# Patient Record
Sex: MALE | Race: OTHER | URBAN - METROPOLITAN AREA
[De-identification: names, ages, dates, MRNs, and addresses within clinical notes are randomized per-mention and may not be internally consistent; named-entity substitution may affect disease eponyms.]

---
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
## 2021-02-24 ENCOUNTER — EMERGENCY (EMERGENCY)
Facility: HOSPITAL | Age: 21
LOS: 0 days | Discharge: HOME | End: 2021-02-24
Attending: EMERGENCY MEDICINE
Payer: MEDICAID

## 2021-02-24 VITALS — HEIGHT: 64 IN | WEIGHT: 145.95 LBS

## 2021-02-24 VITALS
TEMPERATURE: 99 F | RESPIRATION RATE: 16 BRPM | SYSTOLIC BLOOD PRESSURE: 119 MMHG | DIASTOLIC BLOOD PRESSURE: 56 MMHG | HEART RATE: 56 BPM | OXYGEN SATURATION: 100 %

## 2021-02-24 DIAGNOSIS — S60.948A: ICD-10-CM

## 2021-02-24 DIAGNOSIS — S60.921A UNSPECIFIED SUPERFICIAL INJURY OF RIGHT HAND, INITIAL ENCOUNTER: ICD-10-CM
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
## 2021-02-24 PROCEDURE — 29125 APPL SHORT ARM SPLINT STATIC: CPT | Mod: F5

## 2021-02-24 PROCEDURE — 73140 X-RAY EXAM OF FINGER(S): CPT | Mod: RT

## 2021-02-24 PROCEDURE — 73130 X-RAY EXAM OF HAND: CPT | Mod: 26,RT

## 2021-02-24 PROCEDURE — 99283 EMERGENCY DEPT VISIT LOW MDM: CPT

## 2021-02-24 PROCEDURE — 99284 EMERGENCY DEPT VISIT MOD MDM: CPT | Mod: 25

## 2021-02-24 PROCEDURE — 73140 X-RAY EXAM OF FINGER(S): CPT | Mod: 26,59,RT

## 2021-02-24 PROCEDURE — 29125 APPL SHORT ARM SPLINT STATIC: CPT

## 2021-02-24 PROCEDURE — 73130 X-RAY EXAM OF HAND: CPT | Mod: RT

## 2021-02-24 NOTE — ED STATDOCS - NSFOLLOWUPINSTRUCTIONS_ED_ALL_ED_FT
Contusión    Contusion      Lulu contusión es un hematoma profundo. Las contusiones son el resultado de un traumatismo cerrado en los tejidos y las fibras musculares que están debajo de la piel. La lesión causa lulu hemorragia debajo de la piel. La piel sobre la contusión puede ponerse de color james, shelia o amarillo. Las lesiones menores le causarán lulu contusión indolora; sin embargo, las lesiones más graves causan contusiones en las que el dolor y la hinchazón pueden prolongarse enoc algunas semanas.      Siga estas indicaciones en bernard casa:    Esté atento a cualquier cambio en los síntomas. Informe a bernard médico acerca de candis síntomas. [place] estas medidas para aliviar el dolor.      Control del dolor, el entumecimiento y la hinchazón    •Use reposo, aplicación de hielo y aplicación de presión (compresión), y poner en alto (elevar) la yuliana lesionada. Generalmente, esto se conoce sharif la estrategia de RHCE.  •Mantenga la yuliana de la lesión en reposo. Retome candis actividades normales según lo indicado por el médico. Pregúntele al médico qué actividades son seguras para usted.    •Si se lo indican, aplique hielo sobre la yuliana de la lesión:  •Ponga el hielo en lulu bolsa plástica.      •Coloque lulu toalla entre la piel y la bolsa.      •Coloque el hielo enoc 20 minutos, 2 a 3 veces al día.        •Si se lo indican, ejerza lulu compresión suave en la yuliana de la lesión con lulu venda elástica. Asegúrese de que la venda no esté muy ajustada. Quítese y vuelva a colocarse la venda sharif se lo haya indicado el médico.      •Si es posible, cuando esté sentado o acostado, levante (eleve) la yuliana lesionada por encima del nivel del corazón.        Indicaciones generales     •[place] los medicamentos de venta williams y los recetados solamente sharif se lo haya indicado el médico.      •Concurra a todas las visitas de control sharif se lo haya indicado el médico. [place] es importante.        Comuníquese con un médico si:    •Los síntomas no mejoran después de varios días de tratamiento.      •Candis síntomas empeoran.      •Tiene dificultad para  la yuliana lesionada.        Solicite ayuda inmediatamente si:    •Siente dolor intenso.      •Siente adormecimiento en lulu mano o un pie.      •La mano o el pie están pálidos o fríos.        Resumen    •Lulu contusión es un hematoma profundo.      •Las contusiones son el resultado de un traumatismo cerrado en los tejidos y las fibras musculares que están debajo de la piel.      •El tratamiento es hacer reposo, aplicarse hielo, compresión y elevar la yuliana afectada. Es posible que le den analgésicos de venta williams.      •Comuníquese con un médico si los síntomas no mejoran o si empeoran.       •Solicite ayuda de inmediato si tiene dolor intenso, entumecimiento o si la yuliana se torna pálida o fría.      Esta información no tiene sharif fin reemplazar el consejo del médico. Asegúrese de hacerle al médico cualquier pregunta que tenga.

## 2021-02-24 NOTE — ED STATDOCS - PATIENT PORTAL LINK FT
You can access the FollowMyHealth Patient Portal offered by [facility] by registering at the following website: [email or web address]. By joining [employer]’s FollowMyHealth portal, you will also be able to view your health information using other applications (apps) compatible with our system.
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
## 2021-02-24 NOTE — ED STATDOCS - CARE PROVIDER_API CALL
Ghislaine Wilson)  [facility]  [address] [place] [address]  Phone: [phone]  Fax: [phone]  Follow Up Time:
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
## 2021-02-24 NOTE — ED STATDOCS - CLINICAL SUMMARY MEDICAL DECISION MAKING FREE TEXT BOX
Will evaluate for fracture, dispo pending XR, likely will need thumb spica Will evaluate for fracture to base of thumb.  No ligamentous laxity.  Unclear exact mechanism of injury to thumb.  Will require thumb spica and outpatient f/u, dispo pending XR.

## 2021-06-24 PROBLEM — Z00.00 ENCOUNTER FOR PREVENTIVE HEALTH EXAMINATION: Status: ACTIVE | Noted: 2021-06-24

## 2021-07-09 ENCOUNTER — APPOINTMENT (OUTPATIENT)
Age: 21
End: 2021-07-09

## 2021-07-20 ENCOUNTER — INPATIENT (INPATIENT)
Facility: HOSPITAL | Age: 21
LOS: 21 days | Discharge: HOME | DRG: 751 | End: 2021-08-11
Attending: PSYCHIATRY & NEUROLOGY | Admitting: PSYCHIATRY & NEUROLOGY
Payer: MEDICAID

## 2021-07-20 VITALS
RESPIRATION RATE: 16 BRPM | DIASTOLIC BLOOD PRESSURE: 61 MMHG | HEIGHT: 64 IN | TEMPERATURE: 98 F | HEART RATE: 75 BPM | WEIGHT: 139.99 LBS | SYSTOLIC BLOOD PRESSURE: 113 MMHG | OXYGEN SATURATION: 98 %

## 2021-07-20 NOTE — ED ADULT TRIAGE NOTE - CHIEF COMPLAINT QUOTE
Pt. BIBEMS from home c/o abnormal behavior. As per EMS, family called because they thought pt. "took something".  used [id number], pt. A&Ox4 in triage. Denies drug/alcohol use. Acting appropriately.

## 2021-07-21 DIAGNOSIS — F23 BRIEF PSYCHOTIC DISORDER: ICD-10-CM

## 2021-07-21 DIAGNOSIS — F29 UNSPECIFIED PSYCHOSIS NOT DUE TO A SUBSTANCE OR KNOWN PHYSIOLOGICAL CONDITION: ICD-10-CM

## 2021-07-21 LAB
ALBUMIN SERPL ELPH-MCNC: 4.4 G/DL — SIGNIFICANT CHANGE UP (ref 3.3–5)
ALP SERPL-CCNC: 54 U/L — SIGNIFICANT CHANGE UP (ref 40–120)
ALT FLD-CCNC: 27 U/L — SIGNIFICANT CHANGE UP (ref 12–78)
ANION GAP SERPL CALC-SCNC: 3 MMOL/L — LOW (ref 5–17)
APAP SERPL-MCNC: < 2 UG/ML (ref 10–30)
APPEARANCE UR: CLEAR — SIGNIFICANT CHANGE UP
AST SERPL-CCNC: 32 U/L — SIGNIFICANT CHANGE UP (ref 15–37)
BASOPHILS # BLD AUTO: 0.02 K/UL — SIGNIFICANT CHANGE UP (ref 0–0.2)
BASOPHILS NFR BLD AUTO: 0.2 % — SIGNIFICANT CHANGE UP (ref 0–2)
BILIRUB SERPL-MCNC: 0.4 MG/DL — SIGNIFICANT CHANGE UP (ref 0.2–1.2)
BILIRUB UR-MCNC: NEGATIVE — SIGNIFICANT CHANGE UP
BUN SERPL-MCNC: 13 MG/DL — SIGNIFICANT CHANGE UP (ref 7–23)
CALCIUM SERPL-MCNC: 9 MG/DL — SIGNIFICANT CHANGE UP (ref 8.5–10.1)
CHLORIDE SERPL-SCNC: 105 MMOL/L — SIGNIFICANT CHANGE UP (ref 96–108)
CO2 SERPL-SCNC: 27 MMOL/L — SIGNIFICANT CHANGE UP (ref 22–31)
COLOR SPEC: YELLOW — SIGNIFICANT CHANGE UP
CREAT SERPL-MCNC: 1.05 MG/DL — SIGNIFICANT CHANGE UP (ref 0.5–1.3)
DIFF PNL FLD: ABNORMAL
EOSINOPHIL # BLD AUTO: 0.04 K/UL — SIGNIFICANT CHANGE UP (ref 0–0.5)
EOSINOPHIL NFR BLD AUTO: 0.5 % — SIGNIFICANT CHANGE UP (ref 0–6)
ETHANOL SERPL-MCNC: <10 MG/DL — SIGNIFICANT CHANGE UP (ref 0–10)
GLUCOSE SERPL-MCNC: 101 MG/DL — HIGH (ref 70–99)
GLUCOSE UR QL: NEGATIVE MG/DL — SIGNIFICANT CHANGE UP
HCT VFR BLD CALC: 42.7 % — SIGNIFICANT CHANGE UP (ref 39–50)
HGB BLD-MCNC: 14.6 G/DL — SIGNIFICANT CHANGE UP (ref 13–17)
IMM GRANULOCYTES NFR BLD AUTO: 0.2 % — SIGNIFICANT CHANGE UP (ref 0–1.5)
KETONES UR-MCNC: NEGATIVE — SIGNIFICANT CHANGE UP
LEUKOCYTE ESTERASE UR-ACNC: NEGATIVE — SIGNIFICANT CHANGE UP
LYMPHOCYTES # BLD AUTO: 1.7 K/UL — SIGNIFICANT CHANGE UP (ref 1–3.3)
LYMPHOCYTES # BLD AUTO: 20.6 % — SIGNIFICANT CHANGE UP (ref 13–44)
MCHC RBC-ENTMCNC: 29.9 PG — SIGNIFICANT CHANGE UP (ref 27–34)
MCHC RBC-ENTMCNC: 34.2 GM/DL — SIGNIFICANT CHANGE UP (ref 32–36)
MCV RBC AUTO: 87.3 FL — SIGNIFICANT CHANGE UP (ref 80–100)
MONOCYTES # BLD AUTO: 0.48 K/UL — SIGNIFICANT CHANGE UP (ref 0–0.9)
MONOCYTES NFR BLD AUTO: 5.8 % — SIGNIFICANT CHANGE UP (ref 2–14)
NEUTROPHILS # BLD AUTO: 6.01 K/UL — SIGNIFICANT CHANGE UP (ref 1.8–7.4)
NEUTROPHILS NFR BLD AUTO: 72.7 % — SIGNIFICANT CHANGE UP (ref 43–77)
NITRITE UR-MCNC: NEGATIVE — SIGNIFICANT CHANGE UP
PCP SPEC-MCNC: SIGNIFICANT CHANGE UP
PH UR: 6 — SIGNIFICANT CHANGE UP (ref 5–8)
PLATELET # BLD AUTO: 250 K/UL — SIGNIFICANT CHANGE UP (ref 150–400)
POTASSIUM SERPL-MCNC: 3.7 MMOL/L — SIGNIFICANT CHANGE UP (ref 3.5–5.3)
POTASSIUM SERPL-SCNC: 3.7 MMOL/L — SIGNIFICANT CHANGE UP (ref 3.5–5.3)
PROT SERPL-MCNC: 7.6 GM/DL — SIGNIFICANT CHANGE UP (ref 6–8.3)
PROT UR-MCNC: NEGATIVE MG/DL — SIGNIFICANT CHANGE UP
RBC # BLD: 4.89 M/UL — SIGNIFICANT CHANGE UP (ref 4.2–5.8)
RBC # FLD: 11.8 % — SIGNIFICANT CHANGE UP (ref 10.3–14.5)
SALICYLATES SERPL-MCNC: <1.7 MG/DL (ref 2.8–20)
SARS-COV-2 RNA SPEC QL NAA+PROBE: SIGNIFICANT CHANGE UP
SODIUM SERPL-SCNC: 135 MMOL/L — SIGNIFICANT CHANGE UP (ref 135–145)
SP GR SPEC: 1.01 — SIGNIFICANT CHANGE UP (ref 1.01–1.02)
UROBILINOGEN FLD QL: NEGATIVE MG/DL — SIGNIFICANT CHANGE UP
WBC # BLD: 8.27 K/UL — SIGNIFICANT CHANGE UP (ref 3.8–10.5)
WBC # FLD AUTO: 8.27 K/UL — SIGNIFICANT CHANGE UP (ref 3.8–10.5)

## 2021-07-21 PROCEDURE — 99222 1ST HOSP IP/OBS MODERATE 55: CPT

## 2021-07-21 PROCEDURE — 93010 ELECTROCARDIOGRAM REPORT: CPT

## 2021-07-21 PROCEDURE — 80061 LIPID PANEL: CPT

## 2021-07-21 PROCEDURE — 86769 SARS-COV-2 COVID-19 ANTIBODY: CPT

## 2021-07-21 PROCEDURE — 84443 ASSAY THYROID STIM HORMONE: CPT

## 2021-07-21 PROCEDURE — 70450 CT HEAD/BRAIN W/O DYE: CPT | Mod: 26,MG

## 2021-07-21 PROCEDURE — 80053 COMPREHEN METABOLIC PANEL: CPT

## 2021-07-21 PROCEDURE — 93005 ELECTROCARDIOGRAM TRACING: CPT

## 2021-07-21 PROCEDURE — 83036 HEMOGLOBIN GLYCOSYLATED A1C: CPT

## 2021-07-21 PROCEDURE — 36415 COLL VENOUS BLD VENIPUNCTURE: CPT

## 2021-07-21 PROCEDURE — 85025 COMPLETE CBC W/AUTO DIFF WBC: CPT

## 2021-07-21 PROCEDURE — G1004: CPT

## 2021-07-21 RX ORDER — OLANZAPINE 15 MG/1
2.5 TABLET, FILM COATED ORAL AT BEDTIME
Refills: 0 | Status: DISCONTINUED | OUTPATIENT
Start: 2021-07-21 | End: 2021-07-24

## 2021-07-21 RX ORDER — DIPHENHYDRAMINE HCL 50 MG
50 CAPSULE ORAL EVERY 6 HOURS
Refills: 0 | Status: DISCONTINUED | OUTPATIENT
Start: 2021-07-21 | End: 2021-07-21

## 2021-07-21 RX ORDER — OLANZAPINE 15 MG/1
2.5 TABLET, FILM COATED ORAL EVERY 6 HOURS
Refills: 0 | Status: DISCONTINUED | OUTPATIENT
Start: 2021-07-21 | End: 2021-07-22
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
## 2021-07-21 NOTE — PROGRESS NOTE BEHAVIORAL HEALTH - NSBHADDHXPSYSOCFT_PSY_A_CORE
Pt is living with his uncle and cousins in [place]. The pt emigrated from [facility] 5 years ago.   The pt is working as a  and has been attending college in [place].
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
## 2021-07-21 NOTE — ED ADULT NURSE NOTE - NSIMPLEMENTINTERV_GEN_ALL_ED
Implemented All Universal Safety Interventions:  [place] to call system. Call bell, personal items and telephone within reach. Instruct patient to call for assistance. Room bathroom lighting operational. Non-slip footwear when patient is off stretcher. Physically safe environment: no spills, clutter or unnecessary equipment. Stretcher in lowest position, wheels locked, appropriate side rails in place.

## 2021-07-21 NOTE — ED BEHAVIORAL HEALTH NOTE - BEHAVIORAL HEALTH NOTE
Consult requested by EM Attending Dr. Araujo at 2:42. Telepsychiatry attempted to consult at 3:20 but unable to initiate consult due to patient not being set up with Telepsych cart. ED staff aware.
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
## 2021-07-21 NOTE — PROGRESS NOTE BEHAVIORAL HEALTH - NSBHFUPADDHPIFT_PSY_A_CORE
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 He is brought in by his uncle for bizarre behavior and hearing voices.     Patient is a poor historian and superficially cooperative; it is unclear if this is due to drowsiness, psychotic or thought disorder, or language barriers. He denies any current mood sx or hallucinations. Unable to complete full evaluation as patient was unable to participate fully.

## 2021-07-21 NOTE — ED BEHAVIORAL HEALTH ASSESSMENT NOTE - RISK ASSESSMENT
COVID Exposure Screen- Patient  1.	*Have you had a COVID-19 test in the last 90 days?  (  ) Yes   (  ) No   ( x ) Unknown- Reason: _____  IF YES PROCEED TO QUESTION #2. IF NO OR UNKNOWN, PLEASE SKIP TO QUESTION #3.  2.	Date of test(s) and result(s): ________  3.	*Have you tested positive for COVID-19 antibodies? (  ) Yes   (  ) No   (x  ) Unknown- Reason: _____  IF YES PROCEED TO QUESTION #4. IF NO or UNKNOWN, PLEASE SKIP TO QUESTION #5.  4.	Date of positive antibody test: ________  5.	*Have you received 2 doses of the COVID-19 vaccine? (  ) Yes   ( ) No   ( x ) Unknown- Reason: _____   IF YES PROCEED TO QUESTION #6. IF NO or UNKNOWN, PLEASE SKIP TO QUESTION #7.  6.	Date of second dose: ________  7.	*In the past 10 days, have you been around anyone with a positive COVID-19 test?* (  ) Yes   (  ) No   ( x ) Unknown- Reason: ____  IF YES PROCEED TO QUESTION #8. IF NO or UNKNOWN, PLEASE SKIP TO QUESTION #13.  8.	Were you within 6 feet of them for at least 15 minutes? (  ) Yes   (  ) No   (  ) Unknown- Reason: _____  9.	Have you provided care for them? (  ) Yes   (  ) No   (  ) Unknown- Reason: ______  10.	Have you had direct physical contact with them (touched, hugged, or kissed them)? (  ) Yes   (  ) No    (  ) Unknown- Reason: _____  11.	Have you shared eating or drinking utensils with them? (  ) Yes   (  ) No    (  ) Unknown- Reason: ____  12.	Have they sneezed, coughed, or somehow gotten respiratory droplets on you? (  ) Yes   (  ) No    (  ) Unknown- Reason: ______  13.	*Have you been out of [place] within the past 10 days?* (  ) Yes   (  ) No   (  x) Unknown- Reason: _____  IF YES PLEASE ANSWER THE FOLLOWING QUESTIONS:  14.	Which state/country have you been to? ______  15.	Were you there over 24 hours? (  ) Yes   (  ) No    (  ) Unknown- Reason: ______  16.	Date of return [facility]: ______ Unable to determine Suicide Risk unable to assess     COVID Exposure Screen- Patient  1.	*Have you had a COVID-19 test in the last 90 days?  (  ) Yes   (  ) No   ( x ) Unknown- Reason: _____  IF YES PROCEED TO QUESTION #2. IF NO OR UNKNOWN, PLEASE SKIP TO QUESTION #3.  2.	Date of test(s) and result(s): ________  3.	*Have you tested positive for COVID-19 antibodies? (  ) Yes   (  ) No   (x  ) Unknown- Reason: _____  IF YES PROCEED TO QUESTION #4. IF NO or UNKNOWN, PLEASE SKIP TO QUESTION #5.  4.	Date of positive antibody test: ________  5.	*Have you received 2 doses of the COVID-19 vaccine? (  ) Yes   ( ) No   ( x ) Unknown- Reason: _____   IF YES PROCEED TO QUESTION #6. IF NO or UNKNOWN, PLEASE SKIP TO QUESTION #7.  6.	Date of second dose: ________  7.	*In the past 10 days, have you been around anyone with a positive COVID-19 test?* (  ) Yes   (  ) No   ( x ) Unknown- Reason: ____  IF YES PROCEED TO QUESTION #8. IF NO or UNKNOWN, PLEASE SKIP TO QUESTION #13.  8.	Were you within 6 feet of them for at least 15 minutes? (  ) Yes   (  ) No   (  ) Unknown- Reason: _____  9.	Have you provided care for them? (  ) Yes   (  ) No   (  ) Unknown- Reason: ______  10.	Have you had direct physical contact with them (touched, hugged, or kissed them)? (  ) Yes   (  ) No    (  ) Unknown- Reason: _____  11.	Have you shared eating or drinking utensils with them? (  ) Yes   (  ) No    (  ) Unknown- Reason: ____  12.	Have they sneezed, coughed, or somehow gotten respiratory droplets on you? (  ) Yes   (  ) No    (  ) Unknown- Reason: ______  13.	*Have you been out of [place] within the past 10 days?* (  ) Yes   (  ) No   (  x) Unknown- Reason: _____  IF YES PLEASE ANSWER THE FOLLOWING QUESTIONS:  14.	Which state/country have you been to? ______  15.	Were you there over 24 hours? (  ) Yes   (  ) No    (  ) Unknown- Reason: ______  16.	Date of return to [facility]: ______

## 2021-07-21 NOTE — ED BEHAVIORAL HEALTH ASSESSMENT NOTE - HPI (INCLUDE ILLNESS QUALITY, SEVERITY, DURATION, TIMING, CONTEXT, MODIFYING FACTORS, ASSOCIATED SIGNS AND SYMPTOMS)
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 He is brought in by his uncle for bizarre behavior and hearing voices.     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 He is brought in by his uncle for bizarre behavior and hearing voices.     On exam, patient is drowsy and superficially cooperative; evaluation is limited as he is a poor historian. Patient reports he doesn't know why his uncle brought him to the ER, other than he was "looking weird". Patient reports he feels fine and denies any mood sx or changes in sleep, appetite, or energy. When asked about joining a gang or hearing voices, patient denies AVH, paranoia, or delusions. Patient required questions to be repeated to him several times, unclear if this is due to psychotic sx, thought blocking, drowsiness, or comprehension. Unable to complete evaluation.     No collateral available as patient cannot recall uncle's number.

## 2021-07-21 NOTE — PROGRESS NOTE BEHAVIORAL HEALTH - ACTIVATING EVENTS/STRESSORS
Triggering events leading to humiliation, shame, and/or despair (e.g. Loss of relationship, financial or health status) (real or anticipated)/Current or pending social isolation/Substance intoxication or withdrawal

## 2021-07-21 NOTE — ED ADULT NURSE NOTE - CHIEF COMPLAINT QUOTE
Pt. BIBEMS from home c/o abnormal behavior. As per EMS, family called because they thought pt. "took something".  used [id number], pt. A&Ox4 in triage. Denies drug/alcohol use. Acting appropriately.

## 2021-07-21 NOTE — PROGRESS NOTE BEHAVIORAL HEALTH - OTHER
minimally cooperative due to current apparent pt internal preoccupation unable to assess due to pt limited ability to participate in evaluation due to thought disorganization as above paucity of speech overall

## 2021-07-21 NOTE — PROGRESS NOTE BEHAVIORAL HEALTH - SUMMARY
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 He is brought in by his uncle for bizarre behavior and hearing voices.     Patient is a poor historian and superficially cooperative; it is unclear if this is due to drowsiness, psychotic and  thought disorder, or possible unknown substances abuse.   No collaterals available on admission.     PLan:   admit to 5N 9.39 for observation and psychosis   Start Zyprexa 2.5 mg at HS,   No need for CO on the unit,.

## 2021-07-21 NOTE — ED PROVIDER NOTE - PROGRESS NOTE DETAILS
case d/w Dr Lobo telepsych and will evaluate pt pt evaluated by telepsych and recommend hold for collateral

## 2021-07-21 NOTE — PROGRESS NOTE BEHAVIORAL HEALTH - SUMMARY
Patient is a 21yo Citizen of [place] male, Bruneian speaking, domiciled with uncle and cousins, reports goes to college in [place], employed at a factory, with no formal pph, no prior psych admissions, no current outpatient, denies past SAs, denies substance use, and no known pmh. He is brought in by his uncle for bizarre behavior and hearing voices.     Patient is a poor historian and superficially cooperative; it is unclear if this is due to drowsiness, psychotic or thought disorder, or language barriers. He denies any current mood sx or hallucinations. Unable to complete full evaluation as patient was unable to participate fully.     Hold for reassess for additional collateral from uncle and when patient may be able to better engage in interview. The pt. is a 20 year-old Ethiopian male  in the  x 5 years and living with his family in [place]. 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 ED on 7/21/2021  by his uncle for  pt reportedly new onset bizarre behavior and reported AH.      The pt was largely unable to participate in his evaluation due to possible evident pt internal preoccupation and difficulty with attention despite use of the  services. The pt was seen by Psychiatry and admitted to  inpatient psychiatry on 7/21/2021 on a 9.39 emergency legal status for acute pt. safety  and for further pt evaluation and treatment of his psychotic disorder.

## 2021-07-21 NOTE — ED BEHAVIORAL HEALTH ASSESSMENT NOTE - SUMMARY
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 He is brought in by his uncle for bizarre behavior and hearing voices.     Patient is a poor historian and superficially cooperative; it is unclear if this is due to drowsiness, psychotic or thought disorder, or language barriers. He denies any current mood sx or hallucinations. Unable to complete full evaluation as patient was unable to participate fully.     Hold for reassess for additional collateral from uncle and when patient may be able to better engage in interview.

## 2021-07-21 NOTE — ED BEHAVIORAL HEALTH ASSESSMENT NOTE - DESCRIPTION
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

## 2021-07-21 NOTE — ED BEHAVIORAL HEALTH ASSESSMENT NOTE - PAST PSYCHOTROPIC MEDICATION
Left message to notify patient of results. Let her know if she had any questions she could call me back. Summary   Left ventricular function is normal, EF is estimated at 55-60%. Grade I diastolic dysfunction. Mild left ventricular hypertrophy. Bi atrial enlargement noted. The mitral valve appears to slightly prolapse. Moderate to severe mitral and tricuspid regurgitation is present. Aneurysmal interatrial septum. RVSP is 34 mmHg. No evidence of pericardial effusion.       Signature      ------------------------------------------------------------------   Electronically signed by Forest Russo MD (Interpreting   physician) on 03/17/2021 at 12:45 PM
denies

## 2021-07-21 NOTE — PROGRESS NOTE BEHAVIORAL HEALTH - NSBHFUPSTRENGTHS_PSY_A_CORE
In good physical health
Attempting to realize his/her potential/Has supportive interpersonal relationships with family, friends or peers/Steady employment

## 2021-07-21 NOTE — PATIENT PROFILE BEHAVIORAL HEALTH - NSBHHXAWOL_PSY_A_CORE
[de-identified] : This is a 89-year-old female with a minimally displaced left distal clavicle fracture.  The treatment is nonoperative with brief immobilization in sling and nonweightbearing for the next 3 weeks.  I'd like to see her again at that time for repeat x-rays and to possibly advance her range of motion and prescribed physical therapy.  Pain control on a as needed basis.
no

## 2021-07-21 NOTE — PROGRESS NOTE BEHAVIORAL HEALTH - NSBHFUPADDHPIFT_PSY_A_CORE
The pt. is a 20 year-old Slovak male  in the  x 5 years and living with his family in [place]. 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      The pt was largely unable to participate in his evaluation due to possible evident pt internal preoccupation and difficulty with attention despite use of the  services. The pt was seen by Psychiatry and admitted to  inpatient psychiatry on 7/21/2021 on a 9.39 emergency legal status for acute pt. safety  and for further pt evaluation and treatment of his psychotic disorder.     Patient is a poor historian and superficially cooperative; it is unclear if this is due to drowsiness, psychotic or thought disorder, or language barriers. He denies any current mood sx or hallucinations. Unable to complete full evaluation as patient was unable to participate fully. The pt. is a 20 year-old Sudanese male  in the  x 5 years and living with his family in [place]. 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      The pt was largely unable to participate in his evaluation due to possible evident pt internal preoccupation and difficulty with attention despite use of the  services. The pt was seen by Psychiatry and admitted to  inpatient psychiatry on 7/21/2021 on a 9.39 emergency legal status for acute pt. safety  and for further pt evaluation and treatment of his psychotic disorder.

## 2021-07-22 LAB
CHOLEST SERPL-MCNC: 142 MG/DL — SIGNIFICANT CHANGE UP
HDLC SERPL-MCNC: 39 MG/DL — LOW
LIPID PNL WITH DIRECT LDL SERPL: 88 MG/DL — SIGNIFICANT CHANGE UP
NON HDL CHOLESTEROL: 103 MG/DL — SIGNIFICANT CHANGE UP
TRIGL SERPL-MCNC: 75 MG/DL — SIGNIFICANT CHANGE UP

## 2021-07-22 PROCEDURE — 99222 1ST HOSP IP/OBS MODERATE 55: CPT

## 2021-07-22 PROCEDURE — 99232 SBSQ HOSP IP/OBS MODERATE 35: CPT

## 2021-07-22 PROCEDURE — 93010 ELECTROCARDIOGRAM REPORT: CPT

## 2021-07-22 RX ORDER — HALOPERIDOL DECANOATE 100 MG/ML
5 INJECTION INTRAMUSCULAR EVERY 6 HOURS
Refills: 0 | Status: DISCONTINUED | OUTPATIENT
Start: 2021-07-22 | End: 2021-08-11

## 2021-07-22 RX ORDER — DIPHENHYDRAMINE HCL 50 MG
50 CAPSULE ORAL EVERY 6 HOURS
Refills: 0 | Status: DISCONTINUED | OUTPATIENT
Start: 2021-07-22 | End: 2021-08-11

## 2021-07-22 RX ORDER — MAGNESIUM HYDROXIDE 400 MG/1
30 TABLET, CHEWABLE ORAL DAILY
Refills: 0 | Status: DISCONTINUED | OUTPATIENT
Start: 2021-07-22 | End: 2021-08-11

## 2021-07-22 RX ORDER — MULTIVIT-MIN/FERROUS GLUCONATE 9 MG/15 ML
1 LIQUID (ML) ORAL DAILY
Refills: 0 | Status: DISCONTINUED | OUTPATIENT
Start: 2021-07-22 | End: 2021-08-11

## 2021-07-22 RX ORDER — ACETAMINOPHEN 500 MG
650 TABLET ORAL EVERY 6 HOURS
Refills: 0 | Status: DISCONTINUED | OUTPATIENT
Start: 2021-07-22 | End: 2021-08-11

## 2021-07-22 RX ADMIN — OLANZAPINE 2.5 MILLIGRAM(S): 15 TABLET, FILM COATED ORAL at 20:34

## 2021-07-22 NOTE — PROGRESS NOTE BEHAVIORAL HEALTH - NSBHLOCFT_PSY_A_CORE
pt is alert and oriented to person but unable to participate further in assessment due to apparent  thought disorder
pt is alert and oriented to person  and somewhat to place but unable to participate fully  in assessment due to apparent  thought disorder

## 2021-07-22 NOTE — H&P ADULT - NSHPSOCIALHISTORY_GEN_ALL_CORE
Pt lives with uncle and cousins. His parents are still in [facility]. He has lived in the [place] for the last 5 years and works in [employer]. Denies tobacco, alcohol, illicit drug use.

## 2021-07-22 NOTE — H&P ADULT - NSHPLABSRESULTS_GEN_ALL_CORE
Vital Signs Last 24 Hrs  T(C): 36.9 (2021 07:42), Max: 36.9 (2021 07:42)  T(F): 98.5 (2021 07:42), Max: 98.5 (2021 07:42)  HR: --  BP: --  BP(mean): --  RR: 14 (2021 07:42) (14 - 14)  SpO2: 100% (2021 07:42) (100% - 100%)\      Labs:                        14.6   8.27  )-----------( 250      ( 2021 00:56 )             42.7     07-21    135  |  105  |  13  ----------------------------<  101<H>  3.7   |  27  |  1.05    Ca    9.0      2021 00:56    TPro  7.6  /  Alb  4.4  /  TBili  0.4  /  DBili  x   /  AST  32  /  ALT  27  /  AlkPhos  54  07-21    LIVER FUNCTIONS - ( 2021 00:56 )  Alb: 4.4 g/dL / Pro: 7.6 gm/dL / ALK PHOS: 54 U/L / ALT: 27 U/L / AST: 32 U/L / GGT: x           Urinalysis Basic - ( 2021 02:08 )    Color: Yellow / Appearance: Clear / S.010 / pH: x  Gluc: x / Ketone: Negative  / Bili: Negative / Urobili: Negative mg/dL   Blood: x / Protein: Negative mg/dL / Nitrite: Negative   Leuk Esterase: Negative / RBC: 0-2 /HPF / WBC Negative   Sq Epi: x / Non Sq Epi: x / Bacteria: Occasional    EKG: NSR      < from: CT Head No Cont (21 @ 00:39) >    IMPRESSION:    No acute findings on noncontrast CT of the brain.    < end of copied text > Labs:                        14.6   8.27  )-----------( 250      ( 2021 00:56 )             42.7         135  |  105  |  13  ----------------------------<  101<H>  3.7   |  27  |  1.05    Ca    9.0      2021 00:56    TPro  7.6  /  Alb  4.4  /  TBili  0.4  /  DBili  x   /  AST  32  /  ALT  27  /  AlkPhos  54      LIVER FUNCTIONS - ( 2021 00:56 )  Alb: 4.4 g/dL / Pro: 7.6 gm/dL / ALK PHOS: 54 U/L / ALT: 27 U/L / AST: 32 U/L / GGT: x           Urinalysis Basic - ( 2021 02:08 )    Color: Yellow / Appearance: Clear / S.010 / pH: x  Gluc: x / Ketone: Negative  / Bili: Negative / Urobili: Negative mg/dL   Blood: x / Protein: Negative mg/dL / Nitrite: Negative   Leuk Esterase: Negative / RBC: 0-2 /HPF / WBC Negative   Sq Epi: x / Non Sq Epi: x / Bacteria: Occasional    EKG: NSR at 62      < from: CT Head No Cont (21 @ 00:39) >    IMPRESSION:    No acute findings on noncontrast CT of the brain.    < end of copied text >    MEDICATIONS  (STANDING):  multivitamin/minerals 1 Tablet(s) Oral daily  OLANZapine 2.5 milliGRAM(s) Oral at bedtime    MEDICATIONS  (PRN):  acetaminophen   Tablet .. 650 milliGRAM(s) Oral every 6 hours PRN Temp greater or equal to 38C (100.4F), Mild Pain (1 - 3), Moderate Pain (4 - 6)  aluminum hydroxide/magnesium hydroxide/simethicone Suspension 30 milliLiter(s) Oral every 6 hours PRN Dyspepsia  diphenhydrAMINE 50 milliGRAM(s) Oral every 6 hours PRN Extrapyramidal prophylaxis/psychosis related agitation  diphenhydrAMINE   Injectable 50 milliGRAM(s) IntraMuscular every 6 hours PRN Extrapyramidal prophylaxis/psychosis related agitation  haloperidol     Tablet 5 milliGRAM(s) Oral every 6 hours PRN moderate  psychosis related agitation  haloperidol    Injectable 5 milliGRAM(s) IntraMuscular every 6 hours PRN severe psychosis related agitation  LORazepam     Tablet 0.5 milliGRAM(s) Oral every 6 hours PRN moderate anxiety  LORazepam   Injectable 1 milliGRAM(s) IntraMuscular every 6 hours PRN severe psychosis related anxiety/agitation  magnesium hydroxide Suspension 30 milliLiter(s) Oral daily PRN Constipation

## 2021-07-22 NOTE — H&P ADULT - NSHPPHYSICALEXAM_GEN_ALL_CORE
GEN: lying in bed, NAD, cooperative  HEENT:  NC/AT, pupils equal and reactive, EOMI  CV:  +S1, +S2, RRR  RESP:   lungs clear to auscultation bilaterally, no wheeze, rales, rhonchi   BREAST:  not examined  GI:  abdomen soft, non-tender, non-distended, normoactive BS  RECTAL:  not examined  :  not examined  MSK:   normal muscle tone  EXT:  no edema, no calf tenderness  NEURO:  AAOX3, no focal neurological deficits  SKIN:  no rashes Vital Signs Last 24 Hrs  T(C): 36.9 (22 Jul 2021 07:42), Max: 36.9 (22 Jul 2021 07:42)  T(F): 98.5 (22 Jul 2021 07:42), Max: 98.5 (22 Jul 2021 07:42)  HR: --  BP: --  BP(mean): --  RR: 14 (22 Jul 2021 07:42) (14 - 14)  SpO2: 100% (22 Jul 2021 07:42) (100% - 100%)    GEN: lying in bed, NAD, cooperative  HEENT:  NC/AT, pupils equal and reactive, EOMI  CV:  +S1, +S2, RRR  RESP:   lungs clear to auscultation bilaterally, no wheeze, rales, rhonchi   BREAST:  not examined  GI:  abdomen soft, non-tender, non-distended, normoactive BS  RECTAL:  not examined  :  not examined  MSK:   normal muscle tone  EXT:  no edema, no calf tenderness  NEURO:  AAOX3, no focal neurological deficits  SKIN:  no rashes

## 2021-07-22 NOTE — H&P ADULT - ASSESSMENT
#AMS, auditory hallucinations  - medications as per psychology  - CTH neg for acute pathology  - UA neg  - Utox neg  - Lipid panel, TSH wnl  - a1c 5.0    #DVT ppx  - IMPROVE Score: 0   - encourage ambulation 20/M, with no PMHx, admitted to Psych service for    #AMS, auditory hallucinations  - medications and Mx as per Psychiatry  - CTH neg for acute pathology  - UA neg  - Utox neg  - TSH wnl  - a1c 5.0    #: Low HDL: encourage healthy diet and exercise once pt is in that state of comprehension  check in 1 yr with PCP    #DVT ppx  - IMPROVE Score: 0   - encourage ambulation    poc discussed with pt, team.  20/M, with no PMHx, admitted to Psych service for    #AMS, auditory hallucinations  - medications and Mx as per Psychiatry  - CTH neg for acute pathology  - UA neg  - Utox neg  - TSH wnl  - a1c 5.0    #: Low HDL: encourage healthy diet and exercise once pt is in that state of comprehension  recheck lipid profile in 1 yr with PCP    #DVT ppx  - IMPROVE Score: 0   - encourage ambulation    poc discussed with pt, team.

## 2021-07-22 NOTE — PROGRESS NOTE BEHAVIORAL HEALTH - OTHER
paucity of speech overall as above minimally cooperative due to current apparent pt internal preoccupation unable to assess due to pt limited ability to participate in evaluation due to thought disorganization

## 2021-07-22 NOTE — PROGRESS NOTE BEHAVIORAL HEALTH - SUMMARY
The pt. is a 20 year-old Anguillan male  in the  x 5 years and living with his family in [place]. 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      The pt was largely unable to participate in his evaluation due to possible evident pt internal preoccupation and difficulty with attention despite use of the  services. The pt was seen by Psychiatry and admitted to  inpatient psychiatry on 7/21/2021 on a 9.39 emergency legal status for acute pt. safety  and for further pt evaluation and treatment of his psychotic disorder.
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
## 2021-07-22 NOTE — H&P ADULT - ATTENDING COMMENTS
20/M with no PNHX, admitted to Psych service for  AMS/auditory Hallucinations: CT head neg, no uti, PNA, infection, neg u tox  Low HDL 39:  encourage healthy diet and exercise once pt is in that state of comprehension; check in 1 yr with PCP  DVt PPX; ambulation 20/M with no PNHX, admitted to Psych service for  AMS/auditory Hallucinations: CT head neg, no uti, PNA, infection, neg u tox  Low HDL 39:  encourage healthy diet and exercise once pt is in that state of comprehension; recheck lipid profile in 1 yr with PCP  DVT PPX; ambulation

## 2021-07-23 PROCEDURE — 99232 SBSQ HOSP IP/OBS MODERATE 35: CPT

## 2021-07-23 RX ADMIN — OLANZAPINE 2.5 MILLIGRAM(S): 15 TABLET, FILM COATED ORAL at 20:48

## 2021-07-23 RX ADMIN — Medication 1 TABLET(S): at 09:14

## 2021-07-23 NOTE — PROGRESS NOTE BEHAVIORAL HEALTH - OTHER
unable to assess due to pt limited ability to participate in evaluation due to thought disorganization as above paucity of speech overall slowly becoming more cooperative as the pt's thought disorder begins to slowly improve.

## 2021-07-23 NOTE — PROGRESS NOTE BEHAVIORAL HEALTH - SUMMARY
The pt. is a 20 year-old Russian male  in the  x 5 years and living with his family in [place]. 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      The pt was largely unable to participate in his evaluation due to possible evident pt internal preoccupation and difficulty with attention despite use of the  services. The pt was seen by Psychiatry and admitted to  inpatient psychiatry on 7/21/2021 on a 9.39 emergency legal status for acute pt. safety  and for further pt evaluation and treatment of his psychotic disorder.

## 2021-07-24 PROCEDURE — 99231 SBSQ HOSP IP/OBS SF/LOW 25: CPT

## 2021-07-24 RX ORDER — OLANZAPINE 15 MG/1
5 TABLET, FILM COATED ORAL AT BEDTIME
Refills: 0 | Status: DISCONTINUED | OUTPATIENT
Start: 2021-07-24 | End: 2021-08-02

## 2021-07-24 RX ADMIN — OLANZAPINE 5 MILLIGRAM(S): 15 TABLET, FILM COATED ORAL at 20:37

## 2021-07-24 RX ADMIN — Medication 1 TABLET(S): at 09:29

## 2021-07-24 NOTE — PROGRESS NOTE BEHAVIORAL HEALTH - OTHER
paucity of speech overall slowly becoming more cooperative as the pt's thought disorder begins to slowly improve. unable to assess due to pt limited ability to participate in evaluation due to thought disorganization as above

## 2021-07-24 NOTE — PROGRESS NOTE BEHAVIORAL HEALTH - SUMMARY
The pt. is a 20 year-old Filipino male  in the  x 5 years and living with his family in [place]. 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      The pt was largely unable to participate in his evaluation due to possible evident pt internal preoccupation and difficulty with attention despite use of the  services. The pt was seen by Psychiatry and admitted to  inpatient psychiatry on 7/21/2021 on a 9.39 emergency legal status for acute pt. safety  and for further pt evaluation and treatment of his psychotic disorder.

## 2021-07-25 PROCEDURE — 99231 SBSQ HOSP IP/OBS SF/LOW 25: CPT

## 2021-07-25 RX ADMIN — Medication 1 TABLET(S): at 09:27

## 2021-07-25 RX ADMIN — OLANZAPINE 5 MILLIGRAM(S): 15 TABLET, FILM COATED ORAL at 21:08

## 2021-07-25 NOTE — PROGRESS NOTE BEHAVIORAL HEALTH - SUMMARY
The pt. is a 20 year-old Mozambican male  in the  x 5 years and living with his family in [place]. 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      The pt was largely unable to participate in his evaluation due to possible evident pt internal preoccupation and difficulty with attention despite use of the  services. The pt was seen by Psychiatry and admitted to  inpatient psychiatry on 7/21/2021 on a 9.39 emergency legal status for acute pt. safety  and for further pt evaluation and treatment of his psychotic disorder.

## 2021-07-25 NOTE — PROGRESS NOTE BEHAVIORAL HEALTH - OTHER
slowly becoming more cooperative as the pt's thought disorder begins to slowly improve. paucity of speech overall unable to assess due to pt limited ability to participate in evaluation due to thought disorganization as above

## 2021-07-26 PROCEDURE — 99232 SBSQ HOSP IP/OBS MODERATE 35: CPT

## 2021-07-26 RX ADMIN — OLANZAPINE 5 MILLIGRAM(S): 15 TABLET, FILM COATED ORAL at 20:45

## 2021-07-26 RX ADMIN — Medication 1 TABLET(S): at 09:23

## 2021-07-26 NOTE — PROGRESS NOTE BEHAVIORAL HEALTH - OTHER
paucity of speech overall as above unable to assess due to pt limited ability to participate in evaluation due to thought disorganization slowly becoming more cooperative as the pt's thought disorder begins to slowly improve.

## 2021-07-26 NOTE — PROGRESS NOTE BEHAVIORAL HEALTH - SUMMARY
The pt. is a 20 year-old Ugandan male  in the  x 5 years and living with his family in [place]. 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      The pt was largely unable to participate in his evaluation due to possible evident pt internal preoccupation and difficulty with attention despite use of the  services. The pt was seen by Psychiatry and admitted to  inpatient psychiatry on 7/21/2021 on a 9.39 emergency legal status for acute pt. safety  and for further pt evaluation and treatment of his psychotic disorder.

## 2021-07-27 PROCEDURE — 99231 SBSQ HOSP IP/OBS SF/LOW 25: CPT

## 2021-07-27 RX ADMIN — Medication 1 TABLET(S): at 09:09

## 2021-07-27 RX ADMIN — OLANZAPINE 5 MILLIGRAM(S): 15 TABLET, FILM COATED ORAL at 20:54

## 2021-07-27 NOTE — PROGRESS NOTE BEHAVIORAL HEALTH - OTHER
slowly becoming more cooperative as the pt's thought disorder begins to slowly improve. unable to assess due to pt limited ability to participate in evaluation due to thought disorganization as above paucity of speech overall

## 2021-07-27 NOTE — PROGRESS NOTE BEHAVIORAL HEALTH - SUMMARY
The pt. is a 20 year-old Citizen of the [place] male  in the  x 5 years and living with his family in [place]. 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      The pt was largely unable to participate in his evaluation due to possible evident pt internal preoccupation and difficulty with attention despite use of the  services. The pt was seen by Psychiatry and admitted to  inpatient psychiatry on 7/21/2021 on a 9.39 emergency legal status for acute pt. safety  and for further pt evaluation and treatment of his psychotic disorder.

## 2021-07-28 PROCEDURE — 99231 SBSQ HOSP IP/OBS SF/LOW 25: CPT

## 2021-07-28 RX ADMIN — Medication 1 TABLET(S): at 09:35

## 2021-07-28 RX ADMIN — OLANZAPINE 5 MILLIGRAM(S): 15 TABLET, FILM COATED ORAL at 21:07

## 2021-07-28 NOTE — PROGRESS NOTE BEHAVIORAL HEALTH - SUMMARY
The pt. is a 20 year-old Belgian male  in the  x 5 years and living with his family in [place]. 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      The pt was largely unable to participate in his evaluation due to possible evident pt internal preoccupation and difficulty with attention despite use of the  services. The pt was seen by Psychiatry and admitted to  inpatient psychiatry on 7/21/2021 on a 9.39 emergency legal status for acute pt. safety  and for further pt evaluation and treatment of his psychotic disorder.

## 2021-07-29 PROCEDURE — 99231 SBSQ HOSP IP/OBS SF/LOW 25: CPT

## 2021-07-29 RX ADMIN — Medication 1 TABLET(S): at 09:24

## 2021-07-29 RX ADMIN — OLANZAPINE 5 MILLIGRAM(S): 15 TABLET, FILM COATED ORAL at 20:39

## 2021-07-29 NOTE — PROGRESS NOTE BEHAVIORAL HEALTH - SUMMARY
The pt. is a 20 year-old Syrian male  in the  x 5 years and living with his family in [place]. 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      The pt was largely unable to participate in his evaluation due to possible evident pt internal preoccupation and difficulty with attention despite use of the  services. The pt was seen by Psychiatry and admitted to  inpatient psychiatry on 7/21/2021 on a 9.39 emergency legal status for acute pt. safety  and for further pt evaluation and treatment of his psychotic disorder.

## 2021-07-29 NOTE — PROGRESS NOTE BEHAVIORAL HEALTH - OTHER
unable to assess due to pt limited ability to participate in evaluation due to thought disorganization as above slowly becoming more cooperative as the pt's thought disorder begins to slowly improve. paucity of speech overall

## 2021-07-30 PROCEDURE — 99231 SBSQ HOSP IP/OBS SF/LOW 25: CPT

## 2021-07-30 RX ADMIN — OLANZAPINE 5 MILLIGRAM(S): 15 TABLET, FILM COATED ORAL at 21:00

## 2021-07-30 RX ADMIN — Medication 1 TABLET(S): at 09:31

## 2021-07-30 NOTE — PROGRESS NOTE BEHAVIORAL HEALTH - NSBHADMITNEWMEDDISCUSSNO_PSY_A_CORE FT
Pt currently psychotic and unable to participate meaningfully in his treatment

## 2021-07-30 NOTE — PROGRESS NOTE BEHAVIORAL HEALTH - SUMMARY
The pt. is a 20 year-old [place] citizen male  in the  x 5 years and living with his family in [place]. 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 ED on 7/21/2021  by his uncle for  pt reportedly new onset bizarre behavior and reported AH.      The pt was largely unable to participate in his evaluation due to possible evident pt internal preoccupation and difficulty with attention despite use of the  services. The pt was seen by Psychiatry and admitted to  inpatient psychiatry on 7/21/2021 on a 9.39 emergency legal status for acute pt. safety  and for further pt evaluation and treatment of his psychotic disorder.

## 2021-07-31 RX ADMIN — Medication 1 TABLET(S): at 09:42

## 2021-07-31 RX ADMIN — OLANZAPINE 5 MILLIGRAM(S): 15 TABLET, FILM COATED ORAL at 20:25

## 2021-08-01 RX ADMIN — OLANZAPINE 5 MILLIGRAM(S): 15 TABLET, FILM COATED ORAL at 20:48

## 2021-08-01 RX ADMIN — Medication 1 TABLET(S): at 09:22

## 2021-08-02 DIAGNOSIS — F19.959 OTHER PSYCHOACTIVE SUBSTANCE USE, UNSPECIFIED WITH PSYCHOACTIVE SUBSTANCE-INDUCED PSYCHOTIC DISORDER, UNSPECIFIED: ICD-10-CM

## 2021-08-02 PROCEDURE — 99232 SBSQ HOSP IP/OBS MODERATE 35: CPT

## 2021-08-02 RX ORDER — OLANZAPINE 15 MG/1
7.5 TABLET, FILM COATED ORAL AT BEDTIME
Refills: 0 | Status: DISCONTINUED | OUTPATIENT
Start: 2021-08-02 | End: 2021-08-04

## 2021-08-02 RX ADMIN — OLANZAPINE 7.5 MILLIGRAM(S): 15 TABLET, FILM COATED ORAL at 21:20

## 2021-08-02 RX ADMIN — Medication 1 TABLET(S): at 09:21

## 2021-08-02 NOTE — PROGRESS NOTE BEHAVIORAL HEALTH - NSBHLEGALSTATUS_PSY_A_CORE
9.39 (Emergency) legal conversion on 8/2/2021 due to ongoing pt psychosis and inability to function safely in a less restrictive setting at this time/9.27 (2PC)

## 2021-08-02 NOTE — PROGRESS NOTE BEHAVIORAL HEALTH - SUMMARY
The pt. is a 20 year-old Spanish male  in the  x 5 years and living with his family in [place]. 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      The pt was largely unable to participate in his evaluation due to possible evident pt internal preoccupation and difficulty with attention despite use of the  services. The pt was seen by Psychiatry and admitted to  inpatient psychiatry on 7/21/2021 on a 9.39 emergency legal status for acute pt. safety  and for further pt evaluation and treatment of his psychotic disorder.

## 2021-08-02 NOTE — PROGRESS NOTE BEHAVIORAL HEALTH - OTHER
slowly becoming more cooperative as the pt's thought disorder begins to slowly improve. as above " I am fine." paucity of speech overall pt appears reluctant to discuss events prior to his admission

## 2021-08-03 PROCEDURE — 99232 SBSQ HOSP IP/OBS MODERATE 35: CPT

## 2021-08-03 RX ADMIN — Medication 1 TABLET(S): at 09:37

## 2021-08-03 RX ADMIN — OLANZAPINE 7.5 MILLIGRAM(S): 15 TABLET, FILM COATED ORAL at 22:13

## 2021-08-03 RX ADMIN — Medication 0.5 MILLIGRAM(S): at 22:13

## 2021-08-03 NOTE — PROGRESS NOTE BEHAVIORAL HEALTH - OTHER
" I am still hearing voices. I feel anxious" pt still appears reluctant to discuss events prior to his admission generally limited but slowly becoming a bit more interactive with select staff improved, anxious and pleading as above speech paucity but with more frequent if monosyllabic responses to questions slowly becoming more cooperative as the pt's paranoia begins to slowly decrease in intensity ongoing but slowly less intense paranoid delusional thinking

## 2021-08-03 NOTE — PROGRESS NOTE BEHAVIORAL HEALTH - NSBHLEGALSTATUS_PSY_A_CORE
legal conversion on 8/2/2021 due to ongoing pt psychosis and inability to function safely in a less restrictive setting at this time/9.27 (2PC)

## 2021-08-03 NOTE — PROGRESS NOTE BEHAVIORAL HEALTH - SUMMARY
The pt. is a 20 year-old Mold[place] male  in the  x 5 years and living with his family in [place]. 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      The pt was largely unable to participate in his evaluation due to possible evident pt internal preoccupation and difficulty with attention despite use of the  services. The pt was seen by Psychiatry and admitted to  inpatient psychiatry on 7/21/2021 on a 9.39 emergency legal status for acute pt. safety  and for further pt evaluation and treatment of his psychotic disorder.

## 2021-08-04 PROCEDURE — 99232 SBSQ HOSP IP/OBS MODERATE 35: CPT

## 2021-08-04 RX ORDER — OLANZAPINE 15 MG/1
10 TABLET, FILM COATED ORAL AT BEDTIME
Refills: 0 | Status: DISCONTINUED | OUTPATIENT
Start: 2021-08-04 | End: 2021-08-11

## 2021-08-04 RX ADMIN — Medication 0.5 MILLIGRAM(S): at 21:19

## 2021-08-04 RX ADMIN — OLANZAPINE 10 MILLIGRAM(S): 15 TABLET, FILM COATED ORAL at 21:19

## 2021-08-04 RX ADMIN — Medication 0.5 MILLIGRAM(S): at 09:39

## 2021-08-04 RX ADMIN — Medication 1 TABLET(S): at 09:38

## 2021-08-04 NOTE — PROGRESS NOTE BEHAVIORAL HEALTH - OTHER
as above speech paucity but with more frequent , relevant though still monosyllabic responses to questions ongoing but slowly less intense paranoid delusional thinking apprehensive slowly becoming more cooperative as the pt's paranoia begins to very slowly decrease in intensity, pt remains reclusive and withdrawn to his room pt still appears reluctant to discuss events prior to his admission improved, becoming less overtly  anxious " I am still hearing voices. The anxiety is better." generally limited but slowly becoming a bit more interactive with  a few select staff

## 2021-08-04 NOTE — PROGRESS NOTE BEHAVIORAL HEALTH - SUMMARY
The pt. is a 20 year-old Niuean male  in the  x 5 years and living with his family in [place]. 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      The pt was largely unable to participate in his evaluation due to possible evident pt internal preoccupation and difficulty with attention despite use of the  services. The pt was seen by Psychiatry and admitted to  inpatient psychiatry on 7/21/2021 on a 9.39 emergency legal status for acute pt. safety  and for further pt evaluation and treatment of his psychotic disorder.

## 2021-08-05 DIAGNOSIS — F39 UNSPECIFIED MOOD [AFFECTIVE] DISORDER: ICD-10-CM

## 2021-08-05 LAB
ALBUMIN SERPL ELPH-MCNC: 3.9 G/DL — SIGNIFICANT CHANGE UP (ref 3.3–5)
ALP SERPL-CCNC: 44 U/L — SIGNIFICANT CHANGE UP (ref 40–120)
ALT FLD-CCNC: 36 U/L — SIGNIFICANT CHANGE UP (ref 12–78)
ANION GAP SERPL CALC-SCNC: 3 MMOL/L — LOW (ref 5–17)
AST SERPL-CCNC: 14 U/L — LOW (ref 15–37)
BASOPHILS # BLD AUTO: 0.02 K/UL — SIGNIFICANT CHANGE UP (ref 0–0.2)
BASOPHILS NFR BLD AUTO: 0.4 % — SIGNIFICANT CHANGE UP (ref 0–2)
BILIRUB SERPL-MCNC: 0.4 MG/DL — SIGNIFICANT CHANGE UP (ref 0.2–1.2)
BUN SERPL-MCNC: 17 MG/DL — SIGNIFICANT CHANGE UP (ref 7–23)
CALCIUM SERPL-MCNC: 9 MG/DL — SIGNIFICANT CHANGE UP (ref 8.5–10.1)
CHLORIDE SERPL-SCNC: 107 MMOL/L — SIGNIFICANT CHANGE UP (ref 96–108)
CO2 SERPL-SCNC: 30 MMOL/L — SIGNIFICANT CHANGE UP (ref 22–31)
CREAT SERPL-MCNC: 1.04 MG/DL — SIGNIFICANT CHANGE UP (ref 0.5–1.3)
EOSINOPHIL # BLD AUTO: 0.09 K/UL — SIGNIFICANT CHANGE UP (ref 0–0.5)
EOSINOPHIL NFR BLD AUTO: 2 % — SIGNIFICANT CHANGE UP (ref 0–6)
GLUCOSE SERPL-MCNC: 85 MG/DL — SIGNIFICANT CHANGE UP (ref 70–99)
HCT VFR BLD CALC: 45.1 % — SIGNIFICANT CHANGE UP (ref 39–50)
HGB BLD-MCNC: 15.3 G/DL — SIGNIFICANT CHANGE UP (ref 13–17)
IMM GRANULOCYTES NFR BLD AUTO: 0.7 % — SIGNIFICANT CHANGE UP (ref 0–1.5)
LYMPHOCYTES # BLD AUTO: 1.64 K/UL — SIGNIFICANT CHANGE UP (ref 1–3.3)
LYMPHOCYTES # BLD AUTO: 36 % — SIGNIFICANT CHANGE UP (ref 13–44)
MCHC RBC-ENTMCNC: 29.9 PG — SIGNIFICANT CHANGE UP (ref 27–34)
MCHC RBC-ENTMCNC: 33.9 GM/DL — SIGNIFICANT CHANGE UP (ref 32–36)
MCV RBC AUTO: 88.1 FL — SIGNIFICANT CHANGE UP (ref 80–100)
MONOCYTES # BLD AUTO: 0.29 K/UL — SIGNIFICANT CHANGE UP (ref 0–0.9)
MONOCYTES NFR BLD AUTO: 6.4 % — SIGNIFICANT CHANGE UP (ref 2–14)
NEUTROPHILS # BLD AUTO: 2.48 K/UL — SIGNIFICANT CHANGE UP (ref 1.8–7.4)
NEUTROPHILS NFR BLD AUTO: 54.5 % — SIGNIFICANT CHANGE UP (ref 43–77)
PLATELET # BLD AUTO: 225 K/UL — SIGNIFICANT CHANGE UP (ref 150–400)
POTASSIUM SERPL-MCNC: 4.4 MMOL/L — SIGNIFICANT CHANGE UP (ref 3.5–5.3)
POTASSIUM SERPL-SCNC: 4.4 MMOL/L — SIGNIFICANT CHANGE UP (ref 3.5–5.3)
PROT SERPL-MCNC: 7 GM/DL — SIGNIFICANT CHANGE UP (ref 6–8.3)
RBC # BLD: 5.12 M/UL — SIGNIFICANT CHANGE UP (ref 4.2–5.8)
RBC # FLD: 11.9 % — SIGNIFICANT CHANGE UP (ref 10.3–14.5)
SODIUM SERPL-SCNC: 140 MMOL/L — SIGNIFICANT CHANGE UP (ref 135–145)
WBC # BLD: 4.55 K/UL — SIGNIFICANT CHANGE UP (ref 3.8–10.5)
WBC # FLD AUTO: 4.55 K/UL — SIGNIFICANT CHANGE UP (ref 3.8–10.5)

## 2021-08-05 PROCEDURE — 99232 SBSQ HOSP IP/OBS MODERATE 35: CPT

## 2021-08-05 RX ADMIN — Medication 0.5 MILLIGRAM(S): at 22:45

## 2021-08-05 RX ADMIN — OLANZAPINE 10 MILLIGRAM(S): 15 TABLET, FILM COATED ORAL at 22:46

## 2021-08-05 RX ADMIN — Medication 1 TABLET(S): at 09:27

## 2021-08-05 RX ADMIN — Medication 0.5 MILLIGRAM(S): at 09:27

## 2021-08-05 NOTE — PROGRESS NOTE BEHAVIORAL HEALTH - SUMMARY
The pt. is a 20 year-old Palestinian male  in the  x 5 years and living with his family in [place]. 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      The pt was largely unable to participate in his evaluation due to possible evident pt internal preoccupation and difficulty with attention despite use of the  services. The pt was seen by Psychiatry and admitted to  inpatient psychiatry on 7/21/2021 on a 9.39 emergency legal status for acute pt. safety  and for further pt evaluation and treatment of his psychotic disorder.

## 2021-08-05 NOTE — PROGRESS NOTE BEHAVIORAL HEALTH - OTHER
pt still appears reluctant to discuss events prior to his admission as above speech paucity but with more frequent , relevant though still monosyllabic responses to questions slightly more animated and spontaneous still impoverished concrete apprehensive affect slowly less blunted and becoming more mood congruent still reclusive but slowly more cooperative with overall treatment plan " I am still hearing voices. The anxiety is better. I feel afraid.' decreasing in overall frequency and intensity

## 2021-08-06 DIAGNOSIS — F23 BRIEF PSYCHOTIC DISORDER: ICD-10-CM

## 2021-08-06 PROCEDURE — 99232 SBSQ HOSP IP/OBS MODERATE 35: CPT

## 2021-08-06 RX ADMIN — Medication 0.5 MILLIGRAM(S): at 20:54

## 2021-08-06 RX ADMIN — OLANZAPINE 10 MILLIGRAM(S): 15 TABLET, FILM COATED ORAL at 20:54

## 2021-08-06 RX ADMIN — Medication 1 TABLET(S): at 09:23

## 2021-08-06 NOTE — PROGRESS NOTE BEHAVIORAL HEALTH - SUMMARY
The pt. is a 20 year-old Citizen of [place] male  in the  x 5 years and living with his family in [place]. 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 ED on 7/21/2021  by his uncle for  pt reportedly new onset bizarre behavior and reported AH.      The pt was largely unable to participate in his evaluation due to possible evident pt internal preoccupation and difficulty with attention despite use of the  services. The pt was seen by Psychiatry and admitted to  inpatient psychiatry on 7/21/2021 on a 9.39 emergency legal status for acute pt. safety  and for further pt evaluation and treatment of his psychotic disorder.

## 2021-08-06 NOTE — PROGRESS NOTE BEHAVIORAL HEALTH - OTHER
pt still appears reluctant to discuss events prior to his admission at times more animated but with overall paucity of speech affect slowly less blunted and becoming more mood congruent speech paucity but with more frequent , relevant though still monosyllabic responses to questions still impoverished concrete still reclusive but slowly more cooperative with overall treatment plan improving decreasing in overall frequency and intensity apprehensive and frightened appearing as above " I am not afraid."

## 2021-08-07 RX ADMIN — Medication 0.5 MILLIGRAM(S): at 21:13

## 2021-08-07 RX ADMIN — Medication 1 TABLET(S): at 10:43

## 2021-08-07 RX ADMIN — OLANZAPINE 10 MILLIGRAM(S): 15 TABLET, FILM COATED ORAL at 20:53

## 2021-08-07 RX ADMIN — Medication 0.5 MILLIGRAM(S): at 20:54

## 2021-08-08 RX ADMIN — Medication 1 TABLET(S): at 09:34

## 2021-08-08 RX ADMIN — Medication 0.5 MILLIGRAM(S): at 21:07

## 2021-08-08 RX ADMIN — OLANZAPINE 10 MILLIGRAM(S): 15 TABLET, FILM COATED ORAL at 21:05

## 2021-08-09 DIAGNOSIS — F43.10 POST-TRAUMATIC STRESS DISORDER, UNSPECIFIED: ICD-10-CM

## 2021-08-09 PROCEDURE — 99232 SBSQ HOSP IP/OBS MODERATE 35: CPT

## 2021-08-09 RX ADMIN — OLANZAPINE 10 MILLIGRAM(S): 15 TABLET, FILM COATED ORAL at 22:03

## 2021-08-09 RX ADMIN — Medication 0.5 MILLIGRAM(S): at 22:03

## 2021-08-09 RX ADMIN — Medication 1 TABLET(S): at 09:43

## 2021-08-09 NOTE — PROGRESS NOTE BEHAVIORAL HEALTH - NS ED BHA MSE SPEECH SPONTANEITY
Increased latency
Other
Other
Increased latency
Other
Increased latency
Increased latency

## 2021-08-09 NOTE — PROGRESS NOTE BEHAVIORAL HEALTH - NSBHFUPVIOL_PSY_A_CORE
unable to assess
none known
unable to assess
none known
unable to assess
none known
unable to assess
none known
unable to assess
none known
unable to assess

## 2021-08-09 NOTE — PROGRESS NOTE BEHAVIORAL HEALTH - NSBHFUPTYPE_PSY_A_CORE
Inpatient
Inpatient
Inpatient-On Service Note
Inpatient
Inpatient-On Service Note
Inpatient

## 2021-08-09 NOTE — PROGRESS NOTE BEHAVIORAL HEALTH - RISK ASSESSMENT
Low current suicide risk  Acute risk factors- pt currently, suddenly psychotic with no clear etiology at this time, pt unable to care safely for himself due to psychosis    Chronic risk factors- none known, pt with no reported h/o psychosis, depression. 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
Low current suicide risk  Acute risk factors- pt currently, suddenly psychotic with no clear etiology at this time, pt unable to care safely for himself due to psychosis    Chronic risk factors- none known, pt with no reported h/o psychosis, depression. 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
Moderate risk: pt inially said he has SI, then denied, presents psychotic.   Increased long term risk: based on current presentation pt has pachyotic illness vs mood do with psychosis vs substance induced.   Protective factors: supportive uncle.   Mitigation> admission dn safety plan
Low current suicide risk  Acute risk factors- pt currently, suddenly psychotic with no clear etiology at this time, pt unable to care safely for himself due to psychosis    Chronic risk factors- none known, pt with no reported h/o psychosis, depression. 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
Low current suicide risk  Acute risk factors- pt currently, suddenly psychotic with no clear etiology at this time, pt unable to care safely for himself due to psychosis    Chronic risk factors- none known, pt with no reported h/o psychosis, depression. 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
Low current suicide risk  Acute risk factors- pt currently, suddenly psychotic with no clear etiology at this time, pt unable to care safely for himself due to psychosis    Chronic risk factors- none known, pt with no reported h/o psychosis, depression. 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
Low current suicide risk  Acute risk factors- pt currently, suddenly psychotic with no clear etiology at this time, pt unable to care safely for himself due to psychosis  , psychosis is slowly beginning to resolve with addition of low dose SGA Zyprexa  Chronic risk factors- none known, pt with no reported h/o psychosis, depression. 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
Low current suicide risk  Acute risk factors- pt currently, suddenly psychotic with no clear etiology at this time, pt unable to care safely for himself due to psychosis  , psychosis is slowly beginning to resolve with addition of low dose SGA Zyprexa, the pt's sleep and appetite continue to slowly improve  Chronic risk factors- none known, pt with no reported h/o psychosis, depression. 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
Low current suicide risk  Acute risk factors- pt currently, suddenly psychotic with no clear etiology at this time, pt unable to care safely for himself due to psychosis  , psychosis is slowly beginning to resolve with addition of low dose SGA Zyprexa, the pt's sleep and appetite continue to slowly improve  Chronic risk factors- none known, pt with no reported h/o psychosis, depression. 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
Low current suicide risk  Acute risk factors- pt currently, suddenly psychotic with no clear etiology at this time, pt unable to care safely for himself due to psychosis  , psychosis is slowly beginning to resolve with addition of low dose SGA Zyprexa, the pt's sleep and appetite continue to slowly improve  Chronic risk factors- none known, pt with no reported h/o psychosis, depression. 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
Low current suicide risk  Acute risk factors- pt currently, with decreasing  intensity of psychosis    Chronic risk factors- none known, pt with no reported h/o psychosis, depression. 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
Low current suicide risk  Acute risk factors- pt currently, suddenly psychotic with no clear etiology at this time, pt unable to care safely for himself due to psychosis    Chronic risk factors- none known, pt with no reported h/o psychosis, depression. 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
Low current suicide risk  Acute risk factors- pt currently, suddenly psychotic with no clear etiology at this time, pt unable to care safely for himself due to psychosis  , psychosis is continuing  to resolve with addition of  SGA Zyprexa, the pt's sleep and appetite continue to slowly improve  Chronic risk factors- none known, pt with no reported h/o psychosis, depression. 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
Low current suicide risk  Acute risk factors- pt currently, suddenly psychotic with no clear etiology at this time, pt unable to care safely for himself due to psychosis    Chronic risk factors- none known, pt with no reported h/o psychosis, depression. 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
Low current suicide risk  Acute risk factors- pt currently, suddenly psychotic with no clear etiology at this time, pt unable to care safely for himself due to psychosis    Chronic risk factors- none known, pt with no reported h/o psychosis, depression. 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
Low current suicide risk  Acute risk factors- pt currently, suddenly psychotic with likely PTSD acute stress reaction  , pt now better able to care safely for himself due to resolving psychosis  , psychosis is continuing  to resolve with addition of  SGA Zyprexa, the pt's sleep and appetite continue to slowly improve  Chronic risk factors- none known, pt with no reported h/o psychosis, depression. 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
Low current suicide risk  Acute risk factors- pt currently, suddenly psychotic with no clear etiology at this time, pt unable to care safely for himself due to psychosis    Chronic risk factors- none known, pt with no reported h/o psychosis, depression. 

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
## 2021-08-09 NOTE — PROGRESS NOTE BEHAVIORAL HEALTH - NSBHCHARTREVIEWVS_PSY_A_CORE FT
Vital Signs Last 24 Hrs  T(C): 36.9 (22 Jul 2021 07:42), Max: 36.9 (22 Jul 2021 07:42)  T(F): 98.5 (22 Jul 2021 07:42), Max: 98.5 (22 Jul 2021 07:42)  HR: --  BP: --  BP(mean): --  RR: 14 (22 Jul 2021 07:42) (14 - 14)  SpO2: 100% (22 Jul 2021 07:42) (100% - 100%)
Vital Signs Last 24 Hrs  T(C): 36.5 (26 Jul 2021 08:07), Max: 36.5 (26 Jul 2021 08:07)  T(F): 97.7 (26 Jul 2021 08:07), Max: 97.7 (26 Jul 2021 08:07)  HR: --  BP: --  BP(mean): --  RR: 14 (26 Jul 2021 08:07) (14 - 14)  SpO2: 100% (26 Jul 2021 08:07) (100% - 100%)
Vital Signs Last 24 Hrs  T(C): 36.3 (25 Jul 2021 08:01), Max: 36.3 (25 Jul 2021 08:01)  T(F): 97.4 (25 Jul 2021 08:01), Max: 97.4 (25 Jul 2021 08:01)  HR: --  BP: --  BP(mean): --  RR: 18 (25 Jul 2021 08:01) (18 - 18)  SpO2: 100% (25 Jul 2021 08:01) (100% - 100%)
Vital Signs Last 24 Hrs  T(C): 36.5 (04 Aug 2021 08:23), Max: 36.5 (04 Aug 2021 08:23)  T(F): 97.7 (04 Aug 2021 08:23), Max: 97.7 (04 Aug 2021 08:23)  HR: --  BP: --  BP(mean): --  RR: 14 (04 Aug 2021 08:23) (14 - 14)  SpO2: 100% (04 Aug 2021 08:23) (100% - 100%)
Vital Signs Last 24 Hrs  T(C): 36.9 (21 Jul 2021 11:33), Max: 37.2 (21 Jul 2021 06:02)  T(F): 98.4 (21 Jul 2021 11:33), Max: 98.9 (21 Jul 2021 06:02)  HR: 73 (21 Jul 2021 06:02) (73 - 75)  BP: 120/63 (21 Jul 2021 06:02) (113/61 - 120/63)  BP(mean): 79 (21 Jul 2021 06:02) (79 - 79)  RR: 14 (21 Jul 2021 11:33) (14 - 17)  SpO2: 100% (21 Jul 2021 11:33) (98% - 100%)
Vital Signs Last 24 Hrs  T(C): 36.5 (24 Jul 2021 08:28), Max: 36.5 (24 Jul 2021 08:28)  T(F): 97.7 (24 Jul 2021 08:28), Max: 97.7 (24 Jul 2021 08:28)  HR: --  BP: --  BP(mean): --  RR: 18 (24 Jul 2021 08:28) (18 - 18)  SpO2: 99% (24 Jul 2021 08:28) (99% - 99%)
Vital Signs Last 24 Hrs  T(C): 36.8 (06 Aug 2021 08:36), Max: 36.8 (06 Aug 2021 08:36)  T(F): 98.2 (06 Aug 2021 08:36), Max: 98.2 (06 Aug 2021 08:36)  HR: --  BP: --  BP(mean): --  RR: 14 (06 Aug 2021 08:36) (14 - 14)  SpO2: 100% (06 Aug 2021 08:36) (100% - 100%)
Vital Signs Last 24 Hrs  T(C): 36.6 (05 Aug 2021 08:24), Max: 36.6 (05 Aug 2021 08:24)  T(F): 97.9 (05 Aug 2021 08:24), Max: 97.9 (05 Aug 2021 08:24)  HR: --  BP: --  BP(mean): --  RR: 14 (05 Aug 2021 08:24) (14 - 14)  SpO2: 100% (05 Aug 2021 08:24) (100% - 100%)
Vital Signs Last 24 Hrs  T(C): 36.5 (26 Jul 2021 08:07), Max: 36.5 (26 Jul 2021 08:07)  T(F): 97.7 (26 Jul 2021 08:07), Max: 97.7 (26 Jul 2021 08:07)  HR: --  BP: --  BP(mean): --  RR: 14 (26 Jul 2021 08:07) (14 - 14)  SpO2: 100% (26 Jul 2021 08:07) (100% - 100%)
Vital Signs Last 24 Hrs  T(C): 36.8 (29 Jul 2021 07:52), Max: 36.8 (29 Jul 2021 07:52)  T(F): 98.2 (29 Jul 2021 07:52), Max: 98.2 (29 Jul 2021 07:52)  HR: --  BP: --  BP(mean): --  RR: 18 (29 Jul 2021 07:52) (18 - 18)  SpO2: 99% (29 Jul 2021 07:52) (99% - 99%)
Vital Signs Last 24 Hrs  T(C): 36.7 (09 Aug 2021 08:43), Max: 36.7 (09 Aug 2021 08:43)  T(F): 98 (09 Aug 2021 08:43), Max: 98 (09 Aug 2021 08:43)  HR: --  BP: --  BP(mean): --  RR: 14 (09 Aug 2021 08:43) (14 - 14)  SpO2: 100% (09 Aug 2021 08:43) (100% - 100%)
Vital Signs Last 24 Hrs  T(C): 36.7 (28 Jul 2021 08:19), Max: 36.7 (28 Jul 2021 08:19)  T(F): 98.1 (28 Jul 2021 08:19), Max: 98.1 (28 Jul 2021 08:19)  HR: --  BP: --  BP(mean): --  RR: 18 (28 Jul 2021 08:19) (18 - 18)  SpO2: 100% (28 Jul 2021 08:19) (100% - 100%)
Vital Signs Last 24 Hrs  T(C): 36.8 (03 Aug 2021 07:53), Max: 36.8 (03 Aug 2021 07:53)  T(F): 98.3 (03 Aug 2021 07:53), Max: 98.3 (03 Aug 2021 07:53)  HR: --  BP: --  BP(mean): --  RR: 18 (03 Aug 2021 07:53) (18 - 18)  SpO2: 100% (03 Aug 2021 07:53) (100% - 100%)
Vital Signs Last 24 Hrs  T(C): 37.2 (21 Jul 2021 06:02), Max: 37.2 (21 Jul 2021 06:02)  T(F): 98.9 (21 Jul 2021 06:02), Max: 98.9 (21 Jul 2021 06:02)  HR: 73 (21 Jul 2021 06:02) (73 - 75)  BP: 120/63 (21 Jul 2021 06:02) (113/61 - 120/63)  BP(mean): 79 (21 Jul 2021 06:02) (79 - 79)  RR: 17 (21 Jul 2021 06:02) (16 - 17)  SpO2: 99% (21 Jul 2021 06:02) (98% - 99%)
Vital Signs Last 24 Hrs  T(C): 36.5 (23 Jul 2021 07:59), Max: 36.5 (23 Jul 2021 07:59)  T(F): 97.7 (23 Jul 2021 07:59), Max: 97.7 (23 Jul 2021 07:59)  HR: --  BP: --  BP(mean): --  RR: 14 (23 Jul 2021 07:59) (14 - 14)  SpO2: 100% (23 Jul 2021 07:59) (100% - 100%)
Vital Signs Last 24 Hrs  T(C): 36.3 (27 Jul 2021 07:59), Max: 36.3 (27 Jul 2021 07:59)  T(F): 97.4 (27 Jul 2021 07:59), Max: 97.4 (27 Jul 2021 07:59)  HR: --  BP: --  BP(mean): --  RR: 14 (27 Jul 2021 07:59) (14 - 14)  SpO2: 100% (27 Jul 2021 07:59) (100% - 100%)
Vital Signs Last 24 Hrs  T(C): 36.6 (30 Jul 2021 08:12), Max: 36.6 (30 Jul 2021 08:12)  T(F): 97.9 (30 Jul 2021 08:12), Max: 97.9 (30 Jul 2021 08:12)  HR: --  BP: --  BP(mean): --  RR: 18 (30 Jul 2021 08:12) (18 - 18)  SpO2: 99% (30 Jul 2021 08:12) (99% - 99%)

## 2021-08-09 NOTE — PROGRESS NOTE BEHAVIORAL HEALTH - THOUGHT PROCESS
Thought blocking/Disorganized
Thought blocking/Impaired reasoning/Disorganized
Thought blocking/Impaired reasoning/Disorganized
Thought blocking/Disorganized
Thought blocking/Impaired reasoning/Disorganized
Thought blocking/Disorganized
Thought blocking/Disorganized
Thought blocking/Impaired reasoning/Other
Thought blocking/Impaired reasoning/Disorganized
Thought blocking/Disorganized
Thought blocking/Disorganized
Linear/Normal reasoning
Thought blocking/Impaired reasoning/Disorganized
Thought blocking/Impaired reasoning/Disorganized
Thought blocking/Impaired reasoning/Other/Disorganized

## 2021-08-09 NOTE — PROGRESS NOTE BEHAVIORAL HEALTH - OTHER
affect slowly less blunted and becoming more mood congruent slowly becoming  more animated " I am good." cautiously improving normalizing less reclusive  and slowly more animated and hopeful appearing with overall treatment plan improving better modulated less anxious , more euthymic less ruminative

## 2021-08-09 NOTE — PROGRESS NOTE BEHAVIORAL HEALTH - ESTIMATED DISCHARGE DATE
13-Aug-2021
05-Aug-2021
05-Aug-2021
11-Aug-2021
05-Aug-2021
13-Aug-2021
05-Aug-2021
04-Aug-2021
11-Aug-2021
09-Aug-2021
04-Aug-2021
05-Aug-2021
13-Aug-2021
28-Jul-2021

## 2021-08-09 NOTE — PROGRESS NOTE BEHAVIORAL HEALTH - NSBHADMITDANGERSELF_PSY_A_CORE
unable to care for self
suicidal ideation with plan and means/unable to care for self
unable to care for self

## 2021-08-09 NOTE — PROGRESS NOTE BEHAVIORAL HEALTH - PROBLEM SELECTOR PROBLEM 1
Brief psychotic disorder

## 2021-08-09 NOTE — PROGRESS NOTE BEHAVIORAL HEALTH - THOUGHT CONTENT
Preoccupations
Delusions/Preoccupations/Ruminations/Other
Preoccupations
Delusions/Preoccupations/Ruminations/Other
Preoccupations
Preoccupations/Ruminations/Other
Preoccupations/Ruminations
Preoccupations
Delusions/Preoccupations/Ruminations/Other
Delusions/Preoccupations/Ruminations/Other

## 2021-08-09 NOTE — PROGRESS NOTE BEHAVIORAL HEALTH - MOOD
Depressed
Anxious/Other
Depressed
Depressed/Other
Other
Depressed
Anxious/Other
Depressed

## 2021-08-09 NOTE — PROGRESS NOTE BEHAVIORAL HEALTH - NSBHCHARTREVIEWIMAGING_PSY_A_CORE FT
MEDICATIONS  (STANDING):  LORazepam     Tablet 0.5 milliGRAM(s) Oral two times a day  multivitamin/minerals 1 Tablet(s) Oral daily  OLANZapine 7.5 milliGRAM(s) Oral at bedtime    MEDICATIONS  (PRN):  acetaminophen   Tablet .. 650 milliGRAM(s) Oral every 6 hours PRN Temp greater or equal to 38C (100.4F), Mild Pain (1 - 3), Moderate Pain (4 - 6)  aluminum hydroxide/magnesium hydroxide/simethicone Suspension 30 milliLiter(s) Oral every 6 hours PRN Dyspepsia  diphenhydrAMINE 50 milliGRAM(s) Oral every 6 hours PRN Extrapyramidal prophylaxis/psychosis related agitation  diphenhydrAMINE   Injectable 50 milliGRAM(s) IntraMuscular every 6 hours PRN Extrapyramidal prophylaxis/psychosis related agitation  haloperidol     Tablet 5 milliGRAM(s) Oral every 6 hours PRN moderate  psychosis related agitation  haloperidol    Injectable 5 milliGRAM(s) IntraMuscular every 6 hours PRN severe psychosis related agitation  LORazepam     Tablet 0.5 milliGRAM(s) Oral every 8 hours PRN moderate anxiety related to psychosis  magnesium hydroxide Suspension 30 milliLiter(s) Oral daily PRN Constipation
MEDICATIONS  (STANDING):  LORazepam     Tablet 0.5 milliGRAM(s) Oral at bedtime  multivitamin/minerals 1 Tablet(s) Oral daily  OLANZapine 10 milliGRAM(s) Oral at bedtime    MEDICATIONS  (PRN):  acetaminophen   Tablet .. 650 milliGRAM(s) Oral every 6 hours PRN Temp greater or equal to 38C (100.4F), Mild Pain (1 - 3), Moderate Pain (4 - 6)  aluminum hydroxide/magnesium hydroxide/simethicone Suspension 30 milliLiter(s) Oral every 6 hours PRN Dyspepsia  diphenhydrAMINE 50 milliGRAM(s) Oral every 6 hours PRN Extrapyramidal prophylaxis/psychosis related agitation  diphenhydrAMINE   Injectable 50 milliGRAM(s) IntraMuscular every 6 hours PRN Extrapyramidal prophylaxis/psychosis related agitation  haloperidol     Tablet 5 milliGRAM(s) Oral every 6 hours PRN moderate  psychosis related agitation  haloperidol    Injectable 5 milliGRAM(s) IntraMuscular every 6 hours PRN severe psychosis related agitation  LORazepam     Tablet 0.5 milliGRAM(s) Oral every 8 hours PRN moderate anxiety related to psychosis  magnesium hydroxide Suspension 30 milliLiter(s) Oral daily PRN Constipation
MEDICATIONS  (STANDING):  OLANZapine 2.5 milliGRAM(s) Oral at bedtime    MEDICATIONS  (PRN):  LORazepam   Injectable 2 milliGRAM(s) IntraMuscular every 4 hours PRN severe agitation  OLANZapine Injectable 2.5 milliGRAM(s) IntraMuscular every 6 hours PRN severe psychotic  agitation
MEDICATIONS  (STANDING):  OLANZapine 2.5 milliGRAM(s) Oral at bedtime    MEDICATIONS  (PRN):  LORazepam   Injectable 2 milliGRAM(s) IntraMuscular every 4 hours PRN severe agitation  OLANZapine Injectable 2.5 milliGRAM(s) IntraMuscular every 6 hours PRN severe psychotic  agitation
MEDICATIONS  (STANDING):  multivitamin/minerals 1 Tablet(s) Oral daily  OLANZapine 5 milliGRAM(s) Oral at bedtime    MEDICATIONS  (PRN):  acetaminophen   Tablet .. 650 milliGRAM(s) Oral every 6 hours PRN Temp greater or equal to 38C (100.4F), Mild Pain (1 - 3), Moderate Pain (4 - 6)  aluminum hydroxide/magnesium hydroxide/simethicone Suspension 30 milliLiter(s) Oral every 6 hours PRN Dyspepsia  diphenhydrAMINE 50 milliGRAM(s) Oral every 6 hours PRN Extrapyramidal prophylaxis/psychosis related agitation  diphenhydrAMINE   Injectable 50 milliGRAM(s) IntraMuscular every 6 hours PRN Extrapyramidal prophylaxis/psychosis related agitation  haloperidol     Tablet 5 milliGRAM(s) Oral every 6 hours PRN moderate  psychosis related agitation  haloperidol    Injectable 5 milliGRAM(s) IntraMuscular every 6 hours PRN severe psychosis related agitation  LORazepam     Tablet 0.5 milliGRAM(s) Oral every 6 hours PRN moderate anxiety  LORazepam   Injectable 1 milliGRAM(s) IntraMuscular every 6 hours PRN severe psychosis related anxiety/agitation  magnesium hydroxide Suspension 30 milliLiter(s) Oral daily PRN Constipation
MEDICATIONS  (STANDING):  LORazepam     Tablet 0.5 milliGRAM(s) Oral at bedtime  multivitamin/minerals 1 Tablet(s) Oral daily  OLANZapine 10 milliGRAM(s) Oral at bedtime    MEDICATIONS  (PRN):  acetaminophen   Tablet .. 650 milliGRAM(s) Oral every 6 hours PRN Temp greater or equal to 38C (100.4F), Mild Pain (1 - 3), Moderate Pain (4 - 6)  aluminum hydroxide/magnesium hydroxide/simethicone Suspension 30 milliLiter(s) Oral every 6 hours PRN Dyspepsia  diphenhydrAMINE 50 milliGRAM(s) Oral every 6 hours PRN Extrapyramidal prophylaxis/psychosis related agitation  diphenhydrAMINE   Injectable 50 milliGRAM(s) IntraMuscular every 6 hours PRN Extrapyramidal prophylaxis/psychosis related agitation  haloperidol     Tablet 5 milliGRAM(s) Oral every 6 hours PRN moderate  psychosis related agitation  haloperidol    Injectable 5 milliGRAM(s) IntraMuscular every 6 hours PRN severe psychosis related agitation  LORazepam     Tablet 0.5 milliGRAM(s) Oral every 8 hours PRN moderate anxiety related to psychosis  magnesium hydroxide Suspension 30 milliLiter(s) Oral daily PRN Constipation
MEDICATIONS  (STANDING):  LORazepam     Tablet 0.5 milliGRAM(s) Oral two times a day  multivitamin/minerals 1 Tablet(s) Oral daily  OLANZapine 10 milliGRAM(s) Oral at bedtime    MEDICATIONS  (PRN):  acetaminophen   Tablet .. 650 milliGRAM(s) Oral every 6 hours PRN Temp greater or equal to 38C (100.4F), Mild Pain (1 - 3), Moderate Pain (4 - 6)  aluminum hydroxide/magnesium hydroxide/simethicone Suspension 30 milliLiter(s) Oral every 6 hours PRN Dyspepsia  diphenhydrAMINE 50 milliGRAM(s) Oral every 6 hours PRN Extrapyramidal prophylaxis/psychosis related agitation  diphenhydrAMINE   Injectable 50 milliGRAM(s) IntraMuscular every 6 hours PRN Extrapyramidal prophylaxis/psychosis related agitation  haloperidol     Tablet 5 milliGRAM(s) Oral every 6 hours PRN moderate  psychosis related agitation  haloperidol    Injectable 5 milliGRAM(s) IntraMuscular every 6 hours PRN severe psychosis related agitation  LORazepam     Tablet 0.5 milliGRAM(s) Oral every 8 hours PRN moderate anxiety related to psychosis  magnesium hydroxide Suspension 30 milliLiter(s) Oral daily PRN Constipation
MEDICATIONS  (STANDING):  OLANZapine 2.5 milliGRAM(s) Oral at bedtime    MEDICATIONS  (PRN):  LORazepam   Injectable 2 milliGRAM(s) IntraMuscular every 4 hours PRN severe agitation  OLANZapine Injectable 2.5 milliGRAM(s) IntraMuscular every 6 hours PRN severe psychotic  agitation
MEDICATIONS  (STANDING):  LORazepam     Tablet 0.5 milliGRAM(s) Oral at bedtime  multivitamin/minerals 1 Tablet(s) Oral daily  OLANZapine 10 milliGRAM(s) Oral at bedtime    MEDICATIONS  (PRN):  acetaminophen   Tablet .. 650 milliGRAM(s) Oral every 6 hours PRN Temp greater or equal to 38C (100.4F), Mild Pain (1 - 3), Moderate Pain (4 - 6)  aluminum hydroxide/magnesium hydroxide/simethicone Suspension 30 milliLiter(s) Oral every 6 hours PRN Dyspepsia  diphenhydrAMINE 50 milliGRAM(s) Oral every 6 hours PRN Extrapyramidal prophylaxis/psychosis related agitation  diphenhydrAMINE   Injectable 50 milliGRAM(s) IntraMuscular every 6 hours PRN Extrapyramidal prophylaxis/psychosis related agitation  haloperidol     Tablet 5 milliGRAM(s) Oral every 6 hours PRN moderate  psychosis related agitation  haloperidol    Injectable 5 milliGRAM(s) IntraMuscular every 6 hours PRN severe psychosis related agitation  LORazepam     Tablet 0.5 milliGRAM(s) Oral every 8 hours PRN moderate anxiety related to psychosis  magnesium hydroxide Suspension 30 milliLiter(s) Oral daily PRN Constipation
EXAM:  CT BRAIN                            PROCEDURE DATE:  07/21/2021          INTERPRETATION:  NONCONTRAST CT OF THE BRAIN DATED 7/21/2021.    CLINICAL INFORMATION:  Change in mental status.    TECHNIQUE: Axial CT images are obtained from the cranial vertex to the skullbase without the administration of IV contrast. Images are reformatted in sagittal and coronal planes.    No prior studies are available for comparison.    FINDINGS:    Repeat images were performed due to motion artifact. There is no acute intra-axial or extra-axial hemorrhage. There is no mass effect or shift of the midline. The basal cisterns are not effaced. The ventricles are not dilated. There is no CT evidence of an acute vascular territorial infarct.    The regional soft tissues and osseous structures are unremarkable. The visualized paranasal sinuses and tympanic/mastoid cavities are clear apart from minimal ethmoid mucosal thickening    IMPRESSION:    No acute findings on noncontrast CT of the brain.
MEDICATIONS  (STANDING):  OLANZapine 2.5 milliGRAM(s) Oral at bedtime    MEDICATIONS  (PRN):  LORazepam   Injectable 2 milliGRAM(s) IntraMuscular every 4 hours PRN severe agitation  OLANZapine Injectable 2.5 milliGRAM(s) IntraMuscular every 6 hours PRN severe psychotic  agitation
MEDICATIONS  (STANDING):  OLANZapine 2.5 milliGRAM(s) Oral at bedtime    MEDICATIONS  (PRN):  LORazepam   Injectable 2 milliGRAM(s) IntraMuscular every 4 hours PRN severe agitation  OLANZapine Injectable 2.5 milliGRAM(s) IntraMuscular every 6 hours PRN severe psychotic  agitation
MEDICATIONS  (STANDING):  multivitamin/minerals 1 Tablet(s) Oral daily  OLANZapine 5 milliGRAM(s) Oral at bedtime    MEDICATIONS  (PRN):  acetaminophen   Tablet .. 650 milliGRAM(s) Oral every 6 hours PRN Temp greater or equal to 38C (100.4F), Mild Pain (1 - 3), Moderate Pain (4 - 6)  aluminum hydroxide/magnesium hydroxide/simethicone Suspension 30 milliLiter(s) Oral every 6 hours PRN Dyspepsia  diphenhydrAMINE 50 milliGRAM(s) Oral every 6 hours PRN Extrapyramidal prophylaxis/psychosis related agitation  diphenhydrAMINE   Injectable 50 milliGRAM(s) IntraMuscular every 6 hours PRN Extrapyramidal prophylaxis/psychosis related agitation  haloperidol     Tablet 5 milliGRAM(s) Oral every 6 hours PRN moderate  psychosis related agitation  haloperidol    Injectable 5 milliGRAM(s) IntraMuscular every 6 hours PRN severe psychosis related agitation  LORazepam     Tablet 0.5 milliGRAM(s) Oral every 6 hours PRN moderate anxiety  LORazepam   Injectable 1 milliGRAM(s) IntraMuscular every 6 hours PRN severe psychosis related anxiety/agitation  magnesium hydroxide Suspension 30 milliLiter(s) Oral daily PRN Constipation

## 2021-08-09 NOTE — PROGRESS NOTE BEHAVIORAL HEALTH - AFFECT CONGRUENCE
Congruent
Not congruent
Congruent

## 2021-08-09 NOTE — PROGRESS NOTE BEHAVIORAL HEALTH - NSBHADMITMEDEDUDETAILS_A_CORE FT
Brief initial informed consent discussion with the pt as to a trial of low dose SGA Zyprexa to alleviate pt current psychotic symptoms

## 2021-08-09 NOTE — PROGRESS NOTE BEHAVIORAL HEALTH - AFFECT RANGE
Blunted
Other
Blunted
Other
Blunted
Other

## 2021-08-09 NOTE — PROGRESS NOTE BEHAVIORAL HEALTH - SECONDARY DX1
Substance-induced psychotic disorder
PTSD (post-traumatic stress disorder)
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
## 2021-08-09 NOTE — PROGRESS NOTE BEHAVIORAL HEALTH - NSBHPTASSESSDT_PSY_A_CORE
21-Jul-2021 10:28
06-Aug-2021
09-Aug-2021
29-Jul-2021 12:54
03-Aug-2021
28-Jul-2021 13:13
25-Jul-2021 11:34
24-Jul-2021 10:37
26-Jul-2021
05-Aug-2021
02-Aug-2021
04-Aug-2021
23-Jul-2021
27-Jul-2021 11:41
30-Jul-2021 13:17
22-Jul-2021
21-Jul-2021

## 2021-08-09 NOTE — PROGRESS NOTE BEHAVIORAL HEALTH - NSBHCHARTREVIEWLAB_PSY_A_CORE FT
14.6   8.27  )-----------( 250      ( 21 Jul 2021 00:56 )             42.7   07-21    135  |  105  |  13  ----------------------------<  101<H>  3.7   |  27  |  1.05    Ca    9.0      21 Jul 2021 00:56    TPro  7.6  /  Alb  4.4  /  TBili  0.4  /  DBili  x   /  AST  32  /  ALT  27  /  AlkPhos  54  07-21
CBC Full  -  ( 2021 00:56 )  WBC Count : 8.27 K/uL  RBC Count : 4.89 M/uL  Hemoglobin : 14.6 g/dL  Hematocrit : 42.7 %  Platelet Count - Automated : 250 K/uL  Mean Cell Volume : 87.3 fl  Mean Cell Hemoglobin : 29.9 pg  Mean Cell Hemoglobin Concentration : 34.2 gm/dL  Auto Neutrophil # : 6.01 K/uL  Auto Lymphocyte # : 1.70 K/uL  Auto Monocyte # : 0.48 K/uL  Auto Eosinophil # : 0.04 K/uL  Auto Basophil # : 0.02 K/uL  Auto Neutrophil % : 72.7 %  Auto Lymphocyte % : 20.6 %  Auto Monocyte % : 5.8 %  Auto Eosinophil % : 0.5 %  Auto Basophil % : 0.2 %        135  |  105  |  13  ----------------------------<  101<H>  3.7   |  27  |  1.05    Ca    9.0      2021 00:56    TPro  7.6  /  Alb  4.4  /  TBili  0.4  /  DBili  x   /  AST  32  /  ALT  27  /  AlkPhos  54  07-21      Urinalysis Basic - ( 2021 02:08 )    Color: Yellow / Appearance: Clear / S.010 / pH: x  Gluc: x / Ketone: Negative  / Bili: Negative / Urobili: Negative mg/dL   Blood: x / Protein: Negative mg/dL / Nitrite: Negative   Leuk Esterase: Negative / RBC: 0-2 /HPF / WBC Negative   Sq Epi: x / Non Sq Epi: x / Bacteria: Occasional
Pt had refused repeat lab work scheduled for 8/2/2021. Plan to renew lab orders.
CBC Full  -  ( 05 Aug 2021 08:22 )  WBC Count : 4.55 K/uL  RBC Count : 5.12 M/uL  Hemoglobin : 15.3 g/dL  Hematocrit : 45.1 %  Platelet Count - Automated : 225 K/uL  Mean Cell Volume : 88.1 fl  Mean Cell Hemoglobin : 29.9 pg  Mean Cell Hemoglobin Concentration : 33.9 gm/dL  Auto Neutrophil # : 2.48 K/uL  Auto Lymphocyte # : 1.64 K/uL  Auto Monocyte # : 0.29 K/uL  Auto Eosinophil # : 0.09 K/uL  Auto Basophil # : 0.02 K/uL  Auto Neutrophil % : 54.5 %  Auto Lymphocyte % : 36.0 %  Auto Monocyte % : 6.4 %  Auto Eosinophil % : 2.0 %  Auto Basophil % : 0.4 %    08-05    140  |  107  |  17  ----------------------------<  85  4.4   |  30  |  1.04    Ca    9.0      05 Aug 2021 08:22    TPro  7.0  /  Alb  3.9  /  TBili  0.4  /  DBili  x   /  AST  14<L>  /  ALT  36  /  AlkPhos  44  08-05
CBC Full  -  ( 2021 00:56 )  WBC Count : 8.27 K/uL  RBC Count : 4.89 M/uL  Hemoglobin : 14.6 g/dL  Hematocrit : 42.7 %  Platelet Count - Automated : 250 K/uL  Mean Cell Volume : 87.3 fl  Mean Cell Hemoglobin : 29.9 pg  Mean Cell Hemoglobin Concentration : 34.2 gm/dL  Auto Neutrophil # : 6.01 K/uL  Auto Lymphocyte # : 1.70 K/uL  Auto Monocyte # : 0.48 K/uL  Auto Eosinophil # : 0.04 K/uL  Auto Basophil # : 0.02 K/uL  Auto Neutrophil % : 72.7 %  Auto Lymphocyte % : 20.6 %  Auto Monocyte % : 5.8 %  Auto Eosinophil % : 0.5 %  Auto Basophil % : 0.2 %        135  |  105  |  13  ----------------------------<  101<H>  3.7   |  27  |  1.05    Ca    9.0      2021 00:56    TPro  7.6  /  Alb  4.4  /  TBili  0.4  /  DBili  x   /  AST  32  /  ALT  27  /  AlkPhos  54  07-21      Urinalysis Basic - ( 2021 02:08 )    Color: Yellow / Appearance: Clear / S.010 / pH: x  Gluc: x / Ketone: Negative  / Bili: Negative / Urobili: Negative mg/dL   Blood: x / Protein: Negative mg/dL / Nitrite: Negative   Leuk Esterase: Negative / RBC: 0-2 /HPF / WBC Negative   Sq Epi: x / Non Sq Epi: x / Bacteria: Occasional
CBC Full  -  ( 05 Aug 2021 08:22 )  WBC Count : 4.55 K/uL  RBC Count : 5.12 M/uL  Hemoglobin : 15.3 g/dL  Hematocrit : 45.1 %  Platelet Count - Automated : 225 K/uL  Mean Cell Volume : 88.1 fl  Mean Cell Hemoglobin : 29.9 pg  Mean Cell Hemoglobin Concentration : 33.9 gm/dL  Auto Neutrophil # : 2.48 K/uL  Auto Lymphocyte # : 1.64 K/uL  Auto Monocyte # : 0.29 K/uL  Auto Eosinophil # : 0.09 K/uL  Auto Basophil # : 0.02 K/uL  Auto Neutrophil % : 54.5 %  Auto Lymphocyte % : 36.0 %  Auto Monocyte % : 6.4 %  Auto Eosinophil % : 2.0 %  Auto Basophil % : 0.4 %    08-05    140  |  107  |  17  ----------------------------<  85  4.4   |  30  |  1.04    Ca    9.0      05 Aug 2021 08:22    TPro  7.0  /  Alb  3.9  /  TBili  0.4  /  DBili  x   /  AST  14<L>  /  ALT  36  /  AlkPhos  44  08-05
Pt had refused repeat lab work scheduled for 8/2/2021. Plan to renew lab orders.   Labs reordered for 8/5/2021.
CBC Full  -  ( 2021 00:56 )  WBC Count : 8.27 K/uL  RBC Count : 4.89 M/uL  Hemoglobin : 14.6 g/dL  Hematocrit : 42.7 %  Platelet Count - Automated : 250 K/uL  Mean Cell Volume : 87.3 fl  Mean Cell Hemoglobin : 29.9 pg  Mean Cell Hemoglobin Concentration : 34.2 gm/dL  Auto Neutrophil # : 6.01 K/uL  Auto Lymphocyte # : 1.70 K/uL  Auto Monocyte # : 0.48 K/uL  Auto Eosinophil # : 0.04 K/uL  Auto Basophil # : 0.02 K/uL  Auto Neutrophil % : 72.7 %  Auto Lymphocyte % : 20.6 %  Auto Monocyte % : 5.8 %  Auto Eosinophil % : 0.5 %  Auto Basophil % : 0.2 %        135  |  105  |  13  ----------------------------<  101<H>  3.7   |  27  |  1.05    Ca    9.0      2021 00:56    TPro  7.6  /  Alb  4.4  /  TBili  0.4  /  DBili  x   /  AST  32  /  ALT  27  /  AlkPhos  54  07-21      Urinalysis Basic - ( 2021 02:08 )    Color: Yellow / Appearance: Clear / S.010 / pH: x  Gluc: x / Ketone: Negative  / Bili: Negative / Urobili: Negative mg/dL   Blood: x / Protein: Negative mg/dL / Nitrite: Negative   Leuk Esterase: Negative / RBC: 0-2 /HPF / WBC Negative   Sq Epi: x / Non Sq Epi: x / Bacteria: Occasional
CBC Full  -  ( 2021 00:56 )  WBC Count : 8.27 K/uL  RBC Count : 4.89 M/uL  Hemoglobin : 14.6 g/dL  Hematocrit : 42.7 %  Platelet Count - Automated : 250 K/uL  Mean Cell Volume : 87.3 fl  Mean Cell Hemoglobin : 29.9 pg  Mean Cell Hemoglobin Concentration : 34.2 gm/dL  Auto Neutrophil # : 6.01 K/uL  Auto Lymphocyte # : 1.70 K/uL  Auto Monocyte # : 0.48 K/uL  Auto Eosinophil # : 0.04 K/uL  Auto Basophil # : 0.02 K/uL  Auto Neutrophil % : 72.7 %  Auto Lymphocyte % : 20.6 %  Auto Monocyte % : 5.8 %  Auto Eosinophil % : 0.5 %  Auto Basophil % : 0.2 %        135  |  105  |  13  ----------------------------<  101<H>  3.7   |  27  |  1.05    Ca    9.0      2021 00:56    TPro  7.6  /  Alb  4.4  /  TBili  0.4  /  DBili  x   /  AST  32  /  ALT  27  /  AlkPhos  54  07-21      Urinalysis Basic - ( 2021 02:08 )    Color: Yellow / Appearance: Clear / S.010 / pH: x  Gluc: x / Ketone: Negative  / Bili: Negative / Urobili: Negative mg/dL   Blood: x / Protein: Negative mg/dL / Nitrite: Negative   Leuk Esterase: Negative / RBC: 0-2 /HPF / WBC Negative   Sq Epi: x / Non Sq Epi: x / Bacteria: Occasional
CBC Full  -  ( 05 Aug 2021 08:22 )  WBC Count : 4.55 K/uL  RBC Count : 5.12 M/uL  Hemoglobin : 15.3 g/dL  Hematocrit : 45.1 %  Platelet Count - Automated : 225 K/uL  Mean Cell Volume : 88.1 fl  Mean Cell Hemoglobin : 29.9 pg  Mean Cell Hemoglobin Concentration : 33.9 gm/dL  Auto Neutrophil # : 2.48 K/uL  Auto Lymphocyte # : 1.64 K/uL  Auto Monocyte # : 0.29 K/uL  Auto Eosinophil # : 0.09 K/uL  Auto Basophil # : 0.02 K/uL  Auto Neutrophil % : 54.5 %  Auto Lymphocyte % : 36.0 %  Auto Monocyte % : 6.4 %  Auto Eosinophil % : 2.0 %  Auto Basophil % : 0.4 %    08-05    140  |  107  |  17  ----------------------------<  85  4.4   |  30  |  1.04    Ca    9.0      05 Aug 2021 08:22    TPro  7.0  /  Alb  3.9  /  TBili  0.4  /  DBili  x   /  AST  14<L>  /  ALT  36  /  AlkPhos  44  08-05
CBC Full  -  ( 2021 00:56 )  WBC Count : 8.27 K/uL  RBC Count : 4.89 M/uL  Hemoglobin : 14.6 g/dL  Hematocrit : 42.7 %  Platelet Count - Automated : 250 K/uL  Mean Cell Volume : 87.3 fl  Mean Cell Hemoglobin : 29.9 pg  Mean Cell Hemoglobin Concentration : 34.2 gm/dL  Auto Neutrophil # : 6.01 K/uL  Auto Lymphocyte # : 1.70 K/uL  Auto Monocyte # : 0.48 K/uL  Auto Eosinophil # : 0.04 K/uL  Auto Basophil # : 0.02 K/uL  Auto Neutrophil % : 72.7 %  Auto Lymphocyte % : 20.6 %  Auto Monocyte % : 5.8 %  Auto Eosinophil % : 0.5 %  Auto Basophil % : 0.2 %        135  |  105  |  13  ----------------------------<  101<H>  3.7   |  27  |  1.05    Ca    9.0      2021 00:56    TPro  7.6  /  Alb  4.4  /  TBili  0.4  /  DBili  x   /  AST  32  /  ALT  27  /  AlkPhos  54  07-21      Urinalysis Basic - ( 2021 02:08 )    Color: Yellow / Appearance: Clear / S.010 / pH: x  Gluc: x / Ketone: Negative  / Bili: Negative / Urobili: Negative mg/dL   Blood: x / Protein: Negative mg/dL / Nitrite: Negative   Leuk Esterase: Negative / RBC: 0-2 /HPF / WBC Negative   Sq Epi: x / Non Sq Epi: x / Bacteria: Occasional
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
## 2021-08-09 NOTE — PROGRESS NOTE BEHAVIORAL HEALTH - NSBHLOC_PSY_A_CORE
Other
Alert
Other
Alert

## 2021-08-09 NOTE — PROGRESS NOTE BEHAVIORAL HEALTH - NSBHCONSORIP_PSY_A_CORE
Inpatient Admission...
Inpatient Admission...
done
Inpatient Admission...

## 2021-08-09 NOTE — PROGRESS NOTE BEHAVIORAL HEALTH - ATTENTION / CONCENTRATION
Impaired
Other

## 2021-08-09 NOTE — PROGRESS NOTE BEHAVIORAL HEALTH - NSBHFUPSUICINTERVAL_PSY_A_CORE
unable to assess
none known
unable to assess
unable to assess
none known
none known
unable to assess
none known
unable to assess
none known
unable to assess
unable to assess
none known
none known

## 2021-08-09 NOTE — PROGRESS NOTE BEHAVIORAL HEALTH - BEHAVIOR
Other
Cooperative/Other
Other
Cooperative/Other
Other
Other
Cooperative/Other

## 2021-08-09 NOTE — PROGRESS NOTE BEHAVIORAL HEALTH - PERCEPTIONS
Auditory hallucinations
No abnormalities
Auditory hallucinations
Auditory hallucinations/Other
Auditory hallucinations/Other

## 2021-08-09 NOTE — PROGRESS NOTE BEHAVIORAL HEALTH - THOUGHT ASSOCIATIONS
Loose
Normal
Loose

## 2021-08-09 NOTE — PROGRESS NOTE BEHAVIORAL HEALTH - SUMMARY
The pt. is a 20 year-old [place] male  in the  x 5 years and living with his family in [place]. 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      The pt was largely unable to participate in his evaluation due to possible evident pt internal preoccupation and difficulty with attention despite use of the  services. The pt was seen by Psychiatry and admitted to  inpatient psychiatry on 7/21/2021 on a 9.39 emergency legal status for acute pt. safety  and for further pt evaluation and treatment of his psychotic disorder.

## 2021-08-09 NOTE — PROGRESS NOTE BEHAVIORAL HEALTH - NSBHADMITIPOBSFT_PSY_A_CORE
Pt not currently denying active  SI or HI though he remains distracted and preoccupied
Pt not currently endorsing SI or HI though he remains distracted and preoccupied
Pt not currently denying active  SI or HI  Pt denies current AH /VH
Pt not currently endorsing SI or HI though he remains distracted and preoccupied
Pt not currently denying active  SI or HI
Pt not currently endorsing SI or HI though he remains distracted and preoccupied
no imminent plan or intent

## 2021-08-09 NOTE — PROGRESS NOTE BEHAVIORAL HEALTH - GAIT / STATION
Normal gait / station
Other

## 2021-08-10 PROCEDURE — 99232 SBSQ HOSP IP/OBS MODERATE 35: CPT

## 2021-08-10 RX ORDER — MULTIVIT-MIN/FERROUS GLUCONATE 9 MG/15 ML
1 LIQUID (ML) ORAL
Qty: 0 | Refills: 0 | DISCHARGE
Start: 2021-08-10

## 2021-08-10 RX ORDER — OLANZAPINE 15 MG/1
1 TABLET, FILM COATED ORAL
Qty: 14 | Refills: 1
Start: 2021-08-10 | End: 2021-09-06

## 2021-08-10 RX ORDER — OLANZAPINE 15 MG/1
1 TABLET, FILM COATED ORAL
Qty: 14 | Refills: 1
Start: 2021-08-10 | End: 2021-09-07

## 2021-08-10 RX ADMIN — Medication 0.5 MILLIGRAM(S): at 21:59

## 2021-08-10 RX ADMIN — Medication 1 TABLET(S): at 09:19

## 2021-08-10 RX ADMIN — OLANZAPINE 10 MILLIGRAM(S): 15 TABLET, FILM COATED ORAL at 21:58

## 2021-08-10 NOTE — DISCHARGE NOTE BEHAVIORAL HEALTH - NSBHDCCRISISPLAN1FT_PSY_A_CORE
Tell a trusted friend/family member, tell housing staff, tell clinic staff, call a crisis line ( [facility] ph. [phone], [facility] DASH [phone], [facility] (peer support) ph. [phone]), return to the nearest emergency room. You may call 9-1-1 for assistance.

## 2021-08-10 NOTE — DISCHARGE NOTE BEHAVIORAL HEALTH - PAST PSYCHIATRIC HISTORY
Per the pt's uncle's report : no reported pt formal h/o psychiatric illness.  No pt h/o suicide attempts or gestures  No prior inpatient hospital admissions or outpatient treatment  No prior h/o psychotropic medications

## 2021-08-10 NOTE — DISCHARGE NOTE BEHAVIORAL HEALTH - MEDICATION SUMMARY - MEDICATIONS TO STOP TAKING
I will STOP taking the medications listed below when I get home from the hospital:    amoxicillin 500 mg oral tablet  -- 2 tab(s) by mouth once a day   -- Finish all this medication unless otherwise directed by prescriber.
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
## 2021-08-10 NOTE — DISCHARGE NOTE BEHAVIORAL HEALTH - NSBHDCRESOURCESOTHERFT_PSY_A_CORE
KAITLIN DASH- for psychiatric crises (available AFTER HOURS)  24/7 hotline: 632.956.1573  Address: [address] [place] [address] [place] [address]    Mood Disorder Support Group [facility]:   Free Zoom Support Groups:   [email or web address]://[email or web address]/zoommeetings    9-751-280-MONTSERRAT ([phone]) or [email or web address]

## 2021-08-10 NOTE — DISCHARGE NOTE BEHAVIORAL HEALTH - NSBHDCMEDSFT_PSY_A_CORE
MEDICATIONS  (STANDING):  LORazepam     Tablet 0.5 milliGRAM(s) Oral at bedtime  multivitamin/minerals 1 Tablet(s) Oral daily  OLANZapine 10 milliGRAM(s) Oral at bedtime    MEDICATIONS  (PRN):  acetaminophen   Tablet .. 650 milliGRAM(s) Oral every 6 hours PRN Temp greater or equal to 38C (100.4F), Mild Pain (1 - 3), Moderate Pain (4 - 6)  aluminum hydroxide/magnesium hydroxide/simethicone Suspension 30 milliLiter(s) Oral every 6 hours PRN Dyspepsia  diphenhydrAMINE 50 milliGRAM(s) Oral every 6 hours PRN Extrapyramidal prophylaxis/psychosis related agitation  diphenhydrAMINE   Injectable 50 milliGRAM(s) IntraMuscular every 6 hours PRN Extrapyramidal prophylaxis/psychosis related agitation  haloperidol     Tablet 5 milliGRAM(s) Oral every 6 hours PRN moderate  psychosis related agitation  haloperidol    Injectable 5 milliGRAM(s) IntraMuscular every 6 hours PRN severe psychosis related agitation  LORazepam     Tablet 0.5 milliGRAM(s) Oral every 8 hours PRN moderate anxiety related to psychosis  magnesium hydroxide Suspension 30 milliLiter(s) Oral daily PRN Constipation Discharge Medications  multivitamin/minerals 1 Tablet(s) Oral daily  OLANZapine 10 milliGRAM(s) Oral at bedtime   The pt has been educated to continue the medication unless told by his outpatient provider to stop

## 2021-08-10 NOTE — DISCHARGE NOTE BEHAVIORAL HEALTH - NSBHDCPURPOSE2FT_PSY_A_CORE
Outpatient mental health treatment, medication management. You have an appointment with FRANKLYN Samayoa on 8/17/21 at 1:30 PM.

## 2021-08-10 NOTE — DISCHARGE NOTE BEHAVIORAL HEALTH - MEDICATION SUMMARY - MEDICATIONS TO TAKE
I will START or STAY ON the medications listed below when I get home from the hospital:    OLANZapine 10 mg oral tablet  -- 1 tab(s) by mouth once a day (at bedtime)  -- Indication: For Brief reactive psychosis with marked stressor    Multiple Vitamins with Minerals oral tablet  -- 1 tab(s) by mouth once a day  -- Indication: For Supplement ( over the counter)

## 2021-08-10 NOTE — DISCHARGE NOTE BEHAVIORAL HEALTH - NSBHDCTESTSFT_PSY_A_CORE
TSH= 2.61  Fasting lipids  Cholesterol = 142  TG=75  HgA1C= 5.0  COVID-19 PCR NEGATIVE  COVID-19 AB + > 250   Urine tox screen negative for substances of abuse  ( 7/21/2021)  EKG  VR= 64  QT/QTc = 376/387   NSR  No studies are pending

## 2021-08-10 NOTE — DISCHARGE NOTE BEHAVIORAL HEALTH - NSBHDCLABSFT_PSY_A_CORE
HgA1C, Fasting lipids q 6 monthly with SGA Zyprexa  CBC with diff, CMP q 6 monthly with SGA Zyprexa  Monthly monitoring of vital signs including weight and waist circumference with second generation antipsychotic Zyprexa

## 2021-08-10 NOTE — DISCHARGE NOTE BEHAVIORAL HEALTH - CONDITIONS AT DISCHARGE
less depressed and anxious, denies suicidal ideation or voices. discharged with all belongings and verbalized understanding of all d/c plans

## 2021-08-10 NOTE — DISCHARGE NOTE BEHAVIORAL HEALTH - NSBHDCCRISISPLAN2FT_PSY_A_CORE
Call [facility] 5N: [phone]  : Clementine Gross LMSW  Psychiatrists- Dr. Dina Gray

## 2021-08-10 NOTE — DISCHARGE NOTE BEHAVIORAL HEALTH - NSBHDCPURPOSE1FT_PSY_A_CORE
Outpatient mental health treatment. You have an intake appointment with Shreya at the [facility] on 8/13/21 at 12:30 PM.

## 2021-08-11 VITALS — RESPIRATION RATE: 12 BRPM | OXYGEN SATURATION: 97 % | TEMPERATURE: 98 F

## 2021-08-11 PROCEDURE — 99239 HOSP IP/OBS DSCHRG MGMT >30: CPT

## 2021-08-11 RX ADMIN — Medication 1 TABLET(S): at 09:38

## 2021-08-17 DIAGNOSIS — R44.0 AUDITORY HALLUCINATIONS: ICD-10-CM

## 2021-08-17 DIAGNOSIS — F23 BRIEF PSYCHOTIC DISORDER: ICD-10-CM

## 2021-08-17 DIAGNOSIS — F43.10 POST-TRAUMATIC STRESS DISORDER, UNSPECIFIED: ICD-10-CM

## 2021-08-21 ENCOUNTER — INPATIENT (INPATIENT)
Facility: HOSPITAL | Age: 21
LOS: 19 days | Discharge: HOME | DRG: 753 | End: 2021-09-10
Attending: PSYCHIATRY & NEUROLOGY | Admitting: PSYCHIATRY & NEUROLOGY
Payer: MEDICAID

## 2021-08-21 VITALS
TEMPERATURE: 98 F | HEART RATE: 72 BPM | HEIGHT: 62 IN | WEIGHT: 125 LBS | RESPIRATION RATE: 18 BRPM | OXYGEN SATURATION: 98 % | SYSTOLIC BLOOD PRESSURE: 121 MMHG | DIASTOLIC BLOOD PRESSURE: 65 MMHG

## 2021-08-21 DIAGNOSIS — F23 BRIEF PSYCHOTIC DISORDER: ICD-10-CM

## 2021-08-21 DIAGNOSIS — F20.9 SCHIZOPHRENIA, UNSPECIFIED: ICD-10-CM

## 2021-08-21 DIAGNOSIS — F29 UNSPECIFIED PSYCHOSIS NOT DUE TO A SUBSTANCE OR KNOWN PHYSIOLOGICAL CONDITION: ICD-10-CM

## 2021-08-21 LAB
ALBUMIN SERPL ELPH-MCNC: 4.4 G/DL — SIGNIFICANT CHANGE UP (ref 3.3–5)
ALP SERPL-CCNC: 53 U/L — SIGNIFICANT CHANGE UP (ref 40–120)
ALT FLD-CCNC: 48 U/L — SIGNIFICANT CHANGE UP (ref 12–78)
ANION GAP SERPL CALC-SCNC: 2 MMOL/L — LOW (ref 5–17)
APAP SERPL-MCNC: < 2 UG/ML (ref 10–30)
AST SERPL-CCNC: 18 U/L — SIGNIFICANT CHANGE UP (ref 15–37)
BASOPHILS # BLD AUTO: 0.03 K/UL — SIGNIFICANT CHANGE UP (ref 0–0.2)
BASOPHILS NFR BLD AUTO: 0.4 % — SIGNIFICANT CHANGE UP (ref 0–2)
BILIRUB SERPL-MCNC: 0.4 MG/DL — SIGNIFICANT CHANGE UP (ref 0.2–1.2)
BUN SERPL-MCNC: 19 MG/DL — SIGNIFICANT CHANGE UP (ref 7–23)
CALCIUM SERPL-MCNC: 9.3 MG/DL — SIGNIFICANT CHANGE UP (ref 8.5–10.1)
CHLORIDE SERPL-SCNC: 105 MMOL/L — SIGNIFICANT CHANGE UP (ref 96–108)
CO2 SERPL-SCNC: 29 MMOL/L — SIGNIFICANT CHANGE UP (ref 22–31)
CREAT SERPL-MCNC: 0.98 MG/DL — SIGNIFICANT CHANGE UP (ref 0.5–1.3)
EOSINOPHIL # BLD AUTO: 0.01 K/UL — SIGNIFICANT CHANGE UP (ref 0–0.5)
EOSINOPHIL NFR BLD AUTO: 0.1 % — SIGNIFICANT CHANGE UP (ref 0–6)
ETHANOL SERPL-MCNC: <10 MG/DL — SIGNIFICANT CHANGE UP (ref 0–10)
GLUCOSE SERPL-MCNC: 92 MG/DL — SIGNIFICANT CHANGE UP (ref 70–99)
HCT VFR BLD CALC: 40.1 % — SIGNIFICANT CHANGE UP (ref 39–50)
HGB BLD-MCNC: 13.8 G/DL — SIGNIFICANT CHANGE UP (ref 13–17)
IMM GRANULOCYTES NFR BLD AUTO: 0.5 % — SIGNIFICANT CHANGE UP (ref 0–1.5)
LYMPHOCYTES # BLD AUTO: 1.26 K/UL — SIGNIFICANT CHANGE UP (ref 1–3.3)
LYMPHOCYTES # BLD AUTO: 16.3 % — SIGNIFICANT CHANGE UP (ref 13–44)
MCHC RBC-ENTMCNC: 29.9 PG — SIGNIFICANT CHANGE UP (ref 27–34)
MCHC RBC-ENTMCNC: 34.4 GM/DL — SIGNIFICANT CHANGE UP (ref 32–36)
MCV RBC AUTO: 86.8 FL — SIGNIFICANT CHANGE UP (ref 80–100)
MONOCYTES # BLD AUTO: 0.43 K/UL — SIGNIFICANT CHANGE UP (ref 0–0.9)
MONOCYTES NFR BLD AUTO: 5.6 % — SIGNIFICANT CHANGE UP (ref 2–14)
NEUTROPHILS # BLD AUTO: 5.94 K/UL — SIGNIFICANT CHANGE UP (ref 1.8–7.4)
NEUTROPHILS NFR BLD AUTO: 77.1 % — HIGH (ref 43–77)
PLATELET # BLD AUTO: 225 K/UL — SIGNIFICANT CHANGE UP (ref 150–400)
POTASSIUM SERPL-MCNC: 4.1 MMOL/L — SIGNIFICANT CHANGE UP (ref 3.5–5.3)
POTASSIUM SERPL-SCNC: 4.1 MMOL/L — SIGNIFICANT CHANGE UP (ref 3.5–5.3)
PROT SERPL-MCNC: 7.7 GM/DL — SIGNIFICANT CHANGE UP (ref 6–8.3)
RBC # BLD: 4.62 M/UL — SIGNIFICANT CHANGE UP (ref 4.2–5.8)
RBC # FLD: 12.1 % — SIGNIFICANT CHANGE UP (ref 10.3–14.5)
SALICYLATES SERPL-MCNC: <1.7 MG/DL — LOW (ref 2.8–20)
SARS-COV-2 RNA SPEC QL NAA+PROBE: SIGNIFICANT CHANGE UP
SODIUM SERPL-SCNC: 136 MMOL/L — SIGNIFICANT CHANGE UP (ref 135–145)
WBC # BLD: 7.71 K/UL — SIGNIFICANT CHANGE UP (ref 3.8–10.5)
WBC # FLD AUTO: 7.71 K/UL — SIGNIFICANT CHANGE UP (ref 3.8–10.5)

## 2021-08-21 PROCEDURE — 99285 EMERGENCY DEPT VISIT HI MDM: CPT

## 2021-08-21 PROCEDURE — 80053 COMPREHEN METABOLIC PANEL: CPT

## 2021-08-21 PROCEDURE — 93010 ELECTROCARDIOGRAM REPORT: CPT

## 2021-08-21 PROCEDURE — 85025 COMPLETE CBC W/AUTO DIFF WBC: CPT

## 2021-08-21 PROCEDURE — 86769 SARS-COV-2 COVID-19 ANTIBODY: CPT

## 2021-08-21 PROCEDURE — 36415 COLL VENOUS BLD VENIPUNCTURE: CPT

## 2021-08-21 RX ORDER — DIPHENHYDRAMINE HCL 50 MG
50 CAPSULE ORAL EVERY 6 HOURS
Refills: 0 | Status: DISCONTINUED | OUTPATIENT
Start: 2021-08-21 | End: 2021-09-10

## 2021-08-21 RX ORDER — ACETAMINOPHEN 500 MG
650 TABLET ORAL EVERY 6 HOURS
Refills: 0 | Status: DISCONTINUED | OUTPATIENT
Start: 2021-08-21 | End: 2021-09-10

## 2021-08-21 RX ORDER — OLANZAPINE 15 MG/1
10 TABLET, FILM COATED ORAL AT BEDTIME
Refills: 0 | Status: DISCONTINUED | OUTPATIENT
Start: 2021-08-21 | End: 2021-08-25

## 2021-08-21 RX ORDER — LANOLIN ALCOHOL/MO/W.PET/CERES
3 CREAM (GRAM) TOPICAL AT BEDTIME
Refills: 0 | Status: DISCONTINUED | OUTPATIENT
Start: 2021-08-21 | End: 2021-09-10

## 2021-08-21 RX ORDER — HALOPERIDOL DECANOATE 100 MG/ML
5 INJECTION INTRAMUSCULAR EVERY 6 HOURS
Refills: 0 | Status: DISCONTINUED | OUTPATIENT
Start: 2021-08-21 | End: 2021-09-10

## 2021-08-21 RX ORDER — MAGNESIUM HYDROXIDE 400 MG/1
30 TABLET, CHEWABLE ORAL DAILY
Refills: 0 | Status: DISCONTINUED | OUTPATIENT
Start: 2021-08-21 | End: 2021-09-10

## 2021-08-21 RX ADMIN — Medication 3 MILLIGRAM(S): at 21:20

## 2021-08-21 RX ADMIN — OLANZAPINE 10 MILLIGRAM(S): 15 TABLET, FILM COATED ORAL at 21:20

## 2021-08-21 NOTE — ED ADULT NURSE NOTE - OBJECTIVE STATEMENT
[facility]  used ID #[id number], pt is 21 yo bib [employer] for making a SI statement, pt denies SI/HI or depression, or psych history, pt states "my uncle called the police because he wants me out of the house." pt denies taking any ETOH, or recreational drug use. pt lived with uncle for 5 years.
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
## 2021-08-21 NOTE — ED BEHAVIORAL HEALTH ASSESSMENT NOTE - SUICIDAL BEHAVIOR DETAILS
Patient denying but pt was agitated and begging for uncle to "kill" him this morning for no apparent reason

## 2021-08-21 NOTE — ED BEHAVIORAL HEALTH ASSESSMENT NOTE - PSYCHIATRIC ISSUES AND PLAN (INCLUDE STANDING AND PRN MEDICATION)
Zyprexa 10mg po Qhs, Haldol 5mg po prn Q6h for agitation, Cogentin 1mg po prn prophylactically for EPS
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
## 2021-08-21 NOTE — ED BEHAVIORAL HEALTH ASSESSMENT NOTE - DETAILS
Patient discharged from  Uncle Miguel Mcdermott contacted via [employer] #[id number] Dr. Gray informed as above Above details discussed with Dr. Gray Patient denies but uncle states pt was screaming this morning "just kill me uncle. Kill me now!!"

## 2021-08-21 NOTE — ED PROVIDER NOTE - OBJECTIVE STATEMENT
19 y/o male with a psychiatric PMHx presents to the ED c/o suicidal thoughts.  States he was d/c from 5N  in [place] early 08/2021. Reports he was brought in by his uncle at his last visit.  Has SI but denies it at this time. No trauma or injury. No other complaints. 19 y/o male with a psychiatric PMHx presents to the ED c/o suicidal thoughts.  States he was d/c from 5N early 08/2021. Reports he was brought in by his uncle at his last visit.  Has SI but denies it at this time. No trauma or injury. No other complaints.  Service: [id number]

## 2021-08-21 NOTE — ED PROVIDER NOTE - PROGRESS NOTE DETAILS
Saul OTTO: patient is medically clear pending ua result at this time; to be admitted to [address] [place] [address] per psychiatry team.

## 2021-08-21 NOTE — ED PROVIDER NOTE - CLINICAL SUMMARY MEDICAL DECISION MAKING FREE TEXT BOX
21 y/o male with a psychiatric  PMHx  presents to the ED c/o suicidal thoughts.  Need for psych consult and medical evaluation. 21 y/o male with hx of schizophrenia presents to the ED c/o suicidal thoughts.  Need for psych consult and medical evaluation.

## 2021-08-21 NOTE — ED BEHAVIORAL HEALTH ASSESSMENT NOTE - RISK ASSESSMENT
Low Acute Suicide Risk Risk factors - 20 year-old H/M with prior recent psychiatric history, questionable compliance with medication  Protective Factors - 20 year-old, H/M, no known substance use, denying si/p/i, supportive family and cultural belief, housing stability

## 2021-08-21 NOTE — ED BEHAVIORAL HEALTH ASSESSMENT NOTE - DESCRIPTION
20 year-old single, unemployed H/M who migrated from [facility] x5 years ago. Lives with uncle and cousins in private house. Patient was reportedly involved in a work training program prior to recent admission to  the end of July. No children and no reports of substance use. Medical assessment and CO 1:1 observation None known

## 2021-08-22 PROCEDURE — 99231 SBSQ HOSP IP/OBS SF/LOW 25: CPT

## 2021-08-22 PROCEDURE — 99253 IP/OBS CNSLTJ NEW/EST LOW 45: CPT

## 2021-08-22 RX ADMIN — Medication 1 TABLET(S): at 09:33

## 2021-08-22 RX ADMIN — OLANZAPINE 10 MILLIGRAM(S): 15 TABLET, FILM COATED ORAL at 20:43

## 2021-08-22 NOTE — BEHAVIORAL HEALTH ASSESSMENT NOTE - NSBHREFERDETAILS_PSY_A_CORE_FT
The pt was readmitted to  on 8/21/2021 on a 9.39 emergency legal status due to worsening affective psychosis with pt statements of + SI  and paranoid delusional thinking with a resurgence of pt AH  related to pt med noncompliance.

## 2021-08-22 NOTE — BEHAVIORAL HEALTH ASSESSMENT NOTE - NSBHADMITIPSTRENGTH_PSY_A_CORE
Has supportive interpersonal relationships with family, friends or peers/Has access to housing/residential stability

## 2021-08-22 NOTE — BEHAVIORAL HEALTH ASSESSMENT NOTE - NSBHREFEROTHER_PSY_A_CORE_FT
Pt readmitted to  from [facility] ED on 8/21/2021 due to worsening affective psychosis presumably in the context of pt noncompliance with recently begun and clinically effective Zyprexa 10 mg po qhs to alleviate the pt's paranoid psychosis.
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
## 2021-08-22 NOTE — CONSULT NOTE ADULT - SUBJECTIVE AND OBJECTIVE BOX
21 y/o male with PMHx of recently diagnosis psychosis just started on zyprexa and discharged from [address] [place] [address] in August who returns to  with persistent psychosis likely secondary to noncompliance with medications.        PCP:    CHIEF COMPLAINT:     HISTORY OF THE PRESENT ILLNESS:    PAST MEDICAL HISTORY:    PAST SURGICAL HISTORY:    FAMILY HISTORY:   non-contributory to the patient's current presentation    SOCIAL HISTORY:  no smoking, no alcohol, no drugs    REVIEW OF SYSTEMS:   All 10 systems reviewed in detailed and found to be negative with the exception of what has already been described above    MEDICATIONS  (STANDING):  multivitamin 1 Tablet(s) Oral daily  OLANZapine 10 milliGRAM(s) Oral at bedtime    MEDICATIONS  (PRN):  acetaminophen   Tablet .. 650 milliGRAM(s) Oral every 6 hours PRN Temp greater or equal to 38C (100.4F), Mild Pain (1 - 3), Moderate Pain (4 - 6)  aluminum hydroxide/magnesium hydroxide/simethicone Suspension 30 milliLiter(s) Oral every 6 hours PRN Dyspepsia  diphenhydrAMINE 50 milliGRAM(s) Oral every 6 hours PRN Extrapyramidal prophylaxis/ psychosis related anxiety/agitation  diphenhydrAMINE   Injectable 50 milliGRAM(s) IntraMuscular every 6 hours PRN Extrapyramidal prophylaxis/ psychosis related agitation  haloperidol     Tablet 5 milliGRAM(s) Oral every 6 hours PRN moderate psychosis related agitation  haloperidol    Injectable 5 milliGRAM(s) IntraMuscular every 6 hours PRN severe psychosis related agitation  LORazepam     Tablet 1 milliGRAM(s) Oral every 6 hours PRN moderate anxiety related to psychosis  LORazepam   Injectable 2 milliGRAM(s) IntraMuscular every 6 hours PRN severe agitation/ anxiety related to psychosis  magnesium hydroxide Suspension 30 milliLiter(s) Oral daily PRN Constipation  melatonin 3 milliGRAM(s) Oral at bedtime PRN Insomnia      VITALS:  T(F): 98.1 (08-22-21 @ 08:26), Max: 98.3 (08-21-21 @ 17:35)  HR: --  BP: --  RR: 16 (08-22-21 @ 08:26) (16 - 18)  SpO2: 99% (08-22-21 @ 08:26) (98% - 99%)  Wt(kg): --    I&O's Summary      CAPILLARY BLOOD GLUCOSE          PHYSICAL EXAM:    HEENT:  pupils equal and reactive, EOMI, no oropharyngeal lesions, erythema, exudates, oral thrush  NECK:   supple, no carotid bruits, no palpable lymph nodes, no thyromegaly  CV:  +S1, +S2, regular, no murmurs or rubs  RESP:   lungs clear to auscultation bilaterally, no wheezing, rales, rhonchi, good air entry bilaterally  BREAST:  not examined  GI:  abdomen soft, non-tender, non-distended, normal BS, no bruits, no abdominal masses, no palpable masses  RECTAL:  not examined  :  not examined  MSK:   normal muscle tone, no atrophy, no rigidity, no contractions  EXT:  no clubbing, no cyanosis, no edema, no calf pain, swelling or erythema  VASCULAR:  pulses equal and symmetric in the upper and lower extremities  NEURO:  AAOX3, no focal neurological deficits, follows all commands, able to move extremities spontaneously  SKIN:  no ulcers, lesions or rashes    LABS:                            13.8   7.71  )-----------( 225      ( 21 Aug 2021 11:10 )             40.1     08-21    136  |  105  |  19  ----------------------------<  92  4.1   |  29  |  0.98    Ca    9.3      21 Aug 2021 11:10    TPro  7.7  /  Alb  4.4  /  TBili  0.4  /  DBili  x   /  AST  18  /  ALT  48  /  AlkPhos  53  08-21        LIVER FUNCTIONS - ( 21 Aug 2021 11:10 )  Alb: 4.4 g/dL / Pro: 7.7 gm/dL / ALK PHOS: 53 U/L / ALT: 48 U/L / AST: 18 U/L / GGT: x                                             EKG:     RADIOLOGY STUDIES:      IMPRESSION:        RECOMMENDATIONS:        d/w patient, ED RN and ED physician    Time Spent:   PCP:  none    CHIEF COMPLAINT: psychosis    HISTORY OF THE PRESENT ILLNESS:  21 y/o male with PMHx of recently diagnosed psychosis just started on zyprexa and discharged from [address] [place] [address] earlier in August who returns to  with persistent psychosis likely secondary to noncompliance with medications.  We are asked to evaluate medically for admission/ clearance to [address] [place] [address].      PAST MEDICAL HISTORY:  Psychosis    PAST SURGICAL HISTORY:  None    FAMILY HISTORY:    Unable to ask patient at this time due to psychosis    SOCIAL HISTORY:  Patient denies tob/ etoh/ drug use.      REVIEW OF SYSTEMS:   All 10 systems reviewed in detailed and found to be negative with the exception of what has already been described above    MEDICATIONS  (STANDING):  multivitamin 1 Tablet(s) Oral daily  OLANZapine 10 milliGRAM(s) Oral at bedtime    MEDICATIONS  (PRN):  acetaminophen   Tablet .. 650 milliGRAM(s) Oral every 6 hours PRN Temp greater or equal to 38C (100.4F), Mild Pain (1 - 3), Moderate Pain (4 - 6)  aluminum hydroxide/magnesium hydroxide/simethicone Suspension 30 milliLiter(s) Oral every 6 hours PRN Dyspepsia  diphenhydrAMINE 50 milliGRAM(s) Oral every 6 hours PRN Extrapyramidal prophylaxis/ psychosis related anxiety/agitation  diphenhydrAMINE   Injectable 50 milliGRAM(s) IntraMuscular every 6 hours PRN Extrapyramidal prophylaxis/ psychosis related agitation  haloperidol     Tablet 5 milliGRAM(s) Oral every 6 hours PRN moderate psychosis related agitation  haloperidol    Injectable 5 milliGRAM(s) IntraMuscular every 6 hours PRN severe psychosis related agitation  LORazepam     Tablet 1 milliGRAM(s) Oral every 6 hours PRN moderate anxiety related to psychosis  LORazepam   Injectable 2 milliGRAM(s) IntraMuscular every 6 hours PRN severe agitation/ anxiety related to psychosis  magnesium hydroxide Suspension 30 milliLiter(s) Oral daily PRN Constipation  melatonin 3 milliGRAM(s) Oral at bedtime PRN Insomnia      VITALS:  T(F): 98.1 (08-22-21 @ 08:26), Max: 98.3 (08-21-21 @ 17:35)  HR: --  BP: --  RR: 16 (08-22-21 @ 08:26) (16 - 18)  SpO2: 99% (08-22-21 @ 08:26) (98% - 99%)  Wt(kg): --    PHYSICAL EXAM:  HEENT:  pupils equal and reactive, EOMI, no oropharyngeal lesions, erythema, exudates, oral thrush  NECK:   supple, no carotid bruits, no palpable lymph nodes, no thyromegaly  CV:  +S1, +S2, regular, no murmurs or rubs  RESP:   lungs clear to auscultation bilaterally, no wheezing, rales, rhonchi, good air entry bilaterally  BREAST:  not examined  GI:  abdomen soft, non-tender, non-distended, normal BS, no bruits, no abdominal masses, no palpable masses  RECTAL:  not examined  :  not examined  MSK:   normal muscle tone, no atrophy, no rigidity, no contractions  EXT:  no clubbing, no cyanosis, no edema, no calf pain, swelling or erythema  VASCULAR:  pulses equal and symmetric in the upper and lower extremities  NEURO:  AAOX3, no focal neurological deficits, follows all commands, able to move extremities spontaneously  SKIN:  no ulcers, lesions or rashes    LABS:                        13.8   7.71  )-----------( 225      ( 21 Aug 2021 11:10 )             40.1     08-21    136  |  105  |  19  ----------------------------<  92  4.1   |  29  |  0.98    Ca    9.3      21 Aug 2021 11:10    TPro  7.7  /  Alb  4.4  /  TBili  0.4  /  DBili  x   /  AST  18  /  ALT  48  /  AlkPhos  53  08-21        LIVER FUNCTIONS - ( 21 Aug 2021 11:10 )  Alb: 4.4 g/dL / Pro: 7.7 gm/dL / ALK PHOS: 53 U/L / ALT: 48 U/L / AST: 18 U/L / GGT: x             EKG:  NSR.  Rate in 60's.  No acute changes.  QTc 406.

## 2021-08-22 NOTE — BEHAVIORAL HEALTH ASSESSMENT NOTE - DETAILS
Patient denies but uncle states pt was screaming this morning "just kill me uncle. Kill me now!!" Dr. Gray informed as above Patient discharged from

## 2021-08-22 NOTE — BEHAVIORAL HEALTH ASSESSMENT NOTE - RISK ASSESSMENT
Risk factors - 20 year-old H/M with prior recent psychiatric history, questionable compliance with medication  Protective Factors - 20 year-old, H/M, no known substance use, denying si/p/i, supportive family and cultural belief, housing stability Low Acute Suicide Risk

## 2021-08-22 NOTE — BEHAVIORAL HEALTH ASSESSMENT NOTE - NSBHCHARTREVIEWVS_PSY_A_CORE FT
Vital Signs Last 24 Hrs  T(C): 36.7 (22 Aug 2021 08:26), Max: 36.8 (21 Aug 2021 17:35)  T(F): 98.1 (22 Aug 2021 08:26), Max: 98.3 (21 Aug 2021 17:35)  HR: 77 (21 Aug 2021 15:20) (77 - 77)  BP: 118/55 (21 Aug 2021 15:20) (118/55 - 118/55)  BP(mean): --  RR: 16 (22 Aug 2021 08:26) (16 - 18)  SpO2: 99% (22 Aug 2021 08:26) (98% - 100%)

## 2021-08-22 NOTE — CONSULT NOTE ADULT - ASSESSMENT
21 y/o male with PMHx of recently diagnosed psychosis just started on zyprexa and discharged from [address] [place] [address] earlier in August who returns to  with persistent psychosis likely secondary to noncompliance with medications.  We are asked to evaluate medically for admission/ clearance to [address] [place] [address].    #Psychosis:    Further management as per psych.    Cont zyprexa qhs.    PRN meds as ordered.      #+COVID antibodies present in July.  Unclear if had covid or vaccinated but has immunity.      #DVT Proph:  encourage ambulation    Medically stable for admission to [address] [place] [address].  Please recall with any further medical issues.  Will not follow.
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
## 2021-08-22 NOTE — BEHAVIORAL HEALTH ASSESSMENT NOTE - NSBHCHARTREVIEWLAB_PSY_A_CORE FT
CBC Full  -  ( 21 Aug 2021 11:10 )  WBC Count : 7.71 K/uL  RBC Count : 4.62 M/uL  Hemoglobin : 13.8 g/dL  Hematocrit : 40.1 %  Platelet Count - Automated : 225 K/uL  Mean Cell Volume : 86.8 fl  Mean Cell Hemoglobin : 29.9 pg  Mean Cell Hemoglobin Concentration : 34.4 gm/dL  Auto Neutrophil # : 5.94 K/uL  Auto Lymphocyte # : 1.26 K/uL  Auto Monocyte # : 0.43 K/uL  Auto Eosinophil # : 0.01 K/uL  Auto Basophil # : 0.03 K/uL  Auto Neutrophil % : 77.1 %  Auto Lymphocyte % : 16.3 %  Auto Monocyte % : 5.6 %  Auto Eosinophil % : 0.1 %  Auto Basophil % : 0.4 %    08-21    136  |  105  |  19  ----------------------------<  92  4.1   |  29  |  0.98    Ca    9.3      21 Aug 2021 11:10    TPro  7.7  /  Alb  4.4  /  TBili  0.4  /  DBili  x   /  AST  18  /  ALT  48  /  AlkPhos  53  08-21

## 2021-08-23 PROCEDURE — 99231 SBSQ HOSP IP/OBS SF/LOW 25: CPT

## 2021-08-23 RX ADMIN — Medication 1 TABLET(S): at 10:08

## 2021-08-23 RX ADMIN — OLANZAPINE 10 MILLIGRAM(S): 15 TABLET, FILM COATED ORAL at 21:07

## 2021-08-23 NOTE — PROGRESS NOTE BEHAVIORAL HEALTH - NSBHADDHXPSYSOCFT_PSY_A_CORE
The pt had been reportedly doing well with no significant physical or psychiatric history  over the past 5 years since the he came to the [place] alone from [facility] at age 15. Since then  the pt  has  been living with his uncle and his  paternal aunt and a cousin in [place].   

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
## 2021-08-23 NOTE — PROGRESS NOTE BEHAVIORAL HEALTH - NSBHCHARTREVIEWVS_PSY_A_CORE FT
Vital Signs Last 24 Hrs  T(C): 36.8 (23 Aug 2021 08:02), Max: 36.8 (23 Aug 2021 08:02)  T(F): 98.2 (23 Aug 2021 08:02), Max: 98.2 (23 Aug 2021 08:02)  HR: --  BP: --  BP(mean): --  RR: 14 (23 Aug 2021 08:02) (14 - 14)  SpO2: 100% (23 Aug 2021 08:02) (100% - 100%)

## 2021-08-23 NOTE — PROGRESS NOTE BEHAVIORAL HEALTH - RISK ASSESSMENT
Moderate risk for suicide in the context of current paranoid psychosis Moderate risk for suicide in the context of current paranoid psychosis  Acute risk factors-pt med noncompliant with worsening paranoid psychosis, pt with SI /AH  , agitated, limited insight  Chronic risk factors- pt recently discharged from hospital for apparent first psychotic break, med noncompliant, h/o likely PTSD related solitary travel to the [place] alone at age 15 from [facility]  Protective factors- pt has supportive , loving family, pt had been working and attending school before becoming ill, pt has access to housing and to financial support from extended family   Mitigating factors- pt now readmitted to safe setting of hospital, pt now med compliant but with little insight into his illness and the need for treatment, pt unable currently to use coping skills due to severity of his psychosis

## 2021-08-23 NOTE — PROGRESS NOTE BEHAVIORAL HEALTH - NSBHADDHXFAMFT_PSY_A_CORE
As above, the pt has no reported family history of mood or thought disorders.    Lengthy discussion  as to pt's good premorbid functioning and the pt's generally good physical and mental health over the past 5 years since the pt came to the [place] alone from [facility] and since the pt. has been living with his paternal aunt and uncle and a cousin in [place]. 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

## 2021-08-23 NOTE — PROGRESS NOTE BEHAVIORAL HEALTH - NSBHADDHXPSYCHFT_PSY_A_CORE
Pt with no prior h/o psychiatric illness and with first inpatient admission ever  to hospital on 7/21/2021 to [facility] due to bizarre behavior and apparent brief reactive psychosis .   Pt begun on Zyprexa titrated to 10 mg po qhs with modest effect and pt discharged home on 8/11/2021.   Pt reportedly med noncompliant after DC home with resultant resurgence of affective psychosis  and readmission to hospital on 8/22/2021 for acute safety and for ongoing pt evaluation and treatment.   No pt h/o suicide attempts or gestures  No h/o violence or SIB

## 2021-08-23 NOTE — PROGRESS NOTE BEHAVIORAL HEALTH - OTHER
variable, pt in tenuous control slowed, terse, clipped at times " I am fine!" Pt denies AH /VH but he appears internally preoccupied variable paranoid, persecutory, nihilistic  delusional thinking

## 2021-08-24 PROCEDURE — 99231 SBSQ HOSP IP/OBS SF/LOW 25: CPT

## 2021-08-24 RX ADMIN — Medication 1 TABLET(S): at 09:23

## 2021-08-24 RX ADMIN — OLANZAPINE 10 MILLIGRAM(S): 15 TABLET, FILM COATED ORAL at 20:29

## 2021-08-24 NOTE — PROGRESS NOTE BEHAVIORAL HEALTH - NSBHCHARTREVIEWVS_PSY_A_CORE FT
Vital Signs Last 24 Hrs  T(C): 36.8 (23 Aug 2021 08:02), Max: 36.8 (23 Aug 2021 08:02)  T(F): 98.2 (23 Aug 2021 08:02), Max: 98.2 (23 Aug 2021 08:02)  HR: --  BP: --  BP(mean): --  RR: 14 (23 Aug 2021 08:02) (14 - 14)  SpO2: 100% (23 Aug 2021 08:02) (100% - 100%) Vital Signs Last 24 Hrs  T(C): --  T(F): --  HR: --  BP: --  BP(mean): --  RR: --  SpO2: --

## 2021-08-24 NOTE — PROGRESS NOTE BEHAVIORAL HEALTH - NSBHFUPINTERVALHXFT_PSY_A_CORE
Pt seen in his room  with bilingual female staff .  Earlier in the day the pt was noted  by hospital staff to be walking alone in the hallway and talking and whispering to himself seemingly in response to AH which the pt continues to deny. Dressed with fair attention to his ADLs and to his grooming. The pt appeared very tense, distraught and he was noted to be gripping the bed sheets with clenched fists. Pt with poor eye contact  and when asked to sit up he remained with head bowed and gaze averted  downward. Pt appeared with to be roiling with  inner turmoil as he held his clenched fist to his mouth in an apparent effort to not cry despite near tearfulness. Pt was alert and oriented to person and place and appeared angry and depressed with largely blunted affect.   When asked in both [place] and English about pt's uncle's report that the pt had initially worried that his uncle wanted to kill him and later reportedly had begged his uncle to kill the pt, pt with disorganized contradictory replies " I don't know. Yes. Maybe. " In reply to pt questions  related to the seriousness of recent events, the pt stated, " At the moment, I was wanting him to kill me. I don't know why. If my uncle wanted to shoot me, I am not afraid. If he asked me to shoot him, I would do that. I am not afraid." Pt with intact memory and cognition. Pt currently denies active SI or HI and denies AH /VH though he appears quite distracted and internally preoccupied with ? thought blocking and illogical non goal-directed statements. The pt's judgment appears markedly impaired beyond apparent pt efforts at machismo , with very limited insight into his illness and the need for treatment. Pt seen in his room  with bilingual female staff .  Earlier in the day the pt was noted  by hospital staff to be walking alone in the hallway and talking and whispering to himself seemingly in response to AH which the pt continues to deny. The pt  remains illogical and thought disordered with an irritable and angry, anxious demeanor . Pt with glaring eye contact at times when asked direct simple questions with the pt's appearing to struggle to focus on the question and becoming annoyed with writer due to the pt's inattention and ongoing distraction seemingly in the context of the pt's current active psychosis   When the pt was asked about his uncle's report to hospital staff that the pt had appeared clinically stable and well before leaving the house for a while after which the pt had returned in his present psychotic state.  The pt's uncle, who had previously not suspected pt possible new onset substance use was now expressing concern that this could in fact be a possibility considering the time frame and narrow window during which just prior to pt readmission to hospital, the pt had appeared well , then left for awhile only to return with AMS consistent with a possible drug ingestion. The pt continues to deny all such possibilities  despite more convincing collateral information obtained from the pt's uncle with whom the pt has been living x the past 5 years without incident until the past 1- 2 months.  The pt appeared thought blocked and unable to meaningfully participate in this or any discussion or therapy activity due to the severity of his current  thought disorganization possibly  though not yet definitively substance induced as synthetic THC and other such drugs often are not detected by current urine toxicology screening.  The pt is tolerating the newly restarted Zyprexa 10 mg po qhs without side effects. The pt's judgment remains markedly impaired with little current insight into his illness and the need for ongoing acute treatment with the need for substance use d/o after care treatment to decrease the risk of future pt psychotic episodes if drugs are in fact the primary etiology.

## 2021-08-24 NOTE — PROGRESS NOTE BEHAVIORAL HEALTH - OTHER
Pt denies AH /VH but he appears internally preoccupied paranoid, persecutory, nihilistic  delusional thinking slowed, terse, clipped at times variable variable, pt in tenuous control " I am fine!" " I am fine! No! Yes !" pt remains withdrawn and reclusive, distrustful and suspicious variable, responses often brief and diametrically opposed within the same sentence variable, brief angry monosyllabic responses to questions Pt denies AH /VH but he appears internally preoccupied and was noted by staff to be actively hallucinating paranoid, persecutory,  delusional thinking pt noted by staff to be walking alone in the hallway and talking and gesturing to himself  staff seemingly in response to AH

## 2021-08-24 NOTE — PROGRESS NOTE BEHAVIORAL HEALTH - RISK ASSESSMENT
Moderate risk for suicide in the context of current paranoid psychosis  Acute risk factors-pt med noncompliant with worsening paranoid psychosis, pt with SI /AH  , agitated, limited insight  Chronic risk factors- pt recently discharged from hospital for apparent first psychotic break, med noncompliant, h/o likely PTSD related solitary travel to the [place] alone at age 15 from [facility]  Protective factors- pt has supportive , loving family, pt had been working and attending school before becoming ill, pt has access to housing and to financial support from extended family   Mitigating factors- pt now readmitted to safe setting of hospital, pt now med compliant but with little insight into his illness and the need for treatment, pt unable currently to use coping skills due to severity of his psychosis

## 2021-08-25 DIAGNOSIS — F19.959 OTHER PSYCHOACTIVE SUBSTANCE USE, UNSPECIFIED WITH PSYCHOACTIVE SUBSTANCE-INDUCED PSYCHOTIC DISORDER, UNSPECIFIED: ICD-10-CM

## 2021-08-25 PROCEDURE — 99231 SBSQ HOSP IP/OBS SF/LOW 25: CPT

## 2021-08-25 RX ORDER — OLANZAPINE 15 MG/1
10 TABLET, FILM COATED ORAL AT BEDTIME
Refills: 0 | Status: DISCONTINUED | OUTPATIENT
Start: 2021-08-25 | End: 2021-09-08

## 2021-08-25 RX ADMIN — Medication 1 TABLET(S): at 09:20

## 2021-08-25 RX ADMIN — OLANZAPINE 10 MILLIGRAM(S): 15 TABLET, FILM COATED ORAL at 21:20

## 2021-08-25 NOTE — PROGRESS NOTE BEHAVIORAL HEALTH - OTHER
dysphoric briefly visible in the hallway. Standing alone  , hypervigilant  and irritable, walking back to his room , reclusive " I am fine! " Pt denies AH /VH but he appears internally preoccupied and was noted by staff to be actively hallucinating pt remains withdrawn and reclusive, distrustful and suspicious selectively mute at times variable, brief angry monosyllabic responses to questions paranoid, persecutory,  delusional thinking pt in tenuous control

## 2021-08-25 NOTE — PROGRESS NOTE BEHAVIORAL HEALTH - RISK ASSESSMENT
Moderate risk for suicide in the context of current paranoid psychosis  Acute risk factors-pt med noncompliant with worsening paranoid psychosis, pt with SI /AH  , agitated, limited insight  Chronic risk factors- pt recently discharged from hospital for apparent first psychotic break, med noncompliant, h/o likely PTSD related solitary travel to the [place] alone at age 15 from [facility]  Protective factors- pt has supportive , loving family, pt had been working and attending school before becoming ill, pt has access to housing and to financial support from extended family   Mitigating factors- pt now readmitted to safe setting of hospital, pt now med compliant but with little insight into his illness and the need for treatment, pt unable currently to use coping skills due to severity of his psychosis

## 2021-08-25 NOTE — PROGRESS NOTE BEHAVIORAL HEALTH - NSBHFUPINTERVALHXFT_PSY_A_CORE
Pt seen . In the hallway. Dressed neatly with attention to grooming and to his ADLs. Pt appeared irritable, angry, distracted and ? internally preoccupied. Pt with head bowed, socially withdrawn and isolative. Pt declining staff efforts to encourage pt participation in therapy groups .    When the pt does speak briefly with select staff, he has  remained  illogical and thought disordered with an irritable and angry, anxious demeanor . Pt with glaring eye contact at times when staff attempt to engage the pt in even the briefest of conversations.     The pt appeared genuinely surprised and disbelieving when told by this writer that the pt's uncle had called 911 prior to pt readmission to hospital due to the uncle's concerns for the pt's safety . The pt presented as paranoid a guarded and mistakenly appeared to believe that the police had come to the house on their own for some unknown reason. The pt is tolerating the newly restarted Zyprexa 10 mg po qhs without side effects and with plan to change to disintegrating  Zyprexa Zydis in light of pt's ongoing mistaken belief that nothing is wrong with him and that he does not need medication. . The pt's judgment remains markedly impaired with little current insight into his illness and the need for ongoing acute treatment with the need for substance use d/o after care treatment to decrease the risk of future pt psychotic episodes if drugs are in fact the primary etiology.

## 2021-08-26 PROCEDURE — 99231 SBSQ HOSP IP/OBS SF/LOW 25: CPT

## 2021-08-26 RX ADMIN — Medication 1 TABLET(S): at 09:15

## 2021-08-26 RX ADMIN — OLANZAPINE 10 MILLIGRAM(S): 15 TABLET, FILM COATED ORAL at 21:17

## 2021-08-26 NOTE — PROGRESS NOTE BEHAVIORAL HEALTH - OTHER
becoming more mood congruent less overtly irritable but still somewhat dysphoric and annoyed furtive glances at writer, otherwise pt with head bowed the pt appears less internally preoccupied and less distracted overall slowly normalizing slowly becoming more spontaneous more audible, less irritated decreasing evidence of paranoid, persecutory,  delusional thinking affect becoming johnson in range slowly improving slowly becoming more cooperative as his thought d/o appears to clear pt appears less tense and is maintaining emotional control " I'm good." pt remains withdrawn and reclusive, distrustful but slightly less suspicious appearing becoming more linear and logical

## 2021-08-26 NOTE — PROGRESS NOTE BEHAVIORAL HEALTH - NSBHCHARTREVIEWVS_PSY_A_CORE FT
Vital Signs Last 24 Hrs  T(C): 36.7 (26 Aug 2021 08:08), Max: 36.7 (26 Aug 2021 08:08)  T(F): 98.1 (26 Aug 2021 08:08), Max: 98.1 (26 Aug 2021 08:08)  HR: --  BP: --  BP(mean): --  RR: 18 (26 Aug 2021 08:08) (18 - 18)  SpO2: 100% (26 Aug 2021 08:08) (100% - 100%)

## 2021-08-26 NOTE — PROGRESS NOTE BEHAVIORAL HEALTH - NSBHFUPINTERVALHXFT_PSY_A_CORE
Pt seen . Now dressed neatly and appropriately with attention to grooming and to his ADLs. The pt is now more visible on the unit though he is still largely solitary and not seeking to interact with staff or peers on the unit. The pt does, however appear more alert and more aware of his surroundings and he no longer appears distracted and internally preoccupied to the degree he had earlier upon his readmission to hospital. The pt was seen eating heartily at a table alone  . Pt approached writer later to ask for something to drink with his lunch. Pt now able to engage in logical , relevant conversations with writer and with others when the pt needs to make a specific need known.  The pt initially endorsed then quickly denied having used any substances of abuse prior to his readmission to hospital. This writer with help from bilingual staff have continued to attempt to educate the pt as to the clinical similarities between schizophrenia and substance -induced psychosis  and the associated dangers of substance abuse/ misuse .   . The pt continues to tolerate  tolerating the newly restarted and now ODT  disintegrating tab  Zyprexa Zydis in light of pt's ongoing mistaken belief that nothing is wrong with him and that he does not need medication. . The pt's judgment remains markedly impaired with little current insight into his illness and the need for ongoing acute treatment with the need for substance use d/o after care treatment to decrease the risk of future pt psychotic episodes if drugs are in fact the primary etiology.

## 2021-08-26 NOTE — PROGRESS NOTE BEHAVIORAL HEALTH - RISK ASSESSMENT
Low risk for suicide in the context of rapidly improving and more likely substance -induced psychosis  Acute risk factors-pt med noncompliant with worsening paranoid psychosis, pt with resolving SI /AH  , agitated in the context of  likely pt drug induced psychosis  , limited insight  Chronic risk factors- pt recently discharged from hospital for apparent first psychotic break but now possibly related to better prognosis  substance induced psychosis, med noncompliant, h/o likely PTSD related solitary travel to the [place] alone at age 15 from [facility]  Protective factors- pt has supportive , loving family, pt had been working and attending school before becoming ill, pt has access to housing and to financial support from extended family   Mitigating factors- pt now readmitted to safe setting of hospital, pt now med compliant but with little insight into his illness and the need for treatment, pt unable currently to use coping skills due to severity of his psychosis

## 2021-08-27 PROCEDURE — 99231 SBSQ HOSP IP/OBS SF/LOW 25: CPT

## 2021-08-27 RX ADMIN — OLANZAPINE 10 MILLIGRAM(S): 15 TABLET, FILM COATED ORAL at 20:49

## 2021-08-27 RX ADMIN — Medication 1 TABLET(S): at 09:08

## 2021-08-27 NOTE — PROGRESS NOTE BEHAVIORAL HEALTH - ATTENTION / CONCENTRATION
Other Consent (Temporal Branch)/Introductory Paragraph: The rationale for Mohs was explained to the patient and consent was obtained. The risks, benefits and alternatives to therapy were discussed in detail. Specifically, the risks of damage to the temporal branch of the facial nerve, infection, scarring, bleeding, prolonged wound healing, incomplete removal, allergy to anesthesia, and recurrence were addressed. Prior to the procedure, the treatment site was clearly identified and confirmed by the patient. All components of Universal Protocol/PAUSE Rule completed.

## 2021-08-27 NOTE — PROGRESS NOTE BEHAVIORAL HEALTH - NSBHCHARTREVIEWVS_PSY_A_CORE FT
Vital Signs Last 24 Hrs  T(C): 36.7 (26 Aug 2021 08:08), Max: 36.7 (26 Aug 2021 08:08)  T(F): 98.1 (26 Aug 2021 08:08), Max: 98.1 (26 Aug 2021 08:08)  HR: --  BP: --  BP(mean): --  RR: 18 (26 Aug 2021 08:08) (18 - 18)  SpO2: 100% (26 Aug 2021 08:08) (100% - 100%) Vital Signs Last 24 Hrs  T(C): 36.7 (27 Aug 2021 08:07), Max: 36.7 (27 Aug 2021 08:07)  T(F): 98.1 (27 Aug 2021 08:07), Max: 98.1 (27 Aug 2021 08:07)  HR: --  BP: --  BP(mean): --  RR: 18 (27 Aug 2021 08:07) (18 - 18)  SpO2: 99% (27 Aug 2021 08:07) (99% - 99%)

## 2021-08-27 NOTE — PROGRESS NOTE BEHAVIORAL HEALTH - RISK ASSESSMENT
Low risk for suicide in the context of rapidly improving and more likely substance -induced psychosis  Acute risk factors-pt med noncompliant with worsening paranoid psychosis, pt with resolving SI /AH  , agitated in the context of  likely pt drug induced psychosis  , limited insight  Chronic risk factors- pt recently discharged from hospital for apparent first psychotic break but now possibly related to better prognosis  substance induced psychosis, med noncompliant, h/o likely PTSD related solitary travel to the [place] alone at age 15 from [facility]  Protective factors- pt has supportive , loving family, pt had been working and attending school before becoming ill, pt has access to housing and to financial support from extended family   Mitigating factors- pt now readmitted to safe setting of hospital, pt now med compliant but with little insight into his illness and the need for treatment, pt unable currently to use coping skills due to severity of his psychosis

## 2021-08-27 NOTE — PROGRESS NOTE BEHAVIORAL HEALTH - NSBHFUPINTERVALHXFT_PSY_A_CORE
Pt seen . The pt continues to clinically improve and he has become more visible on the unit though he still declines therapy group participation in spite of ongoing staff support and encouragement. The pt appears neatly and appropriately with attention to grooming and to his ADLs. The pt is now more visible on the unit though he is still largely solitary and not seeking to interact with staff or peers on the unit. The pt does, however appear more alert and more aware of his surroundings and he no longer appears distracted and internally preoccupied to the degree he had earlier upon his readmission to hospital. The pt was seen eating heartily at a table alone  . Pt approached writer later to ask for something to drink with his lunch. Pt now able to engage in logical , relevant conversations with writer and with others when the pt needs to make a specific need known.  The pt initially endorsed then quickly denied having used any substances of abuse prior to his readmission to hospital. This writer with help from bilingual staff have continued to attempt to educate the pt as to the clinical similarities between schizophrenia and substance -induced psychosis  and the associated dangers of substance abuse/ misuse .   . The pt continues to tolerate  tolerating the newly restarted and now ODT  disintegrating tab  Zyprexa Zydis in light of pt's ongoing mistaken belief that nothing is wrong with him and that he does not need medication. . The pt's judgment remains markedly impaired with little current insight into his illness and the need for ongoing acute treatment with the need for substance use d/o after care treatment to decrease the risk of future pt psychotic episodes if drugs are in fact the primary etiology.   Ongoing staff efforts at pt psychoeducation related to substance use d/o ( especially K2, synthetic THC and other halland overlap with psychotic illnesses Pt seen . The pt continues to clinically improve and he has become more visible on the unit though he still declines therapy group participation in spite of ongoing staff support and encouragement. The pt appears neatly and appropriately with attention to grooming and to his ADLs. The pt is now more visible on the unit though he is still largely solitary and not seeking to interact with staff or peers on the unit. The pt does, however appear more alert and more aware of his surroundings and he no longer appears distracted and internally preoccupied to the degree he had earlier upon his readmission to hospital.  Pt approached writer later to ask for something to drink with his lunch. Pt now able to engage in logical , relevant conversations with writer and with others when the pt needs to make a specific need known.   This writer with help from bilingual staff have continued to attempt to educate the pt as to the clinical similarities between schizophrenia and substance -induced psychosis  and the associated dangers of substance abuse/ misuse .   . The pt continues to tolerate  tolerating the newly restarted and now ODT  disintegrating tab  Zyprexa Zydis in light of pt's ongoing mistaken belief that nothing is wrong with him and that he does not need medication. . The pt's judgment remains markedly impaired with little current insight into his illness and the need for ongoing acute treatment with the need for substance use d/o after care treatment to decrease the risk of future pt psychotic episodes if drugs are in fact the primary etiology.   Ongoing staff efforts at pt psychoeducation related to substance use d/o ( especially K2, synthetic THC and other hallucinogens ) and evidence for clinical  overlap with psychotic illnesses

## 2021-08-27 NOTE — PROGRESS NOTE BEHAVIORAL HEALTH - OTHER
less overtly irritable but still somewhat dysphoric and annoyed slowly improving pt remains withdrawn and reclusive, distrustful but slightly less suspicious appearing slowly becoming more cooperative as his thought d/o appears to clear pt appears less tense and is maintaining emotional control slowly normalizing becoming more mood congruent affect becoming johnson in range furtive glances at writer, otherwise pt with head bowed " I'm good." the pt appears less internally preoccupied and less distracted overall slowly becoming more spontaneous more audible, less irritated decreasing evidence of paranoid, persecutory,  delusional thinking becoming more linear and logical slowly becoming more cooperative as his thought d/o appears to clear, jack at times

## 2021-08-28 RX ADMIN — Medication 1 TABLET(S): at 09:37

## 2021-08-28 RX ADMIN — OLANZAPINE 10 MILLIGRAM(S): 15 TABLET, FILM COATED ORAL at 21:13

## 2021-08-29 RX ADMIN — OLANZAPINE 10 MILLIGRAM(S): 15 TABLET, FILM COATED ORAL at 20:29

## 2021-08-29 RX ADMIN — Medication 1 TABLET(S): at 09:27

## 2021-08-30 LAB
ALBUMIN SERPL ELPH-MCNC: 4.3 G/DL — SIGNIFICANT CHANGE UP (ref 3.3–5)
ALP SERPL-CCNC: 55 U/L — SIGNIFICANT CHANGE UP (ref 40–120)
ALT FLD-CCNC: 65 U/L — SIGNIFICANT CHANGE UP (ref 12–78)
ANION GAP SERPL CALC-SCNC: 2 MMOL/L — LOW (ref 5–17)
AST SERPL-CCNC: 32 U/L — SIGNIFICANT CHANGE UP (ref 15–37)
BASOPHILS # BLD AUTO: 0.03 K/UL — SIGNIFICANT CHANGE UP (ref 0–0.2)
BASOPHILS NFR BLD AUTO: 0.5 % — SIGNIFICANT CHANGE UP (ref 0–2)
BILIRUB SERPL-MCNC: 0.3 MG/DL — SIGNIFICANT CHANGE UP (ref 0.2–1.2)
BUN SERPL-MCNC: 20 MG/DL — SIGNIFICANT CHANGE UP (ref 7–23)
CALCIUM SERPL-MCNC: 9.3 MG/DL — SIGNIFICANT CHANGE UP (ref 8.5–10.1)
CHLORIDE SERPL-SCNC: 105 MMOL/L — SIGNIFICANT CHANGE UP (ref 96–108)
CO2 SERPL-SCNC: 31 MMOL/L — SIGNIFICANT CHANGE UP (ref 22–31)
CREAT SERPL-MCNC: 1.13 MG/DL — SIGNIFICANT CHANGE UP (ref 0.5–1.3)
EOSINOPHIL # BLD AUTO: 0.08 K/UL — SIGNIFICANT CHANGE UP (ref 0–0.5)
EOSINOPHIL NFR BLD AUTO: 1.3 % — SIGNIFICANT CHANGE UP (ref 0–6)
GLUCOSE SERPL-MCNC: 105 MG/DL — HIGH (ref 70–99)
HCT VFR BLD CALC: 42.7 % — SIGNIFICANT CHANGE UP (ref 39–50)
HGB BLD-MCNC: 14.7 G/DL — SIGNIFICANT CHANGE UP (ref 13–17)
IMM GRANULOCYTES NFR BLD AUTO: 1.8 % — HIGH (ref 0–1.5)
LYMPHOCYTES # BLD AUTO: 2.12 K/UL — SIGNIFICANT CHANGE UP (ref 1–3.3)
LYMPHOCYTES # BLD AUTO: 35.2 % — SIGNIFICANT CHANGE UP (ref 13–44)
MCHC RBC-ENTMCNC: 29.9 PG — SIGNIFICANT CHANGE UP (ref 27–34)
MCHC RBC-ENTMCNC: 34.4 GM/DL — SIGNIFICANT CHANGE UP (ref 32–36)
MCV RBC AUTO: 86.8 FL — SIGNIFICANT CHANGE UP (ref 80–100)
MONOCYTES # BLD AUTO: 0.42 K/UL — SIGNIFICANT CHANGE UP (ref 0–0.9)
MONOCYTES NFR BLD AUTO: 7 % — SIGNIFICANT CHANGE UP (ref 2–14)
NEUTROPHILS # BLD AUTO: 3.26 K/UL — SIGNIFICANT CHANGE UP (ref 1.8–7.4)
NEUTROPHILS NFR BLD AUTO: 54.2 % — SIGNIFICANT CHANGE UP (ref 43–77)
PLATELET # BLD AUTO: 237 K/UL — SIGNIFICANT CHANGE UP (ref 150–400)
POTASSIUM SERPL-MCNC: 4.2 MMOL/L — SIGNIFICANT CHANGE UP (ref 3.5–5.3)
POTASSIUM SERPL-SCNC: 4.2 MMOL/L — SIGNIFICANT CHANGE UP (ref 3.5–5.3)
PROT SERPL-MCNC: 7.9 GM/DL — SIGNIFICANT CHANGE UP (ref 6–8.3)
RBC # BLD: 4.92 M/UL — SIGNIFICANT CHANGE UP (ref 4.2–5.8)
RBC # FLD: 12.1 % — SIGNIFICANT CHANGE UP (ref 10.3–14.5)
SODIUM SERPL-SCNC: 138 MMOL/L — SIGNIFICANT CHANGE UP (ref 135–145)
WBC # BLD: 6.02 K/UL — SIGNIFICANT CHANGE UP (ref 3.8–10.5)
WBC # FLD AUTO: 6.02 K/UL — SIGNIFICANT CHANGE UP (ref 3.8–10.5)

## 2021-08-30 PROCEDURE — 99231 SBSQ HOSP IP/OBS SF/LOW 25: CPT

## 2021-08-30 RX ADMIN — Medication 1 TABLET(S): at 09:17

## 2021-08-30 RX ADMIN — OLANZAPINE 10 MILLIGRAM(S): 15 TABLET, FILM COATED ORAL at 20:11

## 2021-08-30 NOTE — PROGRESS NOTE BEHAVIORAL HEALTH - PROBLEM SELECTOR PLAN 2
1. Ongoing staff support  2.Pt encouraged  to attend therapy groups as tolerated with a focus on substance use d/o treatment options  3.Dual diagnosis after care treatment planning to be recommended

## 2021-08-30 NOTE — PROGRESS NOTE BEHAVIORAL HEALTH - RISK ASSESSMENT
Low risk for suicide in the context of rapidly improving and more likely substance -induced psychosis  Acute risk factors-pt med noncompliant with worsening paranoid psychosis, pt with resolving SI /AH  , agitated in the context of  likely pt drug induced psychosis  , limited insight  Chronic risk factors- pt recently discharged from hospital for apparent first psychotic break but now possibly related to better prognosis  substance induced psychosis, med noncompliant, h/o likely PTSD related solitary travel to the [place] alone at age 15 from [facility]  Protective factors- pt has supportive , loving family, pt had been working and attending school before becoming ill, pt has access to housing and to financial support from extended family   Mitigating factors- pt now readmitted to safe setting of hospital, pt now med compliant but with little insight into his illness and the need for treatment, pt unable currently to use coping skills due to severity of his psychosis

## 2021-08-30 NOTE — PROGRESS NOTE BEHAVIORAL HEALTH - ADDITIONAL DETAILS / COMMENTS
recent memory impairment may be secondary to suspected drug ingestion

## 2021-08-30 NOTE — PROGRESS NOTE BEHAVIORAL HEALTH - NSBHFUPINTERVALHXFT_PSY_A_CORE
Pt seen  with bilingual staff in the pt's room. Pt more visible on the unit but remains largely solitary and isolative to his room despite numerous bilingual staff and peers.. The pt continues to clinically improve but he still declines therapy group participation in spite of ongoing staff support and encouragement. The pt appears neatly and appropriately with attention to grooming and to his ADLs. The pt is now more visible on the unit though he is still largely solitary and not seeking to interact with staff or peers on the unit. The pt does, however appear more alert and more aware of his surroundings and he no longer appears distracted and internally preoccupied to the degree he had earlier upon his readmission to hospital.  Pt approached writer later to ask for something to drink with his lunch. Pt now able to engage in logical , relevant conversations with writer and with others when the pt needs to make a specific need known.   This writer with help from bilingual staff have continued to attempt to educate the pt as to the clinical similarities between schizophrenia and substance -induced psychosis  and the associated dangers of substance abuse/ misuse .   . The pt continues to tolerate  tolerating the newly restarted and now ODT  disintegrating tab  Zyprexa Zydis to better ensure pt treatment compliance. The pt continues to deny any substance use despite the temporal sequence of events surrounding this readmission to hospital with pt reported AMS immediately upon returning to his uncle's home after the pt had appeared well  prior to leaving that evening.    The pt's thought disorganization appears to have largely resolved since his readmission to hospital with no access to any substances of potential abuse and with restart of SGA Zyprexa once again to alleviate the pt's resurgent thought disorder.  The pt continues to tolerate his restarted Zyprexa without reported side effects and with ongoing evidence of clinical stabilization.  The pt's judgment remains impaired with little current insight into his illness and the need for ongoing acute treatment with the need for substance use d/o after care treatment to decrease the risk of future pt psychotic episodes if drugs are in fact the primary etiology.   Ongoing staff efforts at pt psychoeducation related to substance use d/o ( especially K2, synthetic THC and other hallucinogens ) and evidence for clinical  overlap with psychotic illnesses

## 2021-08-30 NOTE — PROGRESS NOTE BEHAVIORAL HEALTH - NSBHCHARTREVIEWVS_PSY_A_CORE FT
Vital Signs Last 24 Hrs  T(C): 36.6 (30 Aug 2021 07:29), Max: 36.6 (30 Aug 2021 07:29)  T(F): 97.8 (30 Aug 2021 07:29), Max: 97.8 (30 Aug 2021 07:29)  HR: --  BP: --  BP(mean): --  RR: 18 (30 Aug 2021 07:29) (18 - 18)  SpO2: 100% (30 Aug 2021 07:29) (100% - 100%)

## 2021-08-30 NOTE — PROGRESS NOTE BEHAVIORAL HEALTH - OTHER
affect becoming johnson in range superficially cooperative but he remains isolative and non disclosing slowly normalizing slowly becoming more spontaneous, often flat, unemotional 1 word responses to questions becoming more mood congruent less overtly irritable but still somewhat dysphoric and annoyed " I'm good." pt remains withdrawn and reclusive, distrustful but  less suspicious appearing more audible, less irritated decreasing evidence of paranoid, persecutory,  delusional thinking becoming more linear and logical slowly improving the pt appears less internally preoccupied and less distracted overall

## 2021-08-30 NOTE — PROGRESS NOTE BEHAVIORAL HEALTH - PROBLEM SELECTOR PROBLEM 2
Substance-induced psychotic disorder

## 2021-08-31 PROCEDURE — 99231 SBSQ HOSP IP/OBS SF/LOW 25: CPT

## 2021-08-31 RX ADMIN — Medication 1 TABLET(S): at 09:28

## 2021-08-31 RX ADMIN — OLANZAPINE 10 MILLIGRAM(S): 15 TABLET, FILM COATED ORAL at 20:53

## 2021-08-31 NOTE — PROGRESS NOTE BEHAVIORAL HEALTH - RISK ASSESSMENT
Low  current risk for suicide but the pt remains paranoid and suspicious  Acute risk factors-pt med noncompliant with worsening paranoid psychosis,  , limited insight  Chronic risk factors- pt recently discharged from hospital for presumptive first psychotic break  , med noncompliant, h/o likely PTSD related solitary travel to the [place] alone at age 15 from [facility]  Protective factors- pt has supportive , loving family, pt had been working and attending school before becoming ill, pt has access to housing and to financial support from extended family   Mitigating factors- pt now readmitted to safe setting of hospital, pt now med compliant but with little insight into his illness and the need for treatment, pt unable currently to use coping skills due to severity of his psychosis

## 2021-08-31 NOTE — PROGRESS NOTE BEHAVIORAL HEALTH - OTHER
the pt denies AH / VH but continues to appear internally preoccupied ongoing evidence of paranoid, persecutory,  delusional thinking more suspicious and wary appearing superficially cooperative but he remains isolative, guarded and non disclosing wary and suspicious pt remains withdrawn and reclusive, distrustful and suspicious appearing " OK" cautious

## 2021-08-31 NOTE — PROGRESS NOTE BEHAVIORAL HEALTH - NSBHFUPINTERVALHXFT_PSY_A_CORE
Pt seen in his room. The pt remains isolative, reclusive and paranoid though he has been a bit more visible on the unit ( ie out for meals but then back to his room and not participating in therapy groups despite ongoing staff support and encouragement. The pt has been less animated and less visible overall on the unit. Pt has been dressed with fair attention to grooming and to his ADLs but he has been more overtly guarded and paranoid with decreased overall speech and a reluctance to elaborate or to participate more fully in his treatment.    . The pt continues to tolerate  tolerating the newly restarted and now ODT  disintegrating tab  Zyprexa Zydis to better ensure pt treatment compliance. The pt continues to deny any substance use despite the temporal sequence of events surrounding this readmission to hospital with pt reported AMS immediately upon returning to his uncle's home after the pt had appeared well  prior to leaving that evening. s to tolerate his restarted Zyprexa without reported side effects and with ongoing evidence of clinical stabilization.  The pt's judgment remains impaired with limited insight into the nature and severity of his apparent primary psychosis.

## 2021-08-31 NOTE — PROGRESS NOTE BEHAVIORAL HEALTH - NSBHCHARTREVIEWVS_PSY_A_CORE FT
Vital Signs Last 24 Hrs  T(C): 36.7 (31 Aug 2021 07:55), Max: 36.7 (31 Aug 2021 07:55)  T(F): 98 (31 Aug 2021 07:55), Max: 98 (31 Aug 2021 07:55)  HR: --  BP: --  BP(mean): --  RR: 18 (31 Aug 2021 07:55) (18 - 18)  SpO2: 100% (31 Aug 2021 07:55) (100% - 100%)

## 2021-09-01 PROCEDURE — 99231 SBSQ HOSP IP/OBS SF/LOW 25: CPT

## 2021-09-01 RX ADMIN — OLANZAPINE 10 MILLIGRAM(S): 15 TABLET, FILM COATED ORAL at 21:32

## 2021-09-01 NOTE — PROGRESS NOTE BEHAVIORAL HEALTH - RISK ASSESSMENT
Low  current risk for suicide but the pt remains paranoid and suspicious  Acute risk factors-pt med noncompliant with worsening paranoid psychosis,  , limited insight  Chronic risk factors- pt recently discharged from hospital for presumptive first psychotic break  , med noncompliant, h/o likely PTSD related solitary travel to the [place] alone at age 15 from [facility]  Protective factors- pt has supportive , loving family, pt had been working and attending school before becoming ill, pt has access to housing and to financial support from extended family   Mitigating factors- pt now readmitted to safe setting of hospital, pt now med compliant but with little insight into his illness and the need for treatment, pt unable currently to use coping skills due to severity of his psychosis

## 2021-09-01 NOTE — PROGRESS NOTE BEHAVIORAL HEALTH - OTHER
wary and suspicious cautious the pt denies AH / VH but continues to appear internally preoccupied " OK" pt remains withdrawn and reclusive, distrustful and suspicious appearing superficially cooperative but he remains isolative, guarded and non disclosing, more reclusive more suspicious and wary appearing ongoing evidence of paranoid, persecutory,  delusional thinking

## 2021-09-02 PROCEDURE — 99231 SBSQ HOSP IP/OBS SF/LOW 25: CPT

## 2021-09-02 RX ADMIN — Medication 1 TABLET(S): at 09:28

## 2021-09-02 RX ADMIN — OLANZAPINE 10 MILLIGRAM(S): 15 TABLET, FILM COATED ORAL at 20:45

## 2021-09-02 NOTE — PROGRESS NOTE BEHAVIORAL HEALTH - OTHER
more suspicious and wary appearing " OK" pt remains withdrawn and reclusive, distrustful and suspicious appearing the pt denies AH / VH but continues to appear internally preoccupied more spontaneous but still largely uninformative cautious superficially cooperative but he remains isolative, guarded and non disclosing, more reclusive ongoing evidence of paranoid, persecutory,  delusional thinking wary and suspicious, intense at times improving frequent one or 2 word responses to questions

## 2021-09-02 NOTE — PROGRESS NOTE BEHAVIORAL HEALTH - NSBHFUPINTERVALHXFT_PSY_A_CORE
Pt seen in the day room with bilingual staff with whom the pt has been more comfortable  in conversation, though still brief and largely uninformative. The pt was neatly attired and out for meals but he continues to refuse all therapy groups despite ongoing staff support and encouragement.  . The pt has been less animated and less visible overall on the unit. Pt has been dressed with fair attention to grooming and to his ADLs but he has been more overtly guarded and paranoid with decreased overall speech and a reluctance to elaborate or to participate more fully in his treatment.     Discussion with the pt as to pt's uncle's plan to adopt the pt with plan for family court hearing on 10/1/2021.  The pt appeared briefly  near tearful though he reported having been told of this by his uncle. The pt continues somewhat guarded and wary and appears doubtful as to other people's motives, including pt loved ones.    . The pt continues to tolerate  tolerating the newly restarted and now ODT  disintegrating tab  Zyprexa Zydis to better ensure pt treatment compliance. The pt continues to deny any substance use despite the temporal sequence of events surrounding this readmission to hospital with pt reported AMS immediately upon returning to his uncle's home after the pt had appeared well  prior to leaving that evening. s to tolerate his restarted Zyprexa without reported side effects and with ongoing evidence of clinical stabilization.  The pt's judgment remains impaired with limited insight into the nature and severity of his apparent primary psychosis.

## 2021-09-02 NOTE — PROGRESS NOTE BEHAVIORAL HEALTH - RISK ASSESSMENT
Low  current risk for suicide but the pt remains paranoid and suspicious  Acute risk factors-pt med noncompliant with worsening paranoid psychosis,  , limited insight  Chronic risk factors- pt recently discharged from hospital for presumptive first psychotic break  , med noncompliant, h/o likely PTSD related solitary travel to the [place] alone at age 15 from [facility]  Protective factors- pt has supportive , loving family, pt had been working and attending school before becoming ill, pt has access to housing and to financial support from extended family   Mitigating factors- pt now readmitted to safe setting of hospital, pt now med compliant but with little insight into his illness and the need for treatment, pt unable currently to use coping skills due to severity of his psychosis

## 2021-09-02 NOTE — PROGRESS NOTE BEHAVIORAL HEALTH - NSBHCHARTREVIEWVS_PSY_A_CORE FT
Vital Signs Last 24 Hrs  T(C): 36.6 (02 Sep 2021 08:33), Max: 36.6 (02 Sep 2021 08:33)  T(F): 97.9 (02 Sep 2021 08:33), Max: 97.9 (02 Sep 2021 08:33)  HR: --  BP: --  BP(mean): --  RR: 18 (02 Sep 2021 08:33) (18 - 18)  SpO2: 100% (02 Sep 2021 08:33) (100% - 100%)

## 2021-09-03 PROCEDURE — 99231 SBSQ HOSP IP/OBS SF/LOW 25: CPT

## 2021-09-03 RX ADMIN — OLANZAPINE 10 MILLIGRAM(S): 15 TABLET, FILM COATED ORAL at 20:52

## 2021-09-03 RX ADMIN — Medication 1 TABLET(S): at 09:23

## 2021-09-03 NOTE — PROGRESS NOTE BEHAVIORAL HEALTH - RISK ASSESSMENT
Low  current risk for suicide but the pt remains paranoid and suspicious  Acute risk factors-pt med noncompliant with worsening paranoid psychosis,  , limited insight  Chronic risk factors- pt recently discharged from hospital for presumptive first psychotic break  , med noncompliant, h/o likely PTSD related solitary travel to the [place] alone at age 15 from [facility]  Protective factors- pt has supportive , loving family, pt had been working and attending school before becoming ill, pt has access to housing and to financial support from extended family   Mitigating factors- pt now readmitted to safe setting of hospital, pt now med compliant but with little insight into his illness and the need for treatment, pt unable currently to use coping skills due to severity of his psychosis

## 2021-09-03 NOTE — PROGRESS NOTE BEHAVIORAL HEALTH - NSBHFUPINTERVALHXFT_PSY_A_CORE
Pt seen in the day room as he sat outside a therapy group which was in progress. " I do not like groups."  . The pt has been less animated and less visible overall on the unit. Pt has been dressed with fair attention to grooming and to his ADLs but he has been more overtly guarded and paranoid with decreased overall speech and a reluctance to elaborate or to participate more fully in his treatment. The pt appeared dysphoric but continues to deny SI /HI /AH /VH .     Discussion with the pt as to pt's uncle's plan to adopt the pt with plan for family court hearing on 10/1/2021.  The pt appeared briefly  near tearful though he reported having been told of this by his uncle. The pt continues somewhat guarded and wary and appears doubtful as to other people's motives, including pt loved ones.    . The pt continues to tolerate  tolerating the newly restarted and now ODT  disintegrating tab  Zyprexa Zydis to better ensure pt treatment compliance. The pt continues to deny any substance use despite the temporal sequence of events surrounding this readmission to hospital with pt reported AMS immediately upon returning to his uncle's home after the pt had appeared well  prior to leaving that evening. s to tolerate his restarted Zyprexa without reported side effects and with ongoing evidence of clinical stabilization.  The pt's judgment remains impaired with limited insight into the nature and severity of his apparent primary psychosis.

## 2021-09-03 NOTE — PROGRESS NOTE BEHAVIORAL HEALTH - OTHER
more dysphoric appearing superficially cooperative but he remains isolative, guarded and non disclosing, more reclusive wary and suspicious, intense at times improving " OK" the pt denies AH / VH but continues to appear internally preoccupied pt remains withdrawn and reclusive, distrustful and suspicious appearing frequent one or 2 word responses to questions cautious ongoing evidence of paranoid, persecutory,  delusional thinking contradictory statements related to mood and thinking

## 2021-09-04 RX ADMIN — OLANZAPINE 10 MILLIGRAM(S): 15 TABLET, FILM COATED ORAL at 21:14

## 2021-09-05 RX ADMIN — OLANZAPINE 10 MILLIGRAM(S): 15 TABLET, FILM COATED ORAL at 21:29

## 2021-09-06 RX ADMIN — OLANZAPINE 10 MILLIGRAM(S): 15 TABLET, FILM COATED ORAL at 21:12

## 2021-09-07 DIAGNOSIS — F39 UNSPECIFIED MOOD [AFFECTIVE] DISORDER: ICD-10-CM

## 2021-09-07 LAB
ALBUMIN SERPL ELPH-MCNC: 4 G/DL — SIGNIFICANT CHANGE UP (ref 3.3–5)
ALP SERPL-CCNC: 53 U/L — SIGNIFICANT CHANGE UP (ref 40–120)
ALT FLD-CCNC: 68 U/L — SIGNIFICANT CHANGE UP (ref 12–78)
ANION GAP SERPL CALC-SCNC: 1 MMOL/L — LOW (ref 5–17)
AST SERPL-CCNC: 17 U/L — SIGNIFICANT CHANGE UP (ref 15–37)
BASOPHILS # BLD AUTO: 0.02 K/UL — SIGNIFICANT CHANGE UP (ref 0–0.2)
BASOPHILS NFR BLD AUTO: 0.5 % — SIGNIFICANT CHANGE UP (ref 0–2)
BILIRUB SERPL-MCNC: 0.5 MG/DL — SIGNIFICANT CHANGE UP (ref 0.2–1.2)
BUN SERPL-MCNC: 16 MG/DL — SIGNIFICANT CHANGE UP (ref 7–23)
CALCIUM SERPL-MCNC: 9.1 MG/DL — SIGNIFICANT CHANGE UP (ref 8.5–10.1)
CHLORIDE SERPL-SCNC: 108 MMOL/L — SIGNIFICANT CHANGE UP (ref 96–108)
CO2 SERPL-SCNC: 30 MMOL/L — SIGNIFICANT CHANGE UP (ref 22–31)
CREAT SERPL-MCNC: 0.97 MG/DL — SIGNIFICANT CHANGE UP (ref 0.5–1.3)
EOSINOPHIL # BLD AUTO: 0.07 K/UL — SIGNIFICANT CHANGE UP (ref 0–0.5)
EOSINOPHIL NFR BLD AUTO: 1.7 % — SIGNIFICANT CHANGE UP (ref 0–6)
GLUCOSE SERPL-MCNC: 86 MG/DL — SIGNIFICANT CHANGE UP (ref 70–99)
HCT VFR BLD CALC: 40.8 % — SIGNIFICANT CHANGE UP (ref 39–50)
HGB BLD-MCNC: 13.7 G/DL — SIGNIFICANT CHANGE UP (ref 13–17)
IMM GRANULOCYTES NFR BLD AUTO: 0.9 % — SIGNIFICANT CHANGE UP (ref 0–1.5)
LYMPHOCYTES # BLD AUTO: 1.63 K/UL — SIGNIFICANT CHANGE UP (ref 1–3.3)
LYMPHOCYTES # BLD AUTO: 38.4 % — SIGNIFICANT CHANGE UP (ref 13–44)
MCHC RBC-ENTMCNC: 29.4 PG — SIGNIFICANT CHANGE UP (ref 27–34)
MCHC RBC-ENTMCNC: 33.6 GM/DL — SIGNIFICANT CHANGE UP (ref 32–36)
MCV RBC AUTO: 87.6 FL — SIGNIFICANT CHANGE UP (ref 80–100)
MONOCYTES # BLD AUTO: 0.34 K/UL — SIGNIFICANT CHANGE UP (ref 0–0.9)
MONOCYTES NFR BLD AUTO: 8 % — SIGNIFICANT CHANGE UP (ref 2–14)
NEUTROPHILS # BLD AUTO: 2.14 K/UL — SIGNIFICANT CHANGE UP (ref 1.8–7.4)
NEUTROPHILS NFR BLD AUTO: 50.5 % — SIGNIFICANT CHANGE UP (ref 43–77)
PLATELET # BLD AUTO: 230 K/UL — SIGNIFICANT CHANGE UP (ref 150–400)
POTASSIUM SERPL-MCNC: 4.4 MMOL/L — SIGNIFICANT CHANGE UP (ref 3.5–5.3)
POTASSIUM SERPL-SCNC: 4.4 MMOL/L — SIGNIFICANT CHANGE UP (ref 3.5–5.3)
PROT SERPL-MCNC: 7.3 GM/DL — SIGNIFICANT CHANGE UP (ref 6–8.3)
RBC # BLD: 4.66 M/UL — SIGNIFICANT CHANGE UP (ref 4.2–5.8)
RBC # FLD: 12.2 % — SIGNIFICANT CHANGE UP (ref 10.3–14.5)
SODIUM SERPL-SCNC: 139 MMOL/L — SIGNIFICANT CHANGE UP (ref 135–145)
WBC # BLD: 4.24 K/UL — SIGNIFICANT CHANGE UP (ref 3.8–10.5)
WBC # FLD AUTO: 4.24 K/UL — SIGNIFICANT CHANGE UP (ref 3.8–10.5)

## 2021-09-07 PROCEDURE — 99231 SBSQ HOSP IP/OBS SF/LOW 25: CPT

## 2021-09-07 RX ADMIN — Medication 1 TABLET(S): at 09:13

## 2021-09-07 RX ADMIN — OLANZAPINE 10 MILLIGRAM(S): 15 TABLET, FILM COATED ORAL at 21:07

## 2021-09-07 NOTE — PROGRESS NOTE BEHAVIORAL HEALTH - NSBHCHARTREVIEWVS_PSY_A_CORE FT
Vital Signs Last 24 Hrs  T(C): 36.4 (07 Sep 2021 07:59), Max: 36.4 (07 Sep 2021 07:59)  T(F): 97.6 (07 Sep 2021 07:59), Max: 97.6 (07 Sep 2021 07:59)  HR: --  BP: --  BP(mean): --  RR: 14 (07 Sep 2021 07:59) (14 - 14)  SpO2: 100% (07 Sep 2021 07:59) (100% - 100%)

## 2021-09-07 NOTE — PROGRESS NOTE BEHAVIORAL HEALTH - OTHER
" Good " improving with select Faroese speaking peers frequent one or 2 word responses to questions no current evidence of overt paranoid or persecutory delusional thinking still minimizing recent events prior to this readmission

## 2021-09-07 NOTE — PROGRESS NOTE BEHAVIORAL HEALTH - RISK ASSESSMENT
Low  current risk for suicide but the pt remains paranoid and suspicious  Acute risk factors-pt med noncompliant with worsening paranoid psychosis,  , limited insight  Chronic risk factors- pt recently discharged from hospital for presumptive first psychotic break  , med noncompliant, h/o likely PTSD related solitary travel to the [place] alone at age 15 from [facility]  Protective factors- pt has supportive , loving family, pt had been working and attending school before becoming ill, pt has access to housing and to financial support from extended family   Mitigating factors- pt now readmitted to safe setting of hospital, pt now med compliant but with little insight into his illness and the need for treatment, pt unable currently to use coping skills due to severity of his psychosis

## 2021-09-07 NOTE — PROGRESS NOTE BEHAVIORAL HEALTH - NSBHFUPINTERVALHXFT_PSY_A_CORE
Pt seen in the day room as he sat outside a therapy group which was in progress. " I do not like groups."  . The pt has been more  animated and visible recently though he continues to decline therapy group participation  on the unit. Pt has been dressed with fair attention to grooming and to his ADLs but he has been more overtly guarded and paranoid with decreased overall speech and a reluctance to elaborate or to participate more fully in his treatment. The pt appeared dysphoric but continues to deny SI /HI /AH /VH .     Discussion with the pt as to pt's uncle's plan to adopt the pt with plan for family court hearing on 10/1/2021.  The pt appeared briefly  near tearful though he reported having been told of this by his uncle. The pt continues somewhat guarded and wary and appears doubtful as to other people's motives, including pt loved ones.    . The pt continues to tolerate  tolerating the newly restarted and now ODT  disintegrating tab  Zyprexa Zydis to better ensure pt treatment compliance. The pt continues to deny any substance use despite the temporal sequence of events surrounding this readmission to hospital with pt reported AMS immediately upon returning to his uncle's home after the pt had appeared well  prior to leaving that evening. s to tolerate his restarted Zyprexa without reported side effects and with ongoing evidence of clinical stabilization.  The pt's judgment remains impaired with limited insight into the nature and severity of his apparent primary psychosis.

## 2021-09-08 PROCEDURE — 99231 SBSQ HOSP IP/OBS SF/LOW 25: CPT

## 2021-09-08 RX ORDER — OLANZAPINE 15 MG/1
10 TABLET, FILM COATED ORAL AT BEDTIME
Refills: 0 | Status: DISCONTINUED | OUTPATIENT
Start: 2021-09-08 | End: 2021-09-10

## 2021-09-08 RX ADMIN — OLANZAPINE 10 MILLIGRAM(S): 15 TABLET, FILM COATED ORAL at 21:08

## 2021-09-08 RX ADMIN — Medication 1 TABLET(S): at 09:28

## 2021-09-08 NOTE — PROGRESS NOTE BEHAVIORAL HEALTH - NSBHADMITIPOBSFT_PSY_A_CORE
The pt denies current SI /HI though he continues to appear thought disordered

## 2021-09-08 NOTE — PROGRESS NOTE BEHAVIORAL HEALTH - NSBHFUPTYPE_PSY_A_CORE
Inpatient
Inpatient-On Service Note
Inpatient

## 2021-09-08 NOTE — PROGRESS NOTE BEHAVIORAL HEALTH - NSBHCHARTREVIEWLAB_PSY_A_CORE FT
CBC Full  -  ( 30 Aug 2021 11:50 )  WBC Count : 6.02 K/uL  RBC Count : 4.92 M/uL  Hemoglobin : 14.7 g/dL  Hematocrit : 42.7 %  Platelet Count - Automated : 237 K/uL  Mean Cell Volume : 86.8 fl  Mean Cell Hemoglobin : 29.9 pg  Mean Cell Hemoglobin Concentration : 34.4 gm/dL  Auto Neutrophil # : 3.26 K/uL  Auto Lymphocyte # : 2.12 K/uL  Auto Monocyte # : 0.42 K/uL  Auto Eosinophil # : 0.08 K/uL  Auto Basophil # : 0.03 K/uL  Auto Neutrophil % : 54.2 %  Auto Lymphocyte % : 35.2 %  Auto Monocyte % : 7.0 %  Auto Eosinophil % : 1.3 %  Auto Basophil % : 0.5 %    08-30    138  |  105  |  20  ----------------------------<  105<H>  4.2   |  31  |  1.13    Ca    9.3      30 Aug 2021 11:50    TPro  7.9  /  Alb  4.3  /  TBili  0.3  /  DBili  x   /  AST  32  /  ALT  65  /  AlkPhos  55  08-30
CBC Full  -  ( 30 Aug 2021 11:50 )  WBC Count : 6.02 K/uL  RBC Count : 4.92 M/uL  Hemoglobin : 14.7 g/dL  Hematocrit : 42.7 %  Platelet Count - Automated : 237 K/uL  Mean Cell Volume : 86.8 fl  Mean Cell Hemoglobin : 29.9 pg  Mean Cell Hemoglobin Concentration : 34.4 gm/dL  Auto Neutrophil # : 3.26 K/uL  Auto Lymphocyte # : 2.12 K/uL  Auto Monocyte # : 0.42 K/uL  Auto Eosinophil # : 0.08 K/uL  Auto Basophil # : 0.03 K/uL  Auto Neutrophil % : 54.2 %  Auto Lymphocyte % : 35.2 %  Auto Monocyte % : 7.0 %  Auto Eosinophil % : 1.3 %  Auto Basophil % : 0.5 %    08-30    138  |  105  |  20  ----------------------------<  105<H>  4.2   |  31  |  1.13    Ca    9.3      30 Aug 2021 11:50    TPro  7.9  /  Alb  4.3  /  TBili  0.3  /  DBili  x   /  AST  32  /  ALT  65  /  AlkPhos  55  08-30
CBC Full  -  ( 07 Sep 2021 10:03 )  WBC Count : 4.24 K/uL  RBC Count : 4.66 M/uL  Hemoglobin : 13.7 g/dL  Hematocrit : 40.8 %  Platelet Count - Automated : 230 K/uL  Mean Cell Volume : 87.6 fl  Mean Cell Hemoglobin : 29.4 pg  Mean Cell Hemoglobin Concentration : 33.6 gm/dL  Auto Neutrophil # : 2.14 K/uL  Auto Lymphocyte # : 1.63 K/uL  Auto Monocyte # : 0.34 K/uL  Auto Eosinophil # : 0.07 K/uL  Auto Basophil # : 0.02 K/uL  Auto Neutrophil % : 50.5 %  Auto Lymphocyte % : 38.4 %  Auto Monocyte % : 8.0 %  Auto Eosinophil % : 1.7 %  Auto Basophil % : 0.5 %    09-07    139  |  108  |  16  ----------------------------<  86  4.4   |  30  |  0.97    Ca    9.1      07 Sep 2021 10:03    TPro  7.3  /  Alb  4.0  /  TBili  0.5  /  DBili  x   /  AST  17  /  ALT  68  /  AlkPhos  53  09-07
CBC Full  -  ( 30 Aug 2021 11:50 )  WBC Count : 6.02 K/uL  RBC Count : 4.92 M/uL  Hemoglobin : 14.7 g/dL  Hematocrit : 42.7 %  Platelet Count - Automated : 237 K/uL  Mean Cell Volume : 86.8 fl  Mean Cell Hemoglobin : 29.9 pg  Mean Cell Hemoglobin Concentration : 34.4 gm/dL  Auto Neutrophil # : 3.26 K/uL  Auto Lymphocyte # : 2.12 K/uL  Auto Monocyte # : 0.42 K/uL  Auto Eosinophil # : 0.08 K/uL  Auto Basophil # : 0.03 K/uL  Auto Neutrophil % : 54.2 %  Auto Lymphocyte % : 35.2 %  Auto Monocyte % : 7.0 %  Auto Eosinophil % : 1.3 %  Auto Basophil % : 0.5 %    08-30    138  |  105  |  20  ----------------------------<  105<H>  4.2   |  31  |  1.13    Ca    9.3      30 Aug 2021 11:50    TPro  7.9  /  Alb  4.3  /  TBili  0.3  /  DBili  x   /  AST  32  /  ALT  65  /  AlkPhos  55  08-30
MEDICATIONS  (STANDING):  multivitamin 1 Tablet(s) Oral daily  OLANZapine Disintegrating Tablet 10 milliGRAM(s) Oral at bedtime    MEDICATIONS  (PRN):  acetaminophen   Tablet .. 650 milliGRAM(s) Oral every 6 hours PRN Temp greater or equal to 38C (100.4F), Mild Pain (1 - 3), Moderate Pain (4 - 6)  aluminum hydroxide/magnesium hydroxide/simethicone Suspension 30 milliLiter(s) Oral every 6 hours PRN Dyspepsia  diphenhydrAMINE 50 milliGRAM(s) Oral every 6 hours PRN Extrapyramidal prophylaxis/ psychosis related anxiety/agitation  diphenhydrAMINE   Injectable 50 milliGRAM(s) IntraMuscular every 6 hours PRN Extrapyramidal prophylaxis/ psychosis related agitation  haloperidol     Tablet 5 milliGRAM(s) Oral every 6 hours PRN moderate psychosis related agitation  haloperidol    Injectable 5 milliGRAM(s) IntraMuscular every 6 hours PRN severe psychosis related agitation  LORazepam     Tablet 1 milliGRAM(s) Oral every 6 hours PRN moderate anxiety related to psychosis  LORazepam   Injectable 2 milliGRAM(s) IntraMuscular every 6 hours PRN severe agitation/ anxiety related to psychosis  magnesium hydroxide Suspension 30 milliLiter(s) Oral daily PRN Constipation  melatonin 3 milliGRAM(s) Oral at bedtime PRN Insomnia
CBC Full  -  ( 30 Aug 2021 11:50 )  WBC Count : 6.02 K/uL  RBC Count : 4.92 M/uL  Hemoglobin : 14.7 g/dL  Hematocrit : 42.7 %  Platelet Count - Automated : 237 K/uL  Mean Cell Volume : 86.8 fl  Mean Cell Hemoglobin : 29.9 pg  Mean Cell Hemoglobin Concentration : 34.4 gm/dL  Auto Neutrophil # : 3.26 K/uL  Auto Lymphocyte # : 2.12 K/uL  Auto Monocyte # : 0.42 K/uL  Auto Eosinophil # : 0.08 K/uL  Auto Basophil # : 0.03 K/uL  Auto Neutrophil % : 54.2 %  Auto Lymphocyte % : 35.2 %  Auto Monocyte % : 7.0 %  Auto Eosinophil % : 1.3 %  Auto Basophil % : 0.5 %    08-30    138  |  105  |  20  ----------------------------<  105<H>  4.2   |  31  |  1.13    Ca    9.3      30 Aug 2021 11:50    TPro  7.9  /  Alb  4.3  /  TBili  0.3  /  DBili  x   /  AST  32  /  ALT  65  /  AlkPhos  55  08-30
MEDICATIONS  (STANDING):  multivitamin 1 Tablet(s) Oral daily  OLANZapine 10 milliGRAM(s) Oral at bedtime    MEDICATIONS  (PRN):  acetaminophen   Tablet .. 650 milliGRAM(s) Oral every 6 hours PRN Temp greater or equal to 38C (100.4F), Mild Pain (1 - 3), Moderate Pain (4 - 6)  aluminum hydroxide/magnesium hydroxide/simethicone Suspension 30 milliLiter(s) Oral every 6 hours PRN Dyspepsia  diphenhydrAMINE 50 milliGRAM(s) Oral every 6 hours PRN Extrapyramidal prophylaxis/ psychosis related anxiety/agitation  diphenhydrAMINE   Injectable 50 milliGRAM(s) IntraMuscular every 6 hours PRN Extrapyramidal prophylaxis/ psychosis related agitation  haloperidol     Tablet 5 milliGRAM(s) Oral every 6 hours PRN moderate psychosis related agitation  haloperidol    Injectable 5 milliGRAM(s) IntraMuscular every 6 hours PRN severe psychosis related agitation  LORazepam     Tablet 1 milliGRAM(s) Oral every 6 hours PRN moderate anxiety related to psychosis  LORazepam   Injectable 2 milliGRAM(s) IntraMuscular every 6 hours PRN severe agitation/ anxiety related to psychosis  magnesium hydroxide Suspension 30 milliLiter(s) Oral daily PRN Constipation  melatonin 3 milliGRAM(s) Oral at bedtime PRN Insomnia
CBC Full  -  ( 07 Sep 2021 10:03 )  WBC Count : 4.24 K/uL  RBC Count : 4.66 M/uL  Hemoglobin : 13.7 g/dL  Hematocrit : 40.8 %  Platelet Count - Automated : 230 K/uL  Mean Cell Volume : 87.6 fl  Mean Cell Hemoglobin : 29.4 pg  Mean Cell Hemoglobin Concentration : 33.6 gm/dL  Auto Neutrophil # : 2.14 K/uL  Auto Lymphocyte # : 1.63 K/uL  Auto Monocyte # : 0.34 K/uL  Auto Eosinophil # : 0.07 K/uL  Auto Basophil # : 0.02 K/uL  Auto Neutrophil % : 50.5 %  Auto Lymphocyte % : 38.4 %  Auto Monocyte % : 8.0 %  Auto Eosinophil % : 1.7 %  Auto Basophil % : 0.5 %    09-07    139  |  108  |  16  ----------------------------<  86  4.4   |  30  |  0.97    Ca    9.1      07 Sep 2021 10:03    TPro  7.3  /  Alb  4.0  /  TBili  0.5  /  DBili  x   /  AST  17  /  ALT  68  /  AlkPhos  53  09-07
MEDICATIONS  (STANDING):  multivitamin 1 Tablet(s) Oral daily  OLANZapine Disintegrating Tablet 10 milliGRAM(s) Oral at bedtime    MEDICATIONS  (PRN):  acetaminophen   Tablet .. 650 milliGRAM(s) Oral every 6 hours PRN Temp greater or equal to 38C (100.4F), Mild Pain (1 - 3), Moderate Pain (4 - 6)  aluminum hydroxide/magnesium hydroxide/simethicone Suspension 30 milliLiter(s) Oral every 6 hours PRN Dyspepsia  diphenhydrAMINE 50 milliGRAM(s) Oral every 6 hours PRN Extrapyramidal prophylaxis/ psychosis related anxiety/agitation  diphenhydrAMINE   Injectable 50 milliGRAM(s) IntraMuscular every 6 hours PRN Extrapyramidal prophylaxis/ psychosis related agitation  haloperidol     Tablet 5 milliGRAM(s) Oral every 6 hours PRN moderate psychosis related agitation  haloperidol    Injectable 5 milliGRAM(s) IntraMuscular every 6 hours PRN severe psychosis related agitation  LORazepam     Tablet 1 milliGRAM(s) Oral every 6 hours PRN moderate anxiety related to psychosis  LORazepam   Injectable 2 milliGRAM(s) IntraMuscular every 6 hours PRN severe agitation/ anxiety related to psychosis  magnesium hydroxide Suspension 30 milliLiter(s) Oral daily PRN Constipation  melatonin 3 milliGRAM(s) Oral at bedtime PRN Insomnia
MEDICATIONS  (STANDING):  multivitamin 1 Tablet(s) Oral daily  OLANZapine Disintegrating Tablet 10 milliGRAM(s) Oral at bedtime    MEDICATIONS  (PRN):  acetaminophen   Tablet .. 650 milliGRAM(s) Oral every 6 hours PRN Temp greater or equal to 38C (100.4F), Mild Pain (1 - 3), Moderate Pain (4 - 6)  aluminum hydroxide/magnesium hydroxide/simethicone Suspension 30 milliLiter(s) Oral every 6 hours PRN Dyspepsia  diphenhydrAMINE 50 milliGRAM(s) Oral every 6 hours PRN Extrapyramidal prophylaxis/ psychosis related anxiety/agitation  diphenhydrAMINE   Injectable 50 milliGRAM(s) IntraMuscular every 6 hours PRN Extrapyramidal prophylaxis/ psychosis related agitation  haloperidol     Tablet 5 milliGRAM(s) Oral every 6 hours PRN moderate psychosis related agitation  haloperidol    Injectable 5 milliGRAM(s) IntraMuscular every 6 hours PRN severe psychosis related agitation  LORazepam     Tablet 1 milliGRAM(s) Oral every 6 hours PRN moderate anxiety related to psychosis  LORazepam   Injectable 2 milliGRAM(s) IntraMuscular every 6 hours PRN severe agitation/ anxiety related to psychosis  magnesium hydroxide Suspension 30 milliLiter(s) Oral daily PRN Constipation  melatonin 3 milliGRAM(s) Oral at bedtime PRN Insomnia
CBC Full  -  ( 30 Aug 2021 11:50 )  WBC Count : 6.02 K/uL  RBC Count : 4.92 M/uL  Hemoglobin : 14.7 g/dL  Hematocrit : 42.7 %  Platelet Count - Automated : 237 K/uL  Mean Cell Volume : 86.8 fl  Mean Cell Hemoglobin : 29.9 pg  Mean Cell Hemoglobin Concentration : 34.4 gm/dL  Auto Neutrophil # : 3.26 K/uL  Auto Lymphocyte # : 2.12 K/uL  Auto Monocyte # : 0.42 K/uL  Auto Eosinophil # : 0.08 K/uL  Auto Basophil # : 0.03 K/uL  Auto Neutrophil % : 54.2 %  Auto Lymphocyte % : 35.2 %  Auto Monocyte % : 7.0 %  Auto Eosinophil % : 1.3 %  Auto Basophil % : 0.5 %    08-30    138  |  105  |  20  ----------------------------<  105<H>  4.2   |  31  |  1.13    Ca    9.3      30 Aug 2021 11:50    TPro  7.9  /  Alb  4.3  /  TBili  0.3  /  DBili  x   /  AST  32  /  ALT  65  /  AlkPhos  55  08-30
MEDICATIONS  (STANDING):  multivitamin 1 Tablet(s) Oral daily  OLANZapine 10 milliGRAM(s) Oral at bedtime    MEDICATIONS  (PRN):  acetaminophen   Tablet .. 650 milliGRAM(s) Oral every 6 hours PRN Temp greater or equal to 38C (100.4F), Mild Pain (1 - 3), Moderate Pain (4 - 6)  aluminum hydroxide/magnesium hydroxide/simethicone Suspension 30 milliLiter(s) Oral every 6 hours PRN Dyspepsia  diphenhydrAMINE 50 milliGRAM(s) Oral every 6 hours PRN Extrapyramidal prophylaxis/ psychosis related anxiety/agitation  diphenhydrAMINE   Injectable 50 milliGRAM(s) IntraMuscular every 6 hours PRN Extrapyramidal prophylaxis/ psychosis related agitation  haloperidol     Tablet 5 milliGRAM(s) Oral every 6 hours PRN moderate psychosis related agitation  haloperidol    Injectable 5 milliGRAM(s) IntraMuscular every 6 hours PRN severe psychosis related agitation  LORazepam     Tablet 1 milliGRAM(s) Oral every 6 hours PRN moderate anxiety related to psychosis  LORazepam   Injectable 2 milliGRAM(s) IntraMuscular every 6 hours PRN severe agitation/ anxiety related to psychosis  magnesium hydroxide Suspension 30 milliLiter(s) Oral daily PRN Constipation  melatonin 3 milliGRAM(s) Oral at bedtime PRN Insomnia

## 2021-09-08 NOTE — PROGRESS NOTE BEHAVIORAL HEALTH - ESTIMATED DISCHARGE DATE
08-Sep-2021
08-Sep-2021
03-Sep-2021
03-Sep-2021
09-Sep-2021
31-Aug-2021
31-Aug-2021
09-Sep-2021
31-Aug-2021
08-Sep-2021
03-Sep-2021
08-Sep-2021

## 2021-09-08 NOTE — PROGRESS NOTE BEHAVIORAL HEALTH - SECONDARY DX1
Schizophrenia spectrum disorder with psychotic disorder type not yet determined
Schizophrenia spectrum disorder with psychotic disorder type not yet determined
Schizophreniform psychosis, confusional type
Schizophrenia spectrum disorder with psychotic disorder type not yet determined
Schizophrenia spectrum disorder with psychotic disorder type not yet determined
Schizophreniform psychosis, confusional type
Schizophreniform psychosis, confusional type
Schizophrenia spectrum disorder with psychotic disorder type not yet determined
Schizophreniform psychosis, confusional type
Schizophrenia spectrum disorder with psychotic disorder type not yet determined

## 2021-09-08 NOTE — PROGRESS NOTE BEHAVIORAL HEALTH - PRIMARY DX
Affective psychosis
Schizophreniform psychosis, confusional type
Substance-induced psychotic disorder
Schizophreniform psychosis, confusional type
Affective psychosis
Substance-induced psychotic disorder

## 2021-09-08 NOTE — PROGRESS NOTE BEHAVIORAL HEALTH - NSBHADMITIPBHPROVIDER_PSY_A_CORE
The patient has been examined and the H&P has been reviewed:    I concur with the findings and no changes have occurred since H&P was written.    Anesthesia/Surgery risks, benefits and alternative options discussed and understood by patient/family.          There are no hospital problems to display for this patient.    
N/A

## 2021-09-08 NOTE — PROGRESS NOTE BEHAVIORAL HEALTH - NSBHCHARTREVIEWVS_PSY_A_CORE FT
Vital Signs Last 24 Hrs  T(C): 36.5 (08 Sep 2021 08:02), Max: 36.5 (08 Sep 2021 08:02)  T(F): 97.7 (08 Sep 2021 08:02), Max: 97.7 (08 Sep 2021 08:02)  HR: --  BP: --  BP(mean): --  RR: 18 (08 Sep 2021 08:02) (18 - 18)  SpO2: 100% (08 Sep 2021 08:02) (100% - 100%)

## 2021-09-08 NOTE — PROGRESS NOTE BEHAVIORAL HEALTH - RISK ASSESSMENT
Low  current risk for suicide with pt now calm and without prior paranoid delusional thinking  Acute risk factors-pt now med noncompliant with much improved  paranoid psychosis,  , limited insight  Chronic risk factors- pt recently discharged from hospital for presumptive first psychotic break  , med noncompliant, h/o likely PTSD related solitary travel to the [place] alone at age 15 from [facility]  Protective factors- pt has supportive , loving family, pt had been working and attending school before becoming ill, pt has access to housing and to financial support from extended family   Mitigating factors- pt now readmitted to safe setting of hospital, pt now med compliant but with little insight into his illness and the need for treatment, pt unable currently to use coping skills due to severity of his psychosis

## 2021-09-08 NOTE — PROGRESS NOTE BEHAVIORAL HEALTH - NSBHPTASSESSDT_PSY_A_CORE
02-Sep-2021
23-Aug-2021
30-Aug-2021
31-Aug-2021
08-Sep-2021
26-Aug-2021
27-Aug-2021
01-Sep-2021
07-Sep-2021
25-Aug-2021
24-Aug-2021
03-Sep-2021

## 2021-09-08 NOTE — PROGRESS NOTE BEHAVIORAL HEALTH - NS ED BHA MSE SPEECH SPONTANEITY
Increased latency
Other
Other
Increased latency/Other
Increased latency
Normal
Increased latency/Other
Normal
Increased latency
Increased latency
Increased latency/Other
Increased latency/Other

## 2021-09-08 NOTE — PROGRESS NOTE BEHAVIORAL HEALTH - OTHER
" Good " more readily flowing still minimizing recent events prior to this readmission improving with select Syriac speaking peers no current evidence of overt paranoid or persecutory delusional thinking

## 2021-09-08 NOTE — PROGRESS NOTE BEHAVIORAL HEALTH - PROBLEM SELECTOR PROBLEM 1
Schizophrenia spectrum disorder with psychotic disorder type not yet determined

## 2021-09-08 NOTE — PROGRESS NOTE BEHAVIORAL HEALTH - PROBLEM SELECTOR PLAN 1
1. Restart previously effective SGA Zyprexa  changed to Zydis ODT 10 mg po qhs ( psychosis)  2.Pt encouraged to attend therapy groups as tolerated when thinking is more organized  3.Pt has allowed writer to contact pt collateral family in order to obtain further clinical pt history to aid in pt treatment and disposition planning
1. Restart previously effective SGA Zyprexa  changed to Zydis ODT 10 mg po qhs ( psychosis)  2.Pt encouraged to attend therapy groups as tolerated when thinking is more organized  3.Pt has allowed writer to contact pt collateral family in order to obtain further clinical pt history to aid in pt treatment and disposition planning  4.To recheck CBC with diff and CMP on 9/7/2021
1. Restart previously effective SGA Zyprexa  changed to Zydis ODT 10 mg po qhs ( psychosis)  2.Pt encouraged to attend therapy groups as tolerated when thinking is more organized  3.Pt has allowed writer to contact pt collateral family in order to obtain further clinical pt history to aid in pt treatment and disposition planning
1. Restart previously effective SGA Zyprexa  changed to Zydis ODT 10 mg po qhs ( psychosis)  2.Pt encouraged to attend therapy groups as tolerated when thinking is more organized  3.Pt has allowed writer to contact pt collateral family in order to obtain further clinical pt history to aid in pt treatment and disposition planning
1. Restart previously effective SGA Zyprexa 10 mg po qhs ( psychosis)  2.Pt encouraged to attend therapy groups as tolerated when thinking is more organized  3.Pt has allowed writer to contact pt collateral family in order to obtain further clinical pt history to aid in pt treatment and disposition planning
1. Restart previously effective SGA Zyprexa  changed to Zydis ODT 10 mg po qhs ( psychosis)  2.Pt encouraged to attend therapy groups as tolerated when thinking is more organized  3.Pt has allowed writer to contact pt collateral family in order to obtain further clinical pt history to aid in pt treatment and disposition planning
1. Restart previously effective SGA Zyprexa  changed to Zydis ODT 10 mg po qhs ( psychosis)  2.Pt encouraged to attend therapy groups as tolerated when thinking is more organized  3.Pt has allowed writer to contact pt collateral family in order to obtain further clinical pt history to aid in pt treatment and disposition planning
1. Restart previously effective SGA Zyprexa  changed back to 10 mg po tablet form prior to pt discharge home   ( psychosis)  2.Pt encouraged to attend therapy groups as tolerated when thinking is more organized  3.Pt has allowed writer to contact pt collateral family in order to obtain further clinical pt history to aid in pt treatment and disposition planning to include reschedule of [facility] clinic for med management and for individual therapy  4.Rechecked  CBC with diff and CMP on 9/7/2021
1. Restart previously effective SGA Zyprexa  changed to Zydis ODT 10 mg po qhs ( psychosis)  2.Pt encouraged to attend therapy groups as tolerated when thinking is more organized  3.Pt has allowed writer to contact pt collateral family in order to obtain further clinical pt history to aid in pt treatment and disposition planning  4.To recheck CBC with diff and CMP on 9/7/2021
1. Restart previously effective SGA Zyprexa  changed to Zydis ODT 10 mg po qhs ( psychosis)  2.Pt encouraged to attend therapy groups as tolerated when thinking is more organized  3.Pt has allowed writer to contact pt collateral family in order to obtain further clinical pt history to aid in pt treatment and disposition planning  4.To recheck CBC with diff and CMP on 9/7/2021
1. Restart previously effective SGA Zyprexa  changed to Zydis ODT 10 mg po qhs ( psychosis)  2.Pt encouraged to attend therapy groups as tolerated when thinking is more organized  3.Pt has allowed writer to contact pt collateral family in order to obtain further clinical pt history to aid in pt treatment and disposition planning
1. Restart previously effective SGA Zyprexa 10 mg po qhs ( psychosis)  2.Pt encouraged to attend therapy groups as tolerated when thinking is more organized  3.Pt has allowed writer to contact pt collateral family in order to obtain further clinical pt history to aid in pt treatment and disposition planning

## 2021-09-08 NOTE — PROGRESS NOTE BEHAVIORAL HEALTH - NSBHFUPINTERVALCCFT_PSY_A_CORE
" I am doing good."    On 8/31/2021 this writer had the pt's permission to speak with the pt's uncle Miguel Mcdermott ( tel [phone]) via [employer] ID # [id number] to gather further pt collateral data. The pt's uncle reported that he was now unsure that the pt had even left the home on the day  prior to the pt's readmission to hospital with worsening evidence of pt psychosis in the context of the pt's reported noncompliance with his outpatient antipsychotic medication Zyprexa . The pt has continued to maintain that he has never used any substances of potential abuse or misuse and the pt's uncle reported that the pt has had no known h/o any substance use / misuse over the past 5 years since the pt has come to live with his uncle. Urine tox screens have also continued to be negative for any substances of possible abuse though synthetic drugs are not always detectable in current tox screen procedures.
" When can I leave?"    On 8/31/2021 this writer had the pt's permission to speak with the pt's uncle Miguel Mcdermott ( tel [phone]) via [employer] ID # [id number] to gather further pt collateral data. The pt's uncle reported that he was now unsure that the pt had even left the home on the day  prior to the pt's readmission to hospital with worsening evidence of pt psychosis in the context of the pt's reported noncompliance with his outpatient antipsychotic medication Zyprexa . The pt has continued to maintain that he has never used any substances of potential abuse or misuse and the pt's uncle reported that the pt has had no known h/o any substance use / misuse over the past 5 years since the pt has come to live with his uncle. Urine tox screens have also continued to be negative for any substances of possible abuse though synthetic drugs are not always detectable in current tox screen procedures.
" When do I leave?"    On 8/31/2021 this writer had the pt's permission to speak with the pt's uncle Miguel Mcdermott ( tel [phone]) via [employer] ID # [id number] to gather further pt collateral data. The pt's uncle reported that he was now unsure that the pt had even left the home on the day  prior to the pt's readmission to hospital with worsening evidence of pt psychosis in the context of the pt's reported noncompliance with his outpatient antipsychotic medication Zyprexa . The pt has continued to maintain that he has never used any substances of potential abuse or misuse and the pt's uncle reported that the pt has had no known h/o any substance use / misuse over the past 5 years since the pt has come to live with his uncle. Urine tox screens have also continued to be negative for any substances of possible abuse though synthetic drugs are not always detectable in current tox screen procedures.
" I'm doing ok. I don't do drugs"
" OK. "
" I don't know why the police brought me back here. There is nothing wrong with me."
" I am doing ok. I'm hungry."
Pt largely quiet and withdrawn, not wishing to speak with staff or peers.
" Yes! No! I don't know!"
" No drugs.'    On 8/31/2021 this writer had the pt's permission to speak with the pt's uncle Miguel Mcdermott ( tel [phone]) via [employer] ID # [id number] to gather further pt collateral data. The pt's uncle reported that he was now unsure that the pt had even left the home on the day  prior to the pt's readmission to hospital with worsening evidence of pt psychosis in the context of the pt's reported noncompliance with his outpatient antipsychotic medication Zyprexa . The pt has continued to maintain that he has never used any substances of potential abuse or misuse and the pt's uncle reported that the pt has had no known h/o any substance use / misuse over the past 5 years since the pt has come to live with his uncle. Urine tox screens have also continued to be negative for any substances of possible abuse though synthetic drugs are not always detectable in current tox screen procedures.
" Hi. I feel good. Ready to go home."    On 8/31/2021 this writer had the pt's permission to speak with the pt's uncle Miguel Mcdermott ( tel [phone]) via [employer] ID # [id number] to gather further pt collateral data. The pt's uncle reported that he was now unsure that the pt had even left the home on the day  prior to the pt's readmission to hospital with worsening evidence of pt psychosis in the context of the pt's reported noncompliance with his outpatient antipsychotic medication Zyprexa . The pt has continued to maintain that he has never used any substances of potential abuse or misuse and the pt's uncle reported that the pt has had no known h/o any substance use / misuse over the past 5 years since the pt has come to live with his uncle. Urine tox screens have also continued to be negative for any substances of possible abuse though synthetic drugs are not always detectable in current tox screen procedures.
" I am ok. Not sad."    On 8/31/2021 this writer had the pt's permission to speak with the pt's uncle Miguel Mcdermott ( tel [phone]) via [employer] ID # [id number] to gather further pt collateral data. The pt's uncle reported that he was now unsure that the pt had even left the home on the day  prior to the pt's readmission to hospital with worsening evidence of pt psychosis in the context of the pt's reported noncompliance with his outpatient antipsychotic medication Zyprexa . The pt has continued to maintain that he has never used any substances of potential abuse or misuse and the pt's uncle reported that the pt has had no known h/o any substance use / misuse over the past 5 years since the pt has come to live with his uncle. Urine tox screens have also continued to be negative for any substances of possible abuse though synthetic drugs are not always detectable in current tox screen procedures.

## 2021-09-08 NOTE — PROGRESS NOTE BEHAVIORAL HEALTH - SUMMARY
The pt. is a 20 year-old [facility] male  in the  x 5 years and living with his family in [place]. 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 ED on 7/21/2021  by his uncle for  pt reportedly new onset bizarre behavior and reported AH.      The pt was largely unable to participate in his evaluation due to possible evident pt internal preoccupation and difficulty with attention despite use of the  services. The pt was seen by Psychiatry and admitted to  inpatient psychiatry on 7/21/2021 on a 9.39 emergency legal status for acute pt. safety  and for further pt evaluation and treatment of his psychotic disorder.  no reported pt h/o prior depression, ragini, psychosis or a h/o inpatient or outpatient treatment  No reported pt h/o substance use/ abuse  Pt is living with his uncle and cousins in [place]. The pt emigrated from [facility] 5 years ago.   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 appeared to benefit from the safety and structure of the inpatient hospital unit with multimodal treatments offered though not always accepted by the pt despite ongoing staff support and encouragement.   	On 8/6/2021, this writer had pt's permission to speak with pt's uncle Miguel Mcdermott with whom the pt lives in [place] ( Tel [phone]) to review pt clinical status and to update pt treatment and disposition planning and after care treatment. Pt with first inpatient admission to hospital on 7/21/2021 to [facility] due to bizarre behavior and apparent brief reactive psychosis . Pt begun on Zyprexa titrated to 10 mg po qhs with modest effect and pt discharged home on 8/11/2021.  Plan discussed and agreed to for gang task force who met on 8/9/2021 with the pt on the inpatient hospital unit  to take report and to discuss plan of action to enhance the pt's safety after pt DC home. Pt with first inpatient admission to hospital on 7/21/2021 to [facility] due to bizarre behavior and apparent brief reactive psychosis .   Pt begun on Zyprexa titrated to 10 mg po qhs with modest effect and pt discharged home on 8/11/2021.   Pt reportedly med noncompliant after DC home with resultant resurgence of affective psychosis  and readmission to hospital on 8/22/2021 for acute safety and for ongoing pt evaluation and treatment.   Meeting with gang task force for 8/9/2021 at 3;30 pm to gather information related to pt's reported receipt of threats to his safety.   Per ED evaluation by NP on 8/22/2021:   The pt.  had been  recently discharged from  x 1 week ago af 1st psychiatric admission for psychotic behavior. Patient initially stated that Aunt called police who came and took him from his room for no reason. . Later stated uncle called police but he didn't know the reason. Then stated uncle came into his room with a gun and wanted to kill him. Patient had earlier denied access to guns or other lethal weapons. Patient repeatedly stated he "feels good" and his mood is "good" and adamantly denies SI/P/Intent. Affect is sad and constricted and incongruent to mood. Denies A/V hallucinations or paranoia. Admits to taking discharge medication "sometimes". Denies h/o drugs/alcohol/cigarette use. Denies h/o physical/sexual abuse. Denies family h/o mental illness or suicide. Speech of average rate and volume but thoughts disorganized.  Insight and judgment poor currently.   Collateral from uncle Miguel Mcdermott obtained with assistance of [place]  # [id number] on 8/22/2021:  The pt's uncle reported  that pt's behavior was 'okay" since discharge but when he went to check on him on 8/22/2021 , the pt   was in bed and had begun  screaming for his uncle to " just kill me uncle, kill me now". Uncle states he was screaming something about gangs and doesn't know if he is in a gang or whether or not he may have used any substance. Urine tox  of 8/22/2021 was again negative.     The pt's uncle then called 911 and the pt was BIB police to [facility] ED on 8/22/2021 where he was then readmitted to  inpatient psychiatry on a 9.39.emergency legal status for acute safety concerns and for ongoing pt evaluation and treatment of his presumptive affective psychosis  Plan  Restart Zyprexa 10mg po Hs  Haldol 5mg po prn Q6h, agitation; Cogentin 1mg po Q6h, Prophylactic EPS
The pt. is a 20 year-old [facility] male  in the  x 5 years and living with his family in [place]. 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      The pt was largely unable to participate in his evaluation due to possible evident pt internal preoccupation and difficulty with attention despite use of the  services. The pt was seen by Psychiatry and admitted to  inpatient psychiatry on 7/21/2021 on a 9.39 emergency legal status for acute pt. safety  and for further pt evaluation and treatment of his psychotic disorder.  no reported pt h/o prior depression, ragini, psychosis or a h/o inpatient or outpatient treatment  No reported pt h/o substance use/ abuse  Pt is living with his uncle and cousins in [place]. The pt emigrated from [facility] 5 years ago.   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 appeared to benefit from the safety and structure of the inpatient hospital unit with multimodal treatments offered though not always accepted by the pt despite ongoing staff support and encouragement.   	On 8/6/2021, this writer had pt's permission to speak with pt's uncle Miguel Mcdermott with whom the pt lives in [place] ( Tel [phone]) to review pt clinical status and to update pt treatment and disposition planning and after care treatment. Pt with first inpatient admission to hospital on 7/21/2021 to [facility] due to bizarre behavior and apparent brief reactive psychosis . Pt begun on Zyprexa titrated to 10 mg po qhs with modest effect and pt discharged home on 8/11/2021.  Plan discussed and agreed to for gang task force who met on 8/9/2021 with the pt on the inpatient hospital unit  to take report and to discuss plan of action to enhance the pt's safety after pt DC home. Pt with first inpatient admission to hospital on 7/21/2021 to [facility] due to bizarre behavior and apparent brief reactive psychosis .   Pt begun on Zyprexa titrated to 10 mg po qhs with modest effect and pt discharged home on 8/11/2021.   Pt reportedly med noncompliant after DC home with resultant resurgence of affective psychosis  and readmission to hospital on 8/22/2021 for acute safety and for ongoing pt evaluation and treatment.   Meeting with gang task force for 8/9/2021 at 3;30 pm to gather information related to pt's reported receipt of threats to his safety.   Per ED evaluation by NP on 8/22/2021:   The pt.  had been  recently discharged from  x 1 week ago af 1st psychiatric admission for psychotic behavior. Patient initially stated that Aunt called police who came and took him from his room for no reason. . Later stated uncle called police but he didn't know the reason. Then stated uncle came into his room with a gun and wanted to kill him. Patient had earlier denied access to guns or other lethal weapons. Patient repeatedly stated he "feels good" and his mood is "good" and adamantly denies SI/P/Intent. Affect is sad and constricted and incongruent to mood. Denies A/V hallucinations or paranoia. Admits to taking discharge medication "sometimes". Denies h/o drugs/alcohol/cigarette use. Denies h/o physical/sexual abuse. Denies family h/o mental illness or suicide. Speech of average rate and volume but thoughts disorganized.  Insight and judgment poor currently.   Collateral from uncle Miguel Mcdermott obtained with assistance of [place]  # [id number] on 8/22/2021:  The pt's uncle reported  that pt's behavior was 'okay" since discharge but when he went to check on him on 8/22/2021 , the pt   was in bed and had begun  screaming for his uncle to " just kill me uncle, kill me now". Uncle states he was screaming something about gangs and doesn't know if he is in a gang or whether or not he may have used any substance. Urine tox  of 8/22/2021 was again negative.     The pt's uncle then called 911 and the pt was BIB police to [facility] ED on 8/22/2021 where he was then readmitted to  inpatient psychiatry on a 9.39.emergency legal status for acute safety concerns and for ongoing pt evaluation and treatment of his presumptive affective psychosis  Plan  Restart Zyprexa 10mg po Hs  Haldol 5mg po prn Q6h, agitation; Cogentin 1mg po Q6h, Prophylactic EPS
The pt. is a 20 year-old [facility] male  in the  x 5 years and living with his family in [place]. 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      The pt was largely unable to participate in his evaluation due to possible evident pt internal preoccupation and difficulty with attention despite use of the  services. The pt was seen by Psychiatry and admitted to  inpatient psychiatry on 7/21/2021 on a 9.39 emergency legal status for acute pt. safety  and for further pt evaluation and treatment of his psychotic disorder.  no reported pt h/o prior depression, ragini, psychosis or a h/o inpatient or outpatient treatment  No reported pt h/o substance use/ abuse  Pt is living with his uncle and cousins in [place]. The pt emigrated from [facility] 5 years ago.   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 appeared to benefit from the safety and structure of the inpatient hospital unit with multimodal treatments offered though not always accepted by the pt despite ongoing staff support and encouragement.   	On 8/6/2021, this writer had pt's permission to speak with pt's uncle Miguel Mcdermott with whom the pt lives in [place] ( Tel [phone]) to review pt clinical status and to update pt treatment and disposition planning and after care treatment. Pt with first inpatient admission to hospital on 7/21/2021 to [facility] due to bizarre behavior and apparent brief reactive psychosis . Pt begun on Zyprexa titrated to 10 mg po qhs with modest effect and pt discharged home on 8/11/2021.  Plan discussed and agreed to for gang task force who met on 8/9/2021 with the pt on the inpatient hospital unit  to take report and to discuss plan of action to enhance the pt's safety after pt DC home. Pt with first inpatient admission to hospital on 7/21/2021 to [facility] due to bizarre behavior and apparent brief reactive psychosis .   Pt begun on Zyprexa titrated to 10 mg po qhs with modest effect and pt discharged home on 8/11/2021.   Pt reportedly med noncompliant after DC home with resultant resurgence of affective psychosis  and readmission to hospital on 8/22/2021 for acute safety and for ongoing pt evaluation and treatment.   Meeting with gang task force for 8/9/2021 at 3;30 pm to gather information related to pt's reported receipt of threats to his safety.   Per ED evaluation by NP on 8/22/2021:   The pt.  had been  recently discharged from  x 1 week ago af 1st psychiatric admission for psychotic behavior. Patient initially stated that Aunt called police who came and took him from his room for no reason. . Later stated uncle called police but he didn't know the reason. Then stated uncle came into his room with a gun and wanted to kill him. Patient had earlier denied access to guns or other lethal weapons. Patient repeatedly stated he "feels good" and his mood is "good" and adamantly denies SI/P/Intent. Affect is sad and constricted and incongruent to mood. Denies A/V hallucinations or paranoia. Admits to taking discharge medication "sometimes". Denies h/o drugs/alcohol/cigarette use. Denies h/o physical/sexual abuse. Denies family h/o mental illness or suicide. Speech of average rate and volume but thoughts disorganized.  Insight and judgment poor currently.   Collateral from uncle Miguel Mcdermott obtained with assistance of [place]  # [id number] on 8/22/2021:  The pt's uncle reported  that pt's behavior was 'okay" since discharge but when he went to check on him on 8/22/2021 , the pt   was in bed and had begun  screaming for his uncle to " just kill me uncle, kill me now". Uncle states he was screaming something about gangs and doesn't know if he is in a gang or whether or not he may have used any substance. Urine tox  of 8/22/2021 was again negative.     The pt's uncle then called 911 and the pt was BIB police to [facility] ED on 8/22/2021 where he was then readmitted to  inpatient psychiatry on a 9.39.emergency legal status for acute safety concerns and for ongoing pt evaluation and treatment of his presumptive affective psychosis  Plan  Restart Zyprexa 10mg po Hs  Haldol 5mg po prn Q6h, agitation; Cogentin 1mg po Q6h, Prophylactic EPS
The pt. is a 20 year-old [facility] male  in the  x 5 years and living with his family in [place]. 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      The pt was largely unable to participate in his evaluation due to possible evident pt internal preoccupation and difficulty with attention despite use of the  services. The pt was seen by Psychiatry and admitted to  inpatient psychiatry on 7/21/2021 on a 9.39 emergency legal status for acute pt. safety  and for further pt evaluation and treatment of his psychotic disorder.  no reported pt h/o prior depression, ragini, psychosis or a h/o inpatient or outpatient treatment  No reported pt h/o substance use/ abuse  Pt is living with his uncle and cousins in [place]. The pt emigrated from [facility] 5 years ago.   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 appeared to benefit from the safety and structure of the inpatient hospital unit with multimodal treatments offered though not always accepted by the pt despite ongoing staff support and encouragement.   	On 8/6/2021, this writer had pt's permission to speak with pt's uncle Miguel Mcdermott with whom the pt lives in [place] ( Tel [phone]) to review pt clinical status and to update pt treatment and disposition planning and after care treatment. Pt with first inpatient admission to hospital on 7/21/2021 to [facility] due to bizarre behavior and apparent brief reactive psychosis . Pt begun on Zyprexa titrated to 10 mg po qhs with modest effect and pt discharged home on 8/11/2021.  Plan discussed and agreed to for gang task force who met on 8/9/2021 with the pt on the inpatient hospital unit  to take report and to discuss plan of action to enhance the pt's safety after pt DC home. Pt with first inpatient admission to hospital on 7/21/2021 to [facility] due to bizarre behavior and apparent brief reactive psychosis .   Pt begun on Zyprexa titrated to 10 mg po qhs with modest effect and pt discharged home on 8/11/2021.   Pt reportedly med noncompliant after DC home with resultant resurgence of affective psychosis  and readmission to hospital on 8/22/2021 for acute safety and for ongoing pt evaluation and treatment.   Meeting with gang task force for 8/9/2021 at 3;30 pm to gather information related to pt's reported receipt of threats to his safety.   Per ED evaluation by NP on 8/22/2021:   The pt.  had been  recently discharged from  x 1 week ago af 1st psychiatric admission for psychotic behavior. Patient initially stated that Aunt called police who came and took him from his room for no reason. . Later stated uncle called police but he didn't know the reason. Then stated uncle came into his room with a gun and wanted to kill him. Patient had earlier denied access to guns or other lethal weapons. Patient repeatedly stated he "feels good" and his mood is "good" and adamantly denies SI/P/Intent. Affect is sad and constricted and incongruent to mood. Denies A/V hallucinations or paranoia. Admits to taking discharge medication "sometimes". Denies h/o drugs/alcohol/cigarette use. Denies h/o physical/sexual abuse. Denies family h/o mental illness or suicide. Speech of average rate and volume but thoughts disorganized.  Insight and judgment poor currently.   Collateral from uncle Miguel Mcdermott obtained with assistance of [place]  # [id number] on 8/22/2021:  The pt's uncle reported  that pt's behavior was 'okay" since discharge but when he went to check on him on 8/22/2021 , the pt   was in bed and had begun  screaming for his uncle to " just kill me uncle, kill me now". Uncle states he was screaming something about gangs and doesn't know if he is in a gang or whether or not he may have used any substance. Urine tox  of 8/22/2021 was again negative.     The pt's uncle then called 911 and the pt was BIB police to [facility] ED on 8/22/2021 where he was then readmitted to  inpatient psychiatry on a 9.39.emergency legal status for acute safety concerns and for ongoing pt evaluation and treatment of his presumptive affective psychosis  Plan  Restart Zyprexa 10mg po Hs  Haldol 5mg po prn Q6h, agitation; Cogentin 1mg po Q6h, Prophylactic EPS
The pt. is a 20 year-old [facility] male  in the  x 5 years and living with his family in [place]. 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      The pt was largely unable to participate in his evaluation due to possible evident pt internal preoccupation and difficulty with attention despite use of the  services. The pt was seen by Psychiatry and admitted to  inpatient psychiatry on 7/21/2021 on a 9.39 emergency legal status for acute pt. safety  and for further pt evaluation and treatment of his psychotic disorder.  no reported pt h/o prior depression, ragini, psychosis or a h/o inpatient or outpatient treatment  No reported pt h/o substance use/ abuse  Pt is living with his uncle and cousins in [place]. The pt emigrated from [facility] 5 years ago.   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 appeared to benefit from the safety and structure of the inpatient hospital unit with multimodal treatments offered though not always accepted by the pt despite ongoing staff support and encouragement.   	On 8/6/2021, this writer had pt's permission to speak with pt's uncle Miguel Mcdermott with whom the pt lives in [place] ( Tel [phone]) to review pt clinical status and to update pt treatment and disposition planning and after care treatment. Pt with first inpatient admission to hospital on 7/21/2021 to [facility] due to bizarre behavior and apparent brief reactive psychosis . Pt begun on Zyprexa titrated to 10 mg po qhs with modest effect and pt discharged home on 8/11/2021.  Plan discussed and agreed to for gang task force who met on 8/9/2021 with the pt on the inpatient hospital unit  to take report and to discuss plan of action to enhance the pt's safety after pt DC home. Pt with first inpatient admission to hospital on 7/21/2021 to [facility] due to bizarre behavior and apparent brief reactive psychosis .   Pt begun on Zyprexa titrated to 10 mg po qhs with modest effect and pt discharged home on 8/11/2021.   Pt reportedly med noncompliant after DC home with resultant resurgence of affective psychosis  and readmission to hospital on 8/22/2021 for acute safety and for ongoing pt evaluation and treatment.   Meeting with gang task force for 8/9/2021 at 3;30 pm to gather information related to pt's reported receipt of threats to his safety.   Per ED evaluation by NP on 8/22/2021:   The pt.  had been  recently discharged from  x 1 week ago af 1st psychiatric admission for psychotic behavior. Patient initially stated that Aunt called police who came and took him from his room for no reason. . Later stated uncle called police but he didn't know the reason. Then stated uncle came into his room with a gun and wanted to kill him. Patient had earlier denied access to guns or other lethal weapons. Patient repeatedly stated he "feels good" and his mood is "good" and adamantly denies SI/P/Intent. Affect is sad and constricted and incongruent to mood. Denies A/V hallucinations or paranoia. Admits to taking discharge medication "sometimes". Denies h/o drugs/alcohol/cigarette use. Denies h/o physical/sexual abuse. Denies family h/o mental illness or suicide. Speech of average rate and volume but thoughts disorganized.  Insight and judgment poor currently.   Collateral from uncle Miguel Mcdermott obtained with assistance of [place]  # [id number] on 8/22/2021:  The pt's uncle reported  that pt's behavior was 'okay" since discharge but when he went to check on him on 8/22/2021 , the pt   was in bed and had begun  screaming for his uncle to " just kill me uncle, kill me now". Uncle states he was screaming something about gangs and doesn't know if he is in a gang or whether or not he may have used any substance. Urine tox  of 8/22/2021 was again negative.     The pt's uncle then called 911 and the pt was BIB police to [facility] ED on 8/22/2021 where he was then readmitted to  inpatient psychiatry on a 9.39.emergency legal status for acute safety concerns and for ongoing pt evaluation and treatment of his presumptive affective psychosis  Plan  Restart Zyprexa 10mg po Hs  Haldol 5mg po prn Q6h, agitation; Cogentin 1mg po Q6h, Prophylactic EPS
The pt. is a 20 year-old [facility] male  in the  x 5 years and living with his family in [place]. 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      The pt was largely unable to participate in his evaluation due to possible evident pt internal preoccupation and difficulty with attention despite use of the  services. The pt was seen by Psychiatry and admitted to  inpatient psychiatry on 7/21/2021 on a 9.39 emergency legal status for acute pt. safety  and for further pt evaluation and treatment of his psychotic disorder.  no reported pt h/o prior depression, ragini, psychosis or a h/o inpatient or outpatient treatment  No reported pt h/o substance use/ abuse  Pt is living with his uncle and cousins in [place]. The pt emigrated from [facility] 5 years ago.   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 appeared to benefit from the safety and structure of the inpatient hospital unit with multimodal treatments offered though not always accepted by the pt despite ongoing staff support and encouragement.   	On 8/6/2021, this writer had pt's permission to speak with pt's uncle Miguel Mcdermott with whom the pt lives in [place] ( Tel [phone]) to review pt clinical status and to update pt treatment and disposition planning and after care treatment. Pt with first inpatient admission to hospital on 7/21/2021 to [facility] due to bizarre behavior and apparent brief reactive psychosis . Pt begun on Zyprexa titrated to 10 mg po qhs with modest effect and pt discharged home on 8/11/2021.  Plan discussed and agreed to for gang task force who met on 8/9/2021 with the pt on the inpatient hospital unit  to take report and to discuss plan of action to enhance the pt's safety after pt DC home. Pt with first inpatient admission to hospital on 7/21/2021 to [facility] due to bizarre behavior and apparent brief reactive psychosis .   Pt begun on Zyprexa titrated to 10 mg po qhs with modest effect and pt discharged home on 8/11/2021.   Pt reportedly med noncompliant after DC home with resultant resurgence of affective psychosis  and readmission to hospital on 8/22/2021 for acute safety and for ongoing pt evaluation and treatment.   Meeting with gang task force for 8/9/2021 at 3;30 pm to gather information related to pt's reported receipt of threats to his safety.   Per ED evaluation by NP on 8/22/2021:   The pt.  had been  recently discharged from  x 1 week ago af 1st psychiatric admission for psychotic behavior. Patient initially stated that Aunt called police who came and took him from his room for no reason. . Later stated uncle called police but he didn't know the reason. Then stated uncle came into his room with a gun and wanted to kill him. Patient had earlier denied access to guns or other lethal weapons. Patient repeatedly stated he "feels good" and his mood is "good" and adamantly denies SI/P/Intent. Affect is sad and constricted and incongruent to mood. Denies A/V hallucinations or paranoia. Admits to taking discharge medication "sometimes". Denies h/o drugs/alcohol/cigarette use. Denies h/o physical/sexual abuse. Denies family h/o mental illness or suicide. Speech of average rate and volume but thoughts disorganized.  Insight and judgment poor currently.   Collateral from uncle Miguel Mcdermott obtained with assistance of [place]  # [id number] on 8/22/2021:  The pt's uncle reported  that pt's behavior was 'okay" since discharge but when he went to check on him on 8/22/2021 , the pt   was in bed and had begun  screaming for his uncle to " just kill me uncle, kill me now". Uncle states he was screaming something about gangs and doesn't know if he is in a gang or whether or not he may have used any substance. Urine tox  of 8/22/2021 was again negative.     The pt's uncle then called 911 and the pt was BIB police to [facility] ED on 8/22/2021 where he was then readmitted to  inpatient psychiatry on a 9.39.emergency legal status for acute safety concerns and for ongoing pt evaluation and treatment of his presumptive affective psychosis  Plan  Restart Zyprexa 10mg po Hs  Haldol 5mg po prn Q6h, agitation; Cogentin 1mg po Q6h, Prophylactic EPS
The pt. is a 20 year-old [facility] male  in the  x 5 years and living with his family in [place]. 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      The pt was largely unable to participate in his evaluation due to possible evident pt internal preoccupation and difficulty with attention despite use of the  services. The pt was seen by Psychiatry and admitted to  inpatient psychiatry on 7/21/2021 on a 9.39 emergency legal status for acute pt. safety  and for further pt evaluation and treatment of his psychotic disorder.  no reported pt h/o prior depression, ragini, psychosis or a h/o inpatient or outpatient treatment  No reported pt h/o substance use/ abuse  Pt is living with his uncle and cousins in [place]. The pt emigrated from [facility] 5 years ago.   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 appeared to benefit from the safety and structure of the inpatient hospital unit with multimodal treatments offered though not always accepted by the pt despite ongoing staff support and encouragement.   	On 8/6/2021, this writer had pt's permission to speak with pt's uncle Miguel Mcdermott with whom the pt lives in [place] ( Tel [phone]) to review pt clinical status and to update pt treatment and disposition planning and after care treatment. Pt with first inpatient admission to hospital on 7/21/2021 to [facility] due to bizarre behavior and apparent brief reactive psychosis . Pt begun on Zyprexa titrated to 10 mg po qhs with modest effect and pt discharged home on 8/11/2021.  Plan discussed and agreed to for gang task force who met on 8/9/2021 with the pt on the inpatient hospital unit  to take report and to discuss plan of action to enhance the pt's safety after pt DC home. Pt with first inpatient admission to hospital on 7/21/2021 to [facility] due to bizarre behavior and apparent brief reactive psychosis .   Pt begun on Zyprexa titrated to 10 mg po qhs with modest effect and pt discharged home on 8/11/2021.   Pt reportedly med noncompliant after DC home with resultant resurgence of affective psychosis  and readmission to hospital on 8/22/2021 for acute safety and for ongoing pt evaluation and treatment.   Meeting with gang task force for 8/9/2021 at 3;30 pm to gather information related to pt's reported receipt of threats to his safety.   Per ED evaluation by NP on 8/22/2021:   The pt.  had been  recently discharged from  x 1 week ago af 1st psychiatric admission for psychotic behavior. Patient initially stated that Aunt called police who came and took him from his room for no reason. . Later stated uncle called police but he didn't know the reason. Then stated uncle came into his room with a gun and wanted to kill him. Patient had earlier denied access to guns or other lethal weapons. Patient repeatedly stated he "feels good" and his mood is "good" and adamantly denies SI/P/Intent. Affect is sad and constricted and incongruent to mood. Denies A/V hallucinations or paranoia. Admits to taking discharge medication "sometimes". Denies h/o drugs/alcohol/cigarette use. Denies h/o physical/sexual abuse. Denies family h/o mental illness or suicide. Speech of average rate and volume but thoughts disorganized.  Insight and judgment poor currently.   Collateral from uncle Miguel Mcdermott obtained with assistance of [place]  # [id number] on 8/22/2021:  The pt's uncle reported  that pt's behavior was 'okay" since discharge but when he went to check on him on 8/22/2021 , the pt   was in bed and had begun  screaming for his uncle to " just kill me uncle, kill me now". Uncle states he was screaming something about gangs and doesn't know if he is in a gang or whether or not he may have used any substance. Urine tox  of 8/22/2021 was again negative.     The pt's uncle then called 911 and the pt was BIB police to [facility] ED on 8/22/2021 where he was then readmitted to  inpatient psychiatry on a 9.39.emergency legal status for acute safety concerns and for ongoing pt evaluation and treatment of his presumptive affective psychosis  Plan  Restart Zyprexa 10mg po Hs  Haldol 5mg po prn Q6h, agitation; Cogentin 1mg po Q6h, Prophylactic EPS
The pt. is a 20 year-old [facility] male  in the  x 5 years and living with his family in [place]. 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      The pt was largely unable to participate in his evaluation due to possible evident pt internal preoccupation and difficulty with attention despite use of the  services. The pt was seen by Psychiatry and admitted to  inpatient psychiatry on 7/21/2021 on a 9.39 emergency legal status for acute pt. safety  and for further pt evaluation and treatment of his psychotic disorder.  no reported pt h/o prior depression, ragini, psychosis or a h/o inpatient or outpatient treatment  No reported pt h/o substance use/ abuse  Pt is living with his uncle and cousins in [place]. The pt emigrated from [facility] 5 years ago.   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 appeared to benefit from the safety and structure of the inpatient hospital unit with multimodal treatments offered though not always accepted by the pt despite ongoing staff support and encouragement.   	On 8/6/2021, this writer had pt's permission to speak with pt's uncle Miguel Mcdermott with whom the pt lives in [place] ( Tel [phone]) to review pt clinical status and to update pt treatment and disposition planning and after care treatment. Pt with first inpatient admission to hospital on 7/21/2021 to [facility] due to bizarre behavior and apparent brief reactive psychosis . Pt begun on Zyprexa titrated to 10 mg po qhs with modest effect and pt discharged home on 8/11/2021.  Plan discussed and agreed to for gang task force who met on 8/9/2021 with the pt on the inpatient hospital unit  to take report and to discuss plan of action to enhance the pt's safety after pt DC home. Pt with first inpatient admission to hospital on 7/21/2021 to [facility] due to bizarre behavior and apparent brief reactive psychosis .   Pt begun on Zyprexa titrated to 10 mg po qhs with modest effect and pt discharged home on 8/11/2021.   Pt reportedly med noncompliant after DC home with resultant resurgence of affective psychosis  and readmission to hospital on 8/22/2021 for acute safety and for ongoing pt evaluation and treatment.   Meeting with gang task force for 8/9/2021 at 3;30 pm to gather information related to pt's reported receipt of threats to his safety.   Per ED evaluation by NP on 8/22/2021:   The pt.  had been  recently discharged from  x 1 week ago af 1st psychiatric admission for psychotic behavior. Patient initially stated that Aunt called police who came and took him from his room for no reason. . Later stated uncle called police but he didn't know the reason. Then stated uncle came into his room with a gun and wanted to kill him. Patient had earlier denied access to guns or other lethal weapons. Patient repeatedly stated he "feels good" and his mood is "good" and adamantly denies SI/P/Intent. Affect is sad and constricted and incongruent to mood. Denies A/V hallucinations or paranoia. Admits to taking discharge medication "sometimes". Denies h/o drugs/alcohol/cigarette use. Denies h/o physical/sexual abuse. Denies family h/o mental illness or suicide. Speech of average rate and volume but thoughts disorganized.  Insight and judgment poor currently.   Collateral from uncle Mgiuel Mcdermott obtained with assistance of [place]  # [id number] on 8/22/2021:  The pt's uncle reported  that pt's behavior was 'okay" since discharge but when he went to check on him on 8/22/2021 , the pt   was in bed and had begun  screaming for his uncle to " just kill me uncle, kill me now". Uncle states he was screaming something about gangs and doesn't know if he is in a gang or whether or not he may have used any substance. Urine tox  of 8/22/2021 was again negative.     The pt's uncle then called 911 and the pt was BIB police to [facility] ED on 8/22/2021 where he was then readmitted to  inpatient psychiatry on a 9.39.emergency legal status for acute safety concerns and for ongoing pt evaluation and treatment of his presumptive affective psychosis  Plan  Restart Zyprexa 10mg po Hs  Haldol 5mg po prn Q6h, agitation; Cogentin 1mg po Q6h, Prophylactic EPS
The pt. is a 20 year-old [facility] male  in the  x 5 years and living with his family in [place]. 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      The pt was largely unable to participate in his evaluation due to possible evident pt internal preoccupation and difficulty with attention despite use of the  services. The pt was seen by Psychiatry and admitted to  inpatient psychiatry on 7/21/2021 on a 9.39 emergency legal status for acute pt. safety  and for further pt evaluation and treatment of his psychotic disorder.  no reported pt h/o prior depression, ragini, psychosis or a h/o inpatient or outpatient treatment  No reported pt h/o substance use/ abuse  Pt is living with his uncle and cousins in [place]. The pt emigrated from [facility] 5 years ago.   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 appeared to benefit from the safety and structure of the inpatient hospital unit with multimodal treatments offered though not always accepted by the pt despite ongoing staff support and encouragement.   	On 8/6/2021, this writer had pt's permission to speak with pt's uncle Miguel Mcdermott with whom the pt lives in [place] ( Tel [phone]) to review pt clinical status and to update pt treatment and disposition planning and after care treatment. Pt with first inpatient admission to hospital on 7/21/2021 to [facility] due to bizarre behavior and apparent brief reactive psychosis . Pt begun on Zyprexa titrated to 10 mg po qhs with modest effect and pt discharged home on 8/11/2021.  Plan discussed and agreed to for gang task force who met on 8/9/2021 with the pt on the inpatient hospital unit  to take report and to discuss plan of action to enhance the pt's safety after pt DC home. Pt with first inpatient admission to hospital on 7/21/2021 to [facility] due to bizarre behavior and apparent brief reactive psychosis .   Pt begun on Zyprexa titrated to 10 mg po qhs with modest effect and pt discharged home on 8/11/2021.   Pt reportedly med noncompliant after DC home with resultant resurgence of affective psychosis  and readmission to hospital on 8/22/2021 for acute safety and for ongoing pt evaluation and treatment.   Meeting with gang task force for 8/9/2021 at 3;30 pm to gather information related to pt's reported receipt of threats to his safety.   Per ED evaluation by NP on 8/22/2021:   The pt.  had been  recently discharged from  x 1 week ago af 1st psychiatric admission for psychotic behavior. Patient initially stated that Aunt called police who came and took him from his room for no reason. . Later stated uncle called police but he didn't know the reason. Then stated uncle came into his room with a gun and wanted to kill him. Patient had earlier denied access to guns or other lethal weapons. Patient repeatedly stated he "feels good" and his mood is "good" and adamantly denies SI/P/Intent. Affect is sad and constricted and incongruent to mood. Denies A/V hallucinations or paranoia. Admits to taking discharge medication "sometimes". Denies h/o drugs/alcohol/cigarette use. Denies h/o physical/sexual abuse. Denies family h/o mental illness or suicide. Speech of average rate and volume but thoughts disorganized.  Insight and judgment poor currently.   Collateral from uncle Miguel Mcdermott obtained with assistance of [place]  # [id number] on 8/22/2021:  The pt's uncle reported  that pt's behavior was 'okay" since discharge but when he went to check on him on 8/22/2021 , the pt   was in bed and had begun  screaming for his uncle to " just kill me uncle, kill me now". Uncle states he was screaming something about gangs and doesn't know if he is in a gang or whether or not he may have used any substance. Urine tox  of 8/22/2021 was again negative.     The pt's uncle then called 911 and the pt was BIB police to [facility] ED on 8/22/2021 where he was then readmitted to  inpatient psychiatry on a 9.39.emergency legal status for acute safety concerns and for ongoing pt evaluation and treatment of his presumptive affective psychosis  Plan  Restart Zyprexa 10mg po Hs  Haldol 5mg po prn Q6h, agitation; Cogentin 1mg po Q6h, Prophylactic EPS
The pt. is a 20 year-old [facility] male  in the  x 5 years and living with his family in [place]. 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      The pt was largely unable to participate in his evaluation due to possible evident pt internal preoccupation and difficulty with attention despite use of the  services. The pt was seen by Psychiatry and admitted to  inpatient psychiatry on 7/21/2021 on a 9.39 emergency legal status for acute pt. safety  and for further pt evaluation and treatment of his psychotic disorder.  no reported pt h/o prior depression, ragini, psychosis or a h/o inpatient or outpatient treatment  No reported pt h/o substance use/ abuse  Pt is living with his uncle and cousins in [place]. The pt emigrated from [facility] 5 years ago.   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 appeared to benefit from the safety and structure of the inpatient hospital unit with multimodal treatments offered though not always accepted by the pt despite ongoing staff support and encouragement.   	On 8/6/2021, this writer had pt's permission to speak with pt's uncle Miguel Mcdermott with whom the pt lives in [place] ( Tel [phone]) to review pt clinical status and to update pt treatment and disposition planning and after care treatment. Pt with first inpatient admission to hospital on 7/21/2021 to [facility] due to bizarre behavior and apparent brief reactive psychosis . Pt begun on Zyprexa titrated to 10 mg po qhs with modest effect and pt discharged home on 8/11/2021.  Plan discussed and agreed to for gang task force who met on 8/9/2021 with the pt on the inpatient hospital unit  to take report and to discuss plan of action to enhance the pt's safety after pt DC home. Pt with first inpatient admission to hospital on 7/21/2021 to [facility] due to bizarre behavior and apparent brief reactive psychosis .   Pt begun on Zyprexa titrated to 10 mg po qhs with modest effect and pt discharged home on 8/11/2021.   Pt reportedly med noncompliant after DC home with resultant resurgence of affective psychosis  and readmission to hospital on 8/22/2021 for acute safety and for ongoing pt evaluation and treatment.   Meeting with gang task force for 8/9/2021 at 3;30 pm to gather information related to pt's reported receipt of threats to his safety.   Per ED evaluation by NP on 8/22/2021:   The pt.  had been  recently discharged from  x 1 week ago af 1st psychiatric admission for psychotic behavior. Patient initially stated that Aunt called police who came and took him from his room for no reason. . Later stated uncle called police but he didn't know the reason. Then stated uncle came into his room with a gun and wanted to kill him. Patient had earlier denied access to guns or other lethal weapons. Patient repeatedly stated he "feels good" and his mood is "good" and adamantly denies SI/P/Intent. Affect is sad and constricted and incongruent to mood. Denies A/V hallucinations or paranoia. Admits to taking discharge medication "sometimes". Denies h/o drugs/alcohol/cigarette use. Denies h/o physical/sexual abuse. Denies family h/o mental illness or suicide. Speech of average rate and volume but thoughts disorganized.  Insight and judgment poor currently.   Collateral from uncle Miguel Mcdermott obtained with assistance of [place]  # [id number] on 8/22/2021:  The pt's uncle reported  that pt's behavior was 'okay" since discharge but when he went to check on him on 8/22/2021 , the pt   was in bed and had begun  screaming for his uncle to " just kill me uncle, kill me now". Uncle states he was screaming something about gangs and doesn't know if he is in a gang or whether or not he may have used any substance. Urine tox  of 8/22/2021 was again negative.     The pt's uncle then called 911 and the pt was BIB police to [facility] ED on 8/22/2021 where he was then readmitted to  inpatient psychiatry on a 9.39.emergency legal status for acute safety concerns and for ongoing pt evaluation and treatment of his presumptive affective psychosis  Plan  Restart Zyprexa 10mg po Hs  Haldol 5mg po prn Q6h, agitation; Cogentin 1mg po Q6h, Prophylactic EPS
The pt. is a 20 year-old [facility] male  in the  x 5 years and living with his family in [place]. 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      The pt was largely unable to participate in his evaluation due to possible evident pt internal preoccupation and difficulty with attention despite use of the  services. The pt was seen by Psychiatry and admitted to  inpatient psychiatry on 7/21/2021 on a 9.39 emergency legal status for acute pt. safety  and for further pt evaluation and treatment of his psychotic disorder.  no reported pt h/o prior depression, ragini, psychosis or a h/o inpatient or outpatient treatment  No reported pt h/o substance use/ abuse  Pt is living with his uncle and cousins in [place]. The pt emigrated from [facility] 5 years ago.   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 appeared to benefit from the safety and structure of the inpatient hospital unit with multimodal treatments offered though not always accepted by the pt despite ongoing staff support and encouragement.   	On 8/6/2021, this writer had pt's permission to speak with pt's uncle Miguel Mcdermott with whom the pt lives in [place] ( Tel [phone]) to review pt clinical status and to update pt treatment and disposition planning and after care treatment. Pt with first inpatient admission to hospital on 7/21/2021 to [facility] due to bizarre behavior and apparent brief reactive psychosis . Pt begun on Zyprexa titrated to 10 mg po qhs with modest effect and pt discharged home on 8/11/2021.  Plan discussed and agreed to for gang task force who met on 8/9/2021 with the pt on the inpatient hospital unit  to take report and to discuss plan of action to enhance the pt's safety after pt DC home. Pt with first inpatient admission to hospital on 7/21/2021 to [facility] due to bizarre behavior and apparent brief reactive psychosis .   Pt begun on Zyprexa titrated to 10 mg po qhs with modest effect and pt discharged home on 8/11/2021.   Pt reportedly med noncompliant after DC home with resultant resurgence of affective psychosis  and readmission to hospital on 8/22/2021 for acute safety and for ongoing pt evaluation and treatment.   Meeting with gang task force for 8/9/2021 at 3;30 pm to gather information related to pt's reported receipt of threats to his safety.   Per ED evaluation by NP on 8/22/2021:   The pt.  had been  recently discharged from  x 1 week ago af 1st psychiatric admission for psychotic behavior. Patient initially stated that Aunt called police who came and took him from his room for no reason. . Later stated uncle called police but he didn't know the reason. Then stated uncle came into his room with a gun and wanted to kill him. Patient had earlier denied access to guns or other lethal weapons. Patient repeatedly stated he "feels good" and his mood is "good" and adamantly denies SI/P/Intent. Affect is sad and constricted and incongruent to mood. Denies A/V hallucinations or paranoia. Admits to taking discharge medication "sometimes". Denies h/o drugs/alcohol/cigarette use. Denies h/o physical/sexual abuse. Denies family h/o mental illness or suicide. Speech of average rate and volume but thoughts disorganized.  Insight and judgment poor currently.   Collateral from uncle Miguel Mcdermott obtained with assistance of [place]  # [id number] on 8/22/2021:  The pt's uncle reported  that pt's behavior was 'okay" since discharge but when he went to check on him on 8/22/2021 , the pt   was in bed and had begun  screaming for his uncle to " just kill me uncle, kill me now". Uncle states he was screaming something about gangs and doesn't know if he is in a gang or whether or not he may have used any substance. Urine tox  of 8/22/2021 was again negative.     The pt's uncle then called 911 and the pt was BIB police to [facility] ED on 8/22/2021 where he was then readmitted to  inpatient psychiatry on a 9.39.emergency legal status for acute safety concerns and for ongoing pt evaluation and treatment of his presumptive affective psychosis  Plan  Restart Zyprexa 10mg po Hs  Haldol 5mg po prn Q6h, agitation; Cogentin 1mg po Q6h, Prophylactic EPS
The pt. is a 20 year-old [facility] male  in the  x 5 years and living with his family in [place]. 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      The pt was largely unable to participate in his evaluation due to possible evident pt internal preoccupation and difficulty with attention despite use of the  services. The pt was seen by Psychiatry and admitted to  inpatient psychiatry on 7/21/2021 on a 9.39 emergency legal status for acute pt. safety  and for further pt evaluation and treatment of his psychotic disorder.  no reported pt h/o prior depression, ragini, psychosis or a h/o inpatient or outpatient treatment  No reported pt h/o substance use/ abuse  Pt is living with his uncle and cousins in [place]. The pt emigrated from [facility] 5 years ago.   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 appeared to benefit from the safety and structure of the inpatient hospital unit with multimodal treatments offered though not always accepted by the pt despite ongoing staff support and encouragement.   	On 8/6/2021, this writer had pt's permission to speak with pt's uncle Miguel Mcdermott with whom the pt lives in [place] ( Tel [phone]) to review pt clinical status and to update pt treatment and disposition planning and after care treatment. Pt with first inpatient admission to hospital on 7/21/2021 to [facility] due to bizarre behavior and apparent brief reactive psychosis . Pt begun on Zyprexa titrated to 10 mg po qhs with modest effect and pt discharged home on 8/11/2021.  Plan discussed and agreed to for gang task force who met on 8/9/2021 with the pt on the inpatient hospital unit  to take report and to discuss plan of action to enhance the pt's safety after pt DC home. Pt with first inpatient admission to hospital on 7/21/2021 to [facility] due to bizarre behavior and apparent brief reactive psychosis .   Pt begun on Zyprexa titrated to 10 mg po qhs with modest effect and pt discharged home on 8/11/2021.   Pt reportedly med noncompliant after DC home with resultant resurgence of affective psychosis  and readmission to hospital on 8/22/2021 for acute safety and for ongoing pt evaluation and treatment.   Meeting with gang task force for 8/9/2021 at 3;30 pm to gather information related to pt's reported receipt of threats to his safety.   Per ED evaluation by NP on 8/22/2021:   The pt.  had been  recently discharged from  x 1 week ago af 1st psychiatric admission for psychotic behavior. Patient initially stated that Aunt called police who came and took him from his room for no reason. . Later stated uncle called police but he didn't know the reason. Then stated uncle came into his room with a gun and wanted to kill him. Patient had earlier denied access to guns or other lethal weapons. Patient repeatedly stated he "feels good" and his mood is "good" and adamantly denies SI/P/Intent. Affect is sad and constricted and incongruent to mood. Denies A/V hallucinations or paranoia. Admits to taking discharge medication "sometimes". Denies h/o drugs/alcohol/cigarette use. Denies h/o physical/sexual abuse. Denies family h/o mental illness or suicide. Speech of average rate and volume but thoughts disorganized.  Insight and judgment poor currently.   Collateral from uncle Miguel Mcdermott obtained with assistance of [employer]  # [id number] on 8/22/2021:  The pt's uncle reported  that pt's behavior was 'okay" since discharge but when he went to check on him on 8/22/2021 , the pt   was in bed and had begun  screaming for his uncle to " just kill me uncle, kill me now". Uncle states he was screaming something about gangs and doesn't know if he is in a gang or whether or not he may have used any substance. Urine tox  of 8/22/2021 was again negative.     The pt's uncle then called 911 and the pt was BIB police to [facility] ED on 8/22/2021 where he was then readmitted to  inpatient psychiatry on a 9.39.emergency legal status for acute safety concerns and for ongoing pt evaluation and treatment of his presumptive affective psychosis  Plan  Restart Zyprexa 10mg po Hs  Haldol 5mg po prn Q6h, agitation; Cogentin 1mg po Q6h, Prophylactic EPS

## 2021-09-08 NOTE — PROGRESS NOTE BEHAVIORAL HEALTH - AFFECT CONGRUENCE
Not congruent
Other
Congruent
Other
Not congruent
Congruent
Not congruent
Other
Not congruent

## 2021-09-08 NOTE — PROGRESS NOTE BEHAVIORAL HEALTH - NSBHFUPINTERVALHXFT_PSY_A_CORE
Pt seen in the day room . Pt more sociable with another Latvian speaking peer who is being discharged today. The pt has been more  animated and visible recently though he continues to decline therapy group participation  on the unit. Pt has been dressed with fair attention to grooming and to his ADLs but he has been more overtly guarded and paranoid with decreased overall speech and a reluctance to elaborate or to participate more fully in his treatment. The pt appeared dysphoric but continues to deny SI /HI /AH /VH .     Discussion with the pt as to pt's uncle's plan to adopt the pt with plan for family court hearing on 10/1/2021.  The pt appeared briefly  near tearful though he reported having been told of this by his uncle. The pt continues somewhat guarded and wary and appears doubtful as to other people's motives, including pt loved ones.    . The pt continues to tolerate  tolerating the newly restarted and now ODT  disintegrating tab  Zyprexa Zydis to better ensure pt treatment compliance. The pt continues to deny any substance use despite the temporal sequence of events surrounding this readmission to hospital with pt reported AMS immediately upon returning to his uncle's home after the pt had appeared well  prior to leaving that evening. s to tolerate his restarted Zyprexa without reported side effects and with ongoing evidence of clinical stabilization.  The pt's judgment remains impaired with limited insight into the nature and severity of his apparent primary psychosis.   The pt continues with good sleep and appetite and with good clinical effect with well tolerated Zyprexa 10 mg po q hs. . Plan to resume pt PO Zyprexa tablet form from Zyprexa Zydis prior to pt DC home scheduled for 9/9/2021.

## 2021-09-08 NOTE — PROGRESS NOTE BEHAVIORAL HEALTH - ORIENTATION OTHER
" I don't need to be here I'm fine!"
" I am ok."
" I am ok."
" I don't need to be here I'm fine!"
" I don't need to be here I'm fine!"
" I am ok."
" I don't need to be here I'm fine!"

## 2021-09-08 NOTE — PROGRESS NOTE BEHAVIORAL HEALTH - NSBHCHARTREVIEWIMAGING_PSY_A_CORE FT
MEDICATIONS  (STANDING):  multivitamin 1 Tablet(s) Oral daily  OLANZapine Disintegrating Tablet 10 milliGRAM(s) Oral at bedtime    MEDICATIONS  (PRN):  acetaminophen   Tablet .. 650 milliGRAM(s) Oral every 6 hours PRN Temp greater or equal to 38C (100.4F), Mild Pain (1 - 3), Moderate Pain (4 - 6)  aluminum hydroxide/magnesium hydroxide/simethicone Suspension 30 milliLiter(s) Oral every 6 hours PRN Dyspepsia  diphenhydrAMINE 50 milliGRAM(s) Oral every 6 hours PRN Extrapyramidal prophylaxis/ psychosis related anxiety/agitation  diphenhydrAMINE   Injectable 50 milliGRAM(s) IntraMuscular every 6 hours PRN Extrapyramidal prophylaxis/ psychosis related agitation  haloperidol     Tablet 5 milliGRAM(s) Oral every 6 hours PRN moderate psychosis related agitation  haloperidol    Injectable 5 milliGRAM(s) IntraMuscular every 6 hours PRN severe psychosis related agitation  magnesium hydroxide Suspension 30 milliLiter(s) Oral daily PRN Constipation  melatonin 3 milliGRAM(s) Oral at bedtime PRN Insomnia

## 2021-09-09 VITALS — TEMPERATURE: 96 F | OXYGEN SATURATION: 99 % | RESPIRATION RATE: 12 BRPM

## 2021-09-09 PROCEDURE — 99231 SBSQ HOSP IP/OBS SF/LOW 25: CPT

## 2021-09-09 RX ORDER — OLANZAPINE 15 MG/1
1 TABLET, FILM COATED ORAL
Qty: 14 | Refills: 0
Start: 2021-09-09 | End: 2021-09-22

## 2021-09-09 RX ORDER — LANOLIN ALCOHOL/MO/W.PET/CERES
1 CREAM (GRAM) TOPICAL
Qty: 14 | Refills: 0
Start: 2021-09-09 | End: 2021-09-22

## 2021-09-09 RX ADMIN — Medication 1 TABLET(S): at 09:34

## 2021-09-09 NOTE — DISCHARGE NOTE BEHAVIORAL HEALTH - PROVIDER TOKENS
FREE:[LAST:[Shreya],FIRST:[TBA],PHONE:[(   )    -],FAX:[(   )    -],ADDRESS:[Pt has intake at [facility] in [place] on 9/13/2021 at 10 am for therapy referral  Pt has med management appointment with FRANKLYN Alexandre on 9/23/2021 at 2pm]]

## 2021-09-09 NOTE — DISCHARGE NOTE BEHAVIORAL HEALTH - NSBHDCPURPOSE1FT_PSY_A_CORE
Outpatient mental health treatment. You have an intake appointment scheduled with Shreya on 9/13/21 at 10 AM in person at [facility].

## 2021-09-09 NOTE — DISCHARGE NOTE BEHAVIORAL HEALTH - NSBHDCSWCOMMENTSFT_PSY_A_CORE
Pt was educated on the importance of taking medications as prescribed and to not stop or miss any doses unless directed to do so by the prescribing provider. Pt was counseled on the importance of attending outpatient appointments. Pt was made aware of crisis resources to use after hours as needed. Pt was educated to return to the nearest emergency room in the event of worsening symptoms, including SI/HI, or to call 911 if pt feels unsafe.  Pt was educated regarding safety precautions for COVID-19 including hand hygiene, wearing a mask, and maintaining social distancing. Pt provided with information on symptoms of COVID 19 prior to discharge, and educated to seek testing in the event that any symptoms occur.

## 2021-09-09 NOTE — DISCHARGE NOTE BEHAVIORAL HEALTH - NSBHDCLABSFT_PSY_A_CORE
Fasting lipids, HgA1C q 6 months with SGA Zyprexa  Monthly monitoring of vital signs including weight and waist circumference

## 2021-09-09 NOTE — DISCHARGE NOTE BEHAVIORAL HEALTH - REASON FOR ADMISSION
Per pt's ED BH Assessment completed on 8/21/21 by Little Stauffer: "I don't know why I here...I'm fine" Per pt's ED BH Assessment completed on 8/21/21 by Little Vick : "I don't know why I here.. I'm fine"

## 2021-09-09 NOTE — DISCHARGE NOTE BEHAVIORAL HEALTH - NSBHDCTESTSFT_PSY_A_CORE
Lab work completed at [facility] on 7/22/2021 :  TSH =2.61  Fasting lipids  Cholesterol = 142  TG=75  HgA1C= 5.0  COVID-19 PCR  NON DETECTED  8/21/2021  COVID- AB +>250 Lab work completed at [facility] on 7/22/2021 :  TSH =2.61  Fasting lipids  Cholesterol = 142  TG=75  HgA1C= 5.0  COVID-19 PCR  NON DETECTED  8/21/2021  COVID- AB +>250  Head CT 7/21/2021 WNL   No studies are pending

## 2021-09-09 NOTE — DISCHARGE NOTE BEHAVIORAL HEALTH - CARE PROVIDER_API CALL
TRISTON Cash  Pt has intake at [facility] in [place] on 9/13/2021 at 10 am for therapy referral  Pt has med management appointment with FRANKLYN Alexandre on 9/23/2021 at 2pm  Phone: (   )    -  Fax: (   )    -  Follow Up Time:

## 2021-09-09 NOTE — DISCHARGE NOTE BEHAVIORAL HEALTH - NSBHDCCRISISPLAN2FT_PSY_A_CORE
Call [facility] 5N: [phone]  : Clementine Gross LMSW  Psychiatrists- Dr. Dina Gray

## 2021-09-09 NOTE — DISCHARGE NOTE BEHAVIORAL HEALTH - NSBHDCMEDSFT_PSY_A_CORE
Pt educated not to stop taking medications unless told to do so by his doctor. Discharge medications   multivitamin 1 Tablet(s) Oral daily  OLANZapine 10 milliGRAM(s) Oral at bedtime  melatonin 3 milliGRAM(s) Oral at bedtime PRN Insomnia      Pt educated not to stop taking medications unless told to do so by his doctor.

## 2021-09-09 NOTE — DISCHARGE NOTE BEHAVIORAL HEALTH - CONDITIONS AT DISCHARGE
Pt pleasant and cooperative. Pt denies SI, denies feeling depressed. Pt is happy and hopeful for his future. Pt is goal directed and future oriented. Pt left with all belongings in hand, properly dressed.

## 2021-09-09 NOTE — DISCHARGE NOTE BEHAVIORAL HEALTH - MEDICATION SUMMARY - MEDICATIONS TO TAKE
I will START or STAY ON the medications listed below when I get home from the hospital:    OLANZapine 10 mg oral tablet  -- 1 tab(s) by mouth once a day (at bedtime)  -- Indication: For Affective psychosis    melatonin 3 mg oral tablet  -- 1 tab(s) by mouth once a day (at bedtime), As needed, Insomnia  -- Indication: For prn insomnia    Multiple Vitamins with Minerals oral tablet  -- 1 tab(s) by mouth once a day  -- Indication: For Discontinued

## 2021-09-09 NOTE — DISCHARGE NOTE BEHAVIORAL HEALTH - NSBHDCPURPOSE2FT_PSY_A_CORE
Outpatient mental health treatment, medication management. You have an appointment with FRANKLYN HUGHES on 9/23/21 at 2PM in person at [facility].

## 2021-09-09 NOTE — DISCHARGE NOTE BEHAVIORAL HEALTH - NSBHDCCRISISPLAN1FT_PSY_A_CORE
Tell a trusted friend/family member, tell housing staff, tell your outpatient provider, call a crisis line ( Crisis Center ph. [phone], [facility] DASH [phone], [facility] (peer support) ph. [phone]), return to the nearest emergency room. You may call 9-1-1 for assistance.

## 2021-09-09 NOTE — DISCHARGE NOTE BEHAVIORAL HEALTH - NS MD DC FALL RISK RISK
For information on Fall & injury Prevention, visit [email or web address]

## 2021-09-09 NOTE — DISCHARGE NOTE BEHAVIORAL HEALTH - HPI (INCLUDE ILLNESS QUALITY, SEVERITY, DURATION, TIMING, CONTEXT, MODIFYING FACTORS, ASSOCIATED SIGNS AND SYMPTOMS)
Per pt's ED BH Assessment completed on 8/21/21 by Little Stauffer: "20 year-old h/m BIB police from home due to SI. Patient was recently discharged from  x 1 week ago af 1st psychiatric admission for psychotic behavior. Patient initially stated that Aunt called police who came and took him from his room for no reason. When bilingual nursing Hiral Alexandra was brought in to translate during the interview, pt stated he had no family and lived alone in a house. Stated he had 1 friend then said he was not close to anyone. Later stated uncle called police but he didn't know the reason. Then stated uncle came into his room with a gun and wanted to kill him. Patient had earlier denied access to guns or other lethal weapons. Patient repeatedly stated he "feels good" and his mood is "good" and adamantly denies SI/P/Intent. Affect is sad and constricted. Denies A/V hallucinations or paranoia. Admits to taking discharge medication "sometimes". Denies h/o drugs/alcohol/cigarette use. Denies h/o physical/sexual abuse. Denies family h/o mental illness or suicide. Speech of average rate and volume but thoughts disorganized.  Insight and judgment poor currently.   Collateral from uncle Miguel Mcdermott obtained with assistance of [employer]  # [id number] and with patient's consent who states pt had good premorbid functioning, good physical and mental health over the past 5 years since the he came to the  alone from [facility] and since he has been living with him and paternal aunt and a cousin in [place]. 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 The pt's uncle report that pt's behavior was 'okay" since discharge but when he went to check on him this morning he was in bed with 2 different foot of shoes on looking strange and started screaming for him to " just kill me uncle, kill me now". Uncle states he was screaming something about gangs and doesn't know if he is in a gang or whether or not he may have used any substance. Urine tox not yet back. So he called police due to safety concerns and bizarre behavior." Principal diagnosis at discharge : Affective psychosis              The pt is a  20 year-old  male  BIB police from home on 8/21/2021 to the ED at [facility]  due to bizarre behavior and SI . Patient was recently discharged from [facility] 1 week prior to this admission after his 1st psychiatric admission for psychotic behavior. Patient initially stated that Aunt called police who came and took him from his room for no reason. When bilingual nursing Hiral Martiny was brought in to translate during the interview, pt stated he had no family and lived alone in a house. Stated he had 1 friend then said he was not close to anyone. Later stated uncle called police but he didn't know the reason. Then stated uncle came into his room with a gun and wanted to kill him. Patient had earlier denied access to guns or other lethal weapons. Patient repeatedly stated he "feels good" and his mood is "good" and adamantly denies SI/P/Intent. Affect is sad and constricted. Denies A/V hallucinations or paranoia. Admits to taking discharge medication "sometimes". Denies h/o drugs/alcohol/cigarette use. Denies h/o physical/sexual abuse. Denies family h/o mental illness or suicide. Speech of average rate and volume but thoughts disorganized.  Insight and judgment poor currently.   Collateral  obtained on 8/21/2021 in the ED from uncle Miguel Mcdermott obtained with assistance of [place]  # [id number] and with patient's consent who states pt had good premorbid functioning, good physical and mental health over the past 5 years since the he came to the [place] alone from [facility] and since he has been living with him and paternal aunt and a cousin in [place]. 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 The pt's uncle report that pt's behavior was 'okay" since discharge but when he went to check on him this morning he was in bed with 2 different foot of shoes on looking strange and started screaming for him to " just kill me uncle, kill me now". Uncle states he was screaming something about gangs and doesn't know if he is in a gang or whether or not he may have used any substance. Urine tox not yet back. So he called police due to safety concerns and bizarre behavior." Principal diagnosis at discharge : Affective psychosis              The pt is a  20 year-old  male  BIB police from home on 8/21/2021 to the ED at [facility]  due to bizarre behavior and SI . Patient was recently discharged from [facility] 1 week prior to this admission after his 1st psychiatric admission for psychotic behavior. Patient initially stated that Aunt called police who came and took him from his room for no reason. When bilingual nursing Hiral Alexandra was brought in to translate during the interview, pt stated he had no family and lived alone in a house. Stated he had 1 friend then said he was not close to anyone. Later stated uncle called police but he didn't know the reason. Then stated uncle came into his room with a gun and wanted to kill him. Patient had earlier denied access to guns or other lethal weapons. Patient repeatedly stated he "feels good" and his mood is "good" and adamantly denies SI/P/Intent. Affect is sad and constricted. Denies A/V hallucinations or paranoia. Admits to taking discharge medication "sometimes". Denies h/o drugs/alcohol/cigarette use. Denies h/o physical/sexual abuse. Denies family h/o mental illness or suicide.                             Course of Hospitalization                   The pt. appears to have benefitted from the safety and structure of the inpatient hospital unit with multimodal treatment including a restart of his previously effective Zyprexa 10 mg po qhs .  	On 8/31/2021 this writer had the pt's permission to speak with the pt's uncle Miguel Mcdermott ( tel [phone]) via [employer] ID # [id number] to gather further pt collateral data. The pt's uncle reported that he was now unsure that the pt had even left the home on the day  prior to the pt's readmission to hospital with worsening evidence of pt psychosis in the context of the pt's reported noncompliance with his outpatient antipsychotic medication Zyprexa . The pt has continued to maintain that he has never used any substances of potential abuse or misuse and the pt's uncle reported that the pt has had no known h/o any substance use / misuse over the past 5 years since the pt has come to live with his uncle. Urine tox screens have also continued to be negative for any substances of possible abuse though synthetic drugs are not always detectable in current tox screen procedures.  · Interval History: Pt seen in the day room . Pt more sociable with another Irish speaking peer . The pt has been more  animated and visible on the unit .The pt now appears more euthymic and continues to deny SI /HI /AH /VH .  	   Discussion with the pt as to pt's uncle's plan to adopt the pt with plan for family court hearing on 10/1/2021.  The pt appeared briefly  near tearful though he reported having been told of this by his uncle. The pt continues somewhat guarded and wary and appears doubtful as to other people's motives, including pt loved ones.   	 . The pt continues to tolerate  tolerating the newly restarted and now ODT  disintegrating tab  Zyprexa Zydis to better ensure pt treatment compliance. The pt continues to deny any substance use despite the temporal sequence of events surrounding this readmission to hospital with pt reported AMS immediately upon returning to his uncle's home after the pt had appeared well  prior to leaving that evening. s to tolerate his restarted Zyprexa without reported side effects and with ongoing evidence of clinical stabilization.  The pt's judgment remains impaired with limited insight into the nature and severity of his apparent primary psychosis.   The pt continues with good sleep and appetite and with good clinical effect with well tolerated Zyprexa 10 mg po q hs. . Plan to resume pt PO Zyprexa tablet form from Zyprexa Zydis prior to pt DC home scheduled for 9/9/2021.

## 2021-09-10 PROCEDURE — 99239 HOSP IP/OBS DSCHRG MGMT >30: CPT

## 2021-09-13 DIAGNOSIS — Z20.822 CONTACT WITH AND (SUSPECTED) EXPOSURE TO COVID-19: ICD-10-CM

## 2021-09-13 DIAGNOSIS — F39 UNSPECIFIED MOOD [AFFECTIVE] DISORDER: ICD-10-CM

## 2021-09-13 DIAGNOSIS — R45.851 SUICIDAL IDEATIONS: ICD-10-CM

## 2021-09-13 DIAGNOSIS — F43.10 POST-TRAUMATIC STRESS DISORDER, UNSPECIFIED: ICD-10-CM

## 2021-09-13 DIAGNOSIS — R45.850 HOMICIDAL IDEATIONS: ICD-10-CM

## 2021-09-13 DIAGNOSIS — Z91.14 PATIENT'S OTHER NONCOMPLIANCE WITH MEDICATION REGIMEN: ICD-10-CM

## 2022-02-28 ENCOUNTER — EMERGENCY (EMERGENCY)
Facility: HOSPITAL | Age: 22
LOS: 0 days | Discharge: OTHER INSTITUTION | End: 2022-03-01
Attending: EMERGENCY MEDICINE
Payer: SELF-PAY

## 2022-02-28 VITALS
SYSTOLIC BLOOD PRESSURE: 137 MMHG | HEART RATE: 112 BPM | RESPIRATION RATE: 17 BRPM | TEMPERATURE: 98 F | HEIGHT: 64 IN | OXYGEN SATURATION: 98 % | DIASTOLIC BLOOD PRESSURE: 99 MMHG

## 2022-02-28 DIAGNOSIS — I45.10 UNSPECIFIED RIGHT BUNDLE-BRANCH BLOCK: ICD-10-CM

## 2022-02-28 DIAGNOSIS — F39 UNSPECIFIED MOOD [AFFECTIVE] DISORDER: ICD-10-CM

## 2022-02-28 DIAGNOSIS — Z20.822 CONTACT WITH AND (SUSPECTED) EXPOSURE TO COVID-19: ICD-10-CM

## 2022-02-28 LAB
ALBUMIN SERPL ELPH-MCNC: 4.4 G/DL — SIGNIFICANT CHANGE UP (ref 3.3–5)
ALP SERPL-CCNC: 70 U/L — SIGNIFICANT CHANGE UP (ref 40–120)
ALT FLD-CCNC: 26 U/L — SIGNIFICANT CHANGE UP (ref 12–78)
ANION GAP SERPL CALC-SCNC: 5 MMOL/L — SIGNIFICANT CHANGE UP (ref 5–17)
APAP SERPL-MCNC: < 2 UG/ML (ref 10–30)
AST SERPL-CCNC: 18 U/L — SIGNIFICANT CHANGE UP (ref 15–37)
BASOPHILS # BLD AUTO: 0.02 K/UL — SIGNIFICANT CHANGE UP (ref 0–0.2)
BASOPHILS NFR BLD AUTO: 0.3 % — SIGNIFICANT CHANGE UP (ref 0–2)
BILIRUB SERPL-MCNC: 0.9 MG/DL — SIGNIFICANT CHANGE UP (ref 0.2–1.2)
BUN SERPL-MCNC: 11 MG/DL — SIGNIFICANT CHANGE UP (ref 7–23)
CALCIUM SERPL-MCNC: 9.3 MG/DL — SIGNIFICANT CHANGE UP (ref 8.5–10.1)
CHLORIDE SERPL-SCNC: 104 MMOL/L — SIGNIFICANT CHANGE UP (ref 96–108)
CO2 SERPL-SCNC: 29 MMOL/L — SIGNIFICANT CHANGE UP (ref 22–31)
CREAT SERPL-MCNC: 1.18 MG/DL — SIGNIFICANT CHANGE UP (ref 0.5–1.3)
EGFR: 90 ML/MIN/1.73M2 — SIGNIFICANT CHANGE UP
EOSINOPHIL # BLD AUTO: 0.06 K/UL — SIGNIFICANT CHANGE UP (ref 0–0.5)
EOSINOPHIL NFR BLD AUTO: 0.8 % — SIGNIFICANT CHANGE UP (ref 0–6)
ETHANOL SERPL-MCNC: <10 MG/DL — SIGNIFICANT CHANGE UP (ref 0–10)
GLUCOSE SERPL-MCNC: 99 MG/DL — SIGNIFICANT CHANGE UP (ref 70–99)
HCT VFR BLD CALC: 46.3 % — SIGNIFICANT CHANGE UP (ref 39–50)
HGB BLD-MCNC: 15.8 G/DL — SIGNIFICANT CHANGE UP (ref 13–17)
IMM GRANULOCYTES NFR BLD AUTO: 0.4 % — SIGNIFICANT CHANGE UP (ref 0–1.5)
LYMPHOCYTES # BLD AUTO: 2.12 K/UL — SIGNIFICANT CHANGE UP (ref 1–3.3)
LYMPHOCYTES # BLD AUTO: 29.9 % — SIGNIFICANT CHANGE UP (ref 13–44)
MCHC RBC-ENTMCNC: 29 PG — SIGNIFICANT CHANGE UP (ref 27–34)
MCHC RBC-ENTMCNC: 34.1 GM/DL — SIGNIFICANT CHANGE UP (ref 32–36)
MCV RBC AUTO: 85 FL — SIGNIFICANT CHANGE UP (ref 80–100)
MONOCYTES # BLD AUTO: 0.67 K/UL — SIGNIFICANT CHANGE UP (ref 0–0.9)
MONOCYTES NFR BLD AUTO: 9.4 % — SIGNIFICANT CHANGE UP (ref 2–14)
NEUTROPHILS # BLD AUTO: 4.19 K/UL — SIGNIFICANT CHANGE UP (ref 1.8–7.4)
NEUTROPHILS NFR BLD AUTO: 59.2 % — SIGNIFICANT CHANGE UP (ref 43–77)
PLATELET # BLD AUTO: 233 K/UL — SIGNIFICANT CHANGE UP (ref 150–400)
POTASSIUM SERPL-MCNC: 3.5 MMOL/L — SIGNIFICANT CHANGE UP (ref 3.5–5.3)
POTASSIUM SERPL-SCNC: 3.5 MMOL/L — SIGNIFICANT CHANGE UP (ref 3.5–5.3)
PROT SERPL-MCNC: 8 GM/DL — SIGNIFICANT CHANGE UP (ref 6–8.3)
RBC # BLD: 5.45 M/UL — SIGNIFICANT CHANGE UP (ref 4.2–5.8)
RBC # FLD: 11.9 % — SIGNIFICANT CHANGE UP (ref 10.3–14.5)
SALICYLATES SERPL-MCNC: <1.7 MG/DL (ref 2.8–20)
SARS-COV-2 RNA SPEC QL NAA+PROBE: SIGNIFICANT CHANGE UP
SODIUM SERPL-SCNC: 138 MMOL/L — SIGNIFICANT CHANGE UP (ref 135–145)
TSH SERPL-MCNC: 1.54 UU/ML — SIGNIFICANT CHANGE UP (ref 0.34–4.82)
WBC # BLD: 7.09 K/UL — SIGNIFICANT CHANGE UP (ref 3.8–10.5)
WBC # FLD AUTO: 7.09 K/UL — SIGNIFICANT CHANGE UP (ref 3.8–10.5)

## 2022-02-28 PROCEDURE — U0005: CPT

## 2022-02-28 PROCEDURE — 80307 DRUG TEST PRSMV CHEM ANLYZR: CPT

## 2022-02-28 PROCEDURE — 93005 ELECTROCARDIOGRAM TRACING: CPT

## 2022-02-28 PROCEDURE — 93010 ELECTROCARDIOGRAM REPORT: CPT

## 2022-02-28 PROCEDURE — U0003: CPT

## 2022-02-28 PROCEDURE — 84443 ASSAY THYROID STIM HORMONE: CPT

## 2022-02-28 PROCEDURE — 85025 COMPLETE CBC W/AUTO DIFF WBC: CPT

## 2022-02-28 PROCEDURE — 81001 URINALYSIS AUTO W/SCOPE: CPT

## 2022-02-28 PROCEDURE — 80053 COMPREHEN METABOLIC PANEL: CPT

## 2022-02-28 PROCEDURE — 36415 COLL VENOUS BLD VENIPUNCTURE: CPT

## 2022-02-28 PROCEDURE — 99285 EMERGENCY DEPT VISIT HI MDM: CPT

## 2022-02-28 RX ORDER — OLANZAPINE 15 MG/1
10 TABLET, FILM COATED ORAL ONCE
Refills: 0 | Status: DISCONTINUED | OUTPATIENT
Start: 2022-02-28 | End: 2022-03-01

## 2022-02-28 RX ORDER — OLANZAPINE 15 MG/1
5 TABLET, FILM COATED ORAL ONCE
Refills: 0 | Status: COMPLETED | OUTPATIENT
Start: 2022-02-28 | End: 2022-02-28

## 2022-02-28 RX ADMIN — OLANZAPINE 5 MILLIGRAM(S): 15 TABLET, FILM COATED ORAL at 22:28

## 2022-02-28 NOTE — ED PROVIDER NOTE - UNABLE TO OBTAIN
Severe Illness/Injury Pt poorly cooperative with efforts at obtaining Hx Pt poorly cooperative with efforts at obtaining ROS

## 2022-02-28 NOTE — ED BEHAVIORAL HEALTH ASSESSMENT NOTE - OTHER PAST PSYCHIATRIC HISTORY (INCLUDE DETAILS REGARDING ONSET, COURSE OF ILLNESS, INPATIENT/OUTPATIENT TREATMENT)
1st psychiatric admission to  7/2021  Seen in ED for AH treated and released 7/2021 As per chart review; Seen in ED for AH treated and released 7/2021. 1st psychiatric admission to  7/21-8/112021. Readmitted subsequently 8/21-9/9/21. Discharged on Zyprexa 10 mg HS.

## 2022-02-28 NOTE — ED ADULT NURSE REASSESSMENT NOTE - NS ED NURSE REASSESS COMMENT FT1
Assumed care of Pt from RN Maggie. Pt received resting comfortably in stretcher. Pt presents with flat affect, non-communicative. Pending urine. Will continue to monitor. Assumed care of Pt from RN Maggie. Pt received resting comfortably in stretcher. Pt presents with flat affect, non-communicative. Pt on constant observation, safety maintained. Pending urine. Will continue to monitor.

## 2022-02-28 NOTE — ED BEHAVIORAL HEALTH ASSESSMENT NOTE - DESCRIPTION
as per chart review; migrated from [facility] ~6years ago. Lives with uncle, aunt and cousins in private house. Patient was reportedly involved in a work training program prior to recent admission to  the end of July. No children and no reports of substance use. ED course and collateral are as per BTCM (ED Behavioral health) note     Patient did not give permission to obtain collateral. Rationale for overriding objection is noted as such:    Lack of capacity - Patient unable to engage meaningfully in process of consenting to collateral and additional information critical to caring for patient remains lacking without collateral input.    Assessing risk of danger to self/others - Evaluation being conducted specifically due to expressed or evidenced psyhiatric symptoms that elevate safety risks and collateral source required to accurately determine severity of safety risk and ensure safe disposition plan.  Specific collateral source noted above was selected based on their potential to provide meaningful information that would impact risk assessment of danger to self/others and no alternative equivalent source was available. None known ED course and collateral are as per BTCM (ED Behavioral health) note     Patient did not give permission to obtain collateral. Rationale for overriding objection is noted as such:    Lack of capacity - Patient unable to engage meaningfully in process of consenting to collateral and additional information critical to caring for patient remains lacking without collateral input.    Assessing risk of danger to self/others - Evaluation being conducted specifically due to expressed or evidenced psychiatric symptoms that elevate safety risks and collateral source required to accurately determine severity of safety risk and ensure safe disposition plan.  Specific collateral source noted above was selected based on their potential to provide meaningful information that would impact risk assessment of danger to self/others and no alternative equivalent source was available.

## 2022-02-28 NOTE — ED ADULT TRIAGE NOTE - CHIEF COMPLAINT QUOTE
Patient presents in ED by [facility] for psych eval. Patient has a known Psych history and as per family has not been taking his meds. Patient has been having poor hygiene.

## 2022-02-28 NOTE — ED BEHAVIORAL HEALTH ASSESSMENT NOTE - RISK ASSESSMENT
Unable to determine Suicide Risk Pertinent known risk and protective factors are noted in documentation above; otherwise unable to assess.

## 2022-02-28 NOTE — ED ADULT NURSE REASSESSMENT NOTE - NS ED NURSE REASSESS COMMENT FT1
Pt resting comfortably in stretcher. [place], ID #[id number]. Vital signs stable at this time. Will continue to monitor.

## 2022-02-28 NOTE — ED PROVIDER NOTE - CLINICAL SUMMARY MEDICAL DECISION MAKING FREE TEXT BOX
22 yo HM, Bulgarian-speaking, PMH of affective psychosis, past Psych admissions incl.  [facility] 9/2021, BIBA [facility] (not under arrest) after family called that pt hasn't been compliant with his meds & has been neglecting basic daily hygeine/bathing/showering, etc.  Poorly cooperative w/ Hx & exam but not hostile nor physically violent, + poor insight, appears very inwardly preoccupied.  Plan: 1:1 observation, EKG, Psych labs incl. COVID swab, Tele-Psychiatry consult.

## 2022-02-28 NOTE — ED PROVIDER NOTE - PROGRESS NOTE DETAILS
DENNY Flores MD:  Tele-Psychiatry will call back to receive consult. DENNY Flores MD:  Informed by Tele-psychiatry that pt will require involuntary Psych admission, but bed situation currently unclear.  Urine still pending as well.  They advise Zyprexa 5 mg po tonight & 10 mg IM prn psychosis, agitation. so am doctor, no issues overnight, pt waiting for bed for involuntary admission SAUD Verdin DO

## 2022-02-28 NOTE — ED PROVIDER NOTE - SKIN, MLM
Skin normal color for race, warm, dry and intact. No evidence of rash.  No tactile warmth.  No obvious external evidence of trauma.  No track marks.

## 2022-02-28 NOTE — ED BEHAVIORAL HEALTH ASSESSMENT NOTE - OTHER
Family 9.39 or 9.27 depending on bed availability at  or elsewhere Deferred pending COVID result, urine studies and bed availability not observed Unable to assess pt denies AVH but appears internally preoccupied [facility], [facility], [facility] Vis[facility], [facility], [facility], [facility], [facility], [facility], [facility], [facility], [facility],  [facility], [facility], [facility], [facility], [facility], [facility], [facility] , [facility]arcus[facility]ookup, [facility], [employer] apathetic selectively non-verbal

## 2022-02-28 NOTE — ED BEHAVIORAL HEALTH ASSESSMENT NOTE - DETAILS
BTCM in contact w/ family pending COVID result, urine studies and bed availability Pt denies current SI but unable to assess suicidality in the last month or past attempts due to selective non-verbalism

## 2022-02-28 NOTE — ED BEHAVIORAL HEALTH NOTE - BEHAVIORAL HEALTH NOTE
============  ED COURSE   ============  SOURCE:  ED documentation  ARRIVAL:  PD-EMS, uncle called due to concerns for pt. pt not under arrest    BELONGINGS:  no belongings of note   BEHAVIOR: Pt. refusing to speak, not cooperative but not agitated.  TREATMENT:  no medications given, no security intervention required   VISITORS:  no friends/family at bedside    ========================  COLLATERAL  ========================  Uncle: Miguel [phone]    Collateral (Miguel, patient's uncle,) has requested that the information provided remain confidential: Yes [X ] No [  ]  Collateral (Miguel, patient's uncle) has provided information that patient is/may be unaware of: Yes [  ] No [X]”  “Patient gives permission to obtain collateral from _Nobody____:  (  ) Yes  (X)  No  Rationale for overriding objection: Please see Psychiatric  assessment for overriding details     HPI    BASELINE FUNCTIONING: Pt at baseline has good appetite and sleep and cares for his ADLs. Pt. has two past psych admissions in [facility], has prior passive SI, and has a psychiatrist he seems 1x per month and therapist he does not see often. Pt. has medications (unknown what they are) but he is currently not compliant but when he was taking them, collateral reports he was doing well, going to school and working part-time.     DATE HPI STARTED:  8 days ago  DECOMPENSATION: Per uncle, the pt. 8 days ago was no longer at baseline. Pt began to refuse food, stopped sleeping during the night, stopped caring for his hygiene-has not showered for the entire 8 days, and has isolated in his room. The pt stopped going to school and is no longer working. The pt. has also stopped speaking to them, might say one word answers but no longer communicates with the family. Has stopped his medications for 1 month. Collateral is very worried for him and would like to see him admitted to [facility] again.   SUICIDALITY: Currently none  VIOLENCE: pertinent no    SUBSTANCE: pertinent no       ========================    PAST PSYCHIATRIC HISTORY    ========================  PSYCHIATRIC HOSPITALIZATIONS:  twice in [facility]     SUICIDALITY:  he has had passive SI never SA    VIOLENCE: pertinent no    SUBSTANCE USE: pertinent no        COVID Exposure Screen- collateral (i.e. third-party, chart review, belongings, etc; include EMS and ED staff)        1. *Has the patient been tested for COVID-19 in the last 90 days? ( X) Yes ( ) No ( ) Unknown- Reason: _____    IF YES PROCEED TO QUESTION #2. IF NO OR UNKNOWN, PLEASE SKIP TO QUESTION #3.    2. Date of test(s), type of test(s), result(s) for ALL tests in last 90 days: pending results-2/28________    3. *Has the patient received a COVID-19 vaccine? ( X) Yes ( ) No ( ) Unknown- Reason: _____    IF YES PROCEED TO QUESTION #4. IF NO or UNKNOWN, PLEASE SKIP TO QUESTION #7.    4. Moderna ( ) Pfizer ( X) J&J ( )    5. Number of doses: _1_______    6. Date of last dose: ____unknown____    7. *In the past 10 days, has the patient been around anyone with a positive COVID-19 test?* ( ) Yes (X ) No ( ) Unknown- Reason: ____    IF YES PLEASE ANSWER THE FOLLOWING QUESTIONS:    8. Was the patient within 6 feet of them for at least 15 minutes? ( ) Yes ( ) No ( ) Unknown- Reason: _____    9. Did the patient provide care for them? ( ) Yes ( ) No ( ) Unknown- Reason: ______    10. Did the patient have direct physical contact with them (touched, hugged, or kissed them)? ( ) Yes ( ) No ( ) Unknown- Reason: __    11. Did the patient share eating or drinking utensils with them? ( ) Yes ( ) No ( ) Unknown- Reason: ____    12. Did they sneeze, cough, or somehow get respiratory droplets on the patient? ( ) Yes ( ) No ( ) Unknown- Reason: ______

## 2022-02-28 NOTE — ED BEHAVIORAL HEALTH ASSESSMENT NOTE - PSYCHIATRIC ISSUES AND PLAN (INCLUDE STANDING AND PRN MEDICATION)
resume Zyprexa 5 mg PO (offer dose now). For acute agitation/psychosis w/ safety threat; recommend Zyprexa 10 mg IM PRN

## 2022-02-28 NOTE — ED ADULT NURSE NOTE - NSIMPLEMENTINTERV_GEN_ALL_ED
Implemented All Universal Safety Interventions:  [place] to call system. Call bell, personal items and telephone within reach. Instruct patient to call for assistance. Room bathroom lighting operational. Non-slip footwear when patient is off stretcher. Physically safe environment: no spills, clutter or unnecessary equipment. Stretcher in lowest position, wheels locked, appropriate side rails in place.

## 2022-02-28 NOTE — ED PROVIDER NOTE - PSYCHIATRIC, MLM
Alert, poorly cooperative w/ formal exam, poor insight, appears very inwardly preoccupied.  Unable to determine if SI/HI.  Pt denies hallucs.

## 2022-02-28 NOTE — ED BEHAVIORAL HEALTH ASSESSMENT NOTE - SUMMARY
This is a 21 year old single, unemployed male, Iraqi-speaking, non-caregiver, domiciled with family (uncle, aunt and cousins), with past psychiatric history of affective psychosis vs schizophreniform disorder, prior psychiatric admissions (last known to  8/21-9/9/21), no known suicide attempts or non-suicidal self injury, no known history of violence, aggression, legal issues or substance use and no pertinent medical history otherwise who presents to the ED BIB [facility] (not under arrest) activated by family for concern of decompensated psychosis and poor self care. Family reported that patient hasn't been compliant with his meds & has been neglecting basic daily hygiene. Patient himself denied any complaints but is described as internally preoccupied an uncooperative with questions. He assesses similarly on psychiatric evaluation with some added concern for volitional misbehavior. He is unable to meaningfully engage in safety screening questions, treatment discussion or safety planning and meets criteria for involuntary psychiatric evaluation for further evaluation and stabilization of psychosis with poor self care.

## 2022-02-28 NOTE — ED PROVIDER NOTE - OBJECTIVE STATEMENT
20 yo HM, [place] Islander-speaking, PMH of affective psychosis, past Psych admissions incl.  [facility] 9/2021, BIBA [facility] (not under arrest) after family called that pt hasn't been compliant with his meds & has been neglecting basic daily hygeine/bathing/showering, etc.  ? if any drug use.  Pt doesn't understand why he's here, has no complaints.  When I asked about his meds use & whether he's recently bathed/showered, pt refused to answer.

## 2022-02-28 NOTE — ED BEHAVIORAL HEALTH ASSESSMENT NOTE - NSSUICPROTFACT_PSY_ALL_CORE
Responsibility to children, family, or others/Cultural, spiritual and/or moral attitudes against suicide Supportive social network of family or friends

## 2022-02-28 NOTE — ED BEHAVIORAL HEALTH ASSESSMENT NOTE - HPI (INCLUDE ILLNESS QUALITY, SEVERITY, DURATION, TIMING, CONTEXT, MODIFYING FACTORS, ASSOCIATED SIGNS AND SYMPTOMS)
This is a 21 year old single, unemployed male, non-caregiver, domiciled with family (uncle, aunt and cousins), with past psychiatric history of affective psychosis vs schizophreniform, prior psychiatric admissions    On assessment, patient is able to confirm his last name and tell me that the police brought him in. He is very slow to respond but denies having any thoughts on is mind and denies having SI, HI, AVH and affirms feeling safe. He denies taking his psychiatric medications. He denies any drug or alcohol use today but relays use "one time." He is otherwise mostly non-verbal and remains so even when asked why he is not responding. When he does answer, he switches between use of [place]er and English and often states "huh?" as if not understanding a question. There is concern for volitional misbehavior in the manner in which he chooses to answer and not answer questions.     COVID Exposure Screen- Patient  Unable to assess This is a 21 year old single, unemployed male, Greenlandic-speaking, non-caregiver, domiciled with family (uncle, aunt and cousins), with past psychiatric history of affective psychosis vs schizophreniform disorder, prior psychiatric admissions (last known to  8/21-9/9/21), no known suicide attempts or non-suicidal self injury, no known history of violence, aggression, legal issues or substance use and no pertinent medical history otherwise who presents to the ED BIB [facility] (not under arrest) activated by family for concern of decompensated psychosis and poor self care. Family reported that patient hasn't been compliant with his meds & has been neglecting basic daily hygiene. Patient himself denied any complaints but is described as internally preoccupied an uncooperative with questions. Psychiatry consulted for evaluation.     On assessment, patient is able to confirm his last name and tell me that the police brought him in. He is very slow to respond but denies having any thoughts on is mind and denies having SI, HI, AVH and affirms feeling safe. He denies taking his psychiatric medications. He denies any drug or alcohol use today but relays use "one time." He is otherwise mostly non-verbal and remains so even when asked why he is not responding. When he does answer, he switches between use of Greenlandic and English and often states "huh?" as if not understanding a question. There is concern for volitional misbehavior in the manner in which he chooses to answer and not answer questions.     COVID Exposure Screen- Patient  Unable to assess

## 2022-02-28 NOTE — ED PROVIDER NOTE - CONSTITUTIONAL, MLM
normal... Thin HM, awake, alert, no respiratory discomfort.  Poorly cooperative w/ Hx & exam but not hostile nor physically violent.

## 2022-03-01 ENCOUNTER — INPATIENT (INPATIENT)
Facility: HOSPITAL | Age: 22
LOS: 29 days | Discharge: HOME | End: 2022-03-31
Attending: PSYCHIATRY & NEUROLOGY | Admitting: PSYCHIATRY & NEUROLOGY
Payer: MEDICAID

## 2022-03-01 VITALS — WEIGHT: 141.1 LBS | TEMPERATURE: 99 F | HEIGHT: 61 IN | RESPIRATION RATE: 16 BRPM

## 2022-03-01 VITALS
OXYGEN SATURATION: 99 % | HEART RATE: 54 BPM | RESPIRATION RATE: 17 BRPM | TEMPERATURE: 98 F | DIASTOLIC BLOOD PRESSURE: 80 MMHG | SYSTOLIC BLOOD PRESSURE: 124 MMHG

## 2022-03-01 DIAGNOSIS — F29 UNSPECIFIED PSYCHOSIS NOT DUE TO A SUBSTANCE OR KNOWN PHYSIOLOGICAL CONDITION: ICD-10-CM

## 2022-03-01 LAB
APPEARANCE UR: CLEAR — SIGNIFICANT CHANGE UP
BILIRUB UR-MCNC: ABNORMAL
COLOR SPEC: YELLOW — SIGNIFICANT CHANGE UP
DIFF PNL FLD: ABNORMAL
GLUCOSE UR QL: NEGATIVE — SIGNIFICANT CHANGE UP
KETONES UR-MCNC: ABNORMAL
LEUKOCYTE ESTERASE UR-ACNC: ABNORMAL
NITRITE UR-MCNC: NEGATIVE — SIGNIFICANT CHANGE UP
PCP SPEC-MCNC: SIGNIFICANT CHANGE UP
PH UR: 6 — SIGNIFICANT CHANGE UP (ref 5–8)
PROT UR-MCNC: 30 MG/DL
SP GR SPEC: 1.02 — SIGNIFICANT CHANGE UP (ref 1.01–1.02)
UROBILINOGEN FLD QL: 1

## 2022-03-01 PROCEDURE — 99222 1ST HOSP IP/OBS MODERATE 55: CPT

## 2022-03-01 RX ORDER — RISPERIDONE 4 MG/1
1 TABLET ORAL AT BEDTIME
Refills: 0 | Status: DISCONTINUED | OUTPATIENT
Start: 2022-03-01 | End: 2022-03-03

## 2022-03-01 RX ORDER — HALOPERIDOL DECANOATE 100 MG/ML
5 INJECTION INTRAMUSCULAR EVERY 6 HOURS
Refills: 0 | Status: DISCONTINUED | OUTPATIENT
Start: 2022-03-01 | End: 2022-03-31

## 2022-03-01 RX ORDER — DIPHENHYDRAMINE HCL 50 MG
50 CAPSULE ORAL EVERY 6 HOURS
Refills: 0 | Status: DISCONTINUED | OUTPATIENT
Start: 2022-03-01 | End: 2022-03-31

## 2022-03-01 RX ORDER — DIPHENHYDRAMINE HCL 50 MG
50 CAPSULE ORAL ONCE
Refills: 0 | Status: DISCONTINUED | OUTPATIENT
Start: 2022-03-01 | End: 2022-03-31

## 2022-03-01 RX ORDER — HALOPERIDOL DECANOATE 100 MG/ML
5 INJECTION INTRAMUSCULAR ONCE
Refills: 0 | Status: DISCONTINUED | OUTPATIENT
Start: 2022-03-01 | End: 2022-03-31

## 2022-03-01 RX ADMIN — RISPERIDONE 1 MILLIGRAM(S): 4 TABLET ORAL at 20:38

## 2022-03-01 NOTE — BH PATIENT PROFILE - HOME MEDICATIONS
melatonin 3 mg oral tablet , 1 tab(s) orally once a day (at bedtime), As needed, Insomnia  OLANZapine 10 mg oral tablet , 1 tab(s) orally once a day (at bedtime)  Multiple Vitamins with Minerals oral tablet , 1 tab(s) orally once a day

## 2022-03-01 NOTE — BH INPATIENT PSYCHIATRY ASSESSMENT NOTE - NSBHCHARTREVIEWVS_PSY_A_CORE FT
Vital Signs Last 24 Hrs  T(C): 37 (03-01-22 @ 14:41), Max: 37 (03-01-22 @ 14:41)  T(F): 98.6 (03-01-22 @ 14:41), Max: 98.6 (03-01-22 @ 14:41)  HR: 54 (03-01-22 @ 06:30) (54 - 112)  BP: 124/80 (03-01-22 @ 06:30) (124/80 - 137/99)  BP(mean): 95 (03-01-22 @ 06:30) (86 - 95)  RR: 16 (03-01-22 @ 14:41) (16 - 18)  SpO2: 99% (03-01-22 @ 06:30) (98% - 99%)    Orthostatic VS  03-01-22 @ 14:41  Lying BP: 123/74 HR: 77  Sitting BP: 117/60 HR: 80  Standing BP: --/-- HR: --  Site: --  Mode: --   Vital Signs Last 24 Hrs  T(C): 36.7 (03-02-22 @ 07:53), Max: 37 (03-01-22 @ 14:41)  T(F): 98 (03-02-22 @ 07:53), Max: 98.6 (03-01-22 @ 14:41)  HR: 97 (03-02-22 @ 07:53) (97 - 97)  BP: 94/59 (03-02-22 @ 07:53) (94/59 - 94/59)  BP(mean): --  RR: 16 (03-01-22 @ 14:41) (16 - 16)  SpO2: --    Orthostatic VS  03-01-22 @ 19:35  Lying BP: --/-- HR: --  Sitting BP: 117/65 HR: 109  Standing BP: --/-- HR: --  Site: --  Mode: --  Orthostatic VS  03-01-22 @ 14:41  Lying BP: 123/74 HR: 77  Sitting BP: 117/60 HR: 80  Standing BP: --/-- HR: --  Site: --  Mode: --

## 2022-03-01 NOTE — BH PATIENT PROFILE - FALL HARM RISK - UNIVERSAL INTERVENTIONS
Bed in lowest position, wheels locked, appropriate side rails in place/Call bell, personal items and telephone in reach/Instruct patient to call for assistance before getting out of bed or chair/Non-slip footwear when patient is out of bed/[place] to call system/Physically safe environment - no spills, clutter or unnecessary equipment/Purposeful Proactive Rounding/Room/bathroom lighting operational, light cord in reach

## 2022-03-01 NOTE — BH INPATIENT PSYCHIATRY ASSESSMENT NOTE - RISK ASSESSMENT
Pertinent known risk and protective factors are noted in documentation above; otherwise unable to assess.

## 2022-03-01 NOTE — BH INPATIENT PSYCHIATRY ASSESSMENT NOTE - DESCRIPTION
as per chart review; migrated from [facility] ~6years ago. Lives with uncle, aunt and cousins in private house. Patient was reportedly involved in a work training program prior to recent admission to  the end of July. No children and no reports of substance use.

## 2022-03-01 NOTE — BH INPATIENT PSYCHIATRY ASSESSMENT NOTE - HPI (INCLUDE ILLNESS QUALITY, SEVERITY, DURATION, TIMING, CONTEXT, MODIFYING FACTORS, ASSOCIATED SIGNS AND SYMPTOMS)
Per [facility] ED assessment, "This is a 21 year old single, unemployed male, Greenlandic-speaking, non-caregiver, domiciled with family (uncle, aunt and cousins), with past psychiatric history of affective psychosis vs schizophreniform disorder, prior psychiatric admissions (last known to  8/21-9/9/21), no known suicide attempts or non-suicidal self injury, no known history of violence, aggression, legal issues or substance use and no pertinent medical history otherwise who presents to the ED BIB PD (not under arrest) activated by family for concern of decompensated psychosis and poor self care. Family reported that patient hasn't been compliant with his meds & has been neglecting basic daily hygiene. Patient himself denied any complaints but is described as internally preoccupied an uncooperative with questions. Psychiatry consulted for evaluation.     On assessment, patient is able to confirm his last name and tell me that the police brought him in. He is very slow to respond but denies having any thoughts on is mind and denies having SI, HI, AVH and affirms feeling safe. He denies taking his psychiatric medications. He denies any drug or alcohol use today but relays use "one time." He is otherwise mostly non-verbal and remains so even when asked why he is not responding. When he does answer, he switches between use of Green[place]ic and English and often states "huh?" as if not understanding a question. There is concern for volitional misbehavior in the manner in which he chooses to answer and not answer questions.     COVID Exposure Screen- Patient  Unable to assess"    On arrival to the unit, patient reports he speaks English, declines  services, but agrees for Greenlandic speaking staff to be present.  Pt guarded and internally preoccupied.  Pt denies SI/HI/I/P or AH/VH or paranoia.  Pt denies changes in mood, sleep or appetite.  Pt reports he does not know why he was hospitalized, but the police brought him here.  Pt denies being prescribed psychotropic medication, but per chart review, patient supposed to be on Zyprexa.  Pt initially denies previous psychiatric hospitalizations, then when asked about hospitalization at [facility], admits to being hospitalized "for little things," but does not elaborate.  Pt denies substance use.

## 2022-03-01 NOTE — BH INPATIENT PSYCHIATRY ASSESSMENT NOTE - NSBHMETABOLIC_PSY_ALL_CORE_FT
BMI: BMI (kg/m2): 26.7 (03-01-22 @ 14:41)  HbA1c: A1C with Estimated Average Glucose Result: 5.0 % (07-22-21 @ 08:02)    Glucose:   BP: --  Lipid Panel: Date/Time: 07-22-21 @ 08:02  Cholesterol, Serum: 142  Direct LDL: --  HDL Cholesterol, Serum: 39  Total Cholesterol/HDL Ration Measurement: --  Triglycerides, Serum: 75   BMI: BMI (kg/m2): 26.7 (03-01-22 @ 14:41)  HbA1c: A1C with Estimated Average Glucose Result: 5.0 % (07-22-21 @ 08:02)    Glucose:   BP: 94/59 (03-02-22 @ 07:53) (94/59 - 94/59)  Lipid Panel: Date/Time: 07-22-21 @ 08:02  Cholesterol, Serum: 142  Direct LDL: --  HDL Cholesterol, Serum: 39  Total Cholesterol/HDL Ration Measurement: --  Triglycerides, Serum: 75

## 2022-03-01 NOTE — BH INPATIENT PSYCHIATRY ASSESSMENT NOTE - CURRENT MEDICATION
MEDICATIONS  (STANDING):  risperiDONE  Disintegrating Tablet 1 milliGRAM(s) Oral at bedtime    MEDICATIONS  (PRN):  diphenhydrAMINE 50 milliGRAM(s) Oral every 6 hours PRN EPS ppx  diphenhydrAMINE Injectable 50 milliGRAM(s) IntraMuscular once PRN agitation  haloperidol     Tablet 5 milliGRAM(s) Oral every 6 hours PRN agitation  haloperidol    Injectable 5 milliGRAM(s) IntraMuscular once PRN agitation

## 2022-03-01 NOTE — ED BEHAVIORAL HEALTH NOTE - BEHAVIORAL HEALTH NOTE
Patient accepted to [place] [id number], by Dr. Sheriff. Nurse to nurse to be completed at [phone].  Informed [facility] to set up transport.  Informed Dr. Coronado, FRANKLYN Perez and Dr. Machado.  Legals and transfer forms completed. Patient accepted to [place] [address], by Dr. Sheriff. Nurse to nurse to be completed at [phone].  Informed [place] to set up transport.  Informed Dr. Coronado, FRANKLYN Perez and Dr. Machado.  Legals and transfer forms completed. No auth needed as patient has straight medicaid.

## 2022-03-01 NOTE — BH PATIENT PROFILE - DOMESTIC TRAVEL HIGH RISK QUESTION
Charu Portal  : 04/10/1935  ACCOUNT:  [id number]  [phone]  PCP: Dr. Roa Fresh     No

## 2022-03-01 NOTE — BH INPATIENT PSYCHIATRY ASSESSMENT NOTE - OTHER PAST PSYCHIATRIC HISTORY (INCLUDE DETAILS REGARDING ONSET, COURSE OF ILLNESS, INPATIENT/OUTPATIENT TREATMENT)
As per chart review; Seen in ED for AH treated and released 7/2021. 1st psychiatric admission to  7/21-8/112021. Readmitted subsequently 8/21-9/9/21. Discharged on Zyprexa 10 mg HS.

## 2022-03-01 NOTE — BH INPATIENT PSYCHIATRY ASSESSMENT NOTE - NSBHASSESSSUMMFT_PSY_ALL_CORE
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 Family reported that patient hasn't been compliant with his meds & has been neglecting basic daily hygiene. Patient himself denied any complaints but is described as internally preoccupied an uncooperative with questions. Psychiatry consulted for evaluation.     >Legal: 9.27 INVOL  >Obs: Routine, no current SI. no need for CO, patient not expected to pose risk to self or others in controlled inpatient setting  >Psychiatric Meds: Start Risperdal M-tab 1mg PO at bedtime  >Labs: Admission labs reviewed, will order urine culture.   >Medical:  No acute concerns. No consultations needed at this time.   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 Family reported that patient hasn't been compliant with his meds & has been neglecting basic daily hygiene. Patient himself denied any complaints but is described as internally preoccupied an uncooperative with questions. Psychiatry consulted for evaluation.     >Legal: 9.27 INVOL  >Obs: Routine, no current SI. no need for CO, patient not expected to pose risk to self or others in controlled inpatient setting; q15 adequate  >Psychiatric Meds: Start Risperdal M-tab 1mg PO at bedtime;  goal of Sustenna EPPS  >Labs: Admission labs reviewed, will order urine culture.   >Medical:  No acute concerns. No consultations needed at this time.   >Social: milieu/structured therapy  >Treatment Interventions: Groups and Individual Therapy/CBT  >Dispo: pending remission of sx

## 2022-03-02 LAB
A1C WITH ESTIMATED AVERAGE GLUCOSE RESULT: 5.2 % — SIGNIFICANT CHANGE UP (ref 4–5.6)
CHOLEST SERPL-MCNC: 144 MG/DL — SIGNIFICANT CHANGE UP
COVID-19 SPIKE DOMAIN AB INTERP: POSITIVE
COVID-19 SPIKE DOMAIN ANTIBODY RESULT: >250 U/ML — HIGH
CULTURE RESULTS: SIGNIFICANT CHANGE UP
ESTIMATED AVERAGE GLUCOSE: 103 — SIGNIFICANT CHANGE UP
HDLC SERPL-MCNC: 35 MG/DL — LOW
LIPID PNL WITH DIRECT LDL SERPL: 91 MG/DL — SIGNIFICANT CHANGE UP
NON HDL CHOLESTEROL: 109 MG/DL — SIGNIFICANT CHANGE UP
SARS-COV-2 IGG+IGM SERPL QL IA: >250 U/ML — HIGH
SARS-COV-2 IGG+IGM SERPL QL IA: POSITIVE
SPECIMEN SOURCE: SIGNIFICANT CHANGE UP
TRIGL SERPL-MCNC: 88 MG/DL — SIGNIFICANT CHANGE UP

## 2022-03-02 PROCEDURE — 99232 SBSQ HOSP IP/OBS MODERATE 35: CPT

## 2022-03-02 RX ADMIN — RISPERIDONE 1 MILLIGRAM(S): 4 TABLET ORAL at 20:58

## 2022-03-02 NOTE — BH INPATIENT PSYCHIATRY PROGRESS NOTE - NSBHMETABOLIC_PSY_ALL_CORE_FT
BMI: BMI (kg/m2): 26.7 (03-01-22 @ 14:41)  HbA1c: A1C with Estimated Average Glucose Result: 5.2 % (03-02-22 @ 09:43)    Glucose:   BP: 94/59 (03-02-22 @ 07:53) (94/59 - 94/59)  Lipid Panel: Date/Time: 03-02-22 @ 09:43  Cholesterol, Serum: 144  Direct LDL: --  HDL Cholesterol, Serum: 35  Total Cholesterol/HDL Ration Measurement: --  Triglycerides, Serum: 88

## 2022-03-02 NOTE — BH INPATIENT PSYCHIATRY PROGRESS NOTE - NSBHCHARTREVIEWVS_PSY_A_CORE FT
Vital Signs Last 24 Hrs  T(C): 36.7 (03-02-22 @ 07:53), Max: 37 (03-01-22 @ 14:41)  T(F): 98 (03-02-22 @ 07:53), Max: 98.6 (03-01-22 @ 14:41)  HR: 97 (03-02-22 @ 07:53) (97 - 97)  BP: 94/59 (03-02-22 @ 07:53) (94/59 - 94/59)  BP(mean): --  RR: 16 (03-01-22 @ 14:41) (16 - 16)  SpO2: --    Orthostatic VS  03-01-22 @ 19:35  Lying BP: --/-- HR: --  Sitting BP: 117/65 HR: 109  Standing BP: --/-- HR: --  Site: --  Mode: --  Orthostatic VS  03-01-22 @ 14:41  Lying BP: 123/74 HR: 77  Sitting BP: 117/60 HR: 80  Standing BP: --/-- HR: --  Site: --  Mode: --

## 2022-03-02 NOTE — BH INPATIENT PSYCHIATRY PROGRESS NOTE - OTHER
selectively non-verbal, guarded, vague pt denies AVH but appears internally preoccupied impoverished

## 2022-03-02 NOTE — PSYCHIATRIC REHAB INITIAL EVALUATION - NSBHPRRECOMMEND_PSY_ALL_CORE
Writer met with patient in order to orient patient to unit, introduce patient to psychiatric rehabilitation staff and department functions. Patient was receptive.   Writer contacted [facility]  ([phone]),  (ID# [id number]). Patient was guarded, not engaged and not forthcoming with his personal history with the writer. Patient made no eye contact with the writer and kept his head down throughout the session. Patient reported that he is feeling good and was brought in by the police. Patient was unable to identify his primary reason of hospitalization. Patient denied SI/HI/AH/VH. Patient reported good sleep and appetite. Patient reported that he is compliant with his meds since admission but was not taking any meds at home. Patient reported that this is his second hospitalization. Patient would answer only in one to two words. Patient was observed to be internally preoccupied and uncooperative with questions at times during the session. Patient reported that he lives with his uncle and no immediate family except one sister in [place].    Per the chart Patient is a 21 year old who identifies as a single, unemployed and non-caregiver male. Patient is a Mohawk speaker and migrated from [facility] [facility] 6years ago. Patient lives with his uncle, aunt and cousins in private house.  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 Patient has prior history of psych inpatient admissions last known to  8/21-9/9/21. Patient has no known attempts or SIB. Patient has no history of violence, aggression, legal issues or substance use. Patient is hospitalization at [facility] for concern of decompensated psychosis and poor self care.     Writer and patient established a collaborative treatment goal for the patient to work on over the next 7 days. Psychiatric rehabilitation staff will provide encouragement, support, psychotherapy, and psychoeducation to assist the patient in the progression of his treatment goal.

## 2022-03-02 NOTE — CHART NOTE - NSCHARTNOTEFT_GEN_A_CORE
Screening Medical Evaluation  Patient Admitted from: [facility]    [facility] admitting diagnosis: Non-organic psychosis    PAST MEDICAL & SURGICAL HISTORY:  No pertinent past medical history    Affective psychosis    No significant past surgical history          Allergies    No Known Allergies    Intolerances        Social History:     FAMILY HISTORY:  No known problems        MEDICATIONS  (STANDING):  risperiDONE  Disintegrating Tablet 1 milliGRAM(s) Oral at bedtime    MEDICATIONS  (PRN):  diphenhydrAMINE 50 milliGRAM(s) Oral every 6 hours PRN EPS ppx  diphenhydrAMINE Injectable 50 milliGRAM(s) IntraMuscular once PRN agitation  haloperidol     Tablet 5 milliGRAM(s) Oral every 6 hours PRN agitation  haloperidol    Injectable 5 milliGRAM(s) IntraMuscular once PRN agitation      Vital Signs Last 24 Hrs  T(C): 36.4 (01 Mar 2022 17:56), Max: 37 (01 Mar 2022 14:41)  T(F): 97.5 (01 Mar 2022 17:56), Max: 98.6 (01 Mar 2022 14:41)  HR: 54 (01 Mar 2022 06:30) (54 - 54)  BP: 124/80 (01 Mar 2022 06:30) (124/80 - 124/80)  BP(mean): 95 (01 Mar 2022 06:30) (95 - 95)  RR: 16 (01 Mar 2022 14:41) (16 - 17)  SpO2: 99% (01 Mar 2022 06:30) (99% - 99%)  CAPILLARY BLOOD GLUCOSE            PHYSICAL EXAM:  GENERAL: NAD, well-developed  HEAD:  Atraumatic, Normocephalic  EYES: EOMI, PERRLA, conjunctiva and sclera clear  NECK: Supple, No JVD  CHEST/LUNG: Clear to auscultation bilaterally; No wheeze  HEART: Regular rate and rhythm; No murmurs, rubs, or gallops  ABDOMEN: Soft, Nontender, Nondistended; Bowel sounds present  EXTREMITIES:  2+ Peripheral Pulses, No clubbing, cyanosis, or edema  PSYCH: AAOx3  NEUROLOGY: non-focal  SKIN: No rashes or lesions    LABS:                        15.8   7.09  )-----------( 233      ( 2022 17:53 )             46.3         138  |  104  |  11  ----------------------------<  99  3.5   |  29  |  1.18    Ca    9.3      2022 17:53    TPro  8.0  /  Alb  4.4  /  TBili  0.9  /  DBili  x   /  AST  18  /  ALT  26  /  AlkPhos  70            Urinalysis Basic - ( 01 Mar 2022 05:08 )    Color: Yellow / Appearance: Clear / S.020 / pH: x  Gluc: x / Ketone: Moderate  / Bili: Small / Urobili: 1   Blood: x / Protein: 30 mg/dL / Nitrite: Negative   Leuk Esterase: Trace / RBC: 0-2 /HPF / WBC 0-2   Sq Epi: x / Non Sq Epi: Negative / Bacteria: Occasional        RADIOLOGY & ADDITIONAL TESTS:    Assessment and Plan:  21 year old male presenting today from [facility] to [facility] with admitting diagnosis of Non-organic psychosis with no pertinent PMH. Denies any fever, chills, headache, chest pain, SOB, abdominal pain, N/V/D/C, dysuria. Physical exam unremarkable.   1) Non-organic psychosis: Follow care plan as per team.

## 2022-03-02 NOTE — BH INPATIENT PSYCHIATRY PROGRESS NOTE - NSBHASSESSSUMMFT_PSY_ALL_CORE
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 Family reported that patient hasn't been compliant with his meds & has been neglecting basic daily hygiene. Patient himself denied any complaints but is described as internally preoccupied an uncooperative with questions. Psychiatry consulted for evaluation.     >Legal: 9.27 INVOL  >Obs: Routine, no current SI. no need for CO, patient not expected to pose risk to self or others in controlled inpatient setting; q15 adequate  >Psychiatric Meds: Start Risperdal M-tab 1mg PO at bedtime;  goal of Sustenna EPPS  >Labs: Admission labs reviewed, will order urine culture.   >Medical:  No acute concerns. No consultations needed at this time.   >Social: milieu/structured therapy  >Treatment Interventions: Groups and Individual Therapy/CBT  >Dispo: pending remission of sx

## 2022-03-02 NOTE — BH INPATIENT PSYCHIATRY PROGRESS NOTE - NSBHFUPINTERVALHXFT_PSY_A_CORE
Pt compliant with medication and tolerating it well.  Chart reviewed and case discussed with treatment team.  Pt speaks some English, but Bahraini speaking staff present during interview for translation.  Pt continues to be guarded and vague.  Pt reports he does not know why he is in the hospital.  Pt gives only one word answers and denies SI/HI/I/P or AH/VH or paranoia.  Pt denies changes in mood, sleep or appetite.  Pt reports he has been tending to his ADLs.  Pt reports at home he doesn't do anything, has not worked since last summer.

## 2022-03-03 PROCEDURE — 99232 SBSQ HOSP IP/OBS MODERATE 35: CPT

## 2022-03-03 RX ORDER — RISPERIDONE 4 MG/1
2 TABLET ORAL AT BEDTIME
Refills: 0 | Status: DISCONTINUED | OUTPATIENT
Start: 2022-03-03 | End: 2022-03-05

## 2022-03-03 RX ADMIN — RISPERIDONE 2 MILLIGRAM(S): 4 TABLET ORAL at 20:32

## 2022-03-03 NOTE — BH INPATIENT PSYCHIATRY PROGRESS NOTE - OTHER
delusions suspected selectively non-verbal, guarded, vague pt denies AVH but appears internally preoccupied impoverished

## 2022-03-03 NOTE — BH INPATIENT PSYCHIATRY PROGRESS NOTE - NSBHFUPINTERVALHXFT_PSY_A_CORE
Pt compliant with medication and tolerating it well.  Chart reviewed and case discussed with treatment team.  No events reported overnight.  Pt reports he would like to use  today.  Pt continues to be guarded denies all sx, speaks minimally.  Pt denies SI/HI/I/P or AH/VH or paranoia.  Pt reports no issues with eating and sleeping.  Pt agrees for treatment team to call his uncle using  services.  Called patient's uncle, Miguel Mcdermott ([phone]), who reported he called 911 because patient stopped taking his medication, was not eating or showering for 6-7 days, was acting out and talking about the devil.  Patient denies all that.  Updated patient's uncle on patient current presentation, provided psychoeducation and contact numbers to the unit as well as visiting hours.  He reported he will try to visit patient in the hospital.  Pt reported he did not want to speak to his uncle while he was on the phone.   Pt compliant with medication and tolerating it well.  Chart reviewed and case discussed with treatment team.  No events reported overnight.  Pt reports he would like to use  today.  Pt continues to be guarded denies all sx, speaks minimally.  Pt denies SI/HI/I/P or AH/VH or paranoia.  Pt reports no issues with eating and sleeping.  Pt agrees for treatment team to call his uncle using  services.  Called patient's uncle, Miguel Mcdermott ([phone]), who reported he called 911 because patient stopped taking his medication, was not eating or showering for 6-7 days, was acting out and talking about the devil then refused to speak to family at all.  Patient denies all that.  Updated patient's uncle on patient current presentation, provided psychoeducation and contact numbers to the unit as well as visiting hours.  He reported he will try to visit patient in the hospital.  Pt reported he did not want to speak to his uncle while he was on the phone.

## 2022-03-03 NOTE — BH INPATIENT PSYCHIATRY PROGRESS NOTE - NSBHMETABOLIC_PSY_ALL_CORE_FT
BMI: BMI (kg/m2): 26.7 (03-01-22 @ 14:41)  HbA1c: A1C with Estimated Average Glucose Result: 5.2 % (03-02-22 @ 09:43)    Glucose:   BP: 111/58 (03-02-22 @ 20:16) (94/59 - 111/58)  Lipid Panel: Date/Time: 03-02-22 @ 09:43  Cholesterol, Serum: 144  Direct LDL: --  HDL Cholesterol, Serum: 35  Total Cholesterol/HDL Ration Measurement: --  Triglycerides, Serum: 88

## 2022-03-03 NOTE — BH INPATIENT PSYCHIATRY PROGRESS NOTE - NSBHASSESSSUMMFT_PSY_ALL_CORE
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 Family reported that patient hasn't been compliant with his meds & has been neglecting basic daily hygiene. Patient himself denied any complaints but is described as internally preoccupied an uncooperative with questions. Psychiatry consulted for evaluation.     >Legal: 9.27 INVOL  >Obs: Routine, no current SI. no need for CO, patient not expected to pose risk to self or others in controlled inpatient setting; q15 adequate  >Psychiatric Meds: Titrate Risperdal M-tab to 2mg PO at bedtime;  goal of Sustenna EPPS  >Labs: Admission labs reviewed, will order urine culture.   >Medical:  No acute concerns. No consultations needed at this time.   >Social: milieu/structured therapy  >Treatment Interventions: Groups and Individual Therapy/CBT  >Dispo: pending remission of sx

## 2022-03-03 NOTE — BH INPATIENT PSYCHIATRY PROGRESS NOTE - NSBHCHARTREVIEWVS_PSY_A_CORE FT
Vital Signs Last 24 Hrs  T(C): 36.5 (03-03-22 @ 06:34), Max: 36.5 (03-03-22 @ 06:34)  T(F): 97.7 (03-03-22 @ 06:34), Max: 97.7 (03-03-22 @ 06:34)  HR: --  BP: 111/58 (03-02-22 @ 20:16) (111/58 - 111/58)  BP(mean): 81 (03-02-22 @ 20:16) (81 - 81)  RR: --  SpO2: --    Orthostatic VS  03-03-22 @ 06:34  Lying BP: --/-- HR: --  Sitting BP: 131/63 HR: 95  Standing BP: 124/64 HR: 114  Site: --  Mode: --  Orthostatic VS  03-01-22 @ 19:35  Lying BP: --/-- HR: --  Sitting BP: 117/65 HR: 109  Standing BP: --/-- HR: --  Site: --  Mode: --  Orthostatic VS  03-01-22 @ 14:41  Lying BP: 123/74 HR: 77  Sitting BP: 117/60 HR: 80  Standing BP: --/-- HR: --  Site: --  Mode: --   Vital Signs Last 24 Hrs  T(C): 36.5 (03-03-22 @ 06:34), Max: 36.5 (03-03-22 @ 06:34)  T(F): 97.7 (03-03-22 @ 06:34), Max: 97.7 (03-03-22 @ 06:34)  HR: --  BP: 111/58 (03-02-22 @ 20:16) (111/58 - 111/58)  BP(mean): 81 (03-02-22 @ 20:16) (81 - 81)  RR: --  SpO2: --    Orthostatic VS  03-03-22 @ 06:34  Lying BP: --/-- HR: --  Sitting BP: 131/63 HR: 95  Standing BP: 124/64 HR: 114  Site: --  Mode: --  Orthostatic VS  03-01-22 @ 19:35  Lying BP: --/-- HR: --  Sitting BP: 117/65 HR: 109  Standing BP: --/-- HR: --  Site: --  Mode: --

## 2022-03-04 PROCEDURE — 99232 SBSQ HOSP IP/OBS MODERATE 35: CPT

## 2022-03-04 RX ADMIN — RISPERIDONE 2 MILLIGRAM(S): 4 TABLET ORAL at 20:39

## 2022-03-04 NOTE — BH INPATIENT PSYCHIATRY PROGRESS NOTE - NSBHASSESSSUMMFT_PSY_ALL_CORE
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 Family reported that patient hasn't been compliant with his meds & has been neglecting basic daily hygiene. Patient himself denied any complaints but is described as internally preoccupied an uncooperative with questions. Psychiatry consulted for evaluation.     >Legal: 9.27 INVOL  >Obs: Routine, no current SI. no need for CO, patient not expected to pose risk to self or others in controlled inpatient setting; q15 adequate  >Psychiatric Meds: Titrate Risperdal M-tab to 2mg PO at bedtime;  goal of Sustenna EPPS  >Labs: Admission labs reviewed, will order urine culture.   >Medical:  No acute concerns. No consultations needed at this time.   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 Family reported that patient hasn't been compliant with his meds & has been neglecting basic daily hygiene. Patient himself denied any complaints but is described as internally preoccupied an uncooperative with questions. Psychiatry consulted for evaluation.     Pt guarded, responding to internal stimuli, speaks minimally    >Legal: 9.27 INVOL  >Obs: Routine, no current SI. no need for CO, patient not expected to pose risk to self or others in controlled inpatient setting; q15 adequate  >Psychiatric Meds: Titrate Risperdal M-tab to 2mg PO at bedtime;  goal of Sustenna EPPS  >Labs: Admission labs reviewed, will order urine culture.   >Medical:  No acute concerns. No consultations needed at this time.   >Social: milieu/structured therapy  >Treatment Interventions: Groups and Individual Therapy/CBT  >Dispo: pending remission of sx

## 2022-03-04 NOTE — BH INPATIENT PSYCHIATRY PROGRESS NOTE - OTHER
delusions suspected impoverished selectively non-verbal, guarded, vague pt denies AVH but appears internally preoccupied and is observed responding to internal stimuli

## 2022-03-04 NOTE — BH INPATIENT PSYCHIATRY PROGRESS NOTE - NSBHFUPINTERVALHXFT_PSY_A_CORE
Pt compliant with medication and tolerating it well.  Chart reviewed and case discussed with treatment team.  No events reported overnight.  Pt observed talking to himself in the hallway.  Pt on interview, remains guarded and speaks minimally.  Pt denies SI/HI/I/P or AH/VH or paranoia.  Pt reports eating 3 meals and sleeping well.  Pt reports he has not been showering because he has only one outfit and no supplies.  Writer asked if staff brought him gowns and shower supplies if he would shower and he said he would.  Pt asked if he has any thoughts about the devil as his uncle reported yesterday he mentioned the devil at home and patient reports he does not.

## 2022-03-04 NOTE — BH INPATIENT PSYCHIATRY PROGRESS NOTE - NSBHCHARTREVIEWVS_PSY_A_CORE FT
Vital Signs Last 24 Hrs  T(C): 35.7 (03-04-22 @ 08:24), Max: 37 (03-03-22 @ 18:44)  T(F): 96.2 (03-04-22 @ 08:24), Max: 98.6 (03-03-22 @ 18:44)  HR: 80 (03-03-22 @ 20:26) (80 - 80)  BP: 122/69 (03-03-22 @ 20:26) (122/69 - 122/69)  BP(mean): --  RR: --  SpO2: --    Orthostatic VS  03-04-22 @ 08:24  Lying BP: --/-- HR: --  Sitting BP: 104/61 HR: 97  Standing BP: --/-- HR: --  Site: --  Mode: --  Orthostatic VS  03-03-22 @ 06:34  Lying BP: --/-- HR: --  Sitting BP: 131/63 HR: 95  Standing BP: 124/64 HR: 114  Site: --  Mode: --

## 2022-03-04 NOTE — BH INPATIENT PSYCHIATRY PROGRESS NOTE - NSBHMETABOLIC_PSY_ALL_CORE_FT
BMI: BMI (kg/m2): 26.7 (03-01-22 @ 14:41)  HbA1c: A1C with Estimated Average Glucose Result: 5.2 % (03-02-22 @ 09:43)    Glucose:   BP: 122/69 (03-03-22 @ 20:26) (94/59 - 122/69)  Lipid Panel: Date/Time: 03-02-22 @ 09:43  Cholesterol, Serum: 144  Direct LDL: --  HDL Cholesterol, Serum: 35  Total Cholesterol/HDL Ration Measurement: --  Triglycerides, Serum: 88

## 2022-03-05 PROCEDURE — 99232 SBSQ HOSP IP/OBS MODERATE 35: CPT

## 2022-03-05 RX ORDER — RISPERIDONE 4 MG/1
3 TABLET ORAL AT BEDTIME
Refills: 0 | Status: DISCONTINUED | OUTPATIENT
Start: 2022-03-05 | End: 2022-03-07

## 2022-03-05 RX ADMIN — RISPERIDONE 3 MILLIGRAM(S): 4 TABLET ORAL at 20:41

## 2022-03-05 NOTE — BH INPATIENT PSYCHIATRY PROGRESS NOTE - NSBHASSESSSUMMFT_PSY_ALL_CORE
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 Family reported that patient hasn't been compliant with his meds & has been neglecting basic daily hygiene. Patient himself denied any complaints but is described as internally preoccupied an uncooperative with questions. Psychiatry consulted for evaluation.     Pt guarded, internally preoccupied, speaks minimally    >Legal: 9.27 INVOL  >Obs: Routine, no current SI. no need for CO, patient not expected to pose risk to self or others in controlled inpatient setting; q15 adequate  >Psychiatric Meds: Titrate Risperdal M-tab to 3mg PO at bedtime;  goal of Sustenna EPPS  >Labs: Admission labs reviewed, will order urine culture.   >Medical:  No acute concerns. No consultations needed at this time.   >Social: milieu/structured therapy  >Treatment Interventions: Groups and Individual Therapy/CBT  >Dispo: pending remission of sx

## 2022-03-05 NOTE — BH INPATIENT PSYCHIATRY PROGRESS NOTE - NSBHCHARTREVIEWVS_PSY_A_CORE FT
Vital Signs Last 24 Hrs  T(C): 37 (03-05-22 @ 08:07), Max: 37 (03-05-22 @ 08:07)  T(F): 98.6 (03-05-22 @ 08:07), Max: 98.6 (03-05-22 @ 08:07)  HR: 73 (03-05-22 @ 08:07) (73 - 73)  BP: 106/67 (03-05-22 @ 08:07) (106/67 - 106/67)  BP(mean): --  RR: --  SpO2: --    Orthostatic VS  03-04-22 @ 08:24  Lying BP: --/-- HR: --  Sitting BP: 104/61 HR: 97  Standing BP: --/-- HR: --  Site: --  Mode: --

## 2022-03-05 NOTE — BH INPATIENT PSYCHIATRY PROGRESS NOTE - OTHER
selectively non-verbal, guarded, vague pt denies AVH but appears internally preoccupied  delusions suspected impoverished

## 2022-03-05 NOTE — BH INPATIENT PSYCHIATRY PROGRESS NOTE - NSBHFUPINTERVALHXFT_PSY_A_CORE
Pt compliant with medication and tolerating it well.  Chart reviewed, no events reported overnight.  Pt remains guarded and speaks minimally.  Pt denies SI/HI/I/P or AH/VH or paranoia.  Pt reports he has been eating and sleeping.  Pt reports he did not shower.

## 2022-03-05 NOTE — BH INPATIENT PSYCHIATRY PROGRESS NOTE - NSBHMETABOLIC_PSY_ALL_CORE_FT
BMI: BMI (kg/m2): 26.7 (03-01-22 @ 14:41)  HbA1c: A1C with Estimated Average Glucose Result: 5.2 % (03-02-22 @ 09:43)    Glucose:   BP: 106/67 (03-05-22 @ 08:07) (106/67 - 122/69)  Lipid Panel: Date/Time: 03-02-22 @ 09:43  Cholesterol, Serum: 144  Direct LDL: --  HDL Cholesterol, Serum: 35  Total Cholesterol/HDL Ration Measurement: --  Triglycerides, Serum: 88

## 2022-03-06 PROCEDURE — 99232 SBSQ HOSP IP/OBS MODERATE 35: CPT

## 2022-03-06 RX ADMIN — RISPERIDONE 3 MILLIGRAM(S): 4 TABLET ORAL at 21:48

## 2022-03-06 NOTE — BH INPATIENT PSYCHIATRY PROGRESS NOTE - OTHER
pt denies AVH but appears internally preoccupied  impoverished selectively non-verbal, guarded, vague delusions suspected

## 2022-03-06 NOTE — BH INPATIENT PSYCHIATRY PROGRESS NOTE - NSBHMETABOLIC_PSY_ALL_CORE_FT
BMI: BMI (kg/m2): 26.7 (03-01-22 @ 14:41)  HbA1c: A1C with Estimated Average Glucose Result: 5.2 % (03-02-22 @ 09:43)    Glucose:   BP: 113/69 (03-06-22 @ 07:24) (106/67 - 122/69)  Lipid Panel: Date/Time: 03-02-22 @ 09:43  Cholesterol, Serum: 144  Direct LDL: --  HDL Cholesterol, Serum: 35  Total Cholesterol/HDL Ration Measurement: --  Triglycerides, Serum: 88

## 2022-03-06 NOTE — BH INPATIENT PSYCHIATRY PROGRESS NOTE - NSBHCHARTREVIEWVS_PSY_A_CORE FT
Vital Signs Last 24 Hrs  T(C): 36.3 (03-05-22 @ 19:53), Max: 36.3 (03-05-22 @ 19:53)  T(F): 97.3 (03-05-22 @ 19:53), Max: 97.3 (03-05-22 @ 19:53)  HR: 78 (03-06-22 @ 07:24) (78 - 78)  BP: 113/69 (03-06-22 @ 07:24) (113/69 - 113/69)  BP(mean): --  RR: --  SpO2: --    Orthostatic VS  03-06-22 @ 07:24  Lying BP: 113/-- HR: --  Sitting BP: --/-- HR: --  Standing BP: --/-- HR: --  Site: --  Mode: --

## 2022-03-06 NOTE — BH INPATIENT PSYCHIATRY PROGRESS NOTE - NSBHFUPINTERVALHXFT_PSY_A_CORE
Pt compliant with medication and tolerating it well.  Chart reviewed, no events reported overnight.  Pt continues to be guarded and speaks minimally.  Pt more irritable today.  Pt denies SI/HI/I/P or AH/VH.  Reports no issues with sleep or appetite.

## 2022-03-06 NOTE — BH INPATIENT PSYCHIATRY PROGRESS NOTE - NSBHASSESSSUMMFT_PSY_ALL_CORE
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 Family reported that patient hasn't been compliant with his meds & has been neglecting basic daily hygiene. Patient himself denied any complaints but is described as internally preoccupied an uncooperative with questions. Psychiatry consulted for evaluation.     Pt guarded, internally preoccupied, speaks minimally    >Legal: 9.27 INVOL  >Obs: Routine, no current SI. no need for CO, patient not expected to pose risk to self or others in controlled inpatient setting; q15 adequate  >Psychiatric Meds: Titrate Risperdal M-tab to 3mg PO at bedtime;  goal of Sustenna EPPS  >Labs: Admission labs reviewed, will order urine culture.   >Medical:  No acute concerns. No consultations needed at this time.   >Social: milieu/structured therapy  >Treatment Interventions: Groups and Individual Therapy/CBT  >Dispo: pending remission of sx

## 2022-03-07 PROCEDURE — 99232 SBSQ HOSP IP/OBS MODERATE 35: CPT

## 2022-03-07 RX ORDER — RISPERIDONE 4 MG/1
4 TABLET ORAL AT BEDTIME
Refills: 0 | Status: DISCONTINUED | OUTPATIENT
Start: 2022-03-07 | End: 2022-03-10

## 2022-03-07 RX ADMIN — RISPERIDONE 4 MILLIGRAM(S): 4 TABLET ORAL at 20:35

## 2022-03-07 NOTE — BH INPATIENT PSYCHIATRY PROGRESS NOTE - NSBHMETABOLIC_PSY_ALL_CORE_FT
BMI: BMI (kg/m2): 26.7 (03-01-22 @ 14:41)  HbA1c: A1C with Estimated Average Glucose Result: 5.2 % (03-02-22 @ 09:43)    Glucose:   BP: 110/65 (03-07-22 @ 07:26) (106/67 - 113/69)  Lipid Panel: Date/Time: 03-02-22 @ 09:43  Cholesterol, Serum: 144  Direct LDL: --  HDL Cholesterol, Serum: 35  Total Cholesterol/HDL Ration Measurement: --  Triglycerides, Serum: 88

## 2022-03-07 NOTE — BH INPATIENT PSYCHIATRY PROGRESS NOTE - NSBHASSESSSUMMFT_PSY_ALL_CORE
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 Family reported that patient hasn't been compliant with his meds & has been neglecting basic daily hygiene. Patient himself denied any complaints but is described as internally preoccupied an uncooperative with questions. Psychiatry consulted for evaluation.     Pt guarded, internally preoccupied, speaks minimally    >Legal: 9.27 INVOL  >Obs: Routine, no current SI. no need for CO, patient not expected to pose risk to self or others in controlled inpatient setting; q15 adequate  >Psychiatric Meds: Titrate Risperdal M-tab to 4mg PO at bedtime;  goal of Sustenna EPPS  >Labs: Admission labs reviewed, will order urine culture.   >Medical:  No acute concerns. No consultations needed at this time.   >Social: milieu/structured therapy  >Treatment Interventions: Groups and Individual Therapy/CBT  >Dispo: pending remission of sx

## 2022-03-07 NOTE — BH INPATIENT PSYCHIATRY PROGRESS NOTE - NSBHCHARTREVIEWVS_PSY_A_CORE FT
Vital Signs Last 24 Hrs  T(C): 36.2 (03-07-22 @ 07:26), Max: 36.2 (03-06-22 @ 18:21)  T(F): 97.2 (03-07-22 @ 07:26), Max: 97.2 (03-06-22 @ 18:21)  HR: 81 (03-07-22 @ 07:26) (81 - 81)  BP: 110/65 (03-07-22 @ 07:26) (110/65 - 110/65)  BP(mean): --  RR: --  SpO2: --    Orthostatic VS  03-06-22 @ 07:24  Lying BP: 113/-- HR: --  Sitting BP: --/-- HR: --  Standing BP: --/-- HR: --  Site: --  Mode: --

## 2022-03-08 PROCEDURE — 99232 SBSQ HOSP IP/OBS MODERATE 35: CPT

## 2022-03-08 RX ADMIN — RISPERIDONE 4 MILLIGRAM(S): 4 TABLET ORAL at 20:42

## 2022-03-08 NOTE — BH INPATIENT PSYCHIATRY PROGRESS NOTE - NSBHCHARTREVIEWVS_PSY_A_CORE FT
Vital Signs Last 24 Hrs  T(C): 36.8 (03-08-22 @ 07:47), Max: 36.8 (03-08-22 @ 07:47)  T(F): 98.3 (03-08-22 @ 07:47), Max: 98.3 (03-08-22 @ 07:47)  HR: 99 (03-08-22 @ 07:47) (99 - 99)  BP: 118/56 (03-08-22 @ 07:47) (118/56 - 118/56)  BP(mean): --  RR: --  SpO2: --

## 2022-03-08 NOTE — BH INPATIENT PSYCHIATRY PROGRESS NOTE - NSBHASSESSSUMMFT_PSY_ALL_CORE
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 Family reported that patient hasn't been compliant with his meds & has been neglecting basic daily hygiene. Patient himself denied any complaints but is described as internally preoccupied an uncooperative with questions. Psychiatry consulted for evaluation.     Pt guarded, internally preoccupied, speaks minimally, has not showered since admission    >Legal: 9.27 INVOL  >Obs: Routine, no current SI. no need for CO, patient not expected to pose risk to self or others in controlled inpatient setting; q15 adequate  >Psychiatric Meds: Titrate Risperdal M-tab to 4mg PO at bedtime;  goal of Sustenna EPPS  >Labs: Admission labs reviewed, will order urine culture.   >Medical:  No acute concerns. No consultations needed at this time.   >Social: milieu/structured therapy  >Treatment Interventions: Groups and Individual Therapy/CBT  >Dispo: pending remission of sx

## 2022-03-08 NOTE — BH INPATIENT PSYCHIATRY PROGRESS NOTE - NSBHFUPINTERVALHXFT_PSY_A_CORE
Pt compliant with medication and tolerating it well.  Chart reviewed and case discussed with treatment team.  No events reported overnight.  Pt remains guarded and internally preoccupied.  Pt denies SI/HI/I/P or AH/VH or paranoia.  Pt reports eating and sleeping well.  Pt reports he has not showered since admission, agrees to shower today.

## 2022-03-08 NOTE — BH INPATIENT PSYCHIATRY PROGRESS NOTE - NSBHMETABOLIC_PSY_ALL_CORE_FT
Started working at [employer].  No Cramping but also has constipation. Will increase water intake. Labs next visit.    BMI: BMI (kg/m2): 26.7 (03-01-22 @ 14:41)  HbA1c: A1C with Estimated Average Glucose Result: 5.2 % (03-02-22 @ 09:43)    Glucose:   BP: 118/56 (03-08-22 @ 07:47) (110/65 - 118/56)  Lipid Panel: Date/Time: 03-02-22 @ 09:43  Cholesterol, Serum: 144  Direct LDL: --  HDL Cholesterol, Serum: 35  Total Cholesterol/HDL Ration Measurement: --  Triglycerides, Serum: 88

## 2022-03-09 PROCEDURE — 99231 SBSQ HOSP IP/OBS SF/LOW 25: CPT

## 2022-03-09 RX ADMIN — RISPERIDONE 4 MILLIGRAM(S): 4 TABLET ORAL at 20:58

## 2022-03-09 NOTE — BH INPATIENT PSYCHIATRY PROGRESS NOTE - NSBHFUPINTERVALHXFT_PSY_A_CORE
Pt compliant with medication and tolerating it well.  Chart reviewed and case discussed with treatment team.  No events reported overnight.  Pt remains guarded.  Pt has been mostly isolative in his room and continues to have poor self care.  Pt denies SI/HI/I/P or AH/VH.  Pt denies issues with eating and sleeping.

## 2022-03-09 NOTE — BH INPATIENT PSYCHIATRY PROGRESS NOTE - NSBHASSESSSUMMFT_PSY_ALL_CORE
This is a 21 year old single, unemployed male, Syrian-speaking, non-caregiver, domiciled with family (uncle, aunt and cousins), with past psychiatric history of affective psychosis vs schizophreniform disorder, prior psychiatric admissions (last known to  8/21-9/9/21), no known suicide attempts or non-suicidal self injury, no known history of violence, aggression, legal issues or substance use and no pertinent medical history otherwise who presents to the ED BIB PD (not under arrest) activated by family for concern of decompensated psychosis and poor self care. Family reported that patient hasn't been compliant with his meds & has been neglecting basic daily hygiene. Patient himself denied any complaints but is described as internally preoccupied an uncooperative with questions. Psychiatry consulted for evaluation.     Pt guarded, internally preoccupied, speaks minimally, has not showered since admission    >Legal: 9.27 INVOL  >Obs: Routine, no current SI. no need for CO, patient not expected to pose risk to self or others in controlled inpatient setting; q15 adequate  >Psychiatric Meds: Titrate Risperdal M-tab to 4mg PO at bedtime;  goal of Sustenna EPPS  >Labs: Admission labs reviewed, will order urine culture.   >Medical:  No acute concerns. No consultations needed at this time.   >Social: milieu/structured therapy  >Treatment Interventions: Groups and Individual Therapy/CBT  >Dispo: pending remission of sx

## 2022-03-09 NOTE — BH INPATIENT PSYCHIATRY PROGRESS NOTE - NSBHCHARTREVIEWVS_PSY_A_CORE FT
Vital Signs Last 24 Hrs  T(C): 36.7 (03-09-22 @ 07:50), Max: 36.7 (03-08-22 @ 18:31)  T(F): 98 (03-09-22 @ 07:50), Max: 98.1 (03-08-22 @ 18:31)  HR: --  BP: --  BP(mean): --  RR: --  SpO2: --    Orthostatic VS  03-09-22 @ 07:50  Lying BP: 98/64 HR: 111  Sitting BP: --/-- HR: --  Standing BP: --/-- HR: --  Site: --  Mode: --

## 2022-03-10 PROCEDURE — 99232 SBSQ HOSP IP/OBS MODERATE 35: CPT

## 2022-03-10 RX ORDER — RISPERIDONE 4 MG/1
5 TABLET ORAL AT BEDTIME
Refills: 0 | Status: DISCONTINUED | OUTPATIENT
Start: 2022-03-10 | End: 2022-03-14

## 2022-03-10 RX ADMIN — RISPERIDONE 5 MILLIGRAM(S): 4 TABLET ORAL at 20:11

## 2022-03-10 NOTE — BH INPATIENT PSYCHIATRY PROGRESS NOTE - NSBHCHARTREVIEWVS_PSY_A_CORE FT
Vital Signs Last 24 Hrs  T(C): 36.9 (03-10-22 @ 08:18), Max: 36.9 (03-10-22 @ 08:18)  T(F): 98.4 (03-10-22 @ 08:18), Max: 98.4 (03-10-22 @ 08:18)  HR: 98 (03-10-22 @ 08:18) (98 - 98)  BP: 113/60 (03-10-22 @ 08:18) (113/60 - 113/60)  BP(mean): --  RR: --  SpO2: --    Orthostatic VS  03-09-22 @ 07:50  Lying BP: 98/64 HR: 111  Sitting BP: --/-- HR: --  Standing BP: --/-- HR: --  Site: --  Mode: --

## 2022-03-10 NOTE — BH INPATIENT PSYCHIATRY PROGRESS NOTE - NSBHFUPINTERVALHXFT_PSY_A_CORE
Pt compliant with medication and tolerating it well.  Chart reviewed and case discussed with treatment team.  No events reported overnight.  Pt approached for interview to come to  phone and patient refuses interview, reports, "I am okay, I don't want to talk."  Pt refuses despite several attempts.  Pt observed with continued poor self care, wearing the same clothing as admission.

## 2022-03-10 NOTE — BH INPATIENT PSYCHIATRY PROGRESS NOTE - NSBHMETABOLIC_PSY_ALL_CORE_FT
BMI: BMI (kg/m2): 26.7 (03-01-22 @ 14:41)  HbA1c: A1C with Estimated Average Glucose Result: 5.2 % (03-02-22 @ 09:43)    Glucose:   BP: 113/60 (03-10-22 @ 08:18) (113/60 - 118/56)  Lipid Panel: Date/Time: 03-02-22 @ 09:43  Cholesterol, Serum: 144  Direct LDL: --  HDL Cholesterol, Serum: 35  Total Cholesterol/HDL Ration Measurement: --  Triglycerides, Serum: 88

## 2022-03-10 NOTE — BH INPATIENT PSYCHIATRY PROGRESS NOTE - NSBHASSESSSUMMFT_PSY_ALL_CORE
This is a 21 year old single, unemployed male, Montserratian-speaking, non-caregiver, domiciled with family (uncle, aunt and cousins), with past psychiatric history of affective psychosis vs schizophreniform disorder, prior psychiatric admissions (last known to  8/21-9/9/21), no known suicide attempts or non-suicidal self injury, no known history of violence, aggression, legal issues or substance use and no pertinent medical history otherwise who presents to the ED BIB PD (not under arrest) activated by family for concern of decompensated psychosis and poor self care. Family reported that patient hasn't been compliant with his meds & has been neglecting basic daily hygiene. Patient himself denied any complaints but is described as internally preoccupied an uncooperative with questions. Psychiatry consulted for evaluation.     Pt uncooperative with interview, poor self care, internally preoccupied    >Legal: 9.27 INVOL  >Obs: Routine, no current SI. no need for CO, patient not expected to pose risk to self or others in controlled inpatient setting; q15 adequate  >Psychiatric Meds: Titrate Risperdal M-tab to 5mg PO at bedtime;  goal of S[facility]enna EPPS  >Labs: Admission labs reviewed, will order urine culture.   >Medical:  No acute concerns. No consultations needed at this time.   >Social: milieu/structured therapy  >Treatment Interventions: Groups and Individual Therapy/CBT  >Dispo: pending remission of sx

## 2022-03-11 PROCEDURE — 99231 SBSQ HOSP IP/OBS SF/LOW 25: CPT

## 2022-03-11 RX ADMIN — RISPERIDONE 5 MILLIGRAM(S): 4 TABLET ORAL at 21:16

## 2022-03-11 NOTE — BH INPATIENT PSYCHIATRY PROGRESS NOTE - NSBHMETABOLIC_PSY_ALL_CORE_FT
BMI: BMI (kg/m2): 26.7 (03-01-22 @ 14:41)  HbA1c: A1C with Estimated Average Glucose Result: 5.2 % (03-02-22 @ 09:43)    Glucose:   BP: 107/60 (03-11-22 @ 07:41) (107/60 - 113/60)  Lipid Panel: Date/Time: 03-02-22 @ 09:43  Cholesterol, Serum: 144  Direct LDL: --  HDL Cholesterol, Serum: 35  Total Cholesterol/HDL Ration Measurement: --  Triglycerides, Serum: 88

## 2022-03-11 NOTE — BH INPATIENT PSYCHIATRY PROGRESS NOTE - NSBHASSESSSUMMFT_PSY_ALL_CORE
This is a 21 year old single, unemployed male, Georgian-speaking, non-caregiver, domiciled with family (uncle, aunt and cousins), with past psychiatric history of affective psychosis vs schizophreniform disorder, prior psychiatric admissions (last known to  8/21-9/9/21), no known suicide attempts or non-suicidal self injury, no known history of violence, aggression, legal issues or substance use and no pertinent medical history otherwise who presents to the ED BIB PD (not under arrest) activated by family for concern of decompensated psychosis and poor self care. Family reported that patient hasn't been compliant with his meds & has been neglecting basic daily hygiene. Patient himself denied any complaints but is described as internally preoccupied an uncooperative with questions. Psychiatry consulted for evaluation.     Pt uncooperative with interview, poor self care, internally preoccupied    >Legal: 9.27 INVOL  >Obs: Routine, no current SI. no need for CO, patient not expected to pose risk to self or others in controlled inpatient setting; q15 adequate  >Psychiatric Meds: Titrate Risperdal M-tab to 5mg PO at bedtime;  goal of Sustenna EPPS  >Labs: Admission labs reviewed, will order urine culture.   >Medical:  No acute concerns. No consultations needed at this time.   >Social: milieu/structured therapy  >Treatment Interventions: Groups and Individual Therapy/CBT  >Dispo: pending remission of sx

## 2022-03-11 NOTE — BH INPATIENT PSYCHIATRY PROGRESS NOTE - NSBHFUPINTERVALHXFT_PSY_A_CORE
Pt compliant with medication and tolerating it well.  Chart reviewed and case discussed with treatment team.  No events reported overnight.  Pt observed sitting in the day room watching TV.  Pt wearing different clothing than previous days.  When approached for interview, patient uncooperative and does not want to go to  phone.  Pt reports, "I am okay, I don't want to talk."  Pt asked if he showered, reports, "no."  Pt then again says he does not want to talk.

## 2022-03-11 NOTE — BH INPATIENT PSYCHIATRY PROGRESS NOTE - NSBHCHARTREVIEWVS_PSY_A_CORE FT
Vital Signs Last 24 Hrs  T(C): 36.4 (03-11-22 @ 07:41), Max: 36.4 (03-11-22 @ 07:41)  T(F): 97.6 (03-11-22 @ 07:41), Max: 97.6 (03-11-22 @ 07:41)  HR: 78 (03-11-22 @ 07:41) (78 - 78)  BP: 107/60 (03-11-22 @ 07:41) (107/60 - 107/60)  BP(mean): --  RR: --  SpO2: --

## 2022-03-12 RX ADMIN — RISPERIDONE 5 MILLIGRAM(S): 4 TABLET ORAL at 20:57

## 2022-03-13 RX ADMIN — RISPERIDONE 5 MILLIGRAM(S): 4 TABLET ORAL at 21:03

## 2022-03-13 RX ADMIN — Medication 50 MILLIGRAM(S): at 21:02

## 2022-03-14 PROCEDURE — 99231 SBSQ HOSP IP/OBS SF/LOW 25: CPT

## 2022-03-14 RX ORDER — OLANZAPINE 15 MG/1
5 TABLET, FILM COATED ORAL AT BEDTIME
Refills: 0 | Status: DISCONTINUED | OUTPATIENT
Start: 2022-03-14 | End: 2022-03-16

## 2022-03-14 RX ORDER — RISPERIDONE 4 MG/1
4 TABLET ORAL AT BEDTIME
Refills: 0 | Status: DISCONTINUED | OUTPATIENT
Start: 2022-03-14 | End: 2022-03-16

## 2022-03-14 RX ADMIN — OLANZAPINE 5 MILLIGRAM(S): 15 TABLET, FILM COATED ORAL at 20:29

## 2022-03-14 RX ADMIN — RISPERIDONE 4 MILLIGRAM(S): 4 TABLET ORAL at 20:28

## 2022-03-14 NOTE — BH INPATIENT PSYCHIATRY PROGRESS NOTE - NSBHCHARTREVIEWVS_PSY_A_CORE FT
Vital Signs Last 24 Hrs  T(C): 37 (03-14-22 @ 07:26), Max: 37 (03-14-22 @ 07:26)  T(F): 98.6 (03-14-22 @ 07:26), Max: 98.6 (03-14-22 @ 07:26)  HR: 72 (03-14-22 @ 07:26) (72 - 72)  BP: 109/64 (03-14-22 @ 07:26) (109/64 - 109/64)  BP(mean): --  RR: --  SpO2: --    Orthostatic VS  03-13-22 @ 08:07  Lying BP: --/-- HR: --  Sitting BP: 109/68 HR: 71  Standing BP: --/-- HR: --  Site: --  Mode: --

## 2022-03-14 NOTE — BH INPATIENT PSYCHIATRY PROGRESS NOTE - NSBHMETABOLIC_PSY_ALL_CORE_FT
BMI: BMI (kg/m2): 26.7 (03-01-22 @ 14:41)  HbA1c: A1C with Estimated Average Glucose Result: 5.2 % (03-02-22 @ 09:43)    Glucose:   BP: 109/64 (03-14-22 @ 07:26) (105/68 - 109/64)  Lipid Panel: Date/Time: 03-02-22 @ 09:43  Cholesterol, Serum: 144  Direct LDL: --  HDL Cholesterol, Serum: 35  Total Cholesterol/HDL Ration Measurement: --  Triglycerides, Serum: 88

## 2022-03-14 NOTE — BH INPATIENT PSYCHIATRY PROGRESS NOTE - NSBHFUPINTERVALHXFT_PSY_A_CORE
Pt compliant with medication and tolerating it well.  Chart reviewed and case discussed with treatment team.  No events reported overnight.  Pt continues to be uncooperative with interview, reports, "I am okay," and refuses to come to  phone, reports he does not want to talk.  Pt later observed pacing the unit, appears internally preoccupied.

## 2022-03-14 NOTE — BH INPATIENT PSYCHIATRY PROGRESS NOTE - NSBHASSESSSUMMFT_PSY_ALL_CORE
This is a 21 year old single, unemployed male, Gibraltarian-speaking, non-caregiver, domiciled with family (uncle, aunt and cousins), with past psychiatric history of affective psychosis vs schizophreniform disorder, prior psychiatric admissions (last known to  8/21-9/9/21), no known suicide attempts or non-suicidal self injury, no known history of violence, aggression, legal issues or substance use and no pertinent medical history otherwise who presents to the ED BIB PD (not under arrest) activated by family for concern of decompensated psychosis and poor self care. Family reported that patient hasn't been compliant with his meds & has been neglecting basic daily hygiene. Patient himself denied any complaints but is described as internally preoccupied an uncooperative with questions. Psychiatry consulted for evaluation.     Pt uncooperative with interview, poor self care, internally preoccupied    >Legal: 9.27 INVOL  >Obs: Routine, no current SI. no need for CO, patient not expected to pose risk to self or others in controlled inpatient setting; q15 adequate  >Psychiatric Meds: Taper Risperdal M-tab to 4mg PO at bedtime and start Zyprexa Zydis 5mg PO at bedtime  goal of Sustenna EPPS  >Labs: Admission labs reviewed, will order urine culture.   >Medical:  No acute concerns. No consultations needed at this time.   >Social: milieu/structured therapy  >Treatment Interventions: Groups and Individual Therapy/CBT  >Dispo: pending remission of sx

## 2022-03-15 PROCEDURE — 99231 SBSQ HOSP IP/OBS SF/LOW 25: CPT

## 2022-03-15 RX ADMIN — RISPERIDONE 4 MILLIGRAM(S): 4 TABLET ORAL at 20:46

## 2022-03-15 RX ADMIN — OLANZAPINE 5 MILLIGRAM(S): 15 TABLET, FILM COATED ORAL at 20:46

## 2022-03-15 NOTE — BH INPATIENT PSYCHIATRY PROGRESS NOTE - NSBHFUPINTERVALHXFT_PSY_A_CORE
Pt compliant with medication and tolerating it well.  Chart reviewed and case discussed with treatment team.  No events reported overnight.  Pt continues to refuse to engage in interview and refuses to go to  phone.  Pt reports he is "okay" and does not want to talk.  Writer attempted to explain that treatment team needs to speak with him to see how he is doing to go home eventually.  Pt reports he is okay and does not want to go home.  Pt walks away and appears internally preoccupied.

## 2022-03-15 NOTE — BH INPATIENT PSYCHIATRY PROGRESS NOTE - NSBHCHARTREVIEWVS_PSY_A_CORE FT
Vital Signs Last 24 Hrs  T(C): 35.8 (03-15-22 @ 07:41), Max: 36.3 (03-14-22 @ 19:06)  T(F): 96.5 (03-15-22 @ 07:41), Max: 97.4 (03-14-22 @ 19:06)  HR: 90 (03-15-22 @ 07:41) (90 - 90)  BP: 108/62 (03-15-22 @ 07:41) (108/62 - 108/62)  BP(mean): --  RR: --  SpO2: --

## 2022-03-15 NOTE — BH INPATIENT PSYCHIATRY PROGRESS NOTE - NSBHMETABOLIC_PSY_ALL_CORE_FT
BMI: BMI (kg/m2): 26.7 (03-01-22 @ 14:41)  HbA1c: A1C with Estimated Average Glucose Result: 5.2 % (03-02-22 @ 09:43)    Glucose:   BP: 108/62 (03-15-22 @ 07:41) (108/62 - 109/64)  Lipid Panel: Date/Time: 03-02-22 @ 09:43  Cholesterol, Serum: 144  Direct LDL: --  HDL Cholesterol, Serum: 35  Total Cholesterol/HDL Ration Measurement: --  Triglycerides, Serum: 88

## 2022-03-15 NOTE — BH INPATIENT PSYCHIATRY PROGRESS NOTE - NSBHASSESSSUMMFT_PSY_ALL_CORE
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 Family reported that patient hasn't been compliant with his meds & has been neglecting basic daily hygiene. Patient himself denied any complaints but is described as internally preoccupied an uncooperative with questions. Psychiatry consulted for evaluation.     Pt uncooperative with interview, poor self care, internally preoccupied    >Legal: 9.27 INVOL  >Obs: Routine, no current SI. no need for CO, patient not expected to pose risk to self or others in controlled inpatient setting; q15 adequate  >Psychiatric Meds: Taper Risperdal M-tab to 4mg PO at bedtime and start Zyprexa Zydis 5mg PO at bedtime  goal of Sustenna EPPS  >Labs: Admission labs reviewed, will order urine culture.   >Medical:  No acute concerns. No consultations needed at this time.   >Social: milieu/structured therapy  >Treatment Interventions: Groups and Individual Therapy/CBT  >Dispo: pending remission of sx

## 2022-03-16 PROCEDURE — 99231 SBSQ HOSP IP/OBS SF/LOW 25: CPT

## 2022-03-16 RX ORDER — OLANZAPINE 15 MG/1
10 TABLET, FILM COATED ORAL AT BEDTIME
Refills: 0 | Status: DISCONTINUED | OUTPATIENT
Start: 2022-03-16 | End: 2022-03-18

## 2022-03-16 RX ORDER — RISPERIDONE 4 MG/1
3 TABLET ORAL AT BEDTIME
Refills: 0 | Status: DISCONTINUED | OUTPATIENT
Start: 2022-03-16 | End: 2022-03-18

## 2022-03-16 RX ADMIN — OLANZAPINE 10 MILLIGRAM(S): 15 TABLET, FILM COATED ORAL at 21:01

## 2022-03-16 RX ADMIN — RISPERIDONE 3 MILLIGRAM(S): 4 TABLET ORAL at 21:01

## 2022-03-16 NOTE — BH INPATIENT PSYCHIATRY PROGRESS NOTE - NSBHASSESSSUMMFT_PSY_ALL_CORE
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 Family reported that patient hasn't been compliant with his meds & has been neglecting basic daily hygiene. Patient himself denied any complaints but is described as internally preoccupied an uncooperative with questions. Psychiatry consulted for evaluation.     Pt uncooperative with interview, poor self care, internally preoccupied    >Legal: 9.27 INVOL  >Obs: Routine, no current SI. no need for CO, patient not expected to pose risk to self or others in controlled inpatient setting; q15 adequate  >Psychiatric Meds: Taper Risperdal M-tab to 4mg PO at bedtime and start Zyprexa Zydis 5mg PO at bedtime  goal of [place] EPPS  >Labs: Admission labs reviewed, will order urine culture.   >Medical:  No acute concerns. No consultations needed at this time.   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 Family reported that patient hasn't been compliant with his meds & has been neglecting basic daily hygiene. Patient himself denied any complaints but is described as internally preoccupied an uncooperative with questions. Psychiatry consulted for evaluation.     Pt uncooperative with interview, poor self care, internally preoccupied    >Legal: 9.27 INVOL  >Obs: Routine, no current SI. no need for CO, patient not expected to pose risk to self or others in controlled inpatient setting; q15 adequate  >Psychiatric Meds: Taper Risperdal M-tab to 3mg PO at bedtime and start Zyprexa Zydis titrated to 10mg PO at bedtime  >Labs: Admission labs reviewed, will order urine culture.   >Medical:  No acute concerns. No consultations needed at this time.   >Social: milieu/structured therapy  >Treatment Interventions: Groups and Individual Therapy/CBT  >Dispo: pending remission of sx

## 2022-03-16 NOTE — BH INPATIENT PSYCHIATRY PROGRESS NOTE - NSBHMETABOLIC_PSY_ALL_CORE_FT
BMI: BMI (kg/m2): 26.7 (03-01-22 @ 14:41)  HbA1c: A1C with Estimated Average Glucose Result: 5.2 % (03-02-22 @ 09:43)    Glucose:   BP: 104/65 (03-16-22 @ 08:37) (104/65 - 109/64)  Lipid Panel: Date/Time: 03-02-22 @ 09:43  Cholesterol, Serum: 144  Direct LDL: --  HDL Cholesterol, Serum: 35  Total Cholesterol/HDL Ration Measurement: --  Triglycerides, Serum: 88   BMI: BMI (kg/m2): 26.7 (03-01-22 @ 14:41)  HbA1c: A1C with Estimated Average Glucose Result: 5.2 % (03-02-22 @ 09:43)    Glucose:   BP: 113/66 (03-17-22 @ 08:10) (104/65 - 113/66)  Lipid Panel: Date/Time: 03-02-22 @ 09:43  Cholesterol, Serum: 144  Direct LDL: --  HDL Cholesterol, Serum: 35  Total Cholesterol/HDL Ration Measurement: --  Triglycerides, Serum: 88

## 2022-03-16 NOTE — BH INPATIENT PSYCHIATRY PROGRESS NOTE - NSBHCHARTREVIEWVS_PSY_A_CORE FT
Vital Signs Last 24 Hrs  T(C): 36.9 (03-16-22 @ 08:37), Max: 36.9 (03-16-22 @ 08:37)  T(F): 98.5 (03-16-22 @ 08:37), Max: 98.5 (03-16-22 @ 08:37)  HR: 95 (03-16-22 @ 08:37) (95 - 95)  BP: 104/65 (03-16-22 @ 08:37) (104/65 - 104/65)  BP(mean): --  RR: 16 (03-16-22 @ 08:37) (16 - 16)  SpO2: --     Vital Signs Last 24 Hrs  T(C): 36.8 (03-17-22 @ 08:10), Max: 36.8 (03-17-22 @ 08:10)  T(F): 98.3 (03-17-22 @ 08:10), Max: 98.3 (03-17-22 @ 08:10)  HR: 86 (03-17-22 @ 08:10) (86 - 86)  BP: 113/66 (03-17-22 @ 08:10) (113/66 - 113/66)  BP(mean): --  RR: --  SpO2: --

## 2022-03-16 NOTE — BH INPATIENT PSYCHIATRY PROGRESS NOTE - NSBHFUPINTERVALHXFT_PSY_A_CORE
Pt compliant with medication and tolerating it well.  Chart reviewed and case discussed with treatment team.  No events reported overnight.  Pt continues to refuse interview, reports he is "okay," and does not want to talk.  Pt asked if he has showered, reports he has not.  Pt refuses to engage further.  Pt later seen pacing the hallway, appeared internally preoccupied and refused to engage with writer.

## 2022-03-17 PROCEDURE — 99231 SBSQ HOSP IP/OBS SF/LOW 25: CPT

## 2022-03-17 RX ADMIN — OLANZAPINE 10 MILLIGRAM(S): 15 TABLET, FILM COATED ORAL at 21:07

## 2022-03-17 RX ADMIN — RISPERIDONE 3 MILLIGRAM(S): 4 TABLET ORAL at 21:06

## 2022-03-17 NOTE — BH INPATIENT PSYCHIATRY PROGRESS NOTE - NSBHFUPINTERVALHXFT_PSY_A_CORE
Pt compliant with medication and tolerating it well.  Chart reviewed and case discussed with treatment team.  No events reported overnight.  Pt continues to isolate to self in room.  Pt refused to use  phone, reports he does not like talking on the phone.  Pt reports he understands English.  Pt remains guarded.  Pt denies SI/HI/I/P or AH/VH.  Pt reports good sleep and appetite.  Pt reports he has not showered.  Pt asks to go home.  Pt encouraged to shower and not isolate and engage in unit activities.

## 2022-03-17 NOTE — BH INPATIENT PSYCHIATRY PROGRESS NOTE - NSBHCHARTREVIEWVS_PSY_A_CORE FT
Vital Signs Last 24 Hrs  T(C): 36.8 (03-17-22 @ 08:10), Max: 36.8 (03-17-22 @ 08:10)  T(F): 98.3 (03-17-22 @ 08:10), Max: 98.3 (03-17-22 @ 08:10)  HR: 86 (03-17-22 @ 08:10) (86 - 86)  BP: 113/66 (03-17-22 @ 08:10) (113/66 - 113/66)  BP(mean): --  RR: --  SpO2: --

## 2022-03-17 NOTE — BH INPATIENT PSYCHIATRY PROGRESS NOTE - NSBHMETABOLIC_PSY_ALL_CORE_FT
BMI: BMI (kg/m2): 26.7 (03-01-22 @ 14:41)  HbA1c: A1C with Estimated Average Glucose Result: 5.2 % (03-02-22 @ 09:43)    Glucose:   BP: 113/66 (03-17-22 @ 08:10) (104/65 - 113/66)  Lipid Panel: Date/Time: 03-02-22 @ 09:43  Cholesterol, Serum: 144  Direct LDL: --  HDL Cholesterol, Serum: 35  Total Cholesterol/HDL Ration Measurement: --  Triglycerides, Serum: 88

## 2022-03-17 NOTE — BH INPATIENT PSYCHIATRY PROGRESS NOTE - NSBHASSESSSUMMFT_PSY_ALL_CORE
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 Family reported that patient hasn't been compliant with his meds & has been neglecting basic daily hygiene. Patient himself denied any complaints but is described as internally preoccupied an uncooperative with questions. Psychiatry consulted for evaluation.     Pt guarded and vague, poor self care, internally preoccupied    >Legal: 9.27 INVOL  >Obs: Routine, no current SI. no need for CO, patient not expected to pose risk to self or others in controlled inpatient setting; q15 adequate  >Psychiatric Meds: Taper Risperdal M-tab to 3mg PO at bedtime and titrate Zyprexa Zydis to 10mg PO at bedtime  >Labs: Admission labs reviewed  >Medical:  No acute concerns. No consultations needed at this time.   >Social: milieu/structured therapy  >Treatment Interventions: Groups and Individual Therapy/CBT  >Dispo: pending remission of sx

## 2022-03-17 NOTE — BH TREATMENT PLAN - NSPTSTATEDGOAL_PSY_ALL_CORE
Patient is a poor historian unable to answer most questions

## 2022-03-17 NOTE — BH TREATMENT PLAN - NSTXMEDICINTERRN_PSY_ALL_CORE
Provide encouragement and support as needed. Explain risks and benefits of medications.
Provide encouragement and support as needed. Explain risks and benefits of medications.

## 2022-03-18 PROCEDURE — 99231 SBSQ HOSP IP/OBS SF/LOW 25: CPT

## 2022-03-18 RX ORDER — RISPERIDONE 4 MG/1
2 TABLET ORAL AT BEDTIME
Refills: 0 | Status: COMPLETED | OUTPATIENT
Start: 2022-03-18 | End: 2022-03-19

## 2022-03-18 RX ORDER — OLANZAPINE 15 MG/1
15 TABLET, FILM COATED ORAL AT BEDTIME
Refills: 0 | Status: DISCONTINUED | OUTPATIENT
Start: 2022-03-18 | End: 2022-03-22

## 2022-03-18 RX ADMIN — OLANZAPINE 15 MILLIGRAM(S): 15 TABLET, FILM COATED ORAL at 20:56

## 2022-03-18 RX ADMIN — RISPERIDONE 2 MILLIGRAM(S): 4 TABLET ORAL at 20:56

## 2022-03-18 NOTE — BH INPATIENT PSYCHIATRY PROGRESS NOTE - NSBHFUPINTERVALHXFT_PSY_A_CORE
Pt compliant with medication and tolerating it well.  Chart reviewed and case discussed with treatment team.  No events reported overnight.  Pt observed isolative to self in room, out pacing later in the afternoon.  Pt continues to refuse to come to  phone.  Pt denies SI/HI/I/P or AH/VH.  Pt reports he has been eating and sleeping.  Pt reports he has not showered and is encouraged to do so and not isolate, says "okay."

## 2022-03-18 NOTE — BH INPATIENT PSYCHIATRY PROGRESS NOTE - NSBHCHARTREVIEWVS_PSY_A_CORE FT
Vital Signs Last 24 Hrs  T(C): 36.8 (03-18-22 @ 08:46), Max: 36.9 (03-17-22 @ 18:30)  T(F): 98.3 (03-18-22 @ 08:46), Max: 98.4 (03-17-22 @ 18:30)  HR: 76 (03-18-22 @ 08:46) (76 - 76)  BP: 111/61 (03-18-22 @ 08:46) (111/61 - 111/61)  BP(mean): --  RR: --  SpO2: --

## 2022-03-18 NOTE — BH INPATIENT PSYCHIATRY PROGRESS NOTE - NSBHMETABOLIC_PSY_ALL_CORE_FT
BMI: BMI (kg/m2): 26.7 (03-01-22 @ 14:41)  HbA1c: A1C with Estimated Average Glucose Result: 5.2 % (03-02-22 @ 09:43)    Glucose:   BP: 111/61 (03-18-22 @ 08:46) (104/65 - 113/66)  Lipid Panel: Date/Time: 03-02-22 @ 09:43  Cholesterol, Serum: 144  Direct LDL: --  HDL Cholesterol, Serum: 35  Total Cholesterol/HDL Ration Measurement: --  Triglycerides, Serum: 88

## 2022-03-18 NOTE — BH INPATIENT PSYCHIATRY PROGRESS NOTE - NSBHASSESSSUMMFT_PSY_ALL_CORE
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 Family reported that patient hasn't been compliant with his meds & has been neglecting basic daily hygiene. Patient himself denied any complaints but is described as internally preoccupied an uncooperative with questions. Psychiatry consulted for evaluation.     Pt guarded and vague, poor self care, internally preoccupied    >Legal: 9.27 INVOL  >Obs: Routine, no current SI. no need for CO, patient not expected to pose risk to self or others in controlled inpatient setting; q15 adequate  >Psychiatric Meds: Taper Risperdal M-tab to 2mg PO at bedtime x 2 days then discontinue and titrate Zyprexa Zydis to 15mg PO at bedtime  >Labs: Admission labs reviewed  >Medical:  No acute concerns. No consultations needed at this time.   >Social: milieu/structured therapy  >Treatment Interventions: Groups and Individual Therapy/CBT  >Dispo: pending remission of sx

## 2022-03-19 RX ADMIN — OLANZAPINE 15 MILLIGRAM(S): 15 TABLET, FILM COATED ORAL at 21:27

## 2022-03-19 RX ADMIN — RISPERIDONE 2 MILLIGRAM(S): 4 TABLET ORAL at 21:27

## 2022-03-20 RX ADMIN — OLANZAPINE 15 MILLIGRAM(S): 15 TABLET, FILM COATED ORAL at 20:46

## 2022-03-21 PROCEDURE — 99231 SBSQ HOSP IP/OBS SF/LOW 25: CPT

## 2022-03-21 RX ADMIN — OLANZAPINE 15 MILLIGRAM(S): 15 TABLET, FILM COATED ORAL at 20:24

## 2022-03-21 NOTE — BH INPATIENT PSYCHIATRY PROGRESS NOTE - NSBHFUPINTERVALHXFT_PSY_A_CORE
Pt compliant with medication and tolerating it well.  Chart reviewed and case discussed with treatment team.  No events reported overnight.  Pt continues to isolate in his room.  Per staff, patient more visible in the day room in the evening.  Pt declining to use  phone.  Pt reports he is "okay" and does not want to talk.  Pt answers minimal interview questions.  Pt denies SI/HI/I/P or AH/VH or paranoia.  Pt reports eating and sleeping.  Pt reports he has not showered.

## 2022-03-21 NOTE — BH INPATIENT PSYCHIATRY PROGRESS NOTE - NSBHCHARTREVIEWVS_PSY_A_CORE FT
Vital Signs Last 24 Hrs  T(C): 36.7 (03-21-22 @ 08:26), Max: 36.7 (03-21-22 @ 08:26)  T(F): 98.1 (03-21-22 @ 08:26), Max: 98.1 (03-21-22 @ 08:26)  HR: 62 (03-21-22 @ 08:26) (62 - 62)  BP: 113/60 (03-21-22 @ 08:26) (113/60 - 113/60)  BP(mean): --  RR: --  SpO2: --    Orthostatic VS  03-20-22 @ 07:52  Lying BP: --/-- HR: --  Sitting BP: 107/65 HR: 102  Standing BP: --/-- HR: --  Site: --  Mode: --

## 2022-03-21 NOTE — BH INPATIENT PSYCHIATRY PROGRESS NOTE - NSBHASSESSSUMMFT_PSY_ALL_CORE
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 Family reported that patient hasn't been compliant with his meds & has been neglecting basic daily hygiene. Patient himself denied any complaints but is described as internally preoccupied an uncooperative with questions. Psychiatry consulted for evaluation.     Pt guarded and vague, poor self care, internally preoccupied    >Legal: 9.27 INVOL  >Obs: Routine, no current SI. no need for CO, patient not expected to pose risk to self or others in controlled inpatient setting; q15 adequate  >Psychiatric Meds:  Zyprexa Zydis to 15mg PO at bedtime  >Labs: Admission labs reviewed  >Medical:  No acute concerns. No consultations needed at this time.   >Social: milieu/structured therapy  >Treatment Interventions: Groups and Individual Therapy/CBT  >Dispo: pending remission of sx

## 2022-03-21 NOTE — BH INPATIENT PSYCHIATRY PROGRESS NOTE - NSBHMETABOLIC_PSY_ALL_CORE_FT
BMI: BMI (kg/m2): 26.7 (03-01-22 @ 14:41)  HbA1c: A1C with Estimated Average Glucose Result: 5.2 % (03-02-22 @ 09:43)    Glucose:   BP: 113/60 (03-21-22 @ 08:26) (104/67 - 113/60)  Lipid Panel: Date/Time: 03-02-22 @ 09:43  Cholesterol, Serum: 144  Direct LDL: --  HDL Cholesterol, Serum: 35  Total Cholesterol/HDL Ration Measurement: --  Triglycerides, Serum: 88

## 2022-03-22 PROCEDURE — 99231 SBSQ HOSP IP/OBS SF/LOW 25: CPT

## 2022-03-22 RX ORDER — OLANZAPINE 15 MG/1
20 TABLET, FILM COATED ORAL AT BEDTIME
Refills: 0 | Status: DISCONTINUED | OUTPATIENT
Start: 2022-03-22 | End: 2022-03-31

## 2022-03-22 RX ADMIN — OLANZAPINE 20 MILLIGRAM(S): 15 TABLET, FILM COATED ORAL at 20:52

## 2022-03-22 NOTE — BH INPATIENT PSYCHIATRY PROGRESS NOTE - NSBHCHARTREVIEWVS_PSY_A_CORE FT
Vital Signs Last 24 Hrs  T(C): 36.3 (03-22-22 @ 07:54), Max: 37.1 (03-21-22 @ 19:59)  T(F): 97.3 (03-22-22 @ 07:54), Max: 98.7 (03-21-22 @ 19:59)  HR: 69 (03-22-22 @ 07:54) (69 - 69)  BP: 102/54 (03-22-22 @ 07:54) (102/54 - 102/54)  BP(mean): --  RR: --  SpO2: --

## 2022-03-22 NOTE — BH INPATIENT PSYCHIATRY PROGRESS NOTE - NSBHFUPINTERVALHXFT_PSY_A_CORE
Pt compliant with medication and tolerating it well.  Chart reviewed and case discussed with treatment team.  No events reported overnight.  Pt continues to isolate in bed.  Pt under the covers, refuses to come out.  Pt reports he does not want to talk and is "okay."  Pt reports he wants to go home.  Pt reports he has not been showering.  Explained to patient he needs to tend to ADLs and is encouraged to be more visible in the mileu.

## 2022-03-22 NOTE — BH INPATIENT PSYCHIATRY PROGRESS NOTE - NSBHASSESSSUMMFT_PSY_ALL_CORE
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 Family reported that patient hasn't been compliant with his meds & has been neglecting basic daily hygiene. Patient himself denied any complaints but is described as internally preoccupied an uncooperative with questions. Psychiatry consulted for evaluation.     Pt uncooperative, poor self care    >Legal: 9.27 INVOL  >Obs: Routine, no current SI. no need for CO, patient not expected to pose risk to self or others in controlled inpatient setting; q15 adequate  >Psychiatric Meds:  Zyprexa Zydis titrated to 20mg PO at bedtime  >Labs: Admission labs reviewed  >Medical:  No acute concerns. No consultations needed at this time.   >Social: milieu/structured therapy  >Treatment Interventions: Groups and Individual Therapy/CBT  >Dispo: pending remission of sx

## 2022-03-22 NOTE — BH INPATIENT PSYCHIATRY PROGRESS NOTE - NSBHMETABOLIC_PSY_ALL_CORE_FT
BMI: BMI (kg/m2): 26.7 (03-01-22 @ 14:41)  HbA1c: A1C with Estimated Average Glucose Result: 5.2 % (03-02-22 @ 09:43)    Glucose:   BP: 102/54 (03-22-22 @ 07:54) (102/54 - 113/60)  Lipid Panel: Date/Time: 03-02-22 @ 09:43  Cholesterol, Serum: 144  Direct LDL: --  HDL Cholesterol, Serum: 35  Total Cholesterol/HDL Ration Measurement: --  Triglycerides, Serum: 88

## 2022-03-23 PROCEDURE — 99231 SBSQ HOSP IP/OBS SF/LOW 25: CPT

## 2022-03-23 RX ADMIN — OLANZAPINE 20 MILLIGRAM(S): 15 TABLET, FILM COATED ORAL at 21:05

## 2022-03-23 NOTE — BH INPATIENT PSYCHIATRY PROGRESS NOTE - NSBHFUPINTERVALHXFT_PSY_A_CORE
Pt compliant with medication and tolerating it well.  Chart reviewed and case discussed with treatment team.  No events reported overnight.  Pt observed in the hallway, appears internally preoccupied.  Pt reports he is "okay," does not want to speak.  Pt reports he has been eating and slept last night.  Pt reports he has not showered, when encouraged to do so, reports he will later.  Pt reports he wants to go back home, feels safe staying with his uncle.

## 2022-03-23 NOTE — BH INPATIENT PSYCHIATRY PROGRESS NOTE - NSBHASSESSSUMMFT_PSY_ALL_CORE
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 Family reported that patient hasn't been compliant with his meds & has been neglecting basic daily hygiene. Patient himself denied any complaints but is described as internally preoccupied an uncooperative with questions. Psychiatry consulted for evaluation.     Pt partially cooperative, poor self care, internally preoccupied    >Legal: 9.27 INVOL  >Obs: Routine, no current SI. no need for CO, patient not expected to pose risk to self or others in controlled inpatient setting; q15 adequate  >Psychiatric Meds:  Zyprexa Zydis titrated to 20mg PO at bedtime  >Labs: Admission labs reviewed  >Medical:  No acute concerns. No consultations needed at this time.   >Social: milieu/structured therapy  >Treatment Interventions: Groups and Individual Therapy/CBT  >Dispo: pending remission of sx

## 2022-03-23 NOTE — BH INPATIENT PSYCHIATRY PROGRESS NOTE - NSBHCHARTREVIEWVS_PSY_A_CORE FT
Vital Signs Last 24 Hrs  T(C): 36.1 (03-23-22 @ 08:04), Max: 36.7 (03-22-22 @ 20:10)  T(F): 97 (03-23-22 @ 08:04), Max: 98.1 (03-22-22 @ 20:10)  HR: --  BP: --  BP(mean): --  RR: --  SpO2: --    Orthostatic VS  03-23-22 @ 08:04  Lying BP: --/-- HR: --  Sitting BP: 108/60 HR: 84  Standing BP: --/-- HR: --  Site: --  Mode: --

## 2022-03-24 PROCEDURE — 99231 SBSQ HOSP IP/OBS SF/LOW 25: CPT

## 2022-03-24 RX ADMIN — OLANZAPINE 20 MILLIGRAM(S): 15 TABLET, FILM COATED ORAL at 20:45

## 2022-03-24 NOTE — BH TREATMENT PLAN - NSTXMEDICINTERPR_PSY_ALL_CORE
Per nursing, patient has demonstrated daily medication compliance during the last 7 days without difficulty. Patient’s ADL have been very limited; hygiene/appearance has been poor, and patient is observed dressed in the same clothing as upon admission. Patient has reportedly been minimally verbal and guarded during other department staff assessments and he is prominently isolative to self/room as well as hyper-somnolent. Patient’s insight/judgment apparently remains poor, and he does not socialize with peers. As writer was repeatedly unable to wake patient from sleep, she was unable to assess patient for suicidal ideation, homicidal ideation, or psychotic symptoms. As writer was unable to conduct progress assessment, she was unable to obtain a self-rated CGI score for improvement.    Psychiatric rehabilitation staff will continue to provide individual supportive counseling/motivational interviewing and group therapy sessions to assist patient in taking all medications as prescribed and identifying 2 related effects/benefits over the next 7 days.
Patient has demonstrated minimal progress towards psychiatric rehabilitation goal of identifying benefits of medication adherence. Patient has been adherent to medication though he declined to meet with writer to discuss current presentation and progress towards treatment goals. Psychiatric rehabilitation recommends patient continue to attend groups, engage in individual sessions, and participate in activities in the milieu to continue exploring coping skills to manage symptoms.
Writer and patient established a collaborative treatment goal for the patient to work on over the next 7 days. Psychiatric rehabilitation staff will provide encouragement, support, psychotherapy, and psychoeducation to assist the patient in the progression of his treatment goal.
Patient has demonstrated daily medication compliance without noted difficulty but as patient has repeatedly declined to participate in interview, writer is unable to assess his ability to identify related effects/benefits. Patient’s ADL are limited as he is prominently isolative to self/room; hygiene/appearance is poor and patient is observed dressed in same clothing for several days. Patient has been tense, and distrustful/suspicious during most interactions with writer and he has exhibited no improvement in insight regarding recovery/rehabilitation; judgment has also been poor.   Patient was unable to provide a self-rated CGI score as he declined progress assessment interview. Patient has been guarded and minimally verbal with staff and he does not socialize with peers, but he has not been a behavioral problem during the last 7 days. Patient has been poorly occupied/hyper somnolent during the day, and he has denied needed assistance or prompt with same when writer has inquired.   Psychiatric rehabilitation staff will continue to provide individual supportive counseling/motivational interviewing and group therapy sessions to assist patient in taking all medications as prescribed and identifying related effects/benefits for improved symptom management and insight over the next 7 days.

## 2022-03-24 NOTE — BH TREATMENT PLAN - NSTXDEPRESINTERRN_PSY_ALL_CORE
Monitor for depressed symptoms. Encouraged verbalization of thoughts/concerns encouraged.
Provide opportunity for patient and family/caregiver to express feelings, stressors and self-perception (e.g., verbalization, journaling).
provide ongoing emotional support and teaching
Empower participation in planning care and activities, as well as development of attainable goals, to increase self-esteem and feelings of control.

## 2022-03-24 NOTE — BH INPATIENT PSYCHIATRY PROGRESS NOTE - NSBHCHARTREVIEWVS_PSY_A_CORE FT
Vital Signs Last 24 Hrs  T(C): 26.4 (03-24-22 @ 08:22), Max: 36.7 (03-23-22 @ 20:00)  T(F): 79.6 (03-24-22 @ 08:22), Max: 98 (03-23-22 @ 20:00)  HR: 94 (03-24-22 @ 08:22) (94 - 94)  BP: 98/64 (03-24-22 @ 08:22) (98/64 - 98/64)  BP(mean): --  RR: --  SpO2: --    Orthostatic VS  03-23-22 @ 08:04  Lying BP: --/-- HR: --  Sitting BP: 108/60 HR: 84  Standing BP: --/-- HR: --  Site: --  Mode: --

## 2022-03-24 NOTE — BH TREATMENT PLAN - NSTXMEDICGOAL_PSY_ALL_CORE
Take all medications as prescribed
Be able to describe the benefit of medication/treatment
Take all medications as prescribed
Take all medications as prescribed

## 2022-03-24 NOTE — BH INPATIENT PSYCHIATRY PROGRESS NOTE - NSBHMETABOLIC_PSY_ALL_CORE_FT
BMI: BMI (kg/m2): 26.7 (03-01-22 @ 14:41)  HbA1c: A1C with Estimated Average Glucose Result: 5.2 % (03-02-22 @ 09:43)    Glucose:   BP: 98/64 (03-24-22 @ 08:22) (98/64 - 102/54)  Lipid Panel: Date/Time: 03-02-22 @ 09:43  Cholesterol, Serum: 144  Direct LDL: --  HDL Cholesterol, Serum: 35  Total Cholesterol/HDL Ration Measurement: --  Triglycerides, Serum: 88

## 2022-03-24 NOTE — BH TREATMENT PLAN - NSTXDEPRESGOAL_PSY_ALL_CORE
Exhibit improvements in self-grooming, hygiene, sleep and appetite
Attend and participate in at least 2 groups daily despite low mood/energy
Exhibit improvements in self-grooming, hygiene, sleep and appetite
Exhibit improvements in self-grooming, hygiene, sleep and appetite

## 2022-03-24 NOTE — BH PATIENT CHARACTERISTICS SURVEY - NSPCSLIVINGSITUA_PSY_ALL_CORE
Private residence (home, apartment, rooming house, hotel, motel, supported housing, supported SRO, permanent housing programs, transient housing programs, and shelter plus care housing)

## 2022-03-24 NOTE — BH INPATIENT PSYCHIATRY PROGRESS NOTE - NSBHFUPINTERVALHXFT_PSY_A_CORE
Pt compliant with medication and tolerating it well.  Chart reviewed and case discussed with treatment team.  No events reported overnight.  Pt continues to report he is "okay."  Pt reports he does not want to talk.  Reports he ate breakfast and has been sleeping.  Pt in bed reports he is tired and does not want to speak further.  Pt reports he wants to go home.

## 2022-03-24 NOTE — BH TREATMENT PLAN - NSDCCRITERIA_PSY_ALL_CORE
remission of sx;  cgi<=2;  EPPS for safety and compliance

## 2022-03-24 NOTE — BH TREATMENT PLAN - NSTXPLANTHERAPYSESSIONSFT_PSY_ALL_CORE
03-16-22  Type of therapy: Other  Type of session: Individual  Level of patient participation: Please see note  Duration of participation: Please see note  Therapy conducted by: Psych rehab  Therapy Summary: Writer attempted to meet with patient to review progress towards psychiatric rehabilitation goals and to provide supportive counseling/motivational interviewing. However, patient declined to participate in psychiatric rehabilitation progress assessment. Writer will continuously encourage patient to demonstrate active participation in his psychiatric rehabilitation treatment, to include appropriately verbalizing needs/concerns to staff.    03-16-22  Type of therapy: Other  Type of session: Group  Level of patient participation: Please see note  Duration of participation: Please see note  Therapy conducted by: Psych rehab  Therapy Summary: Patient has not attended daily psychiatric rehabilitation groups during the last 7 days despite continuous encouragement from staff. Patient will be encouraged to attend daily psychiatric rehabilitation groups as symptom acuity decreases/insight improves.  
  03-09-22  Type of therapy: Other  Type of session: Individual  Level of patient participation: Please see note  Duration of participation: Please see note  Therapy conducted by: Psych rehab  Therapy Summary: Writer attempted to meet with patient to review progress towards psychiatric rehabilitation goals and to provide supportive counseling/motivational interviewing. However, writer was repeatedly unable to wake patient from sleep. Writer will continuously engage patient to develop therapeutic rapport and to encouraged patient to demonstrate active participation in his psychiatric rehabilitation treatment, to include appropriately verbalizing needs/concerns to staff.    03-09-22  Type of therapy: Other  Type of session: Group  Level of patient participation: Please see note  Duration of participation: Please see note  Therapy conducted by: Psych rehab  Therapy Summary: Patient has not attended daily psychiatric rehabilitation groups despite encouragement from staff during the last 7 days. Patient will be encouraged to attend daily group sessions over the next 7 days.  
  03-23-22  Type of therapy: Patient does not attend psychiatric rehabilation groups  Type of session: Individual  Level of patient participation: Resistance to participation  Duration of participation: Less than 15 minutes  Therapy conducted by: Psych rehab  Therapy Summary: Writer attempted to meet with patient for an individual session in order to review progress towards psychiatric rehabilitation goals. Patient declined to speak with writer and therefore this assessment will be completed from chart and staff collateral over the past seven days. Patient has demonstrated little improvement in psychiatric symptoms. Patient remains guarded and superficially cooperative with staff. Patient has expressed wanting to be discharged but is minimally engaged in treatment. Patient has been adherent to medication. Due to the COVID-19 pandemic, unit structure and activities are re-evaluated on a consistent basis in effort to maintain the safety of patients and staff. Patient does not attend psychiatric rehabilitation groups despite staff encouragement. Patient is isolative to his room and does not interact with peers. Patient presents with poor ADLs and hygiene.

## 2022-03-24 NOTE — BH TREATMENT PLAN - NSTXPATIENTPARTICIPATE_PSY_ALL_CORE
No, patient unwilling to participate
Patient participated in defining interventions

## 2022-03-24 NOTE — BH TREATMENT PLAN - NSTXDCOPNOINTERSW_PSY_ALL_CORE
Addended by: Gerson Reynolds on: 6/3/2019 05:36 PM     Modules accepted: Level of Service
Patient will verbalize the need for medication management once discharged.
PT will verbalize the need for medication and treatment once discharged
PT will verbalize the need for medication and the continuation of medication once discharged.
Patient will verbalize need for medication and treatment once discharged.

## 2022-03-25 PROCEDURE — 99231 SBSQ HOSP IP/OBS SF/LOW 25: CPT

## 2022-03-25 RX ADMIN — OLANZAPINE 20 MILLIGRAM(S): 15 TABLET, FILM COATED ORAL at 20:07

## 2022-03-25 NOTE — BH INPATIENT PSYCHIATRY PROGRESS NOTE - NSBHASSESSSUMMFT_PSY_ALL_CORE
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 Family reported that patient hasn't been compliant with his meds & has been neglecting basic daily hygiene. Patient himself denied any complaints but is described as internally preoccupied an uncooperative with questions. Psychiatry consulted for evaluation.     Pt more cooperative and visible, appears internally preoccupied    >Legal: 9.27 INVOL  >Obs: Routine, no current SI. no need for CO, patient not expected to pose risk to self or others in controlled inpatient setting; q15 adequate  >Psychiatric Meds:  Zyprexa Zydis titrated to 20mg PO at bedtime  >Labs: Admission labs reviewed  >Medical:  No acute concerns. No consultations needed at this time.   >Social: milieu/structured therapy  >Treatment Interventions: Groups and Individual Therapy/CBT  >Dispo: pending remission of sx

## 2022-03-25 NOTE — BH INPATIENT PSYCHIATRY PROGRESS NOTE - NSBHMETABOLIC_PSY_ALL_CORE_FT
BMI: BMI (kg/m2): 26.7 (03-01-22 @ 14:41)  HbA1c: A1C with Estimated Average Glucose Result: 5.2 % (03-02-22 @ 09:43)    Glucose:   BP: 101/62 (03-25-22 @ 08:38) (98/64 - 101/62)  Lipid Panel: Date/Time: 03-02-22 @ 09:43  Cholesterol, Serum: 144  Direct LDL: --  HDL Cholesterol, Serum: 35  Total Cholesterol/HDL Ration Measurement: --  Triglycerides, Serum: 88

## 2022-03-25 NOTE — BH INPATIENT PSYCHIATRY PROGRESS NOTE - NSBHFUPINTERVALHXFT_PSY_A_CORE
Pt compliant with medication and tolerating it well.  Chart reviewed and case discussed with treatment team.  No events reported overnight.  Pt observed visible in the day room, watching TV.  Pt more agreeable to engage in interview.  Pt without SI/HI/I/P or AH/VH or paranoia.  Pt reports he has been eating and sleeping.  Pt reports he has not showered, does not like the bathrooms.  Pt wants to go home soon, feels safe with his uncle.

## 2022-03-25 NOTE — BH INPATIENT PSYCHIATRY PROGRESS NOTE - NSBHCHARTREVIEWVS_PSY_A_CORE FT
Vital Signs Last 24 Hrs  T(C): 36 (03-25-22 @ 08:38), Max: 36.8 (03-24-22 @ 18:19)  T(F): 96.8 (03-25-22 @ 08:38), Max: 98.3 (03-24-22 @ 18:19)  HR: 66 (03-25-22 @ 08:38) (66 - 66)  BP: 101/62 (03-25-22 @ 08:38) (101/62 - 101/62)  BP(mean): --  RR: --  SpO2: --

## 2022-03-26 RX ADMIN — OLANZAPINE 20 MILLIGRAM(S): 15 TABLET, FILM COATED ORAL at 22:45

## 2022-03-27 RX ADMIN — OLANZAPINE 20 MILLIGRAM(S): 15 TABLET, FILM COATED ORAL at 21:01

## 2022-03-28 PROCEDURE — 99231 SBSQ HOSP IP/OBS SF/LOW 25: CPT

## 2022-03-28 RX ADMIN — OLANZAPINE 20 MILLIGRAM(S): 15 TABLET, FILM COATED ORAL at 20:34

## 2022-03-28 NOTE — BH INPATIENT PSYCHIATRY PROGRESS NOTE - NSBHMSESPABN_PSY_A_CORE
selectively non-verbal/Decreased productivity
selectively non-verbal/Decreased productivity/Increased latency
selectively non-verbal/Decreased productivity
selectively non-verbal/Decreased productivity/Increased latency
selectively non-verbal/Decreased productivity
selectively non-verbal/Decreased productivity/Increased latency
selectively non-verbal/Decreased productivity/Increased latency
selectively non-verbal/Decreased productivity
selectively non-verbal/Decreased productivity
selectively non-verbal/Decreased productivity/Increased latency
selectively non-verbal/Decreased productivity
selectively non-verbal/Decreased productivity/Increased latency

## 2022-03-28 NOTE — BH INPATIENT PSYCHIATRY PROGRESS NOTE - NSBHFUPINTERVALHXFT_PSY_A_CORE
Pt compliant with medication and tolerating it well.  Chart reviewed and case discussed with treatment team.  No events reported overnight.  Per staff, patient has been more visible on the unit.  Pt denies SI/HI/I/P or AH/VH or paranoia.  Pt reports he has been eating and sleeping.  Pt reports his uncle visited yesterday and they had a good visit.  Pt reports he wants to go home soon.

## 2022-03-28 NOTE — BH INPATIENT PSYCHIATRY PROGRESS NOTE - NSBHASSESSSUMMFT_PSY_ALL_CORE
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 Family reported that patient hasn't been compliant with his meds & has been neglecting basic daily hygiene. Patient himself denied any complaints but is described as internally preoccupied an uncooperative with questions. Psychiatry consulted for evaluation.     Pt more cooperative and visible, appears internally preoccupied    >Legal: 9.27 INVOL  >Obs: Routine, no current SI. no need for CO, patient not expected to pose risk to self or others in controlled inpatient setting; q15 adequate  >Psychiatric Meds:  Zyprexa Zydis titrated to 20mg PO at bedtime  >Labs: Admission labs reviewed  >Medical:  No acute concerns. No consultations needed at this time.   >Social: milieu/structured therapy  >Treatment Interventions: Groups and Individual Therapy/CBT  >Dispo: pending remission of sx

## 2022-03-28 NOTE — BH INPATIENT PSYCHIATRY PROGRESS NOTE - NSBHMETABOLIC_PSY_ALL_CORE_FT
BMI: BMI (kg/m2): 25.5 (03-26-22 @ 07:53)  HbA1c: A1C with Estimated Average Glucose Result: 5.2 % (03-02-22 @ 09:43)    Glucose:   BP: --  Lipid Panel: Date/Time: 03-02-22 @ 09:43  Cholesterol, Serum: 144  Direct LDL: --  HDL Cholesterol, Serum: 35  Total Cholesterol/HDL Ration Measurement: --  Triglycerides, Serum: 88

## 2022-03-28 NOTE — BH INPATIENT PSYCHIATRY PROGRESS NOTE - NSBHCHARTREVIEWVS_PSY_A_CORE FT
Vital Signs Last 24 Hrs  T(C): --  T(F): --  HR: --  BP: --  BP(mean): --  RR: --  SpO2: --    Orthostatic VS  03-27-22 @ 07:51  Lying BP: --/-- HR: --  Sitting BP: 102/63 HR: 76  Standing BP: --/-- HR: --  Site: --  Mode: --

## 2022-03-29 PROCEDURE — 99231 SBSQ HOSP IP/OBS SF/LOW 25: CPT

## 2022-03-29 RX ADMIN — OLANZAPINE 20 MILLIGRAM(S): 15 TABLET, FILM COATED ORAL at 20:27

## 2022-03-29 NOTE — BH INPATIENT PSYCHIATRY PROGRESS NOTE - NSBHASSESSSUMMFT_PSY_ALL_CORE
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 Family reported that patient hasn't been compliant with his meds & has been neglecting basic daily hygiene. Patient himself denied any complaints but is described as internally preoccupied an uncooperative with questions. Psychiatry consulted for evaluation.     Pt more cooperative and visible, appears internally preoccupied    >Legal: 9.27 INVOL  >Obs: Routine, no current SI. no need for CO, patient not expected to pose risk to self or others in controlled inpatient setting; q15 adequate  >Psychiatric Meds:  Zyprexa Zydis titrated to 20mg PO at bedtime  >Labs: Admission labs reviewed  >Medical:  No acute concerns. No consultations needed at this time.   >Social: milieu/structured therapy  >Treatment Interventions: Groups and Individual Therapy/CBT  >Dispo: pending remission of sx

## 2022-03-29 NOTE — BH INPATIENT PSYCHIATRY PROGRESS NOTE - NSBHCHARTREVIEWVS_PSY_A_CORE FT
Vital Signs Last 24 Hrs  T(C): 36.8 (03-29-22 @ 07:52), Max: 37.1 (03-28-22 @ 20:45)  T(F): 98.2 (03-29-22 @ 07:52), Max: 98.7 (03-28-22 @ 20:45)  HR: 72 (03-29-22 @ 07:52) (72 - 72)  BP: 104/65 (03-29-22 @ 07:52) (104/65 - 104/65)  BP(mean): --  RR: --  SpO2: --

## 2022-03-29 NOTE — BH INPATIENT PSYCHIATRY PROGRESS NOTE - NSBHMETABOLIC_PSY_ALL_CORE_FT
BMI: BMI (kg/m2): 25.5 (03-26-22 @ 07:53)  HbA1c: A1C with Estimated Average Glucose Result: 5.2 % (03-02-22 @ 09:43)    Glucose:   BP: 104/65 (03-29-22 @ 07:52) (104/65 - 104/65)  Lipid Panel: Date/Time: 03-02-22 @ 09:43  Cholesterol, Serum: 144  Direct LDL: --  HDL Cholesterol, Serum: 35  Total Cholesterol/HDL Ration Measurement: --  Triglycerides, Serum: 88

## 2022-03-29 NOTE — BH INPATIENT PSYCHIATRY PROGRESS NOTE - NSBHFUPINTERVALHXFT_PSY_A_CORE
Pt compliant with medication and tolerating it well.  Chart reviewed and case discussed with treatment team.  No events reported overnight.  Pt observed visible in the day room, watching TV.  Pt without SI/HI/I/P or VH or paranoia, continues to appear internally preoccupied.  Pt reports eating and sleeping well.  Pt reports his uncle visited last night and that he wants to go home soon.

## 2022-03-30 VITALS — TEMPERATURE: 98 F

## 2022-03-30 PROCEDURE — 99231 SBSQ HOSP IP/OBS SF/LOW 25: CPT

## 2022-03-30 RX ORDER — OLANZAPINE 15 MG/1
1 TABLET, FILM COATED ORAL
Qty: 30 | Refills: 0
Start: 2022-03-30

## 2022-03-30 RX ADMIN — OLANZAPINE 20 MILLIGRAM(S): 15 TABLET, FILM COATED ORAL at 20:20

## 2022-03-30 NOTE — BH INPATIENT PSYCHIATRY DISCHARGE NOTE - HOSPITAL COURSE
Pt in the beginning of his hospitalization was very guarded, appeared internally preoccupied and depressed.  Pts uncle was contacted and reported patient stopped speaking to family and was talking about the devil.  Pt was started on Risperdal, titrated to 6mg PO at bedtime.  Pt started isolating in bed and refusing to speak with the treatment team, reported he did not want to talk despite several attempts.  Zyprexa Zydis was added and Risperdal was tapered down and discontinued.  Zyprexa Zydis was titrated to 20mg PO at bedtime.  On the medication, patient showed improvement in sx, he did not appear depressed and started speaking with the treatment team again.  He also was more visible in the mileu.  Pts uncle was contacted by the treatment team prior to patient discharge and he was in agreement with discharge plan.    On discharge, pt denied SI/HI/I/P or AH/VH or paranoia.  Pt eating and sleeping well.  Pt endorsed motivation to continue medication as prescribed and engage in outpatient treatment.  Safety planning done with patient and patient agreed to call 911 or go to the nearest ED if he finds himself a danger to himself or others.  Suicide hotline information give to patient with discharge documentation.    Although patient was admitted due to risk factors for violence/suicide including psychotic and mood sx, the patient’s risk for suicide/violence was mitigated during his hospitalization and his protective factors outweigh his risk factors at this time.  Pt is currently at low acute risk for suicide/violence.  Patient’s protective factors include:  no current SI/HI/I/P, willingness to engage in treatment, family support, no hx of suicide attempts, no hx of aggression, no hx of substance abuse, and no current acute depressive, psychotic or manic sx.  Although the patient is at chronic risk for violence/suicide given his hx of psychiatric illness and hx of psychiatric hospitalizations, this risk cannot be ameliorated by continued inpatient treatment.  The patient no longer met the criteria for psychiatric hospitalization at discharge.

## 2022-03-30 NOTE — BH INPATIENT PSYCHIATRY DISCHARGE NOTE - HPI (INCLUDE ILLNESS QUALITY, SEVERITY, DURATION, TIMING, CONTEXT, MODIFYING FACTORS, ASSOCIATED SIGNS AND SYMPTOMS)
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 Family reported that patient hasn't been compliant with his meds & has been neglecting basic daily hygiene. Patient himself denied any complaints but is described as internally preoccupied an uncooperative with questions. Psychiatry consulted for evaluation.     On assessment, patient is able to confirm his last name and tell me that the police brought him in. He is very slow to respond but denies having any thoughts on is mind and denies having SI, HI, AVH and affirms feeling safe. He denies taking his psychiatric medications. He denies any drug or alcohol use today but relays use "one time." He is otherwise mostly non-verbal and remains so even when asked why he is not responding. When he does answer, he switches between use of Malaysian and English and often states "huh?" as if not understanding a question. There is concern for volitional misbehavior in the manner in which he chooses to answer and not answer questions.     COVID Exposure Screen- Patient  Unable to assess"    On arrival to the unit, patient reports he speaks English, declines  services, but agrees for Malaysian speaking staff to be present.  Pt guarded and internally preoccupied.  Pt denies SI/HI/I/P or AH/VH or paranoia.  Pt denies changes in mood, sleep or appetite.  Pt reports he does not know why he was hospitalized, but the police brought him here.  Pt denies being prescribed psychotropic medication, but per chart review, patient supposed to be on Zyprexa.  Pt initially denies previous psychiatric hospitalizations, then when asked about hospitalization at [facility], admits to being hospitalized "for little things," but does not elaborate.  Pt denies substance use.

## 2022-03-30 NOTE — BH INPATIENT PSYCHIATRY PROGRESS NOTE - NSBHFUPINTERVALHXFT_PSY_A_CORE
Pt compliant with medication and tolerating it well.  Chart reviewed and case discussed with treatment team.  No events reported overnight.  Pt without SI/HI/I/P or AH/VH.  Pt reports eating and sleeping well.  Pt reports his uncle visited again last night.  Pt reports he wants to go home.

## 2022-03-30 NOTE — BH INPATIENT PSYCHIATRY PROGRESS NOTE - NSBHCHARTREVIEWVS_PSY_A_CORE FT
Vital Signs Last 24 Hrs  T(C): 36.6 (03-30-22 @ 08:06), Max: 36.9 (03-29-22 @ 21:16)  T(F): 97.9 (03-30-22 @ 08:06), Max: 98.4 (03-29-22 @ 21:16)  HR: 76 (03-30-22 @ 08:06) (76 - 76)  BP: 106/53 (03-30-22 @ 08:06) (106/53 - 106/53)  BP(mean): --  RR: --  SpO2: --

## 2022-03-30 NOTE — BH INPATIENT PSYCHIATRY DISCHARGE NOTE - DESCRIPTION
as per chart review; migrated from [facility] ~6years ago. Lives with uncle, aunt and cousins in private house. Patient was reportedly involved in a work training program prior to recent admission to  the end of July. No children and no reports of substance use.

## 2022-03-30 NOTE — BH DISCHARGE NOTE NURSING/SOCIAL WORK/PSYCH REHAB - NSCDUDCCRISIS_PSY_A_CORE
[facility]  1 [phone]) [facility]-WELL ([phone])  Text "WELL" to 11133  Website: [email or web address].[email or web address]/.[facility] 1 (999) 061-RBQQ (9914) Website: www.safehorizon.org/.National Suicide Prevention Lifeline 3 (387) 210-5053/.  [facility]  1 (274) [facility] (110-2808)/.  [facility]  [phone] [place] [address] [phone]   Hours:  Monday through Friday from 9 AM to 3 PM/.  [employer]  [employer] - [facility] Crisis Line  8 [phone], Option 1

## 2022-03-30 NOTE — BH INPATIENT PSYCHIATRY DISCHARGE NOTE - NSBHMETABOLIC_PSY_ALL_CORE_FT
BMI: BMI (kg/m2): 25.5 (03-26-22 @ 07:53)  HbA1c: A1C with Estimated Average Glucose Result: 5.2 % (03-02-22 @ 09:43)    Glucose:   BP: 106/53 (03-30-22 @ 08:06) (104/65 - 106/53)  Lipid Panel: Date/Time: 03-02-22 @ 09:43  Cholesterol, Serum: 144  Direct LDL: --  HDL Cholesterol, Serum: 35  Total Cholesterol/HDL Ration Measurement: --  Triglycerides, Serum: 88

## 2022-03-30 NOTE — BH DISCHARGE NOTE NURSING/SOCIAL WORK/PSYCH REHAB - NSDCPRRECOMMEND_PSY_ALL_CORE
Patient would benefit from demonstrating full, consistent compliance with outpatient psychiatric treatment and expanding his knowledge/practice of personally relevant coping methods to meet his needs. Upon discharge, patient would benefit from outpatient psychiatric treatment for ongoing medication management, symptom management, and support.

## 2022-03-30 NOTE — BH DISCHARGE NOTE NURSING/SOCIAL WORK/PSYCH REHAB - DISCHARGE INSTRUCTIONS AFTERCARE APPOINTMENTS
In order to check the location, date, or time of your aftercare appointment, please refer to your Discharge Instructions Document given to you upon leaving the hospital.  If you have lost the instructions please call [phone]

## 2022-03-30 NOTE — BH INPATIENT PSYCHIATRY PROGRESS NOTE - NSBHASSESSSUMMFT_PSY_ALL_CORE
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 Family reported that patient hasn't been compliant with his meds & has been neglecting basic daily hygiene. Patient himself denied any complaints but is described as internally preoccupied an uncooperative with questions. Psychiatry consulted for evaluation.     Pt more cooperative, appears internally preoccupied    >Legal: 9.27 INVOL  >Obs: Routine, no current SI. no need for CO, patient not expected to pose risk to self or others in controlled inpatient setting; q15 adequate  >Psychiatric Meds:  Zyprexa Zydis titrated to 20mg PO at bedtime  >Labs: Admission labs reviewed  >Medical:  No acute concerns. No consultations needed at this time.   >Social: milieu/structured therapy  >Treatment Interventions: Groups and Individual Therapy/CBT  >Dispo: pending remission of sx

## 2022-03-30 NOTE — BH DISCHARGE NOTE NURSING/SOCIAL WORK/PSYCH REHAB - NSDCPRGOAL_PSY_ALL_CORE
Patient did not attend daily Psychiatric Rehabilitation groups during the current hospitalization despite continuous encouragement from staff. Patient demonstrated daily medication compliance without noted difficulty, but ADL were otherwise very limited. Patient was observed dressed in the same clothing from admission, despite staff encouragement and offers of support/clothing. Patient was mostly guarded and minimally cooperative with treatment staff. However, patient was able to verbalize some needs/concerns appropriately, he was recently more visible on the unit, and he did not demonstrate physical/violence towards persons or property on the unit. Writer was repeatedly unable to assess patient's insight into recovery/rehabilitation as he was continuously unreceptive to interview. Judgment on the unit was very limited. Patient was isolative to self/room, he did not socialize with peers, and he was poorly occupied/hyper somnolent during the day.

## 2022-03-30 NOTE — BH DISCHARGE NOTE NURSING/SOCIAL WORK/PSYCH REHAB - PATIENT PORTAL LINK FT
You can access the FollowMyHealth Patient Portal offered by [facility] by registering at the following website: [email or web address]. By joining [facility]’s FollowMyHealth portal, you will also be able to view your health information using other applications (apps) compatible with our system.

## 2022-03-30 NOTE — BH INPATIENT PSYCHIATRY DISCHARGE NOTE - NSBHDCHANDOFFNOFT_PSY_A_CORE
Left message for call back from assigned provider at the [facility] ([phone]) to provide handoff.  SW to fax discharge summary.  Can call Jewels Wisdom NP at [phone]) for handoff.

## 2022-03-31 PROCEDURE — 99238 HOSP IP/OBS DSCHRG MGMT 30/<: CPT

## 2022-03-31 NOTE — BH INPATIENT PSYCHIATRY PROGRESS NOTE - NSBHMSERELATED_PSY_A_CORE
Poor
Fair
Unable to assess
Fair
Poor
Fair
Poor
Fair
Fair
Poor
Fair
Poor
Poor
Unable to assess
Poor
Unable to assess
Poor
Poor

## 2022-03-31 NOTE — BH INPATIENT PSYCHIATRY PROGRESS NOTE - NSBHATTESTSEENBY_PSY_A_CORE
NP without Attending Psychiatrist

## 2022-03-31 NOTE — BH INPATIENT PSYCHIATRY PROGRESS NOTE - NSTXMEDICDATEEST_PSY_ALL_CORE
02-Mar-2022
09-Mar-2022
02-Mar-2022
02-Mar-2022
09-Mar-2022
02-Mar-2022
09-Mar-2022
09-Mar-2022
02-Mar-2022
09-Mar-2022
02-Mar-2022
02-Mar-2022
09-Mar-2022

## 2022-03-31 NOTE — BH INPATIENT PSYCHIATRY PROGRESS NOTE - NSTXDEPRESPROGRES_PSY_ALL_CORE
No Change
Improving
No Change

## 2022-03-31 NOTE — BH INPATIENT PSYCHIATRY PROGRESS NOTE - NSBHMSEAFFCONG_PSY_A_CORE
Congruent
Unable to assess
Congruent
Non-congruent
Congruent
Non-congruent
Unable to assess
Congruent

## 2022-03-31 NOTE — BH INPATIENT PSYCHIATRY PROGRESS NOTE - NSBHFUPINTERVALHXFT_PSY_A_CORE
Pt compliant with medication and tolerating it well.  Chart reviewed and case discussed with treatment team.  No events reported overnight.  Pt denies SI/HI/I/P or AH/VH or paranoia.  Pt reports eating and sleeping well.  Pt happy when told he is going home today and his uncle is picking him up this afternoon.  Explained to patient to take medication as prescribed and engage in outpatient treatment and he agreed.  Pt asked how long he needs to take the medication, advised him until his provider tells him to stop and not to self discontinue medication and he understood.

## 2022-03-31 NOTE — BH INPATIENT PSYCHIATRY PROGRESS NOTE - NSBHMSEEYE_PSY_A_CORE
Fair
Fair
Poor
Fair
Poor
Fair
Poor
Fair
Unable to assess
Poor
Unable to assess
Fair
Poor
Unable to assess

## 2022-03-31 NOTE — BH INPATIENT PSYCHIATRY PROGRESS NOTE - CURRENT MEDICATION
Follow up call made to Derrick after Cycle 1 of chemo on Wednesday.  Derrick states he is feeling good.  Has his normal bone aches and pains he takes tylenol for.   Reviewed with him that after the taxol and onpro neulasta those bone aches and pains can be exacerbated for a few days.    Reviewed Tylenol, advil (alternating is what he does) and claritin for bone aches and pains.      
MEDICATIONS  (STANDING):  OLANZapine Disintegrating Tablet 15 milliGRAM(s) Oral at bedtime  risperiDONE  Disintegrating Tablet 2 milliGRAM(s) Oral at bedtime    MEDICATIONS  (PRN):  diphenhydrAMINE 50 milliGRAM(s) Oral every 6 hours PRN EPS ppx  diphenhydrAMINE Injectable 50 milliGRAM(s) IntraMuscular once PRN agitation  haloperidol     Tablet 5 milliGRAM(s) Oral every 6 hours PRN agitation  haloperidol    Injectable 5 milliGRAM(s) IntraMuscular once PRN agitation  
MEDICATIONS  (STANDING):  OLANZapine Disintegrating Tablet 20 milliGRAM(s) Oral at bedtime    MEDICATIONS  (PRN):  diphenhydrAMINE 50 milliGRAM(s) Oral every 6 hours PRN EPS ppx  diphenhydrAMINE Injectable 50 milliGRAM(s) IntraMuscular once PRN agitation  haloperidol     Tablet 5 milliGRAM(s) Oral every 6 hours PRN agitation  haloperidol    Injectable 5 milliGRAM(s) IntraMuscular once PRN agitation  
MEDICATIONS  (STANDING):  OLANZapine Disintegrating Tablet 5 milliGRAM(s) Oral at bedtime  risperiDONE  Disintegrating Tablet 4 milliGRAM(s) Oral at bedtime    MEDICATIONS  (PRN):  diphenhydrAMINE 50 milliGRAM(s) Oral every 6 hours PRN EPS ppx  diphenhydrAMINE Injectable 50 milliGRAM(s) IntraMuscular once PRN agitation  haloperidol     Tablet 5 milliGRAM(s) Oral every 6 hours PRN agitation  haloperidol    Injectable 5 milliGRAM(s) IntraMuscular once PRN agitation  
MEDICATIONS  (STANDING):  risperiDONE  Disintegrating Tablet 1 milliGRAM(s) Oral at bedtime    MEDICATIONS  (PRN):  diphenhydrAMINE 50 milliGRAM(s) Oral every 6 hours PRN EPS ppx  diphenhydrAMINE Injectable 50 milliGRAM(s) IntraMuscular once PRN agitation  haloperidol     Tablet 5 milliGRAM(s) Oral every 6 hours PRN agitation  haloperidol    Injectable 5 milliGRAM(s) IntraMuscular once PRN agitation  
MEDICATIONS  (STANDING):  risperiDONE  Disintegrating Tablet 3 milliGRAM(s) Oral at bedtime    MEDICATIONS  (PRN):  diphenhydrAMINE 50 milliGRAM(s) Oral every 6 hours PRN EPS ppx  diphenhydrAMINE Injectable 50 milliGRAM(s) IntraMuscular once PRN agitation  haloperidol     Tablet 5 milliGRAM(s) Oral every 6 hours PRN agitation  haloperidol    Injectable 5 milliGRAM(s) IntraMuscular once PRN agitation  
MEDICATIONS  (STANDING):  risperiDONE  Disintegrating Tablet 4 milliGRAM(s) Oral at bedtime    MEDICATIONS  (PRN):  diphenhydrAMINE 50 milliGRAM(s) Oral every 6 hours PRN EPS ppx  diphenhydrAMINE Injectable 50 milliGRAM(s) IntraMuscular once PRN agitation  haloperidol     Tablet 5 milliGRAM(s) Oral every 6 hours PRN agitation  haloperidol    Injectable 5 milliGRAM(s) IntraMuscular once PRN agitation  
MEDICATIONS  (STANDING):  risperiDONE  Disintegrating Tablet 5 milliGRAM(s) Oral at bedtime    MEDICATIONS  (PRN):  diphenhydrAMINE 50 milliGRAM(s) Oral every 6 hours PRN EPS ppx  diphenhydrAMINE Injectable 50 milliGRAM(s) IntraMuscular once PRN agitation  haloperidol     Tablet 5 milliGRAM(s) Oral every 6 hours PRN agitation  haloperidol    Injectable 5 milliGRAM(s) IntraMuscular once PRN agitation  
MEDICATIONS  (STANDING):  risperiDONE  Disintegrating Tablet 4 milliGRAM(s) Oral at bedtime    MEDICATIONS  (PRN):  diphenhydrAMINE 50 milliGRAM(s) Oral every 6 hours PRN EPS ppx  diphenhydrAMINE Injectable 50 milliGRAM(s) IntraMuscular once PRN agitation  haloperidol     Tablet 5 milliGRAM(s) Oral every 6 hours PRN agitation  haloperidol    Injectable 5 milliGRAM(s) IntraMuscular once PRN agitation  
MEDICATIONS  (STANDING):  OLANZapine Disintegrating Tablet 20 milliGRAM(s) Oral at bedtime    MEDICATIONS  (PRN):  diphenhydrAMINE 50 milliGRAM(s) Oral every 6 hours PRN EPS ppx  diphenhydrAMINE Injectable 50 milliGRAM(s) IntraMuscular once PRN agitation  haloperidol     Tablet 5 milliGRAM(s) Oral every 6 hours PRN agitation  haloperidol    Injectable 5 milliGRAM(s) IntraMuscular once PRN agitation  
MEDICATIONS  (STANDING):  OLANZapine Disintegrating Tablet 20 milliGRAM(s) Oral at bedtime    MEDICATIONS  (PRN):  diphenhydrAMINE 50 milliGRAM(s) Oral every 6 hours PRN EPS ppx  diphenhydrAMINE Injectable 50 milliGRAM(s) IntraMuscular once PRN agitation  haloperidol     Tablet 5 milliGRAM(s) Oral every 6 hours PRN agitation  haloperidol    Injectable 5 milliGRAM(s) IntraMuscular once PRN agitation  
MEDICATIONS  (STANDING):  OLANZapine Disintegrating Tablet 15 milliGRAM(s) Oral at bedtime    MEDICATIONS  (PRN):  diphenhydrAMINE 50 milliGRAM(s) Oral every 6 hours PRN EPS ppx  diphenhydrAMINE Injectable 50 milliGRAM(s) IntraMuscular once PRN agitation  haloperidol     Tablet 5 milliGRAM(s) Oral every 6 hours PRN agitation  haloperidol    Injectable 5 milliGRAM(s) IntraMuscular once PRN agitation  
MEDICATIONS  (STANDING):  OLANZapine Disintegrating Tablet 10 milliGRAM(s) Oral at bedtime  risperiDONE  Disintegrating Tablet 3 milliGRAM(s) Oral at bedtime    MEDICATIONS  (PRN):  diphenhydrAMINE 50 milliGRAM(s) Oral every 6 hours PRN EPS ppx  diphenhydrAMINE Injectable 50 milliGRAM(s) IntraMuscular once PRN agitation  haloperidol     Tablet 5 milliGRAM(s) Oral every 6 hours PRN agitation  haloperidol    Injectable 5 milliGRAM(s) IntraMuscular once PRN agitation  
MEDICATIONS  (STANDING):  risperiDONE  Disintegrating Tablet 5 milliGRAM(s) Oral at bedtime    MEDICATIONS  (PRN):  diphenhydrAMINE 50 milliGRAM(s) Oral every 6 hours PRN EPS ppx  diphenhydrAMINE Injectable 50 milliGRAM(s) IntraMuscular once PRN agitation  haloperidol     Tablet 5 milliGRAM(s) Oral every 6 hours PRN agitation  haloperidol    Injectable 5 milliGRAM(s) IntraMuscular once PRN agitation  
MEDICATIONS  (STANDING):  OLANZapine Disintegrating Tablet 20 milliGRAM(s) Oral at bedtime    MEDICATIONS  (PRN):  diphenhydrAMINE 50 milliGRAM(s) Oral every 6 hours PRN EPS ppx  diphenhydrAMINE Injectable 50 milliGRAM(s) IntraMuscular once PRN agitation  haloperidol     Tablet 5 milliGRAM(s) Oral every 6 hours PRN agitation  haloperidol    Injectable 5 milliGRAM(s) IntraMuscular once PRN agitation  
MEDICATIONS  (STANDING):  OLANZapine Disintegrating Tablet 10 milliGRAM(s) Oral at bedtime  risperiDONE  Disintegrating Tablet 3 milliGRAM(s) Oral at bedtime    MEDICATIONS  (PRN):  diphenhydrAMINE 50 milliGRAM(s) Oral every 6 hours PRN EPS ppx  diphenhydrAMINE Injectable 50 milliGRAM(s) IntraMuscular once PRN agitation  haloperidol     Tablet 5 milliGRAM(s) Oral every 6 hours PRN agitation  haloperidol    Injectable 5 milliGRAM(s) IntraMuscular once PRN agitation  
MEDICATIONS  (STANDING):  OLANZapine Disintegrating Tablet 20 milliGRAM(s) Oral at bedtime    MEDICATIONS  (PRN):  diphenhydrAMINE 50 milliGRAM(s) Oral every 6 hours PRN EPS ppx  diphenhydrAMINE Injectable 50 milliGRAM(s) IntraMuscular once PRN agitation  haloperidol     Tablet 5 milliGRAM(s) Oral every 6 hours PRN agitation  haloperidol    Injectable 5 milliGRAM(s) IntraMuscular once PRN agitation  
MEDICATIONS  (STANDING):  risperiDONE  Disintegrating Tablet 2 milliGRAM(s) Oral at bedtime    MEDICATIONS  (PRN):  diphenhydrAMINE 50 milliGRAM(s) Oral every 6 hours PRN EPS ppx  diphenhydrAMINE Injectable 50 milliGRAM(s) IntraMuscular once PRN agitation  haloperidol     Tablet 5 milliGRAM(s) Oral every 6 hours PRN agitation  haloperidol    Injectable 5 milliGRAM(s) IntraMuscular once PRN agitation  
MEDICATIONS  (STANDING):  OLANZapine Disintegrating Tablet 20 milliGRAM(s) Oral at bedtime    MEDICATIONS  (PRN):  diphenhydrAMINE 50 milliGRAM(s) Oral every 6 hours PRN EPS ppx  diphenhydrAMINE Injectable 50 milliGRAM(s) IntraMuscular once PRN agitation  haloperidol     Tablet 5 milliGRAM(s) Oral every 6 hours PRN agitation  haloperidol    Injectable 5 milliGRAM(s) IntraMuscular once PRN agitation  
MEDICATIONS  (STANDING):  risperiDONE  Disintegrating Tablet 2 milliGRAM(s) Oral at bedtime    MEDICATIONS  (PRN):  diphenhydrAMINE 50 milliGRAM(s) Oral every 6 hours PRN EPS ppx  diphenhydrAMINE Injectable 50 milliGRAM(s) IntraMuscular once PRN agitation  haloperidol     Tablet 5 milliGRAM(s) Oral every 6 hours PRN agitation  haloperidol    Injectable 5 milliGRAM(s) IntraMuscular once PRN agitation  
MEDICATIONS  (STANDING):  risperiDONE  Disintegrating Tablet 3 milliGRAM(s) Oral at bedtime    MEDICATIONS  (PRN):  diphenhydrAMINE 50 milliGRAM(s) Oral every 6 hours PRN EPS ppx  diphenhydrAMINE Injectable 50 milliGRAM(s) IntraMuscular once PRN agitation  haloperidol     Tablet 5 milliGRAM(s) Oral every 6 hours PRN agitation  haloperidol    Injectable 5 milliGRAM(s) IntraMuscular once PRN agitation  
MEDICATIONS  (STANDING):  OLANZapine Disintegrating Tablet 20 milliGRAM(s) Oral at bedtime    MEDICATIONS  (PRN):  diphenhydrAMINE 50 milliGRAM(s) Oral every 6 hours PRN EPS ppx  diphenhydrAMINE Injectable 50 milliGRAM(s) IntraMuscular once PRN agitation  haloperidol     Tablet 5 milliGRAM(s) Oral every 6 hours PRN agitation  haloperidol    Injectable 5 milliGRAM(s) IntraMuscular once PRN agitation  
MEDICATIONS  (STANDING):  OLANZapine Disintegrating Tablet 20 milliGRAM(s) Oral at bedtime    MEDICATIONS  (PRN):  diphenhydrAMINE 50 milliGRAM(s) Oral every 6 hours PRN EPS ppx  diphenhydrAMINE Injectable 50 milliGRAM(s) IntraMuscular once PRN agitation  haloperidol     Tablet 5 milliGRAM(s) Oral every 6 hours PRN agitation  haloperidol    Injectable 5 milliGRAM(s) IntraMuscular once PRN agitation  
MEDICATIONS  (STANDING):  OLANZapine Disintegrating Tablet 5 milliGRAM(s) Oral at bedtime  risperiDONE  Disintegrating Tablet 4 milliGRAM(s) Oral at bedtime    MEDICATIONS  (PRN):  diphenhydrAMINE 50 milliGRAM(s) Oral every 6 hours PRN EPS ppx  diphenhydrAMINE Injectable 50 milliGRAM(s) IntraMuscular once PRN agitation  haloperidol     Tablet 5 milliGRAM(s) Oral every 6 hours PRN agitation  haloperidol    Injectable 5 milliGRAM(s) IntraMuscular once PRN agitation  
MEDICATIONS  (STANDING):  risperiDONE  Disintegrating Tablet 4 milliGRAM(s) Oral at bedtime    MEDICATIONS  (PRN):  diphenhydrAMINE 50 milliGRAM(s) Oral every 6 hours PRN EPS ppx  diphenhydrAMINE Injectable 50 milliGRAM(s) IntraMuscular once PRN agitation  haloperidol     Tablet 5 milliGRAM(s) Oral every 6 hours PRN agitation  haloperidol    Injectable 5 milliGRAM(s) IntraMuscular once PRN agitation

## 2022-03-31 NOTE — BH INPATIENT PSYCHIATRY PROGRESS NOTE - NSTXMEDICDATETRGT_PSY_ALL_CORE
16-Mar-2022
09-Mar-2022
16-Mar-2022
30-Mar-2022
30-Mar-2022
16-Mar-2022
16-Mar-2022
30-Mar-2022
09-Mar-2022
30-Mar-2022
09-Mar-2022
16-Mar-2022
30-Mar-2022
30-Mar-2022
16-Mar-2022
09-Mar-2022

## 2022-03-31 NOTE — BH INPATIENT PSYCHIATRY PROGRESS NOTE - NSBHMSEMOVE_PSY_A_CORE
No abnormal movements
Unable to assess
No abnormal movements
Unable to assess
No abnormal movements

## 2022-03-31 NOTE — BH INPATIENT PSYCHIATRY PROGRESS NOTE - NSTXDEPRESGOAL_PSY_ALL_CORE
Exhibit improvements in self-grooming, hygiene, sleep and appetite
Attend and participate in at least 2 groups daily despite low mood/energy
Exhibit improvements in self-grooming, hygiene, sleep and appetite
Attend and participate in at least 2 groups daily despite low mood/energy
Exhibit improvements in self-grooming, hygiene, sleep and appetite
Exhibit improvements in self-grooming, hygiene, sleep and appetite
Attend and participate in at least 2 groups daily despite low mood/energy
Exhibit improvements in self-grooming, hygiene, sleep and appetite
Attend and participate in at least 2 groups daily despite low mood/energy
Exhibit improvements in self-grooming, hygiene, sleep and appetite
Attend and participate in at least 2 groups daily despite low mood/energy
Exhibit improvements in self-grooming, hygiene, sleep and appetite
Exhibit improvements in self-grooming, hygiene, sleep and appetite

## 2022-03-31 NOTE — BH INPATIENT PSYCHIATRY PROGRESS NOTE - NSBHMSEBEHAV_PSY_A_CORE
Uncooperative
Cooperative
Uncooperative
Other
Cooperative
Other
Uncooperative
Other
Cooperative
Uncooperative
Other
Cooperative
Other
Other
Uncooperative
Other
Other
Cooperative
Other
Other
Cooperative
Cooperative
Other
Other

## 2022-03-31 NOTE — BH INPATIENT PSYCHIATRY PROGRESS NOTE - NSTXPROBDCOPNO_PSY_ALL_CORE
DISCHARGE ISSUE - NON-ADHERENT WITH OUTPATIENT SERVICES

## 2022-03-31 NOTE — BH INPATIENT PSYCHIATRY PROGRESS NOTE - NSBHMSEPERCEPT_PSY_A_CORE
Other/Unable to assess
Other
Other
Other/Unable to assess
Other
Other/Unable to assess
Unable to assess
Other
Other/Unable to assess
Other
Unable to assess
Other
Other/Unable to assess
Other
Other
Other/Unable to assess

## 2022-03-31 NOTE — BH INPATIENT PSYCHIATRY PROGRESS NOTE - NSBHMSETHTASSOC_PSY_A_CORE
Unable to assess
Normal
Unable to assess
Normal
Unable to assess
Normal
Unable to assess
Normal
Unable to assess
Unable to assess
Normal
Unable to assess
Unable to assess
Normal
Normal

## 2022-03-31 NOTE — BH INPATIENT PSYCHIATRY PROGRESS NOTE - NSBHMSETHTCONTENT_PSY_A_CORE
Unable to assess
Unremarkable
Unable to assess
Unremarkable
Unable to assess
Other
Other
Unable to assess
Unable to assess
Other
Unable to assess
Unable to assess
Unremarkable
Other
Unremarkable
Unremarkable
Other
Unremarkable
Other
Other
Unremarkable
Unremarkable

## 2022-03-31 NOTE — BH INPATIENT PSYCHIATRY PROGRESS NOTE - NSBHCHARTREVIEWVS_PSY_A_CORE FT
Vital Signs Last 24 Hrs  T(C): 36.6 (03-30-22 @ 20:32), Max: 36.6 (03-30-22 @ 20:32)  T(F): 97.8 (03-30-22 @ 20:32), Max: 97.8 (03-30-22 @ 20:32)  HR: --  BP: --  BP(mean): --  RR: --  SpO2: --

## 2022-03-31 NOTE — BH INPATIENT PSYCHIATRY PROGRESS NOTE - NSBHPSYCHOLCOGORIENT_PSY_A_CORE
Unable to assess
Unable to assess
Oriented to time, place, person, situation
Unable to assess
Oriented to time, place, person, situation
Unable to assess
Oriented to time, place, person, situation
Oriented to time, place, person, situation
Unable to assess
Oriented to time, place, person, situation
Unable to assess
Oriented to time, place, person, situation

## 2022-03-31 NOTE — BH INPATIENT PSYCHIATRY PROGRESS NOTE - NSTXDCOPNOPROGRES_PSY_ALL_CORE
No Change
Met - goal discontinued
No Change
Met - goal discontinued
No Change

## 2022-03-31 NOTE — BH INPATIENT PSYCHIATRY PROGRESS NOTE - LEVEL OF CONSCIOUSNESS
Alert
Lethargic, arousable to verbal stimulus
Alert

## 2022-03-31 NOTE — BH INPATIENT PSYCHIATRY PROGRESS NOTE - NSBHMSESPEECH_PSY_A_CORE
Abnormal as indicated, otherwise normal...
Normal volume, rate, productivity, spontaneity and articulation
Abnormal as indicated, otherwise normal...
Abnormal as indicated, otherwise normal...
Normal volume, rate, productivity, spontaneity and articulation
Abnormal as indicated, otherwise normal...
Normal volume, rate, productivity, spontaneity and articulation
Abnormal as indicated, otherwise normal...

## 2022-03-31 NOTE — BH INPATIENT PSYCHIATRY PROGRESS NOTE - NSTXPROBMEDIC_PSY_ALL_CORE
MEDICATION/TREATMENT NON-COMPLIANCE

## 2022-03-31 NOTE — BH INPATIENT PSYCHIATRY PROGRESS NOTE - NSBHMSEGROOM_PSY_A_CORE
Poor
Fair
Fair
Poor
Fair
Fair
Poor
Fair
Poor
Fair
Poor
Fair
Poor
Poor
Fair
Unable to assess
Poor

## 2022-03-31 NOTE — BH INPATIENT PSYCHIATRY PROGRESS NOTE - NSBHMSEAFFRANGE_PSY_A_CORE
Constricted
Unable to assess
Constricted
Unable to assess
Constricted

## 2022-03-31 NOTE — BH INPATIENT PSYCHIATRY PROGRESS NOTE - NSTXMEDICPROGRES_PSY_ALL_CORE
Met - goal discontinued

## 2022-03-31 NOTE — BH INPATIENT PSYCHIATRY PROGRESS NOTE - GENERAL APPEARANCE
No deformities present
Unable to assess
No deformities present

## 2022-03-31 NOTE — BH INPATIENT PSYCHIATRY PROGRESS NOTE - NSTXDEPRESDATEEST_PSY_ALL_CORE
16-Mar-2022
02-Mar-2022
09-Mar-2022
02-Mar-2022
23-Mar-2022
16-Mar-2022
02-Mar-2022
16-Mar-2022
09-Mar-2022
23-Mar-2022
16-Mar-2022
02-Mar-2022
09-Mar-2022
16-Mar-2022
09-Mar-2022
02-Mar-2022
09-Mar-2022
02-Mar-2022
30-Mar-2022
02-Mar-2022
02-Mar-2022

## 2022-03-31 NOTE — BH INPATIENT PSYCHIATRY PROGRESS NOTE - NSBHMSEGAIT_PSY_A_CORE
Normal gait / station
Unable to assess
Normal gait / station
Unable to assess
Normal gait / station
Unable to assess
Normal gait / station

## 2022-03-31 NOTE — BH INPATIENT PSYCHIATRY PROGRESS NOTE - NSBHMSEHYG_PSY_A_CORE
Poor
Unable to assess
Poor
Unable to assess

## 2022-03-31 NOTE — BH INPATIENT PSYCHIATRY PROGRESS NOTE - NSTXMEDICGOAL_PSY_ALL_CORE
Take all medications as prescribed
Be able to describe the benefit of medication/treatment
Take all medications as prescribed
Be able to describe the benefit of medication/treatment
Take all medications as prescribed
Be able to describe the benefit of medication/treatment
Take all medications as prescribed
Take all medications as prescribed
Be able to describe the benefit of medication/treatment

## 2022-03-31 NOTE — BH INPATIENT PSYCHIATRY PROGRESS NOTE - NSTXDEPRESDATETRGT_PSY_ALL_CORE
09-Mar-2022
16-Mar-2022
30-Mar-2022
09-Mar-2022
16-Mar-2022
23-Mar-2022
30-Mar-2022
23-Mar-2022
09-Mar-2022
09-Mar-2022
23-Mar-2022
16-Mar-2022
16-Mar-2022
09-Mar-2022
06-Apr-2022
09-Mar-2022
16-Mar-2022

## 2022-03-31 NOTE — BH INPATIENT PSYCHIATRY PROGRESS NOTE - NSBHMSEMUSCLE_PSY_A_CORE
Normal muscle tone/strength
Unable to assess
Normal muscle tone/strength
Normal muscle tone/strength
Unable to assess
Normal muscle tone/strength
Unable to assess
Normal muscle tone/strength
Unable to assess
Normal muscle tone/strength
Unable to assess
Normal muscle tone/strength

## 2022-03-31 NOTE — BH INPATIENT PSYCHIATRY PROGRESS NOTE - NSDCCRITERIA_PSY_ALL_CORE
remission of sx;  cgi<=2;  EPPS for safety and compliance
remission of sx;  
remission of sx;  cgi<=2;  EPPS for safety and compliance

## 2022-03-31 NOTE — BH INPATIENT PSYCHIATRY PROGRESS NOTE - NSBHFUPINTERVALCCFT_PSY_A_CORE
Pt seen f/u for psychosis and poor self care
Pt seen f/u for psychosis and poor self care utilizing [employer]  ID #:  [id number]
Pt seen f/u for psychosis and poor self care
Pt seen f/u for psychosis and poor self care utilizing [employer]  ID #:  [id number]
Pt seen f/u for psychosis and poor self care utilizing [employer]  ID #:  [id number]
Pt seen f/u for psychosis and poor self care
Pt seen f/u for psychosis and poor self care
Pt seen f/u for psychosis and poor self care utilizing [employer]  ID #:  [id number]
Pt seen f/u for psychosis and poor self care
Pt seen f/u for psychosis and poor self care utilizing [employer]  ID #:  [id number]
Pt seen f/u for psychosis and poor self care utilizing [employer]  ID #:  [id number]
Pt seen f/u for psychosis and poor self care
Pt seen f/u for psychosis and poor self care utilizing [employer]  ID #:  [id number]
Pt seen f/u for psychosis and poor self care

## 2022-03-31 NOTE — BH INPATIENT PSYCHIATRY PROGRESS NOTE - NSTXDCOPNOGOAL_PSY_ALL_CORE
Will agree to participate in appropriate outpatient care

## 2022-03-31 NOTE — BH INPATIENT PSYCHIATRY PROGRESS NOTE - NSBHMSETHTPROC_PSY_A_CORE
Other/Unable to assess
Other
Other/Unable to assess
Other/Unable to assess
Other
Other/Unable to assess
Other
Other/Unable to assess
Other
Other
Other/Unable to assess
Other
Linear

## 2022-03-31 NOTE — BH INPATIENT PSYCHIATRY PROGRESS NOTE - NSBHMSEAFFQUAL_PSY_A_CORE
Euthymic
Depressed
Euthymic
Unable to assess
Euthymic
Depressed
Euthymic
Euthymic/Irritable
Euthymic
Unable to assess

## 2022-03-31 NOTE — BH INPATIENT PSYCHIATRY PROGRESS NOTE - NSBHINPTBILLING_PSY_ALL_CORE
39817 - Inpatient Moderate Complexity
63685 - Inpatient Moderate Complexity
76418 - Inpatient Low Complexity
57383 - Inpatient Low Complexity
10096 - Inpatient Low Complexity
22572 - Inpatient Moderate Complexity
91301 - Inpatient Low Complexity
82159 - Inpatient Moderate Complexity
23660 - Inpatient Moderate Complexity
53432 - Inpatient Low Complexity
49993 - Inpatient Moderate Complexity
62553 - Inpatient Low Complexity
87849 - Hospital Discharge Day Management; 30 min or less
71864 - Inpatient Moderate Complexity
88071 - Inpatient Low Complexity
23026 - Inpatient Low Complexity
22044 - Inpatient Low Complexity
83788 - Inpatient Low Complexity
15715 - Inpatient Low Complexity
49251 - Inpatient Low Complexity
69964 - Inpatient Low Complexity
34346 - Inpatient Moderate Complexity
17496 - Inpatient Low Complexity
53137 - Inpatient Low Complexity

## 2022-03-31 NOTE — BH INPATIENT PSYCHIATRY PROGRESS NOTE - NSTXDCOPNODATEEST_PSY_ALL_CORE
02-Mar-2022
02-Mar-2022
16-Mar-2022
02-Mar-2022
16-Mar-2022
23-Mar-2022
02-Mar-2022
16-Mar-2022
02-Mar-2022
30-Mar-2022
02-Mar-2022
02-Mar-2022
23-Mar-2022
16-Mar-2022
02-Mar-2022
16-Mar-2022
23-Mar-2022
23-Mar-2022
30-Mar-2022

## 2022-03-31 NOTE — BH INPATIENT PSYCHIATRY PROGRESS NOTE - PRN MEDS
MEDICATIONS  (PRN):  diphenhydrAMINE 50 milliGRAM(s) Oral every 6 hours PRN EPS ppx  diphenhydrAMINE Injectable 50 milliGRAM(s) IntraMuscular once PRN agitation  haloperidol     Tablet 5 milliGRAM(s) Oral every 6 hours PRN agitation  haloperidol    Injectable 5 milliGRAM(s) IntraMuscular once PRN agitation  

## 2022-03-31 NOTE — BH INPATIENT PSYCHIATRY PROGRESS NOTE - NSTXPROBDEPRES_PSY_ALL_CORE
DEPRESSIVE SYMPTOMS

## 2022-03-31 NOTE — BH INPATIENT PSYCHIATRY PROGRESS NOTE - NSTXDCOPNODATETRGT_PSY_ALL_CORE
09-Mar-2022
09-Mar-2022
30-Mar-2022
09-Mar-2022
30-Mar-2022
06-Apr-2022
30-Mar-2022
09-Mar-2022
30-Mar-2022
09-Mar-2022
09-Mar-2022
30-Mar-2022
09-Mar-2022
30-Mar-2022
09-Mar-2022
30-Mar-2022
09-Mar-2022
06-Apr-2022
09-Mar-2022

## 2022-03-31 NOTE — BH INPATIENT PSYCHIATRY PROGRESS NOTE - NSBHCONSULTIPREASON_PSY_A_CORE
danger to self; mental illness expected to respond to inpatient care
other reason
danger to self; mental illness expected to respond to inpatient care

## 2022-03-31 NOTE — BH INPATIENT PSYCHIATRY PROGRESS NOTE - NSBHASSESSSUMMFT_PSY_ALL_CORE
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 Family reported that patient hasn't been compliant with his meds & has been neglecting basic daily hygiene. Patient himself denied any complaints but is described as internally preoccupied an uncooperative with questions. Psychiatry consulted for evaluation.     Pt cooperative and ready for discharge, appears internally preoccupied    >Legal: 9.27 INVOL  >Obs: Routine, no current SI. no need for CO, patient not expected to pose risk to self or others in controlled inpatient setting; q15 adequate  >Psychiatric Meds:  Zyprexa Zydis titrated to 20mg PO at bedtime  >Labs: Admission labs reviewed  >Medical:  No acute concerns. No consultations needed at this time.   >Social: milieu/structured therapy  >Treatment Interventions: Groups and Individual Therapy/CBT  >Dispo: pt safe to discharge home with outpatient care

## 2022-10-13 NOTE — PROGRESS NOTE BEHAVIORAL HEALTH - NSBHFUPSUICINTERVAL_PSY_A_CORE
41 year old female, past medical history htn, hld, peptic ulcer disease, dm, +smoker, who presents for eval. patient with right sided abd pain radiating to back that began xseveral days ago. patient reports pain intermittent, worse after eating and inspiration. no falls/trauma. no fever, chills, chest pain, hemoptysis, nausea/vomiting, syncope, skin changes, bowel changes. 41 year old female, past medical history htn, hld, peptic ulcer disease, dm, +smoker, opiate use, hypothyroidism, who presents for eval. patient with right sided abd pain radiating to back that began xseveral days ago. patient reports pain intermittent, worse after eating and inspiration. no falls/trauma. no fever, chills, chest pain, hemoptysis, nausea/vomiting, syncope, skin changes, bowel changes. unable to assess

## 2023-01-16 NOTE — ED ADULT NURSE NOTE - NSFALLRSKOUTCOME_ED_ALL_ED
Patient informed of labs. Stated she did notice pressure with a little burning but that has cleared up and she is not having any issues as of now.      She said she will see you on Friday Universal Safety Interventions

## 2023-07-27 ENCOUNTER — INPATIENT (INPATIENT)
Facility: HOSPITAL | Age: 23
LOS: 12 days | Discharge: HOME | DRG: 751 | End: 2023-08-09
Attending: PSYCHIATRY & NEUROLOGY | Admitting: PSYCHIATRY & NEUROLOGY
Payer: MEDICAID

## 2023-07-27 VITALS — HEIGHT: 65 IN | WEIGHT: 119.93 LBS

## 2023-07-27 LAB
ALBUMIN SERPL ELPH-MCNC: 4.2 G/DL — SIGNIFICANT CHANGE UP (ref 3.3–5)
ALP SERPL-CCNC: 57 U/L — SIGNIFICANT CHANGE UP (ref 40–120)
ALT FLD-CCNC: 26 U/L — SIGNIFICANT CHANGE UP (ref 12–78)
AMPHET UR-MCNC: NEGATIVE — SIGNIFICANT CHANGE UP
ANION GAP SERPL CALC-SCNC: 5 MMOL/L — SIGNIFICANT CHANGE UP (ref 5–17)
APAP SERPL-MCNC: < 2 UG/ML (ref 10–30)
APPEARANCE UR: CLEAR — SIGNIFICANT CHANGE UP
AST SERPL-CCNC: 18 U/L — SIGNIFICANT CHANGE UP (ref 15–37)
BARBITURATES UR SCN-MCNC: NEGATIVE — SIGNIFICANT CHANGE UP
BASOPHILS # BLD AUTO: 0.03 K/UL — SIGNIFICANT CHANGE UP (ref 0–0.2)
BASOPHILS NFR BLD AUTO: 0.3 % — SIGNIFICANT CHANGE UP (ref 0–2)
BENZODIAZ UR-MCNC: NEGATIVE — SIGNIFICANT CHANGE UP
BILIRUB SERPL-MCNC: 0.5 MG/DL — SIGNIFICANT CHANGE UP (ref 0.2–1.2)
BILIRUB UR-MCNC: NEGATIVE — SIGNIFICANT CHANGE UP
BUN SERPL-MCNC: 13 MG/DL — SIGNIFICANT CHANGE UP (ref 7–23)
CALCIUM SERPL-MCNC: 8.8 MG/DL — SIGNIFICANT CHANGE UP (ref 8.5–10.1)
CHLORIDE SERPL-SCNC: 109 MMOL/L — HIGH (ref 96–108)
CO2 SERPL-SCNC: 25 MMOL/L — SIGNIFICANT CHANGE UP (ref 22–31)
COCAINE METAB.OTHER UR-MCNC: NEGATIVE — SIGNIFICANT CHANGE UP
COLOR SPEC: YELLOW — SIGNIFICANT CHANGE UP
CREAT SERPL-MCNC: 1.11 MG/DL — SIGNIFICANT CHANGE UP (ref 0.5–1.3)
DIFF PNL FLD: NEGATIVE — SIGNIFICANT CHANGE UP
EGFR: 96 ML/MIN/1.73M2 — SIGNIFICANT CHANGE UP
EOSINOPHIL # BLD AUTO: 0.07 K/UL — SIGNIFICANT CHANGE UP (ref 0–0.5)
EOSINOPHIL NFR BLD AUTO: 0.8 % — SIGNIFICANT CHANGE UP (ref 0–6)
ETHANOL SERPL-MCNC: <10 MG/DL — SIGNIFICANT CHANGE UP (ref 0–10)
GLUCOSE SERPL-MCNC: 114 MG/DL — HIGH (ref 70–99)
GLUCOSE UR QL: NEGATIVE — SIGNIFICANT CHANGE UP
HCT VFR BLD CALC: 44.6 % — SIGNIFICANT CHANGE UP (ref 39–50)
HGB BLD-MCNC: 15.6 G/DL — SIGNIFICANT CHANGE UP (ref 13–17)
IMM GRANULOCYTES NFR BLD AUTO: 0.3 % — SIGNIFICANT CHANGE UP (ref 0–0.9)
KETONES UR-MCNC: NEGATIVE — SIGNIFICANT CHANGE UP
LEUKOCYTE ESTERASE UR-ACNC: NEGATIVE — SIGNIFICANT CHANGE UP
LYMPHOCYTES # BLD AUTO: 2.63 K/UL — SIGNIFICANT CHANGE UP (ref 1–3.3)
LYMPHOCYTES # BLD AUTO: 29.4 % — SIGNIFICANT CHANGE UP (ref 13–44)
MCHC RBC-ENTMCNC: 30.2 PG — SIGNIFICANT CHANGE UP (ref 27–34)
MCHC RBC-ENTMCNC: 35 GM/DL — SIGNIFICANT CHANGE UP (ref 32–36)
MCV RBC AUTO: 86.4 FL — SIGNIFICANT CHANGE UP (ref 80–100)
METHADONE UR-MCNC: NEGATIVE — SIGNIFICANT CHANGE UP
MONOCYTES # BLD AUTO: 0.55 K/UL — SIGNIFICANT CHANGE UP (ref 0–0.9)
MONOCYTES NFR BLD AUTO: 6.1 % — SIGNIFICANT CHANGE UP (ref 2–14)
NEUTROPHILS # BLD AUTO: 5.64 K/UL — SIGNIFICANT CHANGE UP (ref 1.8–7.4)
NEUTROPHILS NFR BLD AUTO: 63.1 % — SIGNIFICANT CHANGE UP (ref 43–77)
NITRITE UR-MCNC: NEGATIVE — SIGNIFICANT CHANGE UP
OPIATES UR-MCNC: NEGATIVE — SIGNIFICANT CHANGE UP
PCP SPEC-MCNC: SIGNIFICANT CHANGE UP
PCP UR-MCNC: NEGATIVE — SIGNIFICANT CHANGE UP
PH UR: 7 — SIGNIFICANT CHANGE UP (ref 5–8)
PLATELET # BLD AUTO: 265 K/UL — SIGNIFICANT CHANGE UP (ref 150–400)
POTASSIUM SERPL-MCNC: 3.4 MMOL/L — LOW (ref 3.5–5.3)
POTASSIUM SERPL-SCNC: 3.4 MMOL/L — LOW (ref 3.5–5.3)
PROT SERPL-MCNC: 8.1 GM/DL — SIGNIFICANT CHANGE UP (ref 6–8.3)
PROT UR-MCNC: NEGATIVE — SIGNIFICANT CHANGE UP
RBC # BLD: 5.16 M/UL — SIGNIFICANT CHANGE UP (ref 4.2–5.8)
RBC # FLD: 12.1 % — SIGNIFICANT CHANGE UP (ref 10.3–14.5)
SALICYLATES SERPL-MCNC: <1.7 MG/DL (ref 2.8–20)
SODIUM SERPL-SCNC: 139 MMOL/L — SIGNIFICANT CHANGE UP (ref 135–145)
SP GR SPEC: 1.01 — SIGNIFICANT CHANGE UP (ref 1.01–1.02)
THC UR QL: NEGATIVE — SIGNIFICANT CHANGE UP
UROBILINOGEN FLD QL: 1
WBC # BLD: 8.95 K/UL — SIGNIFICANT CHANGE UP (ref 3.8–10.5)
WBC # FLD AUTO: 8.95 K/UL — SIGNIFICANT CHANGE UP (ref 3.8–10.5)

## 2023-07-27 PROCEDURE — 99285 EMERGENCY DEPT VISIT HI MDM: CPT

## 2023-07-27 PROCEDURE — 93010 ELECTROCARDIOGRAM REPORT: CPT

## 2023-07-27 NOTE — ED PROVIDER NOTE - CLINICAL SUMMARY MEDICAL DECISION MAKING FREE TEXT BOX
01-Feb-2017
Concerned for patient not caring for himself, possible medication non-compliance. Will screen for substance use vs. toxic/metabolic abnormalities. Plan on labs, psych consult.

## 2023-07-27 NOTE — ED PROVIDER NOTE - PHYSICAL EXAMINATION
Constitutional: Awake, Alert, non-toxic. No acute distress.  HEAD: Normocephalic, atraumatic.   EYES: PERRL, EOM intact, conjunctiva and sclera are clear bilaterally.  ENT: External ears normal. No rhinorrhea, no tracheal deviation   NECK: Supple, non-tender  CARDIOVASCULAR: regular rate and rhythm.  RESPIRATORY: Normal respiratory effort; breath sounds CTAB, no wheezes, rhonchi, or rales. Speaking in full sentences. No accessory muscle use.   ABDOMEN: Soft; non-tender, non-distended. No rebound or guarding.   MSK:  no lower extremity edema, no deformities  SKIN: Warm, dry  NEURO: A&O x3. Sensory and motor functions are grossly intact. Speech is normal. No facial droop.  PSYCH: Minimally conversational, denies SI/HI Constitutional: Awake, Alert, non-toxic. No acute distress. makes minimal eye contact  HEAD: Normocephalic, atraumatic.   EYES: PERRL, EOM intact, conjunctiva and sclera are clear bilaterally.  ENT: External ears normal. No rhinorrhea, no tracheal deviation   NECK: Supple, non-tender  CARDIOVASCULAR: regular rate and rhythm.  RESPIRATORY: Normal respiratory effort; breath sounds CTAB, no wheezes, rhonchi, or rales. Speaking in full sentences. No accessory muscle use.   ABDOMEN: Soft; non-tender, non-distended. No rebound or guarding.   MSK:  no lower extremity edema, no deformities  SKIN: Warm, dry  NEURO: A&O x3. Sensory and motor functions are grossly intact. Speech is normal. No facial droop.  PSYCH: Minimally conversational, denies SI/HI

## 2023-07-27 NOTE — ED STATDOCS - PROGRESS NOTE DETAILS
22-year-old male with history of affective psychosis, schizoaffective disorder presents for evaluation of not sleeping or eating for the past 5 days.  The patient's father provides all the history and review of systems.  The patient does not contribute to the history reported during my interview.  The patient's father denies hearing any SI or HI.  He states that the patient has not gone to work for the past several days either.  The patient has stopped taking his medications which include olanzapine 20 mg nightly over the past 2 months.  Patient will be sent to the main emergency department for further work-up and psychiatric evaluation.  Per

## 2023-07-27 NOTE — ED PROVIDER NOTE - OBJECTIVE STATEMENT
Patient is a 22 year old male with a PMHx of affective psychosis; presenting to the ED for medical evaluation. Uncle reports that for the last five days, patient has been talking nonsense, and hasn't been eating or sleeping. Also notes he is on an unknown psych medication. Patient denies SI/HI, or any pain. When asked what medications he took, the patient would not respond. Uncle states patient's doctor called him, and recommended that he bring him to the ED. Patient and family is Slovenian-speaking,  #[id number]. Patient is a 22 year old male with a PMHx of affective psychosis; presenting to the ED for medical evaluation. Uncle reports that for the last five days, patient has been talking nonsense, and hasn't been eating or sleeping. Also notes he is on an unknown psych medication but has not been taking it.. Patient denies SI/HI, or any pain. When asked what medications he took, the patient would not respond. Uncle states patient's doctor called him, and recommended that he bring him to the ED. Patient and family is Welsh-speaking,  #[id number].

## 2023-07-27 NOTE — ED ADULT NURSE NOTE - NSFALLUNIVINTERV_ED_ALL_ED
Bed/Stretcher in lowest position, wheels locked, appropriate side rails in place/Call bell, personal items and telephone in reach/Instruct patient to call for assistance before getting out of bed/chair/stretcher/Non-slip footwear applied when patient is off stretcher/[place] to call system/Physically safe environment - no spills, clutter or unnecessary equipment/Purposeful proactive rounding/Room/bathroom lighting operational, light cord in reach

## 2023-07-27 NOTE — ED ADULT NURSE NOTE - PAIN: PRESENCE, MLM
Admission Reconciliation is Completed  Discharge Reconciliation is Not Complete denies pain/discomfort (Rating = 0) Admission Reconciliation is Completed  Discharge Reconciliation is Completed

## 2023-07-27 NOTE — ED ADULT NURSE NOTE - OBJECTIVE STATEMENT
PAST SURGICAL HISTORY:  No significant past surgical history Pt to Ed with family member fro psych eval. per pt uncle pt has not been eating or sleeping x 5 days and talking gibberish. hx of schizoaffective psychosis, unsure of meds.  Pt denies SI/HI,denies pain.  #[id number]. Pt cooperative but appears tense and anxious. Pt to Ed with family member fro psych eval. per pt uncle pt has not been eating or sleeping x 5 days and talking gibberish. hx of psychosis, unsure of meds that he takes.  Pt denies SI/HI, denies pain.  #[id number]. Pt cooperative but appears tense.

## 2023-07-27 NOTE — ED ADULT NURSE NOTE - CHIEF COMPLAINT QUOTE
pt bib dad having no complaints. Dad states "he has not been sleeping, eating or leaving the house. He was diagnosed with epilepsy 2 years ago, but stopped/ ran out of his medication 2 months ago". Pt A&ox4, and admits to not knowing why his dad wants him here. Pt denies SI/HI. Pt ambulatory, no s/s of distress.  #[id number]

## 2023-07-28 DIAGNOSIS — F29 UNSPECIFIED PSYCHOSIS NOT DUE TO A SUBSTANCE OR KNOWN PHYSIOLOGICAL CONDITION: ICD-10-CM

## 2023-07-28 DIAGNOSIS — F20.9 SCHIZOPHRENIA, UNSPECIFIED: ICD-10-CM

## 2023-07-28 DIAGNOSIS — F06.1 CATATONIC DISORDER DUE TO KNOWN PHYSIOLOGICAL CONDITION: ICD-10-CM

## 2023-07-28 PROBLEM — F39 UNSPECIFIED MOOD [AFFECTIVE] DISORDER: Chronic | Status: ACTIVE | Noted: 2022-02-28

## 2023-07-28 LAB — SARS-COV-2 RNA SPEC QL NAA+PROBE: SIGNIFICANT CHANGE UP

## 2023-07-28 PROCEDURE — 82607 VITAMIN B-12: CPT

## 2023-07-28 PROCEDURE — 99223 1ST HOSP IP/OBS HIGH 75: CPT

## 2023-07-28 PROCEDURE — 80048 BASIC METABOLIC PNL TOTAL CA: CPT

## 2023-07-28 PROCEDURE — 84443 ASSAY THYROID STIM HORMONE: CPT

## 2023-07-28 PROCEDURE — 36415 COLL VENOUS BLD VENIPUNCTURE: CPT

## 2023-07-28 PROCEDURE — 80061 LIPID PANEL: CPT

## 2023-07-28 PROCEDURE — 83036 HEMOGLOBIN GLYCOSYLATED A1C: CPT

## 2023-07-28 RX ORDER — OLANZAPINE 15 MG/1
5 TABLET, FILM COATED ORAL
Refills: 0 | Status: COMPLETED | OUTPATIENT
Start: 2023-07-28 | End: 2023-07-28

## 2023-07-28 RX ORDER — OLANZAPINE 15 MG/1
5 TABLET, FILM COATED ORAL ONCE
Refills: 0 | Status: COMPLETED | OUTPATIENT
Start: 2023-07-28 | End: 2023-07-28

## 2023-07-28 RX ADMIN — OLANZAPINE 5 MILLIGRAM(S): 15 TABLET, FILM COATED ORAL at 12:02

## 2023-07-28 RX ADMIN — Medication 1 MILLIGRAM(S): at 20:58

## 2023-07-28 RX ADMIN — OLANZAPINE 5 MILLIGRAM(S): 15 TABLET, FILM COATED ORAL at 20:59

## 2023-07-28 RX ADMIN — Medication 1 MILLIGRAM(S): at 03:34

## 2023-07-28 NOTE — ED BEHAVIORAL HEALTH ASSESSMENT NOTE - DESCRIPTION
as per chart review; migrated from [facility] ~6years ago. Lives with uncle, aunt and cousins in private house. Patient was reportedly involved in a work training program prior to recent admission to  the end of July. No children and no reports of substance use. None known as per chart review; migrated from [facility] ~7years ago. Lives with uncle, aunt and cousins in private house. mostly immobile in stretcher, eyes closed & resting, minimally reactive to surroundings

## 2023-07-28 NOTE — ED BEHAVIORAL HEALTH ASSESSMENT NOTE - SUMMARY
This is a 21 yo male, Ugandan-speaking, domiciled w/ family, with history of schizophrenia vs affective psychosis, multiple prior psych admissions (last known at [facility] 3/1-3/31/22), no suicide attempts or violence hx, BIB EMS a/b family due to poor sleep, PO intake, and self-care in the past week in the context of medication non-adherence.    Patient with possible catatonic presentation including mutism, stupor, immobility, stereotyped movements, although cannot say definitely due to limited ability to perform physical exam through tele assessment. This behavior would likely be the result of exacerbation of underlying psychotic illness. Based on collateral, there is strong and sufficient evidence that patient is unable to care properly for self or maintain adequate functioning. Therefore he requires psychiatric hospitalization for stabilization.    Plan:  - admit to psych 9.39  - pt was given Ativan 1mg IM challenge test for catatonia. Unclear if improved. Unable to reach ED provider for update  - given uncertainty of catatonia, will hold off on restarting antipsychotic for now and defer to further inpatient assessment.  - if agitated, can given Zyprexa 5mg IM PRN

## 2023-07-28 NOTE — ED BEHAVIORAL HEALTH ASSESSMENT NOTE - DETAILS
Pt denies current SI but unable to assess suicidality in the last month or past attempts due to selective non-verbalism SW spoke to uncle pt poorly related, not interacting, mostly nonverbal d/w charge RN

## 2023-07-28 NOTE — ED BEHAVIORAL HEALTH NOTE - BEHAVIORAL HEALTH NOTE
==================             PRE-HOSPITAL COURSE             ===================            SOURCE:  Secondhand EMR documentation.             DETAILS:  Patient BIB uncle to ED; chief complaint of med noncompliance/behavioral change.           ===========      ED COURSE:            ===========             SOURCE:  RN and secondhand EMR documentation.              ARRIVAL:  Patient noted to be cooperative with ED protocol. Patient gowned, wanded, and placed in private room on 1:1 for consult. Patient presents with good hygiene/grooming.              BELONGINGS:  None notable.             BEHAVIOR: Blood/urine provided for routine labs without noted incident. No SI/HI/AH/VH elicited per RN. Patient is alert, orientedx4, and makes fair eye contact; RN notes patient staring off at times. Patient's speech of normal volume and rate accompanied by logical thought process. Patient presenting as anxious/tense however has remained calm and interacting appropriately with ED staff.         TREATMENT: Patient has not required medication intervention while in ED; remains in behavioral control.      Visitors: Patient presently unaccompanied by social supports while in ED. ==================             PRE-HOSPITAL COURSE             ===================            SOURCE:  Secondhand EMR documentation.             DETAILS:  Patient BIB uncle to ED; chief complaint of med noncompliance/behavioral change.           ===========      ED COURSE:            ===========             SOURCE:  RN and secondhand EMR documentation.              ARRIVAL:  Patient noted to be cooperative with ED protocol. Patient gowned, wanded, and placed in private room on 1:1 for consult. Patient presents with good hygiene/grooming.              BELONGINGS:  None notable.             BEHAVIOR: Blood/urine provided for routine labs without noted incident. No SI/HI/AH/VH elicited per RN. Patient is alert, orientedx4, and makes fair eye contact; RN notes patient staring off at times. Patient's speech of normal volume and rate accompanied by logical thought process. Patient presenting as anxious/tense however has remained cooperative and interacting appropriately with ED staff.         TREATMENT: Patient has not required medication intervention while in ED; remains in behavioral control.      Visitors: Patient presently unaccompanied by social supports while in ED.

## 2023-07-28 NOTE — BH INPATIENT PSYCHIATRY ASSESSMENT NOTE - OTHER PAST PSYCHIATRIC HISTORY (INCLUDE DETAILS REGARDING ONSET, COURSE OF ILLNESS, INPATIENT/OUTPATIENT TREATMENT)
Discharged from [facility] 3/2022 on olanzapine 20 mg  First hospitalization in  7/21-8/11/21, readmitted 8/21-9/9/21

## 2023-07-28 NOTE — BH INPATIENT PSYCHIATRY ASSESSMENT NOTE - NSBHCHARTREVIEWINVESTIGATE_PSY_A_CORE FT
< from: 12 Lead ECG (02.28.22 @ 18:04) >    Ventricular Rate 70 BPM    Atrial Rate 70 BPM    P-R Interval 154 ms    QRS Duration 104 ms    Q-T Interval 396 ms    QTC Calculation(Bazett) 427 ms    P Axis 56 degrees    R Axis -25 degrees    T Axis 17 degrees    Diagnosis Line Normal sinus rhythm  When compared with ECG of 21-AUG-2021 12:04,  No significant change was found  Confirmed by MAISHA SIMEON MD ([id number]) on 3/1/2022 7:07:33 AM    < end of copied text >

## 2023-07-28 NOTE — BH INPATIENT PSYCHIATRY ASSESSMENT NOTE - RISK ASSESSMENT
Risk factors: past hospitalizations, poor compliance w/ treatment, poor insight, actively psychotic
Detail Level: Zone

## 2023-07-28 NOTE — BH INPATIENT PSYCHIATRY ASSESSMENT NOTE - NSBHMETABOLIC_PSY_ALL_CORE_FT
BMI: BMI (kg/m2): 26 (07-28-23 @ 08:18)  HbA1c:   Glucose:   BP: 125/72 (07-28-23 @ 07:52) (114/55 - 131/69)  Lipid Panel:

## 2023-07-28 NOTE — BH INPATIENT PSYCHIATRY ASSESSMENT NOTE - NSBHASSESSSUMMFT_PSY_ALL_CORE
07/28/2023: Patient was seen today AM, with MARC Nair who help with interview. Interview is limited, he is alert, not oriented to time and place. Endorses that he does not hear voices, does not feel suspicious and has been sleeping well before coming to ED at . He agreed to take Zyprexa 5 mg x 1 dose now and also Zyprexa 5 mg HS ordered for faster improvement.    Plan: Admit Involuntarily -9.39          Start Zyprexa 5 mg HS          PRN PO/IM meds as needed for stability          Collateral info from family          Discharge Planning-Ongoing

## 2023-07-28 NOTE — BH INPATIENT PSYCHIATRY ASSESSMENT NOTE - NSDCCRITERIA_PSY_ALL_CORE
When no longer psychotic/ and able to communicate needs well with adequate self care before discharge

## 2023-07-28 NOTE — ED BEHAVIORAL HEALTH ASSESSMENT NOTE - OTHER PAST PSYCHIATRIC HISTORY (INCLUDE DETAILS REGARDING ONSET, COURSE OF ILLNESS, INPATIENT/OUTPATIENT TREATMENT)
As per chart review; Seen in ED for AH treated and released 7/2021. 1st psychiatric admission to  7/21-8/112021. Readmitted subsequently 8/21-9/9/21. Discharged on Zyprexa 10 mg HS. Discharged from [facility] 3/2022 on olanzapine 20mg  First hospitalization in  7/21-8/11/21, readmitted 8/21-9/9/21

## 2023-07-28 NOTE — BH INPATIENT PSYCHIATRY ASSESSMENT NOTE - DESCRIPTION
As per chart review; migrated from [facility] ~7years ago. Lives with uncle, aunt and cousins in private house.

## 2023-07-28 NOTE — ED BEHAVIORAL HEALTH ASSESSMENT NOTE - HPI (INCLUDE ILLNESS QUALITY, SEVERITY, DURATION, TIMING, CONTEXT, MODIFYING FACTORS, ASSOCIATED SIGNS AND SYMPTOMS)
Detail Level: Detailed This is a 23 yo male, Cypriot-speaking, domiciled w/ family, with history of schizophrenia vs affective psychosis, multiple prior psych admissions ([facility] 3/1-3/31/22, reportedly also at  9 months ago), no suicide attempts or violence hx, BIB EMS a/b family due to poor sleep, PO intake, and self-care in the past week in the context of medication non-adherence.    Attempted interview w/ . Patient is odd, somnolent, has irregular eye movements and repetitive movements of feet per 1:1. He is mostly nonverbal, nonresponsive even though, not making eye contact. Unable to answer any questions. Very inattentive, says "huh" even when most questions are repeated.    Collateral obtained from patient's uncle (see SW note). Per uncle for the last week patient has not been showering, not eating. Has been making paranoid comments about Lucifer, talking to self and laughing to self. This is a 21 yo male, [place]ican-speaking, domiciled w/ family, with history of schizophrenia vs affective psychosis, multiple prior psych admissions (last known at [facility] 3/1-3/31/22), no suicide attempts or violence hx, BIB EMS a/b family due to poor sleep, PO intake, and self-care in the past week in the context of medication non-adherence.    Attempted interview w/ . Patient is odd, somnolent, has irregular eye movements and repetitive movements of feet per 1:1. He is mostly nonverbal, nonresponsive even though, not making eye contact. Unable to answer any questions. Very inattentive, says "huh" even when most questions are repeated.    Collateral obtained from patient's uncle (see SW note). Per uncle for the last week patient has not been showering, not eating. Has been making paranoid comments about Lucifer, talking to self and laughing to self.

## 2023-07-28 NOTE — ED BEHAVIORAL HEALTH NOTE - BEHAVIORAL HEALTH NOTE
Patient gives permission to obtain collateral from Uncle Rivera:  (  ) Yes  ( x )  No  Rationale for overriding objection            (  ) Lack of capacity. Details: ________            ( x ) Assessing risk of danger to self/others. Details: Pt unable to care for self, need to assess for safety.   Rationale for selecting specific collateral source            (  ) Potential to impact risk of danger to self/others and no alternative equivalent. Details: _____    Collateral (Uncle Rivera) has requested that the information provided remain confidential: Yes [  ] No [ x ]  Collateral (Uncle Rivera) has provided information that patient is/may be unaware of: Yes [  ] No [ x ]       FOR EACH PERSON  •	NAME: Jose Louis Reyes  •	NUMBER: [phone]  •	RELATIONSHIP: Uncle  •	RELIABILITY: Good but limited  •	COMMENTS: Uncle has safety concerns, uncle is concerned pt is not able to care for himself and that someone might hurt him or he might hurt someone else if discharged. Uncle is advocating for inpt admission as uncle reports it’s the only thing that helps him because when pt is discharged, he’s back at baseline.      DEMOGRAPHICS 21 yo M, single, domiciled with sister and uncle, employed as , non-caregiver.     HPI (SEE TIMELINE ABOVE)  •	BASELINE FUNCTIONING: able to independently complete ADLs, goes to work, happy person, hardworking.   •	DATE HPI STARTED: 5-6 days ago  •	DECOMPENSATION:  reports for the past 5-6 days, pt has not been sleeping, eating, or going to work.  reports pt stays home, is observed to be internally preoccupied, talking and laughing to self, endorsing delusions that lucifer is behind him and doing bad things.  reports pt is not performing ADLs and has only taken 1 shower in the past 5 days.  reports on some days pt is able to follow commands and was willing to go with uncle to  his medications.  reports this is a similar presentation to the last time pt was hospitalized.    reports he called PD yesterday to take pt to the ED but PD came to the home, talked to pt, and decided not to take him.  reports he called pt’s psychiatrist today and informed her of pt’s presentation and was instructed by psychiatrist to take him to the ED immediately.  •	SUICIDALITY: Denies   •	VIOLENCE: Denies   •	SUBSTANCE: Denies     PAST PSYCHIATRIC HISTORY    •	DATE PAST PSYCHIATRIC HISTORY STARTED: Unknown  •	MAIN PSYCHIATRIC DIAGNOSIS: schizophrenia   •	PSYCHIATRIC HOSPITALIZATIONS: Uncle reports 1 prior hospitalization 9 months ago at  [facility] for 1.5 months.    •	PRIOR ILLNESS: Uncle reports pt is in outpt tx with psychiatrist and therapist. Uncle reports pt discontinued his medication approximately 2 months. Uncle reports he is of the medication that pt takes but reports it is a pill that puts pt to sleep.    •	SUICIDALITY: Denies   •	VIOLENCE: Denies   •	SUBSTANCE USE: Denies      OTHER HISTORY  •	CURRENT MEDICATION: Unknown   •	MEDICAL HISTORY: None known  •	ALLERGIES: NKA  •	LEGAL ISSUES: Denies  •	FIREARM ACCESS: Denies   •	SOCIAL HISTORY: Denies   •	FAMILY HISTORY: None known Patient gives permission to obtain collateral from Uncle Rivera:  (  ) Yes  ( x )  No  Rationale for overriding objection            (  ) Lack of capacity. Details: ________            ( x ) Assessing risk of danger to self/others. Details: Pt unable to care for self, need to assess for safety.   Rationale for selecting specific collateral source            (  ) Potential to impact risk of danger to self/others and no alternative equivalent. Details: _____    Collateral (Uncle Rivera) has requested that the information provided remain confidential: Yes [  ] No [ x ]  Collateral (Uncle Rivera) has provided information that patient is/may be unaware of: Yes [  ] No [ x ]       FOR EACH PERSON  •	NAME: Jose Louis Reyes  •	NUMBER: [phone]  •	RELATIONSHIP: Uncle  •	RELIABILITY: Good but limited  •	COMMENTS: : ID [id number]. Uncle has safety concerns, uncle is concerned pt is not able to care for himself and that someone might hurt him or he might hurt someone else if discharged. Uncle is advocating for inpt admission as uncle reports it’s the only thing that helps him because when pt is discharged, he’s back at baseline.      DEMOGRAPHICS 21 yo M, single, domiciled with sister and uncle, employed as , non-caregiver.     HPI (SEE TIMELINE ABOVE)  •	BASELINE FUNCTIONING: able to independently complete ADLs, goes to work, happy person, hardworking.   •	DATE HPI STARTED: 5-6 days ago  •	DECOMPENSATION:  reports for the past 5-6 days, pt has not been sleeping, eating, or going to work.  reports pt stays home, is observed to be internally preoccupied, talking and laughing to self, endorsing delusions that lucifer is behind him and doing bad things.  reports pt is not performing ADLs and has only taken 1 shower in the past 5 days.  reports on some days pt is able to follow commands and was willing to go with uncle to  his medications.  reports this is a similar presentation to the last time pt was hospitalized.    reports he called PD yesterday to take pt to the ED but PD came to the home, talked to pt, and decided not to take him.  reports he called pt’s psychiatrist today and informed her of pt’s presentation and was instructed by psychiatrist to take him to the ED immediately.  •	SUICIDALITY: Denies   •	VIOLENCE: Denies   •	SUBSTANCE: Denies     PAST PSYCHIATRIC HISTORY    •	DATE PAST PSYCHIATRIC HISTORY STARTED: Unknown  •	MAIN PSYCHIATRIC DIAGNOSIS: schizophrenia   •	PSYCHIATRIC HOSPITALIZATIONS: Uncle reports 1 prior hospitalization 9 months ago at  [facility] for 1.5 months.    •	PRIOR ILLNESS: Uncle reports pt is in outpt tx with psychiatrist and therapist. Uncle reports pt discontinued his medication approximately 2 months. Uncle reports he is of the medication that pt takes but reports it is a pill that puts pt to sleep.    •	SUICIDALITY: Denies   •	VIOLENCE: Denies   •	SUBSTANCE USE: Denies      OTHER HISTORY  •	CURRENT MEDICATION: Unknown   •	MEDICAL HISTORY: None known  •	ALLERGIES: NKA  •	LEGAL ISSUES: Denies  •	FIREARM ACCESS: Denies   •	SOCIAL HISTORY: Denies   •	FAMILY HISTORY: None known

## 2023-07-28 NOTE — BH INPATIENT PSYCHIATRY ASSESSMENT NOTE - HPI (INCLUDE ILLNESS QUALITY, SEVERITY, DURATION, TIMING, CONTEXT, MODIFYING FACTORS, ASSOCIATED SIGNS AND SYMPTOMS)
07/28/2023: Patient was seen today AM, with MARC Nair who help with interview. Interview is limited, he is alert, not oriented to time and place. Endorses that he does not hear voices, does not feel suspicious and has been sleeping well before coming to ED at . He agreed to take Zyprexa 5 mg x 1 dose now and also Zyprexa 5 mg HS ordered for faster improvement.    Plan: Admit Involuntarily -9.39          Start Zyprexa 5 mg HS          PRN PO/IM meds as needed for stability          Collateral info from family          Discharge Planning-Ongoing    As per ED presentation: This is a 23 yo male, Uzbek-speaking, domiciled w/ family, with history of schizophrenia vs affective psychosis, multiple prior psych admissions (last known at [facility] 3/1-3/31/22), no suicide attempts or violence hx, BIB EMS a/b family due to poor sleep, PO intake, and self-care in the past week in the context of medication non-adherence.    Attempted interview w/ . Patient is odd, somnolent, has irregular eye movements and repetitive movements of feet per 1:1. He is mostly nonverbal, nonresponsive even though, not making eye contact. Unable to answer any questions. Very inattentive, says "huh" even when most questions are repeated.    Collateral obtained from patient's uncle (see SW note). Per uncle for the last week patient has not been showering, not eating. Has been making paranoid comments about Lucifer, talking to self and laughing to self.  Uncle reports for the past 5-6 days, pt has not been sleeping, eating, or going to work. Uncle reports pt stays home, is observed to be internally preoccupied, talking and laughing to self, endorsing delusions that lucifer is behind him and doing bad things. Uncle reports pt is not performing ADLs and has only taken 1 shower in the past 5 days. Uncle reports on some days pt is able to follow commands and was willing to go with uncle to  his medications. Uncle reports this is a similar presentation to the last time pt was hospitalized.   Uncle reports he called PD yesterday to take pt to the ED but PD came to the home, talked to pt, and decided not to take him. Uncle reports he called pt’s psychiatrist today and informed her of pt’s presentation and was instructed by psychiatrist to take him to the ED immediately.  Collateral information from Uncle:

## 2023-07-28 NOTE — BH INPATIENT PSYCHIATRY ASSESSMENT NOTE - NSBHCHARTREVIEWVS_PSY_A_CORE FT
Vital Signs Last 24 Hrs  T(C): 37 (07-28-23 @ 08:18), Max: 37.4 (07-27-23 @ 20:10)  T(F): 98.6 (07-28-23 @ 08:18), Max: 99.4 (07-27-23 @ 20:10)  HR: 72 (07-28-23 @ 07:52) (50 - 72)  BP: 125/72 (07-28-23 @ 07:52) (114/55 - 131/69)  BP(mean): 72 (07-28-23 @ 05:30) (72 - 88)  RR: 20 (07-28-23 @ 08:18) (15 - 20)  SpO2: 100% (07-28-23 @ 08:18) (99% - 100%)    Orthostatic VS  07-28-23 @ 08:18  Lying BP: --/-- HR: --  Sitting BP: 118/60 HR: 77  Standing BP: 125/73 HR: 99  Site: --  Mode: --

## 2023-07-28 NOTE — BH INPATIENT PSYCHIATRY ASSESSMENT NOTE - CURRENT MEDICATION
MEDICATIONS  (STANDING):  OLANZapine Disintegrating Tablet 5 milliGRAM(s) Oral <User Schedule>  OLANZapine Disintegrating Tablet 5 milliGRAM(s) Oral once    MEDICATIONS  (PRN):  LORazepam     Tablet 1 milliGRAM(s) Oral every 6 hours PRN Agitation

## 2023-07-28 NOTE — ED ADULT NURSE REASSESSMENT NOTE - NS ED NURSE REASSESS COMMENT FT1
Telepsych set up at bedside.Pt Alert and calm. PCT at bedside for continuous 1 to 1 obs.
Pt resting comfortably in bed, cooperative, no distress noted. PCT at bedside continuous 1 to 1 obs. Pt uncle left phone number ([phone])
Pt alert. sitting up in bed, calm, cooperative. resp unlabored. PCT at bedside for continuous 1 to 1 obs

## 2023-07-29 PROCEDURE — 99232 SBSQ HOSP IP/OBS MODERATE 35: CPT

## 2023-07-29 PROCEDURE — 99222 1ST HOSP IP/OBS MODERATE 55: CPT

## 2023-07-29 RX ORDER — OLANZAPINE 15 MG/1
10 TABLET, FILM COATED ORAL AT BEDTIME
Refills: 0 | Status: DISCONTINUED | OUTPATIENT
Start: 2023-07-29 | End: 2023-08-03

## 2023-07-29 RX ORDER — OLANZAPINE 15 MG/1
5 TABLET, FILM COATED ORAL ONCE
Refills: 0 | Status: COMPLETED | OUTPATIENT
Start: 2023-07-29 | End: 2023-07-29

## 2023-07-29 RX ORDER — POTASSIUM CHLORIDE 20 MEQ
40 PACKET (EA) ORAL ONCE
Refills: 0 | Status: COMPLETED | OUTPATIENT
Start: 2023-07-29 | End: 2023-07-29

## 2023-07-29 RX ADMIN — OLANZAPINE 5 MILLIGRAM(S): 15 TABLET, FILM COATED ORAL at 10:54

## 2023-07-29 RX ADMIN — Medication 1 MILLIGRAM(S): at 20:40

## 2023-07-29 RX ADMIN — Medication 40 MILLIEQUIVALENT(S): at 18:41

## 2023-07-29 RX ADMIN — OLANZAPINE 10 MILLIGRAM(S): 15 TABLET, FILM COATED ORAL at 20:42

## 2023-07-29 NOTE — H&P ADULT - ASSESSMENT
22/ M with no significant PMHx admitted to Psych service for:    Acute Psychosis + schizophrenia:  mx per psych    # Hypokalemia: k 3.4  replace, recheck    # Hyperglycemia;  check Hb A1c  fasting glucose in am    DVT PPx: ambulation    would f/u in am.

## 2023-07-29 NOTE — H&P ADULT - NSHPPHYSICALEXAM_GEN_ALL_CORE
PHYSICAL EXAM:    Daily     Daily Weight in k (2023 07:23)    Vital Signs Last 24 Hrs  T(C): 37.1 (2023 07:23), Max: 37.1 (2023 07:23)  T(F): 98.7 (2023 07:23), Max: 98.7 (2023 07:23)  HR: --  BP: --  BP(mean): --  RR: 16 (2023 07:23) (16 - 16)  SpO2: 100% (2023 07:23) (100% - 100%)    Constitutional: Weak  appearing  HEENT: Atraumatic, AYAZ, Normal, No congestion  Respiratory: Breath Sounds normal, no rhonchi/wheeze  Cardiovascular: N S1S2;   Gastrointestinal: Abdomen soft, non tender, Bowel Sounds present  Extremities: No edema, peripheral pulses present  Neurological: AAO x2, no gross focal motor deficits  Skin: Non cellulitic, no rash, ulcers  Lymph Nodes: No lymphadenopathy noted  Back: No CVA tenderness   Musculoskeletal: non tender  Breasts: Deferred  Genitourinary: deferred  Rectal: Deferred

## 2023-07-29 NOTE — H&P ADULT - NSHPLABSRESULTS_GEN_ALL_CORE
All Labs/EKG/Radiology/Meds reviewed by me.     Lab Results:  CBC  CBC Full  -  ( 2023 22:14 )  WBC Count : 8.95 K/uL  RBC Count : 5.16 M/uL  Hemoglobin : 15.6 g/dL  Hematocrit : 44.6 %  Platelet Count - Automated : 265 K/uL  Mean Cell Volume : 86.4 fl  Mean Cell Hemoglobin : 30.2 pg  Mean Cell Hemoglobin Concentration : 35.0 gm/dL  Auto Neutrophil # : 5.64 K/uL  Auto Lymphocyte # : 2.63 K/uL  Auto Monocyte # : 0.55 K/uL  Auto Eosinophil # : 0.07 K/uL  Auto Basophil # : 0.03 K/uL  Auto Neutrophil % : 63.1 %  Auto Lymphocyte % : 29.4 %  Auto Monocyte % : 6.1 %  Auto Eosinophil % : 0.8 %  Auto Basophil % : 0.3 %    .		Differential:	[] Automated		[] Manual  Chemistry                        15.6   8.95  )-----------( 265      ( 2023 22:14 )             44.6     07-27    139  |  109<H>  |  13  ----------------------------<  114<H>  3.4<L>   |  25  |  1.11    Ca    8.8      2023 22:14    TPro  8.1  /  Alb  4.2  /  TBili  0.5  /  DBili  x   /  AST  18  /  ALT  26  /  AlkPhos  57  07-27    LIVER FUNCTIONS - ( 2023 22:14 )  Alb: 4.2 g/dL / Pro: 8.1 gm/dL / ALK PHOS: 57 U/L / ALT: 26 U/L / AST: 18 U/L / GGT: x             Urinalysis Basic - ( 2023 22:17 )    Color: Yellow / Appearance: Clear / S.015 / pH: x  Gluc: x / Ketone: Negative  / Bili: Negative / Urobili: 1   Blood: x / Protein: Negative / Nitrite: Negative   Leuk Esterase: Negative / RBC: x / WBC x   Sq Epi: x / Non Sq Epi: x / Bacteria: x              MICROBIOLOGY/CULTURES:      RADIOLOGY RESULTS:    MEDICATIONS  (STANDING):  OLANZapine Disintegrating Tablet 10 milliGRAM(s) Oral at bedtime    MEDICATIONS  (PRN):  LORazepam     Tablet 1 milliGRAM(s) Oral every 6 hours PRN Agitation

## 2023-07-29 NOTE — BH SOCIAL WORK INITIAL PSYCHOSOCIAL EVALUATION - OTHER PAST PSYCHIATRIC HISTORY (INCLUDE DETAILS REGARDING ONSET, COURSE OF ILLNESS, INPATIENT/OUTPATIENT TREATMENT)
23 yo male, Vietnamese-speaking, domiciled w/ family, with history of schizophrenia vs affective psychosis, multiple prior psych admissions (last known at [facility] 3/1-3/31/22), no suicide attempts or violence hx, BIB EMS a/b family due to poor sleep, PO intake, and self-care in the past week in the context of medication non-adherence.

## 2023-07-29 NOTE — BH INPATIENT PSYCHIATRY PROGRESS NOTE - NSBHFUPINTERVALHXFT_PSY_A_CORE
Patient a 23 y/o male, with past psychiatric hx of Schizophrenia, meds non-compliant for an unknown period, He was admitted as he was not acting right, uncooperative, bizarre, and refusing to endorse anything was mute and  received PRN IM Ativan for suspecting Catatonia. He was given Zyprexa 5 mg HS last night, today he took additional Zyprexa 2.5 mg AM and additional dosages was ordered for tonight Zyprexa 10 mg HS. He was previously on Zyprexa 20 mg HS with good effect. No aggression/agitation reported.    Plan: To continue with Zyprexa 10 mg HS           To start Zyprexa 5 mg AM           Collateral info from family           Discharge planning -ongoing Patient a 21 y/o male, with past psychiatric hx of Schizophrenia, meds non-compliant for an unknown period, He was admitted as he was not acting right, uncooperative, bizarre, and refusing to endorse anything was mute and  received PRN IM Ativan for suspecting Catatonia. He was given Zyprexa 5 mg HS last night, today he took additional Zyprexa 2.5 mg AM and additional dosages was ordered for tonight Zyprexa 10 mg HS. He was previously on Zyprexa 20 mg HS with good effect. No aggression/agitation reported. He continues to stay in the room all day with limited cooperation, limited to no answers most of the time and was in [facility] for a month in March' 2022.     Plan: To continue with Zyprexa 10 mg HS           To start Zyprexa 5 mg AM           Collateral info from family           Discharge planning -ongoing

## 2023-07-29 NOTE — H&P ADULT - HISTORY OF PRESENT ILLNESS
22 year old male with a PMHx of affective psychosis; presenting to the ED for medical evaluation. Uncle reports that for the last five days, patient has been talking nonsense, and hasn't been eating or sleeping. Also notes he is on an unknown psych medication but has not been taking it.. Patient denies SI/HI, or any pain. When asked what medications he took, the patient would not respond. Uncle states patient's doctor called him, and recommended that he bring him to the ED.    pt denies any cp/abd pain/sob/n/v/d

## 2023-07-30 LAB
A1C WITH ESTIMATED AVERAGE GLUCOSE RESULT: 5.2 % — SIGNIFICANT CHANGE UP (ref 4–5.6)
ANION GAP SERPL CALC-SCNC: 5 MMOL/L — SIGNIFICANT CHANGE UP (ref 5–17)
BUN SERPL-MCNC: 17 MG/DL — SIGNIFICANT CHANGE UP (ref 7–23)
CALCIUM SERPL-MCNC: 9.4 MG/DL — SIGNIFICANT CHANGE UP (ref 8.5–10.1)
CHLORIDE SERPL-SCNC: 108 MMOL/L — SIGNIFICANT CHANGE UP (ref 96–108)
CHOLEST SERPL-MCNC: 142 MG/DL — SIGNIFICANT CHANGE UP
CO2 SERPL-SCNC: 27 MMOL/L — SIGNIFICANT CHANGE UP (ref 22–31)
CREAT SERPL-MCNC: 1.19 MG/DL — SIGNIFICANT CHANGE UP (ref 0.5–1.3)
EGFR: 89 ML/MIN/1.73M2 — SIGNIFICANT CHANGE UP
ESTIMATED AVERAGE GLUCOSE: 103 MG/DL — SIGNIFICANT CHANGE UP (ref 68–114)
GLUCOSE SERPL-MCNC: 117 MG/DL — HIGH (ref 70–99)
HDLC SERPL-MCNC: 38 MG/DL — LOW
LIPID PNL WITH DIRECT LDL SERPL: 87 MG/DL — SIGNIFICANT CHANGE UP
NON HDL CHOLESTEROL: 105 MG/DL — SIGNIFICANT CHANGE UP
POTASSIUM SERPL-MCNC: 4.5 MMOL/L — SIGNIFICANT CHANGE UP (ref 3.5–5.3)
POTASSIUM SERPL-SCNC: 4.5 MMOL/L — SIGNIFICANT CHANGE UP (ref 3.5–5.3)
SODIUM SERPL-SCNC: 140 MMOL/L — SIGNIFICANT CHANGE UP (ref 135–145)
TRIGL SERPL-MCNC: 95 MG/DL — SIGNIFICANT CHANGE UP
TSH SERPL-MCNC: 2.44 UU/ML — SIGNIFICANT CHANGE UP (ref 0.34–4.82)
VIT B12 SERPL-MCNC: 805 PG/ML — SIGNIFICANT CHANGE UP (ref 232–1245)

## 2023-07-30 PROCEDURE — 99232 SBSQ HOSP IP/OBS MODERATE 35: CPT

## 2023-07-30 RX ORDER — OLANZAPINE 15 MG/1
5 TABLET, FILM COATED ORAL DAILY
Refills: 0 | Status: DISCONTINUED | OUTPATIENT
Start: 2023-07-31 | End: 2023-08-03

## 2023-07-30 RX ADMIN — OLANZAPINE 10 MILLIGRAM(S): 15 TABLET, FILM COATED ORAL at 22:52

## 2023-07-30 RX ADMIN — Medication 1 MILLIGRAM(S): at 08:35

## 2023-07-30 RX ADMIN — Medication 1 MILLIGRAM(S): at 20:34

## 2023-07-30 NOTE — BH INPATIENT PSYCHIATRY PROGRESS NOTE - NSBHMETABOLIC_PSY_ALL_CORE_FT
BMI: BMI (kg/m2): 26 (07-28-23 @ 08:18)  HbA1c: Pending  TSH-2.44  Glucose:   BP: 125/72 (07-28-23 @ 07:52) (114/55 - 131/69)  Lipid Panel: Date/Time: 07-30-23 @ 07:49  Cholesterol, Serum: 142  Direct LDL: 87  HDL Cholesterol, Serum: 38  Triglycerides, Serum: 95

## 2023-07-30 NOTE — BH INPATIENT PSYCHIATRY PROGRESS NOTE - NSBHFUPINTERVALHXFT_PSY_A_CORE
07/30/2023: Patient was seen to day AM, chart reviewed and discussed in team. He is in bed sleeping , so was not bothered, as per nursing he has been meds compliant, preferably stays in the room, limited to no peer contact. He is seen in the hallway for limited time which proves that Psychosis is improving. No aggression pr agitation reported. Zyprexa 5 mg IM AM added for faster improvement. Not S/H and no prior SI/SA.    Patient a 23 y/o male, with past psychiatric hx of Schizophrenia, meds non-compliant for an unknown period, He was admitted as he was not acting right, uncooperative, bizarre, and refusing to endorse anything was mute and  received PRN IM Ativan for suspecting Catatonia. He was given Zyprexa 5 mg HS last night, today he took additional Zyprexa 2.5 mg AM and additional dosages was ordered for tonight Zyprexa 10 mg HS. He was previously on Zyprexa 20 mg HS with good effect. No aggression/agitation reported. He continues to stay in the room all day with limited cooperation, limited to no answers most of the time and was in [facility] for a month in March' 2022.     Plan: To continue with Zyprexa 10 mg HS           To start Zyprexa 5 mg AM           Collateral info from family           Discharge planning -ongoing

## 2023-07-30 NOTE — PROGRESS NOTE ADULT - ASSESSMENT
22/ M with no significant PMHx admitted to Psych service for:    Acute Psychosis + schizophrenia:  mx per psych  TSH normal    # Hypokalemia: k 3.4  replace, rechecked; normalized, 4.5    # Hyperglycemia;  check Hb A1c; 5.2  no DM    DVT PPx: ambulation    would sign off. Please re consult as needed.

## 2023-07-30 NOTE — PROGRESS NOTE ADULT - SUBJECTIVE AND OBJECTIVE BOX
7/30: no complaints      PHYSICAL EXAM:    Daily     Daily     Vital Signs Last 24 Hrs  T(C): 36.7 (30 Jul 2023 07:17), Max: 36.7 (30 Jul 2023 07:17)  T(F): 98.1 (30 Jul 2023 07:17), Max: 98.1 (30 Jul 2023 07:17)  HR: --  BP: --  BP(mean): --  RR: 16 (30 Jul 2023 07:17) (16 - 16)  SpO2: 100% (30 Jul 2023 07:17) (100% - 100%)    Constitutional: Well appearing  HEENT: Atraumatic, AYAZ,   Respiratory: Breath Sounds normal, no rhonchi/wheeze  Cardiovascular: N S1S2;   Gastrointestinal: Abdomen soft, non tender, Bowel Sounds present  Extremities: No edema, peripheral pulses present  Neurological: AAO x 1, no gross focal motor deficits  Skin: Non cellulitic, no rash, ulcers  Lymph Nodes: No lymphadenopathy noted  Back: No CVA tenderness   Musculoskeletal: non tender  Breasts: Deferred  Genitourinary: deferred  Rectal: Deferred    All Labs/EKG/Radiology/Meds reviewed by me            07-30    140  |  108  |  17  ----------------------------<  117<H>  4.5   |  27  |  1.19    Ca    9.4      30 Jul 2023 07:49                      Urinalysis Basic - ( 30 Jul 2023 07:49 )    Color: x / Appearance: x / SG: x / pH: x  Gluc: 117 mg/dL / Ketone: x  / Bili: x / Urobili: x   Blood: x / Protein: x / Nitrite: x   Leuk Esterase: x / RBC: x / WBC x   Sq Epi: x / Non Sq Epi: x / Bacteria: x            MEDICATIONS  (STANDING):  OLANZapine Disintegrating Tablet 10 milliGRAM(s) Oral at bedtime    MEDICATIONS  (PRN):  LORazepam     Tablet 1 milliGRAM(s) Oral every 6 hours PRN Agitation

## 2023-07-31 PROCEDURE — 99232 SBSQ HOSP IP/OBS MODERATE 35: CPT

## 2023-07-31 RX ADMIN — Medication 1 MILLIGRAM(S): at 20:46

## 2023-07-31 RX ADMIN — OLANZAPINE 10 MILLIGRAM(S): 15 TABLET, FILM COATED ORAL at 20:46

## 2023-07-31 RX ADMIN — OLANZAPINE 5 MILLIGRAM(S): 15 TABLET, FILM COATED ORAL at 09:35

## 2023-07-31 NOTE — BH TREATMENT PLAN - NSTXDCOPNOINTERSW_PSY_ALL_CORE
Ongoing support,psychoed and encouragement provided in effort to comply with meds, tx and discharge plans.

## 2023-07-31 NOTE — BH INPATIENT PSYCHIATRY PROGRESS NOTE - NSBHMETABOLIC_PSY_ALL_CORE_FT
BMI: BMI (kg/m2): 26 (07-28-23 @ 08:18)  HbA1c: A1C with Estimated Average Glucose Result: 5.2 % (07-30-23 @ 07:49)  TSH-2.44    Glucose:   BP: --  Lipid Panel: Date/Time: 07-30-23 @ 07:49  Cholesterol, Serum: 142  Direct LDL: 87  HDL Cholesterol, Serum: 38  Triglycerides, Serum: 95

## 2023-07-31 NOTE — BH TREATMENT PLAN - NSTXPLANTHERAPYSESSIONSFT_PSY_ALL_CORE
07-29-23  --  --  --  --  --  Therapy Summary: Patient encouraged to participate in group therapy today but declined.    07-31-23  Type of therapy: NA  Type of session: NA  Level of patient participation: Resistance to participation  Duration of participation: 0  Therapy conducted by: Psych rehab  Therapy Summary: Patient was encouraged to attend psych rehab group programming, but declined.

## 2023-07-31 NOTE — BH INPATIENT PSYCHIATRY PROGRESS NOTE - NSBHFUPINTERVALHXFT_PSY_A_CORE
07/31/2023: Patient as usual in bed, endorses tired, and thus need to sleep. He has the same comment for past few days since he was admitted here from ED at /. Meds complaint and on Zyprexa 15 mg/daily. To continue current meds and as per records at [facility] he was on Zyprexa 20 mg HS. He is also a slow responder and as per record he was at [facility] for almost a month.     07/30/2023: Patient was seen to day AM, chart reviewed and discussed in team. He is in bed sleeping , so was not bothered, as per nursing he has been meds compliant, preferably stays in the room, limited to no peer contact. He is seen in the hallway for limited time which proves that Psychosis is improving. No aggression pr agitation reported. Zyprexa 5 mg IM AM added for faster improvement. Not S/H and no prior SI/SA.    Patient a 23 y/o male, with past psychiatric hx of Schizophrenia, meds non-compliant for an unknown period, He was admitted as he was not acting right, uncooperative, bizarre, and refusing to endorse anything was mute and  received PRN IM Ativan for suspecting Catatonia. He was given Zyprexa 5 mg HS last night, today he took additional Zyprexa 2.5 mg AM and additional dosages was ordered for tonight Zyprexa 10 mg HS. He was previously on Zyprexa 20 mg HS with good effect. No aggression/agitation reported. He continues to stay in the room all day with limited cooperation, limited to no answers most of the time and was in [facility] for a month in March' 2022.     Plan: To continue with Zyprexa 10 mg HS           To start Zyprexa 5 mg AM           Collateral info from family           Discharge planning -ongoing

## 2023-08-01 PROCEDURE — 99232 SBSQ HOSP IP/OBS MODERATE 35: CPT

## 2023-08-01 RX ADMIN — OLANZAPINE 5 MILLIGRAM(S): 15 TABLET, FILM COATED ORAL at 09:25

## 2023-08-01 RX ADMIN — OLANZAPINE 10 MILLIGRAM(S): 15 TABLET, FILM COATED ORAL at 21:19

## 2023-08-01 RX ADMIN — Medication 1 MILLIGRAM(S): at 21:19

## 2023-08-01 NOTE — BH INPATIENT PSYCHIATRY PROGRESS NOTE - NSBHFUPINTERVALHXFT_PSY_A_CORE
08/01/2023: Patient ion bed, no eye contact, in-spite of Joanie helping as a , endorses that he needs to go home. Patient was advised to help us to talk with him so we can assess further how he is Improving. He has been in bed all day. Meds compliant and no meds induced s/e noted. not S/H and no prior SI/SA. Protective/Mitigating factors-Admitted for stability/safety, Meds complaint.    07/31/2023: Patient as usual in bed, endorses tired, and thus need to sleep. He has the same comment for past few days since he was admitted here from ED at /. Meds complaint and on Zyprexa 15 mg/daily. To continue current meds and as per records at [facility] he was on Zyprexa 20 mg HS. He is also a slow responder and as per record he was at [facility] for almost a month.     07/30/2023: Patient was seen to day AM, chart reviewed and discussed in team. He is in bed sleeping , so was not bothered, as per nursing he has been meds compliant, preferably stays in the room, limited to no peer contact. He is seen in the hallway for limited time which proves that Psychosis is improving. No aggression pr agitation reported. Zyprexa 5 mg IM AM added for faster improvement. Not S/H and no prior SI/SA.    Patient a 21 y/o male, with past psychiatric hx of Schizophrenia, meds non-compliant for an unknown period, He was admitted as he was not acting right, uncooperative, bizarre, and refusing to endorse anything was mute and  received PRN IM Ativan for suspecting Catatonia. He was given Zyprexa 5 mg HS last night, today he took additional Zyprexa 2.5 mg AM and additional dosages was ordered for tonight Zyprexa 10 mg HS. He was previously on Zyprexa 20 mg HS with good effect. No aggression/agitation reported. He continues to stay in the room all day with limited cooperation, limited to no answers most of the time and was in [facility] for a month in March' 2022.     Plan: To continue with Zyprexa 10 mg HS           To start Zyprexa 5 mg AM           Collateral info from family           Discharge planning -ongoing

## 2023-08-02 PROCEDURE — 99232 SBSQ HOSP IP/OBS MODERATE 35: CPT

## 2023-08-02 RX ADMIN — OLANZAPINE 10 MILLIGRAM(S): 15 TABLET, FILM COATED ORAL at 21:20

## 2023-08-02 RX ADMIN — OLANZAPINE 5 MILLIGRAM(S): 15 TABLET, FILM COATED ORAL at 09:30

## 2023-08-02 RX ADMIN — Medication 1 MILLIGRAM(S): at 21:21

## 2023-08-02 NOTE — ED ADULT NURSE NOTE - NS ED NURSE DISCH DISPOSITION
No protocol for requested medication Tizanidine    Last office visit date: 3/20/23  Preferred pharmacy: [facility]    Order pended, routed to clinician for review.     
Admitted

## 2023-08-02 NOTE — BH INPATIENT PSYCHIATRY PROGRESS NOTE - NSBHFUPINTERVALHXFT_PSY_A_CORE
08/02/2023: Patient was seen today AM, chart reviewed and discussed in team. Staff Joanie was helpful again with . He prefers to stay in room most of the time, sleeping or pondering, only pot get up for food TID. He works for [employer] and has to be awake to help with the job. No family has visited him yet and was advised that writer to speak with the mother/family for their opinion for assistance clinically as he stays quiet and prefers to go home only.       08/01/2023: Patient ion bed, no eye contact, in-spite of Joanie helping as a , endorses that he needs to go home. Patient was advised to help us to talk with him so we can assess further how he is Improving. He has been in bed all day. Meds compliant and no meds induced s/e noted. not S/H and no prior SI/SA. Protective/Mitigating factors-Admitted for stability/safety, Meds complaint.    07/31/2023: Patient as usual in bed, endorses tired, and thus need to sleep. He has the same comment for past few days since he was admitted here from ED at /. Meds complaint and on Zyprexa 15 mg/daily. To continue current meds and as per records at [facility] he was on Zyprexa 20 mg HS. He is also a slow responder and as per record he was at [facility] for almost a month.     07/30/2023: Patient was seen to day AM, chart reviewed and discussed in team. He is in bed sleeping , so was not bothered, as per nursing he has been meds compliant, preferably stays in the room, limited to no peer contact. He is seen in the hallway for limited time which proves that Psychosis is improving. No aggression pr agitation reported. Zyprexa 5 mg IM AM added for faster improvement. Not S/H and no prior SI/SA.    Patient a 21 y/o male, with past psychiatric hx of Schizophrenia, meds non-compliant for an unknown period, He was admitted as he was not acting right, uncooperative, bizarre, and refusing to endorse anything was mute and  received PRN IM Ativan for suspecting Catatonia. He was given Zyprexa 5 mg HS last night, today he took additional Zyprexa 2.5 mg AM and additional dosages was ordered for tonight Zyprexa 10 mg HS. He was previously on Zyprexa 20 mg HS with good effect. No aggression/agitation reported. He continues to stay in the room all day with limited cooperation, limited to no answers most of the time and was in [facility] for a month in March' 2022.     Plan: To continue with Zyprexa 10 mg HS           To start Zyprexa 5 mg AM           Collateral info from family           Discharge planning -ongoing

## 2023-08-03 PROCEDURE — 99232 SBSQ HOSP IP/OBS MODERATE 35: CPT

## 2023-08-03 RX ORDER — OLANZAPINE 15 MG/1
15 TABLET, FILM COATED ORAL AT BEDTIME
Refills: 0 | Status: DISCONTINUED | OUTPATIENT
Start: 2023-08-03 | End: 2023-08-04

## 2023-08-03 RX ADMIN — Medication 1 MILLIGRAM(S): at 20:33

## 2023-08-03 RX ADMIN — OLANZAPINE 15 MILLIGRAM(S): 15 TABLET, FILM COATED ORAL at 20:32

## 2023-08-03 RX ADMIN — OLANZAPINE 5 MILLIGRAM(S): 15 TABLET, FILM COATED ORAL at 10:06

## 2023-08-03 NOTE — BH INPATIENT PSYCHIATRY PROGRESS NOTE - NSBHFUPINTERVALHXFT_PSY_A_CORE
08/03/2023: patient was seen today AM, chart reviewed and discussed in team. Staff Phyllis was helpful and today AM, he seems more alert, and spoke well and understands limited English and communicated with writer in English. Zyprexa now given at HS instead of AM starting tomorrow. Not S/h and now endorses that he has to start working and was advised to stay away from Cannabis for a better day from now on.    08/02/2023: Patient was seen today AM, chart reviewed and discussed in team. Staff Joanie was helpful again with . He prefers to stay in room most of the time, sleeping or pondering, only pot get up for food TID. He works for [employer] and has to be awake to help with the job. No family has visited him yet and was advised that writer to speak with the mother/family for their opinion for assistance clinically as he stays quiet and prefers to go home only.       08/01/2023: Patient ion bed, no eye contact, in-spite of Joanie helping as a , endorses that he needs to go home. Patient was advised to help us to talk with him so we can assess further how he is Improving. He has been in bed all day. Meds compliant and no meds induced s/e noted. not S/H and no prior SI/SA. Protective/Mitigating factors-Admitted for stability/safety, Meds complaint.    07/31/2023: Patient as usual in bed, endorses tired, and thus need to sleep. He has the same comment for past few days since he was admitted here from ED at /. Meds complaint and on Zyprexa 15 mg/daily. To continue current meds and as per records at [facility] he was on Zyprexa 20 mg HS. He is also a slow responder and as per record he was at [facility] for almost a month.     07/30/2023: Patient was seen to day AM, chart reviewed and discussed in team. He is in bed sleeping , so was not bothered, as per nursing he has been meds compliant, preferably stays in the room, limited to no peer contact. He is seen in the hallway for limited time which proves that Psychosis is improving. No aggression pr agitation reported. Zyprexa 5 mg IM AM added for faster improvement. Not S/H and no prior SI/SA.    Patient a 23 y/o male, with past psychiatric hx of Schizophrenia, meds non-compliant for an unknown period, He was admitted as he was not acting right, uncooperative, bizarre, and refusing to endorse anything was mute and  received PRN IM Ativan for suspecting Catatonia. He was given Zyprexa 5 mg HS last night, today he took additional Zyprexa 2.5 mg AM and additional dosages was ordered for tonight Zyprexa 10 mg HS. He was previously on Zyprexa 20 mg HS with good effect. No aggression/agitation reported. He continues to stay in the room all day with limited cooperation, limited to no answers most of the time and was in [facility] for a month in March' 2022.     Plan: To continue with Zyprexa 10 mg HS           To start Zyprexa 5 mg AM           Collateral info from family           Discharge planning -ongoing

## 2023-08-04 PROCEDURE — 99231 SBSQ HOSP IP/OBS SF/LOW 25: CPT

## 2023-08-04 RX ORDER — OLANZAPINE 15 MG/1
20 TABLET, FILM COATED ORAL AT BEDTIME
Refills: 0 | Status: DISCONTINUED | OUTPATIENT
Start: 2023-08-04 | End: 2023-08-09

## 2023-08-04 RX ADMIN — OLANZAPINE 20 MILLIGRAM(S): 15 TABLET, FILM COATED ORAL at 20:32

## 2023-08-04 RX ADMIN — Medication 1 MILLIGRAM(S): at 20:27

## 2023-08-04 NOTE — BH INPATIENT PSYCHIATRY PROGRESS NOTE - NSBHPSYCHOLCOGABN_PSY_A_CORE
disoriented to time/disoriented to place

## 2023-08-04 NOTE — BH INPATIENT PSYCHIATRY PROGRESS NOTE - NSBHFUPINTERVALHXFT_PSY_A_CORE
08/04/2023: Patient was seen today AM with NIYA Nair, chart reviewed and discussed in team. Patient has been meds compliant till now, and has improved with a better affect, speech is in better control, not endorsing that he is tired or need to go home. No family visited him yet and overall looks better and plans to discharge him sometime next week.    08/03/2023: patient was seen today AM, chart reviewed and discussed in team. Staff Phyllis was helpful and today AM, he seems more alert, and spoke well and understands limited English and communicated with writer in English. Zyprexa now given at HS instead of AM starting tomorrow. Not S/h and now endorses that he has to start working and was advised to stay away from Cannabis for a better day from now on.    08/02/2023: Patient was seen today AM, chart reviewed and discussed in team. Staff Nair was helpful again with . He prefers to stay in room most of the time, sleeping or pondering, only pot get up for food TID. He works for [employer] and has to be awake to help with the job. No family has visited him yet and was advised that writer to speak with the mother/family for their opinion for assistance clinically as he stays quiet and prefers to go home only.       08/01/2023: Patient ion bed, no eye contact, in-spite of Joanie helping as a , endorses that he needs to go home. Patient was advised to help us to talk with him so we can assess further how he is Improving. He has been in bed all day. Meds compliant and no meds induced s/e noted. not S/H and no prior SI/SA. Protective/Mitigating factors-Admitted for stability/safety, Meds complaint.    07/31/2023: Patient as usual in bed, endorses tired, and thus need to sleep. He has the same comment for past few days since he was admitted here from ED at /. Meds complaint and on Zyprexa 15 mg/daily. To continue current meds and as per records at [facility] he was on Zyprexa 20 mg HS. He is also a slow responder and as per record he was at [facility] for almost a month.     07/30/2023: Patient was seen to day AM, chart reviewed and discussed in team. He is in bed sleeping , so was not bothered, as per nursing he has been meds compliant, preferably stays in the room, limited to no peer contact. He is seen in the hallway for limited time which proves that Psychosis is improving. No aggression pr agitation reported. Zyprexa 5 mg IM AM added for faster improvement. Not S/H and no prior SI/SA.    Patient a 21 y/o male, with past psychiatric hx of Schizophrenia, meds non-compliant for an unknown period, He was admitted as he was not acting right, uncooperative, bizarre, and refusing to endorse anything was mute and  received PRN IM Ativan for suspecting Catatonia. He was given Zyprexa 5 mg HS last night, today he took additional Zyprexa 2.5 mg AM and additional dosages was ordered for tonight Zyprexa 10 mg HS. He was previously on Zyprexa 20 mg HS with good effect. No aggression/agitation reported. He continues to stay in the room all day with limited cooperation, limited to no answers most of the time and was in [facility] for a month in March' 2022.     Plan: To continue with Zyprexa 10 mg HS           To start Zyprexa 5 mg AM           Collateral info from family           Discharge planning -ongoing

## 2023-08-05 RX ADMIN — OLANZAPINE 20 MILLIGRAM(S): 15 TABLET, FILM COATED ORAL at 20:21

## 2023-08-06 RX ADMIN — OLANZAPINE 20 MILLIGRAM(S): 15 TABLET, FILM COATED ORAL at 20:43

## 2023-08-07 PROCEDURE — 99231 SBSQ HOSP IP/OBS SF/LOW 25: CPT

## 2023-08-07 RX ADMIN — OLANZAPINE 20 MILLIGRAM(S): 15 TABLET, FILM COATED ORAL at 21:07

## 2023-08-07 NOTE — BH INPATIENT PSYCHIATRY PROGRESS NOTE - NSBHFUPINTERVALHXFT_PSY_A_CORE
Other (Free Text): Used  08/07/2023: Patient was seen today AM, chart reviewed and discussed in team. he is meds compliant and has no meds induced s/e till now. He prefers to stay in room, more awake since shifting the meds to HS. He also seems more vocal and oriented of his issues and also receptive to advise to stay way from Cannabis to continue to have mental stability/safety. No meds changes for now. Discharge planning this week.    08/04/2023: Patient was seen today AM with NIYA Nair, chart reviewed and discussed in team. Patient has been meds compliant till now, and has improved with a better affect, speech is in better control, not endorsing that he is tired or need to go home. No family visited him yet and overall looks better and plans to discharge him sometime next week.    08/03/2023: patient was seen today AM, chart reviewed and discussed in team. Staff Phyllis was helpful and today AM, he seems more alert, and spoke well and understands limited English and communicated with writer in English. Zyprexa now given at HS instead of AM starting tomorrow. Not S/h and now endorses that he has to start working and was advised to stay away from Cannabis for a better day from now on.    08/02/2023: Patient was seen today AM, chart reviewed and discussed in team. Staff Joanie was helpful again with . He prefers to stay in room most of the time, sleeping or pondering, only pot get up for food TID. He works for [employer] and has to be awake to help with the job. No family has visited him yet and was advised that writer to speak with the mother/family for their opinion for assistance clinically as he stays quiet and prefers to go home only.       08/01/2023: Patient ion bed, no eye contact, in-spite of Joanie helping as a , endorses that he needs to go home. Patient was advised to help us to talk with him so we can assess further how he is Improving. He has been in bed all day. Meds compliant and no meds induced s/e noted. not S/H and no prior SI/SA. Protective/Mitigating factors-Admitted for stability/safety, Meds complaint.    07/31/2023: Patient as usual in bed, endorses tired, and thus need to sleep. He has the same comment for past few days since he was admitted here from ED at /. Meds complaint and on Zyprexa 15 mg/daily. To continue current meds and as per records at [facility] he was on Zyprexa 20 mg HS. He is also a slow responder and as per record he was at [facility] for almost a month.     07/30/2023: Patient was seen to day AM, chart reviewed and discussed in team. He is in bed sleeping , so was not bothered, as per nursing he has been meds compliant, preferably stays in the room, limited to no peer contact. He is seen in the hallway for limited time which proves that Psychosis is improving. No aggression pr agitation reported. Zyprexa 5 mg IM AM added for faster improvement. Not S/H and no prior SI/SA.    Patient a 23 y/o male, with past psychiatric hx of Schizophrenia, meds non-compliant for an unknown period, He was admitted as he was not acting right, uncooperative, bizarre, and refusing to endorse anything was mute and  received PRN IM Ativan for suspecting Catatonia. He was given Zyprexa 5 mg HS last night, today he took additional Zyprexa 2.5 mg AM and additional dosages was ordered for tonight Zyprexa 10 mg HS. He was previously on Zyprexa 20 mg HS with good effect. No aggression/agitation reported. He continues to stay in the room all day with limited cooperation, limited to no answers most of the time and was in [facility] for a month in March' 2022.     Plan: To continue with Zyprexa 10 mg HS           To start Zyprexa 5 mg AM           Collateral info from family           Discharge planning -ongoing Detail Level: Zone Note Text (......Xxx Chief Complaint.): This diagnosis correlates with the Render Risk Assessment In Note?: no

## 2023-08-08 DIAGNOSIS — F12.10 CANNABIS ABUSE, UNCOMPLICATED: ICD-10-CM

## 2023-08-08 PROCEDURE — 99231 SBSQ HOSP IP/OBS SF/LOW 25: CPT

## 2023-08-08 RX ORDER — OLANZAPINE 15 MG/1
1 TABLET, FILM COATED ORAL
Qty: 14 | Refills: 0
Start: 2023-08-08 | End: 2023-08-21

## 2023-08-08 RX ADMIN — OLANZAPINE 20 MILLIGRAM(S): 15 TABLET, FILM COATED ORAL at 21:01

## 2023-08-08 NOTE — BH INPATIENT PSYCHIATRY PROGRESS NOTE - NSBHMETABOLIC_PSY_ALL_CORE_FT
BMI: BMI (kg/m2): 26 (07-28-23 @ 08:18)  HbA1c: A1C with Estimated Average Glucose Result: 5.2 % (07-30-23 @ 07:49)  TSH-2.44    Glucose:   BP: --  Lipid Panel: Date/Time: 07-30-23 @ 07:49  Cholesterol, Serum: 142  Direct LDL: 87  HDL Cholesterol, Serum: 38  Total Cholesterol/HDL Ration Measurement: --  Triglycerides, Serum: 95   BMI: BMI (kg/m2): 26 (07-28-23 @ 08:18)  HbA1c: A1C with Estimated Average Glucose Result: 5.2 % (07-30-23 @ 07:49)    Glucose:   BP: --  Lipid Panel: Date/Time: 07-30-23 @ 07:49  Cholesterol, Serum: 142  Direct LDL: --  HDL Cholesterol, Serum: 38  Total Cholesterol/HDL Ration Measurement: --  Triglycerides, Serum: 95

## 2023-08-08 NOTE — BH INPATIENT PSYCHIATRY PROGRESS NOTE - NSBHFUPINTERVALHXFT_PSY_A_CORE
08/08/2023: Patient was seen today AM, chart reviewed and discussed in team. Mood in better control no acute issues, writer to speak with uncle Miguel soon for discharge planning . Not S/h and was advised to stay away from cannabis. No meds changes for now. He was also visible out side the room today AM.    08/07/2023: Patient was seen today AM, chart reviewed and discussed in team. he is meds compliant and has no meds induced s/e till now. He prefers to stay in room, more awake since shifting the meds to HS. He also seems more vocal and oriented of his issues and also receptive to advise to stay way from Cannabis to continue to have mental stability/safety. No meds changes for now. Discharge planning this week.    08/04/2023: Patient was seen today AM with NIYA Nair, chart reviewed and discussed in team. Patient has been meds compliant till now, and has improved with a better affect, speech is in better control, not endorsing that he is tired or need to go home. No family visited him yet and overall looks better and plans to discharge him sometime next week.    08/03/2023: patient was seen today AM, chart reviewed and discussed in team. Staff Phyllis was helpful and today AM, he seems more alert, and spoke well and understands limited English and communicated with writer in English. Zyprexa now given at HS instead of AM starting tomorrow. Not S/h and now endorses that he has to start working and was advised to stay away from Cannabis for a better day from now on.    08/02/2023: Patient was seen today AM, chart reviewed and discussed in team. Staff Nair was helpful again with . He prefers to stay in room most of the time, sleeping or pondering, only pot get up for food TID. He works for [employer] and has to be awake to help with the job. No family has visited him yet and was advised that writer to speak with the mother/family for their opinion for assistance clinically as he stays quiet and prefers to go home only.       08/01/2023: Patient ion bed, no eye contact, in-spite of Joanie helping as a , endorses that he needs to go home. Patient was advised to help us to talk with him so we can assess further how he is Improving. He has been in bed all day. Meds compliant and no meds induced s/e noted. not S/H and no prior SI/SA. Protective/Mitigating factors-Admitted for stability/safety, Meds complaint.    07/31/2023: Patient as usual in bed, endorses tired, and thus need to sleep. He has the same comment for past few days since he was admitted here from ED at /. Meds complaint and on Zyprexa 15 mg/daily. To continue current meds and as per records at [facility] he was on Zyprexa 20 mg HS. He is also a slow responder and as per record he was at [facility] for almost a month.     07/30/2023: Patient was seen to day AM, chart reviewed and discussed in team. He is in bed sleeping , so was not bothered, as per nursing he has been meds compliant, preferably stays in the room, limited to no peer contact. He is seen in the hallway for limited time which proves that Psychosis is improving. No aggression pr agitation reported. Zyprexa 5 mg IM AM added for faster improvement. Not S/H and no prior SI/SA.    Patient a 21 y/o male, with past psychiatric hx of Schizophrenia, meds non-compliant for an unknown period, He was admitted as he was not acting right, uncooperative, bizarre, and refusing to endorse anything was mute and  received PRN IM Ativan for suspecting Catatonia. He was given Zyprexa 5 mg HS last night, today he took additional Zyprexa 2.5 mg AM and additional dosages was ordered for tonight Zyprexa 10 mg HS. He was previously on Zyprexa 20 mg HS with good effect. No aggression/agitation reported. He continues to stay in the room all day with limited cooperation, limited to no answers most of the time and was in [facility] for a month in March' 2022.     Plan: To continue with Zyprexa 10 mg HS           To start Zyprexa 5 mg AM           Collateral info from family           Discharge planning -ongoing

## 2023-08-08 NOTE — ED BEHAVIORAL HEALTH ASSESSMENT NOTE - ELEVATED CHRONIC RISK
Pt was seen by Germaine Rowley, NP today for high blood pressure.   Per OV notes - Hypertension, unspecified type  Pt to add atenolol 25 mg to her regimen and still check her BP everyday.  She will follow up for a BP check in the next week or two with one of the Internal Medicine NPs.  I recommended establishing with an IM MD sooner than March. Provided a Good Rx coupon with one refill for $3.16 which she states she can afford.      Reception please call pt and ask her to cancel the 8/11 appt for a follow up and schedule in 2 weeks with Dr. Guillen for bp follow up. Okay to use an acute spot the week of 8/21 for 2 week follow up bp check. Pt should still keep March appt to [facility]. Thank you.      No

## 2023-08-09 VITALS — RESPIRATION RATE: 16 BRPM | TEMPERATURE: 98 F | OXYGEN SATURATION: 100 %

## 2023-08-09 PROCEDURE — 99239 HOSP IP/OBS DSCHRG MGMT >30: CPT

## 2023-08-09 RX ORDER — OLANZAPINE 15 MG/1
2 TABLET, FILM COATED ORAL
Qty: 28 | Refills: 0
Start: 2023-08-09 | End: 2023-08-22

## 2023-08-09 NOTE — BH INPATIENT PSYCHIATRY PROGRESS NOTE - NSTXDCOPNOPROGRES_PSY_ALL_CORE
No Change
Improving
No Change
Met - goal discontinued
Improving
No Change

## 2023-08-09 NOTE — BH INPATIENT PSYCHIATRY PROGRESS NOTE - NSBHMSEGAIT_PSY_A_CORE
Normal gait / station
Other
Other
Normal gait / station
Normal gait / station

## 2023-08-09 NOTE — BH INPATIENT PSYCHIATRY PROGRESS NOTE - NSBHMSEBEHAV_PSY_A_CORE
Uncooperative/Other
Uncooperative
Uncooperative
Cooperative/Other
Uncooperative
Cooperative/Other
Uncooperative
Cooperative/Other
Uncooperative
Cooperative/Other

## 2023-08-09 NOTE — BH INPATIENT PSYCHIATRY PROGRESS NOTE - NSTXPSYCHODATETRGT_PSY_ALL_CORE
04-Aug-2023
09-Aug-2023
04-Aug-2023
04-Aug-2023

## 2023-08-09 NOTE — BH INPATIENT PSYCHIATRY DISCHARGE NOTE - NSDCCPCAREPLAN_GEN_ALL_CORE_FT
PRINCIPAL DISCHARGE DIAGNOSIS  Diagnosis: Psychosis, unspecified psychosis type  Assessment and Plan of Treatment: Zyprexa 20 mg HS      SECONDARY DISCHARGE DIAGNOSES  Diagnosis: Schizophrenia  Assessment and Plan of Treatment: Zyprexa 20 mg HS

## 2023-08-09 NOTE — BH INPATIENT PSYCHIATRY PROGRESS NOTE - NSTXDCOPNOINTERMD_PSY_ALL_CORE
Zyprexa 5 mg AM and Zyprexa 10 mg HS trial
Zyprexa 5 mg AM and Zyprexa 10 mg HS
Zyprexa 5 mg AM and Zyprexa 10 mg HS
Zyprexa 5 mg AM and Zyprexa 10 mg HS trial

## 2023-08-09 NOTE — BH INPATIENT PSYCHIATRY PROGRESS NOTE - NSBHCHARTREVIEWINVESTIGATE_PSY_A_CORE FT
< from: 12 Lead ECG (02.28.22 @ 18:04) >    Ventricular Rate 70 BPM    Atrial Rate 70 BPM    P-R Interval 154 ms    QRS Duration 104 ms    Q-T Interval 396 ms    QTC Calculation(Bazett) 427 ms    P Axis 56 degrees    R Axis -25 degrees    T Axis 17 degrees    Diagnosis Line Normal sinus rhythm  When compared with ECG of 21-AUG-2021 12:04,  No significant change was found  Confirmed by MAISHA SIMEON MD ([id number]) on 3/1/2022 7:07:33 AM    < end of copied text >    < from: 12 Lead ECG (02.28.22 @ 18:04) >    Ventricular Rate 70 BPM    Atrial Rate 70 BPM    P-R Interval 154 ms    QRS Duration 104 ms    Q-T Interval 396 ms    QTC Calculation(Bazett) 427 ms    P Axis 56 degrees    R Axis -25 degrees    T Axis 17 degrees    Diagnosis Line Normal sinus rhythm  When compared with ECG of 21-AUG-2021 12:04,  No significant change was found  Confirmed by MAISHA SIMEON MD [id number]) on 3/1/2022 7:07:33 AM        

## 2023-08-09 NOTE — BH INPATIENT PSYCHIATRY PROGRESS NOTE - NSBHADMITMEDEDUDETAILS_A_CORE FT
Zyprexa trial after discussion of meds s/e

## 2023-08-09 NOTE — BH INPATIENT PSYCHIATRY PROGRESS NOTE - NSCGISEVERILLNESS_PSY_ALL_CORE
3 = Mildly ill – clearly established symptoms with minimal, if any, distress or difficulty in social and occupational function
3 = Mildly ill – clearly established symptoms with minimal, if any, distress or difficulty in social and occupational function
4 = Moderately ill – overt symptoms causing noticeable, but modest, functional impairment or distress; symptom level may warrant medication
3 = Mildly ill – clearly established symptoms with minimal, if any, distress or difficulty in social and occupational function
3 = Mildly ill – clearly established symptoms with minimal, if any, distress or difficulty in social and occupational function
4 = Moderately ill – overt symptoms causing noticeable, but modest, functional impairment or distress; symptom level may warrant medication
3 = Mildly ill – clearly established symptoms with minimal, if any, distress or difficulty in social and occupational function

## 2023-08-09 NOTE — BH INPATIENT PSYCHIATRY DISCHARGE NOTE - NSBHDCRISKMITIGATEFT_PSY_ALL_CORE
To f/u as per SW referral and to take meds as ordered and in crisis may visit ED at .

## 2023-08-09 NOTE — BH INPATIENT PSYCHIATRY DISCHARGE NOTE - HOSPITAL COURSE
Patient was admitted involuntarily from ED at  on 9.39 status. He was quiet, uncooperative, prefers to stay in bed, under covers or blankets. He initially endorses not hearing any voices and step by step he stared to open up and  endorsed that at one time he was hearing voices. Writer not sure what was  the content pf the voices as he preferred to remain quiet most of the time. He agreed to take meds as needed for stability/safety. He was initially started on Zyprexa 5 mg daily and soon Zyprexa was further increased to 20 mg HS with no meds induced s/.e till now. As per records he was  at [facility] in May'2022 and was there for a month and  was on Zyprexa 20 mg HS. He improved significantly and soon was visible in community and did communicate very well with writer that at one point he was hearing voices and emphasized that he no longer hears voices. He has been sleeping/eating well has limited cooperation with peers as he preferred to stay in room most of the time, but he communicate very well. No aggression/agitation reported and there were no prior or current S/H/I/P, he denied active drug abuse hx, occasionally smokes cannabis. U-tox was negative. QTC Interval was 407 msec.

## 2023-08-09 NOTE — BH INPATIENT PSYCHIATRY PROGRESS NOTE - NSBHCHARTREVIEWVS_PSY_A_CORE FT
Vital Signs Last 24 Hrs  T(C): 35.9 (08-02-23 @ 07:30), Max: 35.9 (08-02-23 @ 07:30)  T(F): 96.6 (08-02-23 @ 07:30), Max: 96.6 (08-02-23 @ 07:30)  HR: --  BP: --  BP(mean): --  RR: 16 (08-02-23 @ 07:30) (16 - 16)  SpO2: 100% (08-02-23 @ 07:30) (100% - 100%)    Orthostatic VS  08-02-23 @ 07:30  Lying BP: --/-- HR: --  Sitting BP: 123/50 HR: 69  Standing BP: 120/73 HR: 80  Site: upper right arm  Mode: electronic  Orthostatic VS  08-01-23 @ 07:50  Lying BP: --/-- HR: --  Sitting BP: 112/62 HR: 68  Standing BP: 132/69 HR: 102  Site: upper right arm  Mode: electronic  
Vital Signs Last 24 Hrs  T(C): 37.1 (08-01-23 @ 07:50), Max: 37.1 (08-01-23 @ 07:50)  T(F): 98.7 (08-01-23 @ 07:50), Max: 98.7 (08-01-23 @ 07:50)  HR: --  BP: --  BP(mean): --  RR: 18 (08-01-23 @ 07:50) (18 - 18)  SpO2: 100% (08-01-23 @ 07:50) (100% - 100%)    Orthostatic VS  08-01-23 @ 07:50  Lying BP: --/-- HR: --  Sitting BP: 112/62 HR: 68  Standing BP: 132/69 HR: 102  Site: upper right arm  Mode: electronic  Orthostatic VS  07-31-23 @ 07:27  Lying BP: --/-- HR: --  Sitting BP: 116/68 HR: 68  Standing BP: 126/89 HR: 76  Site: upper right arm  Mode: electronic  
Vital Signs Last 24 Hrs  T(C): 36.7 (07-30-23 @ 07:17), Max: 36.7 (07-30-23 @ 07:17)  T(F): 98.1 (07-30-23 @ 07:17), Max: 98.1 (07-30-23 @ 07:17)  HR: --  BP: --  BP(mean): --  RR: 16 (07-30-23 @ 07:17) (16 - 16)  SpO2: 100% (07-30-23 @ 07:17) (100% - 100%)    Orthostatic VS  07-30-23 @ 07:17  Lying BP: --/-- HR: --  Sitting BP: 106/50 HR: 81  Standing BP: 103/65 HR: 88  Site: upper right arm  Mode: electronic  Orthostatic VS  07-29-23 @ 07:23  Lying BP: --/-- HR: --  Sitting BP: 117/60 HR: 81  Standing BP: 111/66 HR: 69  Site: upper right arm  Mode: electronic  
Vital Signs Last 24 Hrs  T(C): 36.4 (08-09-23 @ 07:31), Max: 36.4 (08-09-23 @ 07:31)  T(F): 97.6 (08-09-23 @ 07:31), Max: 97.6 (08-09-23 @ 07:31)  HR: --  BP: --  BP(mean): --  RR: 16 (08-09-23 @ 07:31) (16 - 16)  SpO2: 100% (08-09-23 @ 07:31) (100% - 100%)    Orthostatic VS  08-09-23 @ 07:31  Lying BP: --/-- HR: --  Sitting BP: 106/77 HR: 73  Standing BP: 155/87 HR: 83  Site: upper right arm  Mode: electronic  Orthostatic VS  08-08-23 @ 07:48  Lying BP: --/-- HR: --  Sitting BP: 131/100 HR: 100  Standing BP: 133/100 HR: 84  Site: upper right arm  Mode: electronic  
Vital Signs Last 24 Hrs  T(C): 36.4 (08-07-23 @ 08:07), Max: 36.4 (08-07-23 @ 08:07)  T(F): 97.6 (08-07-23 @ 08:07), Max: 97.6 (08-07-23 @ 08:07)  HR: --  BP: --  BP(mean): --  RR: 18 (08-07-23 @ 08:07) (18 - 18)  SpO2: 100% (08-07-23 @ 08:07) (100% - 100%)    Orthostatic VS  08-07-23 @ 08:07  Lying BP: --/-- HR: --  Sitting BP: 109/74 HR: 64  Standing BP: 123/77 HR: 102  Site: upper right arm  Mode: electronic  Orthostatic VS  08-06-23 @ 07:47  Lying BP: --/-- HR: --  Sitting BP: 116/57 HR: 93  Standing BP: 128/70 HR: 112  Site: upper right arm  Mode: electronic  
Vital Signs Last 24 Hrs  T(C): 37.1 (07-29-23 @ 07:23), Max: 37.1 (07-29-23 @ 07:23)  T(F): 98.7 (07-29-23 @ 07:23), Max: 98.7 (07-29-23 @ 07:23)  HR: --  BP: --  BP(mean): --  RR: 16 (07-29-23 @ 07:23) (16 - 16)  SpO2: 100% (07-29-23 @ 07:23) (100% - 100%)    Orthostatic VS  07-29-23 @ 07:23  Lying BP: --/-- HR: --  Sitting BP: 117/60 HR: 81  Standing BP: 111/66 HR: 69  Site: upper right arm  Mode: electronic  Orthostatic VS  07-28-23 @ 08:18  Lying BP: --/-- HR: --  Sitting BP: 118/60 HR: 77  Standing BP: 125/73 HR: 99  Site: --  Mode: --  
Vital Signs Last 24 Hrs  T(C): 36.3 (08-08-23 @ 07:48), Max: 36.3 (08-08-23 @ 07:48)  T(F): 97.4 (08-08-23 @ 07:48), Max: 97.4 (08-08-23 @ 07:48)  HR: --  BP: --  BP(mean): --  RR: 16 (08-08-23 @ 07:48) (16 - 16)  SpO2: 100% (08-08-23 @ 07:48) (100% - 100%)    Orthostatic VS  08-08-23 @ 07:48  Lying BP: --/-- HR: --  Sitting BP: 131/100 HR: 100  Standing BP: 133/100 HR: 84  Site: upper right arm  Mode: electronic  Orthostatic VS  08-07-23 @ 08:07  Lying BP: --/-- HR: --  Sitting BP: 109/74 HR: 64  Standing BP: 123/77 HR: 102  Site: upper right arm  Mode: electronic  
Vital Signs Last 24 Hrs  T(C): 36.6 (08-04-23 @ 08:24), Max: 36.6 (08-04-23 @ 08:24)  T(F): 97.9 (08-04-23 @ 08:24), Max: 97.9 (08-04-23 @ 08:24)  HR: --  BP: --  BP(mean): --  RR: 16 (08-04-23 @ 08:24) (16 - 16)  SpO2: 100% (08-04-23 @ 08:24) (100% - 100%)    Orthostatic VS  08-04-23 @ 08:24  Lying BP: --/-- HR: --  Sitting BP: 97/58 HR: 75  Standing BP: 113/100 HR: 66  Site: upper right arm  Mode: electronic  Orthostatic VS  08-03-23 @ 07:20  Lying BP: --/-- HR: --  Sitting BP: 119/56 HR: 61  Standing BP: 100/75 HR: 92  Site: upper right arm  Mode: electronic  
Vital Signs Last 24 Hrs  T(C): 36.4 (08-03-23 @ 07:20), Max: 36.4 (08-03-23 @ 07:20)  T(F): 97.5 (08-03-23 @ 07:20), Max: 97.5 (08-03-23 @ 07:20)  HR: --  BP: --  BP(mean): --  RR: 18 (08-03-23 @ 07:20) (18 - 18)  SpO2: 100% (08-03-23 @ 07:20) (100% - 100%)    Orthostatic VS  08-03-23 @ 07:20  Lying BP: --/-- HR: --  Sitting BP: 119/56 HR: 61  Standing BP: 100/75 HR: 92  Site: upper right arm  Mode: electronic  Orthostatic VS  08-02-23 @ 07:30  Lying BP: --/-- HR: --  Sitting BP: 123/50 HR: 69  Standing BP: 120/73 HR: 80  Site: upper right arm  Mode: electronic  
Vital Signs Last 24 Hrs  T(C): 36.1 (07-31-23 @ 07:27), Max: 36.1 (07-31-23 @ 07:27)  T(F): 97 (07-31-23 @ 07:27), Max: 97 (07-31-23 @ 07:27)  HR: --  BP: --  BP(mean): --  RR: 18 (07-31-23 @ 07:27) (18 - 18)  SpO2: 100% (07-31-23 @ 07:27) (100% - 100%)    Orthostatic VS  07-31-23 @ 07:27  Lying BP: --/-- HR: --  Sitting BP: 116/68 HR: 68  Standing BP: 126/89 HR: 76  Site: upper right arm  Mode: electronic  Orthostatic VS  07-30-23 @ 07:17  Lying BP: --/-- HR: --  Sitting BP: 106/50 HR: 81  Standing BP: 103/65 HR: 88  Site: upper right arm  Mode: electronic

## 2023-08-09 NOTE — BH INPATIENT PSYCHIATRY PROGRESS NOTE - NSTXDCOPNODATETRGT_PSY_ALL_CORE
04-Aug-2023
11-Aug-2023
15-Aug-2023
04-Aug-2023
15-Aug-2023

## 2023-08-09 NOTE — BH INPATIENT PSYCHIATRY PROGRESS NOTE - NSTXPSYCHOGOAL_PSY_ALL_CORE
Will ask for PRN medication to manage hallucinations

## 2023-08-09 NOTE — BH INPATIENT PSYCHIATRY PROGRESS NOTE - NSBHMSERECMEM_PSY_A_CORE
Unable to assess
Normal
Unable to assess
Normal
Unable to assess
Normal
Normal

## 2023-08-09 NOTE — BH DISCHARGE NOTE NURSING/SOCIAL WORK/PSYCH REHAB - NSDCPLANREVIEWED_PSY_ALL_CORE
discharge plan reviewed with patient and patient agreeable to receive a copy of the discharge summary through the patient portal.

## 2023-08-09 NOTE — BH DISCHARGE NOTE NURSING/SOCIAL WORK/PSYCH REHAB - NSDPDISTO_PSY_ALL_CORE
Patient to return home with his Uncle and Aunt:   [address] [place] [id number] [phone]/Home

## 2023-08-09 NOTE — BH INPATIENT PSYCHIATRY DISCHARGE NOTE - NSBHASSESSSUMMFT_PSY_ALL_CORE
Patient was seen today AM, with MARC Nair who help with interview. Interview is limited, he is alert, not oriented to time and place. Endorses that he does not hear voices, does not feel suspicious and has been sleeping well before coming to ED at . He agreed to take Zyprexa 5 mg x 1 dose now and also Zyprexa 5 mg HS ordered for faster improvement.    Plan: Admit Involuntarily -9.39          Start Zyprexa 5 mg HS and titrate as needed          PRN PO/IM meds as needed for stability          Collateral info from family          Discharge Planning-Ongoing

## 2023-08-09 NOTE — BH INPATIENT PSYCHIATRY PROGRESS NOTE - NSBHMSEREMMEM_PSY_A_CORE
Unable to assess
Normal
Normal
Unable to assess
Normal
Normal
Unable to assess
Unable to assess

## 2023-08-09 NOTE — BH INPATIENT PSYCHIATRY PROGRESS NOTE - NSTXPSYCHOINTERMD_PSY_ALL_CORE
Zyprexa 5 mg AM and Zyprexa 10 mg HS trial
Zyprexa 5 mg AM and Zyprexa 10 mg HS trial
Zyprexa 5 mg AM and Zyprexa 10 mg HS
Zyprexa 5 mg AM and Zyprexa 10 mg HS trial
Zyprexa 5 mg AM and Zyprexa 10 mg HS trial
Zyprexa 5 mg AM and Zyprexa 10 mg HS
Zyprexa 5 mg AM and Zyprexa 10 mg HS trial

## 2023-08-09 NOTE — BH DISCHARGE NOTE NURSING/SOCIAL WORK/PSYCH REHAB - NSDCADDINFO2FT_PSY_ALL_CORE
You have an appointment in person at [facility] on 8/22/23 at 9:30 AM with FRANKLYN Alexandre.

## 2023-08-09 NOTE — BH INPATIENT PSYCHIATRY PROGRESS NOTE - NSICDXBHPRIMARYDX_PSY_ALL_CORE
Psychosis, unspecified psychosis type   F29  

## 2023-08-09 NOTE — BH INPATIENT PSYCHIATRY DISCHARGE NOTE - NSDCMRMEDTOKEN_GEN_ALL_CORE_FT
OLANZapine 10 mg oral tablet: 2 tab(s) orally once a day (at bedtime)  OLANZapine 20 mg oral tablet: 1 tab(s) orally once a day (at bedtime)

## 2023-08-09 NOTE — BH INPATIENT PSYCHIATRY PROGRESS NOTE - NSBHMSEKNOW_PSY_A_CORE
Unable to assess
Unable to assess
Normal
Unable to assess
Normal
Unable to assess
Unable to assess
Normal
Normal
Unable to assess

## 2023-08-09 NOTE — BH INPATIENT PSYCHIATRY PROGRESS NOTE - NSBHMSEPERCEPT_PSY_A_CORE
No abnormalities
Unable to assess
Unable to assess
No abnormalities
Unable to assess
No abnormalities
No abnormalities
Unable to assess

## 2023-08-09 NOTE — BH INPATIENT PSYCHIATRY PROGRESS NOTE - NSTXDCOPNODATEEST_PSY_ALL_CORE
29-Jul-2023
04-Aug-2023
08-Aug-2023
29-Jul-2023
08-Aug-2023
29-Jul-2023
29-Jul-2023

## 2023-08-09 NOTE — BH DISCHARGE NOTE NURSING/SOCIAL WORK/PSYCH REHAB - NSDCADDINFO1FT_PSY_ALL_CORE
You have an appointment in person at [facility] on 8/14/23 at 10 AM with Shreya.     Patient to address any issues related to substance abuse in treatment with [facility].  You have an appointment in person at [facility] on 8/10/23 at 12 PM with Lashawn     Patient to address any issues related to substance abuse in treatment with [facility].

## 2023-08-09 NOTE — BH INPATIENT PSYCHIATRY PROGRESS NOTE - NSBHCHARTREVIEWLAB_PSY_A_CORE FT
15.6   8.95  )-----------( 265      ( 27 Jul 2023 22:14 )             44.6   07-27    139  |  109<H>  |  13  ----------------------------<  114<H>  3.4<L>   |  25  |  1.11    Ca    8.8      27 Jul 2023 22:14    TPro  8.1  /  Alb  4.2  /  TBili  0.5  /  DBili  x   /  AST  18  /  ALT  26  /  AlkPhos  57  07-27  

## 2023-08-09 NOTE — BH INPATIENT PSYCHIATRY DISCHARGE NOTE - HPI (INCLUDE ILLNESS QUALITY, SEVERITY, DURATION, TIMING, CONTEXT, MODIFYING FACTORS, ASSOCIATED SIGNS AND SYMPTOMS)
07/28/2023: Patient was seen today AM, with MARC Nair who help with interview. Interview is limited, he is alert, not oriented to time and place. Endorses that he does not hear voices, does not feel suspicious and has been sleeping well before coming to ED at . He agreed to take Zyprexa 5 mg x 1 dose now and also Zyprexa 5 mg HS ordered for faster improvement.    Plan: Admit Involuntarily -9.39          Start Zyprexa 5 mg HS          PRN PO/IM meds as needed for stability          Collateral info from family          Discharge Planning-Ongoing    As per ED presentation: This is a 21 yo male, Arabic-speaking, domiciled w/ family, with history of schizophrenia vs affective psychosis, multiple prior psych admissions (last known at [facility] 3/1-3/31/22), no suicide attempts or violence hx, BIB EMS a/b family due to poor sleep, PO intake, and self-care in the past week in the context of medication non-adherence.    Attempted interview w/ . Patient is odd, somnolent, has irregular eye movements and repetitive movements of feet per 1:1. He is mostly nonverbal, nonresponsive even though, not making eye contact. Unable to answer any questions. Very inattentive, says "huh" even when most questions are repeated.    Collateral obtained from patient's uncle (see SW note). Per uncle for the last week patient has not been showering, not eating. Has been making paranoid comments about Lucifer, talking to self and laughing to self.  Uncle reports for the past 5-6 days, pt has not been sleeping, eating, or going to work. Uncle reports pt stays home, is observed to be internally preoccupied, talking and laughing to self, endorsing delusions that lucifer is behind him and doing bad things. Uncle reports pt is not performing ADLs and has only taken 1 shower in the past 5 days. Uncle reports on some days pt is able to follow commands and was willing to go with uncle to  his medications. Uncle reports this is a similar presentation to the last time pt was hospitalized.   Uncle reports he called PD yesterday to take pt to the ED but PD came to the home, talked to pt, and decided not to take him. Uncle reports he called pt’s psychiatrist today and informed her of pt’s presentation and was instructed by psychiatrist to take him to the ED immediately.  Collateral information from Uncle:

## 2023-08-09 NOTE — BH INPATIENT PSYCHIATRY PROGRESS NOTE - NSBHMSETHTCONTENT_PSY_A_CORE
Delusions
Unremarkable

## 2023-08-09 NOTE — BH INPATIENT PSYCHIATRY PROGRESS NOTE - NSBHFUPINTERVALHXFT_PSY_A_CORE
Patient was seen today AM, chart reviewed and discussed in team. He prefers to stay in room most of the time, Overall much improved and also seen in community room yesterday, no aggression/agitation reported and takes meds as suggested. He was advised to continue with meds for now and to f/u as per GEREMIAS referral and advised to stay away from Cannabis. He smokes cannabis at times. GEREMIAS spoke with his uncle Miguel and he agreed to take him today @ 1:00PM

## 2023-08-09 NOTE — BH INPATIENT PSYCHIATRY PROGRESS NOTE - PRN MEDS
MEDICATIONS  (PRN):  LORazepam     Tablet 1 milliGRAM(s) Oral every 6 hours PRN Agitation  
MEDICATIONS  (PRN):  
MEDICATIONS  (PRN):  
MEDICATIONS  (PRN):  LORazepam     Tablet 1 milliGRAM(s) Oral every 6 hours PRN Agitation  
MEDICATIONS  (PRN):

## 2023-08-09 NOTE — BH DISCHARGE NOTE NURSING/SOCIAL WORK/PSYCH REHAB - NSCDUDCCRISIS_PSY_A_CORE
.  [facility] Crisis [facility] for Children, Adults and Families  [address] [place] [address]  Mobile Crisis Hotline – (508) 703-2302/.National Suicide Prevention Lifeline 2 (802) 562-9544/.  [facility]  1  LIFENET (772-3256)/.  [facility]  (104) 411-7229/.  [facility] Response Crisis Hotline  (561) 676-6933  24 hour telephone crisis intervention and suicide prevention hotline concerned with all mental health issues/Other.../988 Suicide and Crisis Lifeline Substance Use Resources    SMART Recovery Groups  *Can meet online   https://www.smartrecovery.org     Find a local AA group:  [facility]  https://[facility]-aa.org/  212.221.8563    Find a local NA meeting:   [facility] of Narcotics Anonymous  Anne Fogarty  call 24/7: [phone]/.  [facility] Crisis [facility] for Children, Adults and Families  [address] [place] [address]  Mobile Crisis Hotline – (786) 962-2354/.National Suicide Prevention Lifeline 2 (407) 982-8108/.  [facility]  1 (836) LIFENET (106-2853)/.  [facility]  (263) 768-1799/.  [facility] Response Crisis Hotline  (706) 107-7054  24 hour telephone crisis intervention and suicide prevention hotline concerned with all mental health issues/Other.../988 Suicide and Crisis Lifeline

## 2023-08-09 NOTE — BH INPATIENT PSYCHIATRY PROGRESS NOTE - NSTXCOPEDATEEST_PSY_ALL_CORE
28-Jul-2023
04-Aug-2023
28-Jul-2023
04-Aug-2023
28-Jul-2023
04-Aug-2023
07-Aug-2023
04-Aug-2023

## 2023-08-09 NOTE — BH INPATIENT PSYCHIATRY PROGRESS NOTE - NSBHFUPINTERVALCCFT_PSY_A_CORE
" I feel OK " 
Personally reviewed: does not meet criteria for low voltage, sinus tachy @ 135, nl axis, nl intervals, no S1Q3T3, no RAVINDRA/STD/TWIs

## 2023-08-09 NOTE — BH DISCHARGE NOTE NURSING/SOCIAL WORK/PSYCH REHAB - PATIENT PORTAL LINK FT
You can access the FollowMyHealth Patient Portal offered by [facility] by registering at the following website: [email or web address]. By joining [facility]’s FollowMyHealth portal, you will also be able to view your health information using other applications (apps) compatible with our system.

## 2023-08-09 NOTE — BH INPATIENT PSYCHIATRY PROGRESS NOTE - NSBHATTESTTYPEVISIT_PSY_A_CORE
Attending Only

## 2023-08-09 NOTE — BH INPATIENT PSYCHIATRY PROGRESS NOTE - CURRENT MEDICATION
MEDICATIONS  (STANDING):  OLANZapine Disintegrating Tablet 20 milliGRAM(s) Oral at bedtime    MEDICATIONS  (PRN):  LORazepam     Tablet 1 milliGRAM(s) Oral every 6 hours PRN Agitation  
MEDICATIONS  (STANDING):  OLANZapine Disintegrating Tablet 10 milliGRAM(s) Oral at bedtime  OLANZapine Disintegrating Tablet 5 milliGRAM(s) Oral daily    MEDICATIONS  (PRN):  LORazepam     Tablet 1 milliGRAM(s) Oral every 6 hours PRN Agitation  
MEDICATIONS  (STANDING):  OLANZapine Disintegrating Tablet 10 milliGRAM(s) Oral at bedtime    MEDICATIONS  (PRN):  LORazepam     Tablet 1 milliGRAM(s) Oral every 6 hours PRN Agitation  
MEDICATIONS  (STANDING):  OLANZapine Disintegrating Tablet 20 milliGRAM(s) Oral at bedtime    MEDICATIONS  (PRN):  
MEDICATIONS  (STANDING):  OLANZapine Disintegrating Tablet 20 milliGRAM(s) Oral at bedtime    MEDICATIONS  (PRN):  
MEDICATIONS  (STANDING):  OLANZapine Disintegrating Tablet 10 milliGRAM(s) Oral at bedtime    MEDICATIONS  (PRN):  LORazepam     Tablet 1 milliGRAM(s) Oral every 6 hours PRN Agitation  
MEDICATIONS  (STANDING):  OLANZapine Disintegrating Tablet 20 milliGRAM(s) Oral at bedtime    MEDICATIONS  (PRN):  
MEDICATIONS  (STANDING):  OLANZapine Disintegrating Tablet 15 milliGRAM(s) Oral at bedtime    MEDICATIONS  (PRN):  LORazepam     Tablet 1 milliGRAM(s) Oral every 6 hours PRN Agitation  
MEDICATIONS  (STANDING):  OLANZapine Disintegrating Tablet 10 milliGRAM(s) Oral at bedtime  OLANZapine Disintegrating Tablet 5 milliGRAM(s) Oral daily    MEDICATIONS  (PRN):  LORazepam     Tablet 1 milliGRAM(s) Oral every 6 hours PRN Agitation  
MEDICATIONS  (STANDING):  OLANZapine Disintegrating Tablet 10 milliGRAM(s) Oral at bedtime  OLANZapine Disintegrating Tablet 5 milliGRAM(s) Oral daily    MEDICATIONS  (PRN):  LORazepam     Tablet 1 milliGRAM(s) Oral every 6 hours PRN Agitation

## 2023-08-09 NOTE — BH INPATIENT PSYCHIATRY PROGRESS NOTE - NSBHADMITMEDEDUDETAILS_PSY_A_CORE FT
Discussed meds s/e, benefits and risks of Zyprexa

## 2023-08-09 NOTE — BH INPATIENT PSYCHIATRY PROGRESS NOTE - NSTXCOPEINTERMD_PSY_ALL_CORE
Zyprexa 5 mg AM and Zyprexa 10 mg HS trial
Zyprexa 5 mg AM and Zyprexa 10 mg HS trial
Zyprexa 5 mg AM and Zyprexa 10 mg HS
Zyprexa 5 mg AM and Zyprexa 10 mg HS
Zyprexa 5 mg AM and Zyprexa 10 mg HS trial

## 2023-08-09 NOTE — BH INPATIENT PSYCHIATRY PROGRESS NOTE - NSTXCOPEDATETRGT_PSY_ALL_CORE
14-Aug-2023
14-Aug-2023
11-Aug-2023
04-Aug-2023
04-Aug-2023
11-Aug-2023
04-Aug-2023
14-Aug-2023
11-Aug-2023
11-Aug-2023

## 2023-08-09 NOTE — BH INPATIENT PSYCHIATRY DISCHARGE NOTE - DESCRIPTION
As per chart review; migrated from [facility] ~7years ago. Lives with uncle, aunt and cousins in private house.

## 2023-08-09 NOTE — BH INPATIENT PSYCHIATRY DISCHARGE NOTE - OTHER PAST PSYCHIATRIC HISTORY (INCLUDE DETAILS REGARDING ONSET, COURSE OF ILLNESS, INPATIENT/OUTPATIENT TREATMENT)
21 yo male, Hebrew-speaking, domiciled w/ family, with history of schizophrenia vs affective psychosis, multiple prior psych admissions (last known at [facility] 3/1-3/31/22), no suicide attempts or violence hx, BIB EMS a/b family due to poor sleep, PO intake, and self-care in the past week in the context of medication non-adherence.

## 2023-08-09 NOTE — BH INPATIENT PSYCHIATRY PROGRESS NOTE - NSBHMSETHTPROC_PSY_A_CORE
Linear
Linear/Illogical
Illogical
Linear
Linear
Linear/Illogical

## 2023-08-09 NOTE — BH INPATIENT PSYCHIATRY DISCHARGE NOTE - NSDCPROCEDURESFT_PSY_ALL_CORE
CBC-WNL  CMP-WNL  Covid-19-Negative  HBA1-C--5.2  TSH-2.44  EKG-QTC--407-msec    Lipid profile  LDL-87  HDL-38  TRG-95

## 2023-08-09 NOTE — BH INPATIENT PSYCHIATRY PROGRESS NOTE - NSCGIIMPROVESX_PSY_ALL_CORE
4 = No change - symptoms remain essentially unchanged
2 = Much improved - notably better with signficant reduction of symptoms; increase in the level of functioning but some symptoms remain
4 = No change - symptoms remain essentially unchanged
2 = Much improved - notably better with signficant reduction of symptoms; increase in the level of functioning but some symptoms remain

## 2023-08-09 NOTE — BH INPATIENT PSYCHIATRY DISCHARGE NOTE - NSBHMETABOLIC_PSY_ALL_CORE_FT
BMI: BMI (kg/m2): 26 (07-28-23 @ 08:18)  HbA1c: A1C with Estimated Average Glucose Result: 5.2 % (07-30-23 @ 07:49)  TSH-2.44    Glucose:   BP: --  Lipid Panel: Date/Time: 07-30-23 @ 07:49  Cholesterol, Serum: 142  Direct LDL: --87  HDL Cholesterol, Serum: 38  Total Cholesterol/HDL Ration Measurement: --  Triglycerides, Serum: 95

## 2023-08-09 NOTE — BH INPATIENT PSYCHIATRY PROGRESS NOTE - NSTXPSYCHODATEEST_PSY_ALL_CORE
28-Jul-2023
13-Aug-2023
28-Jul-2023
13-Aug-2023
28-Jul-2023

## 2023-08-09 NOTE — BH INPATIENT PSYCHIATRY PROGRESS NOTE - NSBHATTESTBILLING_PSY_A_CORE
10521-Wgjpqnmsws OBS or IP - low complexity OR 25-34 mins
70110-Mzokpqpoxx OBS or IP - moderate complexity OR 35-49 mins
16175-Hefnjjqyxy OBS or IP - moderate complexity OR 35-49 mins
11408-Hfsynochad OBS or IP - moderate complexity OR 35-49 mins
39292-Rsucrtgacf OBS or IP - moderate complexity OR 35-49 mins
75334-Itxodxlxqh OBS or IP - low complexity OR 25-34 mins
25578-Gvfaerkdxl OBS or IP - moderate complexity OR 35-49 mins
93102-Kxessyyseo OBS or IP - moderate complexity OR 35-49 mins
04799-Cishkdctvh OBS or IP - moderate complexity OR 35-49 mins
98655-Slreeqzhqv OBS or IP - low complexity OR 25-34 mins

## 2023-08-10 NOTE — CHART NOTE - NSCHARTNOTEFT_GEN_A_CORE
Pt discharged to home with aftercare scheduled at [facility]. SW faxed pt's DC summary to [facility] for continuity of care. Pt's family provided transport to ensure safe arrival home.

## 2023-08-12 NOTE — BH INPATIENT PSYCHIATRY PROGRESS NOTE - NSBHASSESSSUMMFT_PSY_ALL_CORE
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 Family reported that patient hasn't been compliant with his meds & has been neglecting basic daily hygiene. Patient himself denied any complaints but is described as internally preoccupied an uncooperative with questions. Psychiatry consulted for evaluation.     Pt uncooperative, reports he is tired and does not want to talk    >Legal: 9.27 INVOL  >Obs: Routine, no current SI. no need for CO, patient not expected to pose risk to self or others in controlled inpatient setting; q15 adequate  >Psychiatric Meds:  Zyprexa Zydis titrated to 20mg PO at bedtime  >Labs: Admission labs reviewed  >Medical:  No acute concerns. No consultations needed at this time.   >Social: milieu/structured therapy  >Treatment Interventions: Groups and Individual Therapy/CBT  >Dispo: pending remission of sx

## 2023-08-16 DIAGNOSIS — F20.9 SCHIZOPHRENIA, UNSPECIFIED: ICD-10-CM

## 2023-08-16 DIAGNOSIS — E87.6 HYPOKALEMIA: ICD-10-CM

## 2023-08-16 DIAGNOSIS — R73.9 HYPERGLYCEMIA, UNSPECIFIED: ICD-10-CM

## 2023-08-16 DIAGNOSIS — Z91.148 PATIENT'S OTHER NONCOMPLIANCE WITH MEDICATION REGIMEN FOR OTHER REASON: ICD-10-CM

## 2023-08-16 DIAGNOSIS — F29 UNSPECIFIED PSYCHOSIS NOT DUE TO A SUBSTANCE OR KNOWN PHYSIOLOGICAL CONDITION: ICD-10-CM

## 2023-08-24 NOTE — ED ADULT TRIAGE NOTE - CHIEF COMPLAINT QUOTE
pt bib dad having no complaints. Dad states "he has not been sleeping, eating or leaving the house. He was diagnosed with epilepsy 2 years ago, but stopped/ ran out of his medication 2 months ago". Pt A&ox4, and admits to not knowing why his dad wants him here. Pt denies SI/HI. Pt ambulatory, no s/s of distress.  #[id number] Protopic Pregnancy And Lactation Text: This medication is Pregnancy Category C. It is unknown if this medication is excreted in breast milk when applied topically.

## 2023-10-20 NOTE — BH INPATIENT PSYCHIATRY PROGRESS NOTE - NSBHPSYCHOLCOGORIENT_PSY_A_CORE
Not fully oriented...
no
Not fully oriented...
Oriented to time, place, person, situation
Not fully oriented...
Oriented to time, place, person, situation
Oriented to time, place, person, situation
Not fully oriented...

## 2023-11-02 ENCOUNTER — INPATIENT (INPATIENT)
Facility: HOSPITAL | Age: 23
LOS: 12 days | Discharge: HOME | DRG: 750 | End: 2023-11-15
Attending: PSYCHIATRY & NEUROLOGY | Admitting: PSYCHIATRY & NEUROLOGY
Payer: MEDICAID

## 2023-11-02 VITALS
TEMPERATURE: 99 F | DIASTOLIC BLOOD PRESSURE: 70 MMHG | WEIGHT: 179.9 LBS | HEART RATE: 71 BPM | SYSTOLIC BLOOD PRESSURE: 136 MMHG | RESPIRATION RATE: 18 BRPM | OXYGEN SATURATION: 100 % | HEIGHT: 65 IN

## 2023-11-02 LAB
ALBUMIN SERPL ELPH-MCNC: 4.3 G/DL — SIGNIFICANT CHANGE UP (ref 3.3–5)
ALBUMIN SERPL ELPH-MCNC: 4.3 G/DL — SIGNIFICANT CHANGE UP (ref 3.3–5)
ALP SERPL-CCNC: 56 U/L — SIGNIFICANT CHANGE UP (ref 40–120)
ALP SERPL-CCNC: 56 U/L — SIGNIFICANT CHANGE UP (ref 40–120)
ALT FLD-CCNC: 27 U/L — SIGNIFICANT CHANGE UP (ref 12–78)
ALT FLD-CCNC: 27 U/L — SIGNIFICANT CHANGE UP (ref 12–78)
AMPHET UR-MCNC: NEGATIVE — SIGNIFICANT CHANGE UP
AMPHET UR-MCNC: NEGATIVE — SIGNIFICANT CHANGE UP
ANION GAP SERPL CALC-SCNC: 6 MMOL/L — SIGNIFICANT CHANGE UP (ref 5–17)
ANION GAP SERPL CALC-SCNC: 6 MMOL/L — SIGNIFICANT CHANGE UP (ref 5–17)
APAP SERPL-MCNC: < 2 UG/ML (ref 10–30)
APAP SERPL-MCNC: < 2 UG/ML (ref 10–30)
APPEARANCE UR: ABNORMAL
APPEARANCE UR: ABNORMAL
AST SERPL-CCNC: 11 U/L — LOW (ref 15–37)
AST SERPL-CCNC: 11 U/L — LOW (ref 15–37)
BACTERIA # UR AUTO: NEGATIVE /HPF — SIGNIFICANT CHANGE UP
BACTERIA # UR AUTO: NEGATIVE /HPF — SIGNIFICANT CHANGE UP
BARBITURATES UR SCN-MCNC: NEGATIVE — SIGNIFICANT CHANGE UP
BARBITURATES UR SCN-MCNC: NEGATIVE — SIGNIFICANT CHANGE UP
BASOPHILS # BLD AUTO: 0.02 K/UL — SIGNIFICANT CHANGE UP (ref 0–0.2)
BASOPHILS # BLD AUTO: 0.02 K/UL — SIGNIFICANT CHANGE UP (ref 0–0.2)
BASOPHILS NFR BLD AUTO: 0.4 % — SIGNIFICANT CHANGE UP (ref 0–2)
BASOPHILS NFR BLD AUTO: 0.4 % — SIGNIFICANT CHANGE UP (ref 0–2)
BENZODIAZ UR-MCNC: NEGATIVE — SIGNIFICANT CHANGE UP
BENZODIAZ UR-MCNC: NEGATIVE — SIGNIFICANT CHANGE UP
BILIRUB SERPL-MCNC: 0.3 MG/DL — SIGNIFICANT CHANGE UP (ref 0.2–1.2)
BILIRUB SERPL-MCNC: 0.3 MG/DL — SIGNIFICANT CHANGE UP (ref 0.2–1.2)
BILIRUB UR-MCNC: NEGATIVE — SIGNIFICANT CHANGE UP
BILIRUB UR-MCNC: NEGATIVE — SIGNIFICANT CHANGE UP
BUN SERPL-MCNC: 11 MG/DL — SIGNIFICANT CHANGE UP (ref 7–23)
BUN SERPL-MCNC: 11 MG/DL — SIGNIFICANT CHANGE UP (ref 7–23)
CALCIUM SERPL-MCNC: 8.9 MG/DL — SIGNIFICANT CHANGE UP (ref 8.5–10.1)
CALCIUM SERPL-MCNC: 8.9 MG/DL — SIGNIFICANT CHANGE UP (ref 8.5–10.1)
CAST: 0 /LPF — SIGNIFICANT CHANGE UP (ref 0–4)
CAST: 0 /LPF — SIGNIFICANT CHANGE UP (ref 0–4)
CHLORIDE SERPL-SCNC: 108 MMOL/L — SIGNIFICANT CHANGE UP (ref 96–108)
CHLORIDE SERPL-SCNC: 108 MMOL/L — SIGNIFICANT CHANGE UP (ref 96–108)
CO2 SERPL-SCNC: 28 MMOL/L — SIGNIFICANT CHANGE UP (ref 22–31)
CO2 SERPL-SCNC: 28 MMOL/L — SIGNIFICANT CHANGE UP (ref 22–31)
COCAINE METAB.OTHER UR-MCNC: NEGATIVE — SIGNIFICANT CHANGE UP
COCAINE METAB.OTHER UR-MCNC: NEGATIVE — SIGNIFICANT CHANGE UP
COLOR SPEC: YELLOW — SIGNIFICANT CHANGE UP
COLOR SPEC: YELLOW — SIGNIFICANT CHANGE UP
CREAT SERPL-MCNC: 1.16 MG/DL — SIGNIFICANT CHANGE UP (ref 0.5–1.3)
CREAT SERPL-MCNC: 1.16 MG/DL — SIGNIFICANT CHANGE UP (ref 0.5–1.3)
DIFF PNL FLD: NEGATIVE — SIGNIFICANT CHANGE UP
DIFF PNL FLD: NEGATIVE — SIGNIFICANT CHANGE UP
EGFR: 91 ML/MIN/1.73M2 — SIGNIFICANT CHANGE UP
EGFR: 91 ML/MIN/1.73M2 — SIGNIFICANT CHANGE UP
EOSINOPHIL # BLD AUTO: 0.05 K/UL — SIGNIFICANT CHANGE UP (ref 0–0.5)
EOSINOPHIL # BLD AUTO: 0.05 K/UL — SIGNIFICANT CHANGE UP (ref 0–0.5)
EOSINOPHIL NFR BLD AUTO: 0.9 % — SIGNIFICANT CHANGE UP (ref 0–6)
EOSINOPHIL NFR BLD AUTO: 0.9 % — SIGNIFICANT CHANGE UP (ref 0–6)
ETHANOL SERPL-MCNC: <10 MG/DL — SIGNIFICANT CHANGE UP (ref 0–10)
ETHANOL SERPL-MCNC: <10 MG/DL — SIGNIFICANT CHANGE UP (ref 0–10)
FLUAV AG NPH QL: SIGNIFICANT CHANGE UP
FLUAV AG NPH QL: SIGNIFICANT CHANGE UP
FLUBV AG NPH QL: SIGNIFICANT CHANGE UP
FLUBV AG NPH QL: SIGNIFICANT CHANGE UP
GLUCOSE SERPL-MCNC: 115 MG/DL — HIGH (ref 70–99)
GLUCOSE SERPL-MCNC: 115 MG/DL — HIGH (ref 70–99)
GLUCOSE UR QL: NEGATIVE MG/DL — SIGNIFICANT CHANGE UP
GLUCOSE UR QL: NEGATIVE MG/DL — SIGNIFICANT CHANGE UP
HCT VFR BLD CALC: 44.5 % — SIGNIFICANT CHANGE UP (ref 39–50)
HCT VFR BLD CALC: 44.5 % — SIGNIFICANT CHANGE UP (ref 39–50)
HGB BLD-MCNC: 15.9 G/DL — SIGNIFICANT CHANGE UP (ref 13–17)
HGB BLD-MCNC: 15.9 G/DL — SIGNIFICANT CHANGE UP (ref 13–17)
IMM GRANULOCYTES NFR BLD AUTO: 0.4 % — SIGNIFICANT CHANGE UP (ref 0–0.9)
IMM GRANULOCYTES NFR BLD AUTO: 0.4 % — SIGNIFICANT CHANGE UP (ref 0–0.9)
KETONES UR-MCNC: NEGATIVE MG/DL — SIGNIFICANT CHANGE UP
KETONES UR-MCNC: NEGATIVE MG/DL — SIGNIFICANT CHANGE UP
LEUKOCYTE ESTERASE UR-ACNC: NEGATIVE — SIGNIFICANT CHANGE UP
LEUKOCYTE ESTERASE UR-ACNC: NEGATIVE — SIGNIFICANT CHANGE UP
LYMPHOCYTES # BLD AUTO: 2.07 K/UL — SIGNIFICANT CHANGE UP (ref 1–3.3)
LYMPHOCYTES # BLD AUTO: 2.07 K/UL — SIGNIFICANT CHANGE UP (ref 1–3.3)
LYMPHOCYTES # BLD AUTO: 38.3 % — SIGNIFICANT CHANGE UP (ref 13–44)
LYMPHOCYTES # BLD AUTO: 38.3 % — SIGNIFICANT CHANGE UP (ref 13–44)
MCHC RBC-ENTMCNC: 30.5 PG — SIGNIFICANT CHANGE UP (ref 27–34)
MCHC RBC-ENTMCNC: 30.5 PG — SIGNIFICANT CHANGE UP (ref 27–34)
MCHC RBC-ENTMCNC: 35.7 GM/DL — SIGNIFICANT CHANGE UP (ref 32–36)
MCHC RBC-ENTMCNC: 35.7 GM/DL — SIGNIFICANT CHANGE UP (ref 32–36)
MCV RBC AUTO: 85.4 FL — SIGNIFICANT CHANGE UP (ref 80–100)
MCV RBC AUTO: 85.4 FL — SIGNIFICANT CHANGE UP (ref 80–100)
METHADONE UR-MCNC: NEGATIVE — SIGNIFICANT CHANGE UP
METHADONE UR-MCNC: NEGATIVE — SIGNIFICANT CHANGE UP
MONOCYTES # BLD AUTO: 0.42 K/UL — SIGNIFICANT CHANGE UP (ref 0–0.9)
MONOCYTES # BLD AUTO: 0.42 K/UL — SIGNIFICANT CHANGE UP (ref 0–0.9)
MONOCYTES NFR BLD AUTO: 7.8 % — SIGNIFICANT CHANGE UP (ref 2–14)
MONOCYTES NFR BLD AUTO: 7.8 % — SIGNIFICANT CHANGE UP (ref 2–14)
NEUTROPHILS # BLD AUTO: 2.82 K/UL — SIGNIFICANT CHANGE UP (ref 1.8–7.4)
NEUTROPHILS # BLD AUTO: 2.82 K/UL — SIGNIFICANT CHANGE UP (ref 1.8–7.4)
NEUTROPHILS NFR BLD AUTO: 52.2 % — SIGNIFICANT CHANGE UP (ref 43–77)
NEUTROPHILS NFR BLD AUTO: 52.2 % — SIGNIFICANT CHANGE UP (ref 43–77)
NITRITE UR-MCNC: NEGATIVE — SIGNIFICANT CHANGE UP
NITRITE UR-MCNC: NEGATIVE — SIGNIFICANT CHANGE UP
OPIATES UR-MCNC: NEGATIVE — SIGNIFICANT CHANGE UP
OPIATES UR-MCNC: NEGATIVE — SIGNIFICANT CHANGE UP
PCP SPEC-MCNC: SIGNIFICANT CHANGE UP
PCP SPEC-MCNC: SIGNIFICANT CHANGE UP
PCP UR-MCNC: NEGATIVE — SIGNIFICANT CHANGE UP
PCP UR-MCNC: NEGATIVE — SIGNIFICANT CHANGE UP
PH UR: 7.5 — SIGNIFICANT CHANGE UP (ref 5–8)
PH UR: 7.5 — SIGNIFICANT CHANGE UP (ref 5–8)
PLATELET # BLD AUTO: 248 K/UL — SIGNIFICANT CHANGE UP (ref 150–400)
PLATELET # BLD AUTO: 248 K/UL — SIGNIFICANT CHANGE UP (ref 150–400)
POTASSIUM SERPL-MCNC: 3.6 MMOL/L — SIGNIFICANT CHANGE UP (ref 3.5–5.3)
POTASSIUM SERPL-MCNC: 3.6 MMOL/L — SIGNIFICANT CHANGE UP (ref 3.5–5.3)
POTASSIUM SERPL-SCNC: 3.6 MMOL/L — SIGNIFICANT CHANGE UP (ref 3.5–5.3)
POTASSIUM SERPL-SCNC: 3.6 MMOL/L — SIGNIFICANT CHANGE UP (ref 3.5–5.3)
PROT SERPL-MCNC: 7.8 GM/DL — SIGNIFICANT CHANGE UP (ref 6–8.3)
PROT SERPL-MCNC: 7.8 GM/DL — SIGNIFICANT CHANGE UP (ref 6–8.3)
PROT UR-MCNC: NEGATIVE MG/DL — SIGNIFICANT CHANGE UP
PROT UR-MCNC: NEGATIVE MG/DL — SIGNIFICANT CHANGE UP
RBC # BLD: 5.21 M/UL — SIGNIFICANT CHANGE UP (ref 4.2–5.8)
RBC # BLD: 5.21 M/UL — SIGNIFICANT CHANGE UP (ref 4.2–5.8)
RBC # FLD: 11.9 % — SIGNIFICANT CHANGE UP (ref 10.3–14.5)
RBC # FLD: 11.9 % — SIGNIFICANT CHANGE UP (ref 10.3–14.5)
RBC CASTS # UR COMP ASSIST: 1 /HPF — SIGNIFICANT CHANGE UP (ref 0–4)
RBC CASTS # UR COMP ASSIST: 1 /HPF — SIGNIFICANT CHANGE UP (ref 0–4)
RSV RNA NPH QL NAA+NON-PROBE: SIGNIFICANT CHANGE UP
RSV RNA NPH QL NAA+NON-PROBE: SIGNIFICANT CHANGE UP
SALICYLATES SERPL-MCNC: <1.7 MG/DL — LOW (ref 2.8–20)
SALICYLATES SERPL-MCNC: <1.7 MG/DL — LOW (ref 2.8–20)
SARS-COV-2 RNA SPEC QL NAA+PROBE: SIGNIFICANT CHANGE UP
SARS-COV-2 RNA SPEC QL NAA+PROBE: SIGNIFICANT CHANGE UP
SODIUM SERPL-SCNC: 142 MMOL/L — SIGNIFICANT CHANGE UP (ref 135–145)
SODIUM SERPL-SCNC: 142 MMOL/L — SIGNIFICANT CHANGE UP (ref 135–145)
SP GR SPEC: 1.01 — SIGNIFICANT CHANGE UP (ref 1–1.03)
SP GR SPEC: 1.01 — SIGNIFICANT CHANGE UP (ref 1–1.03)
SQUAMOUS # UR AUTO: 0 /HPF — SIGNIFICANT CHANGE UP (ref 0–5)
SQUAMOUS # UR AUTO: 0 /HPF — SIGNIFICANT CHANGE UP (ref 0–5)
THC UR QL: NEGATIVE — SIGNIFICANT CHANGE UP
THC UR QL: NEGATIVE — SIGNIFICANT CHANGE UP
UROBILINOGEN FLD QL: 0.2 MG/DL — SIGNIFICANT CHANGE UP (ref 0.2–1)
UROBILINOGEN FLD QL: 0.2 MG/DL — SIGNIFICANT CHANGE UP (ref 0.2–1)
WBC # BLD: 5.4 K/UL — SIGNIFICANT CHANGE UP (ref 3.8–10.5)
WBC # BLD: 5.4 K/UL — SIGNIFICANT CHANGE UP (ref 3.8–10.5)
WBC # FLD AUTO: 5.4 K/UL — SIGNIFICANT CHANGE UP (ref 3.8–10.5)
WBC # FLD AUTO: 5.4 K/UL — SIGNIFICANT CHANGE UP (ref 3.8–10.5)
WBC UR QL: 4 /HPF — SIGNIFICANT CHANGE UP (ref 0–5)
WBC UR QL: 4 /HPF — SIGNIFICANT CHANGE UP (ref 0–5)

## 2023-11-02 PROCEDURE — 93010 ELECTROCARDIOGRAM REPORT: CPT

## 2023-11-02 PROCEDURE — 99285 EMERGENCY DEPT VISIT HI MDM: CPT

## 2023-11-02 NOTE — ED ADULT TRIAGE NOTE - TEMPERATURE IN CELSIUS (DEGREES C)
"Lab Results   Component Value Date    HGBA1C 6 3 (H) 03/01/2022       No results for input(s): \"POCGLU\" in the last 72 hours  Blood Sugar Average: Last 72 hrs:  well controlled   Hold metformin  Sliding scale    " 37

## 2023-11-02 NOTE — ED ADULT NURSE NOTE - OBJECTIVE STATEMENT
Pt presents to the ED BIB police for homicidal threat. Pt states " police came to my house but I don't know why." Pt denies making an threat to his sister. Pt is calm and cooperative. Endorsing smoking weed PTA. Denies HI/SI, or AH/VH. 1:1 initiated, Pt undressed and wanded. Labs drawn and sent. Awaiting psych consult at this time.

## 2023-11-02 NOTE — ED ADULT NURSE NOTE - CHIEF COMPLAINT QUOTE
Brought in by [facility] 6939 for making homicidal statements at home. Per [employer] patient has a psych history and might not be taking medications as perscribed. Patient made a statement at home that he wanted to kill his sister At triage, patient calm and cooperative, denies making any statements. Denies SI/HI. 1:1 initiated.

## 2023-11-02 NOTE — ED PROVIDER NOTE - CLINICAL SUMMARY MEDICAL DECISION MAKING FREE TEXT BOX
22-year-old patient with history of schizophrenia; reported non compliant with medications. Brought in by [employer] for homicidal statements. Plan medical screening and psych consult. 22-year-old patient with history of schizophrenia; reported non compliant with medications. Brought in by [employer] for homicidal statements. Plan medical screening and psych consult.    Saul OTTO: patient is medically clear; assumed care from Dr. Coronado at 7AM; admit to Dr. Hunter on [address].

## 2023-11-02 NOTE — ED PROVIDER NOTE - PHYSICAL EXAMINATION
GENERAL: A&Ox4, non-toxic appearing, no acute distress  HEENT: NCAT, EOMI, oral mucosa moist, normal conjunctiva  RESP: CTAB, no respiratory distress, no wheezes/rhonchi/rales, speaking in full sentences  CV: RRR, no murmurs/rubs/gallops  ABDOMEN: soft, non-tender, non-distended, no guarding, no CVA tenderness  MSK: no visible deformities  NEURO: no focal sensory or motor deficits, CN 2-12 grossly intact  SKIN: warm, normal color, well perfused, no rash  PSYCH: normal affect, appears preoccupied.

## 2023-11-02 NOTE — ED ADULT TRIAGE NOTE - CHIEF COMPLAINT QUOTE
Brought in by [facility] for making homicidal statements at home. Per [employer] patient has a psych history and might not be taking medications as perscribed. Patient made a statement at home that he wanted to kill his sister At triage, patient calm and cooperative, denies making any statements. Denies SI/HI. 1:1 initiated.

## 2023-11-02 NOTE — ED PROVIDER NOTE - OBJECTIVE STATEMENT
21 y/o male w/PMHx of affective psychosis and schizophrenia presents to the ED BIB [employer] for making homicidal statements. Per [employer], pt has not been taking his psych medications. At home, pt threatened to kill his sister. Currently denies any HI/SI. 21 y/o male w/PMHx of affective psychosis and schizophrenia presents to the ED BIB [employer] for making homicidal statements. Per [employer], pt has not been taking his psych medications. At home, pt threatened to kill his sister. Currently denies any HI/SI. In room, states he has been keeping up with his medication. Denies making a threat towards his sister.

## 2023-11-03 DIAGNOSIS — F29 UNSPECIFIED PSYCHOSIS NOT DUE TO A SUBSTANCE OR KNOWN PHYSIOLOGICAL CONDITION: ICD-10-CM

## 2023-11-03 PROCEDURE — 84443 ASSAY THYROID STIM HORMONE: CPT

## 2023-11-03 PROCEDURE — 99222 1ST HOSP IP/OBS MODERATE 55: CPT

## 2023-11-03 PROCEDURE — 36415 COLL VENOUS BLD VENIPUNCTURE: CPT

## 2023-11-03 PROCEDURE — 80061 LIPID PANEL: CPT

## 2023-11-03 PROCEDURE — 83036 HEMOGLOBIN GLYCOSYLATED A1C: CPT

## 2023-11-03 RX ORDER — INFLUENZA VIRUS VACCINE 15; 15; 15; 15 UG/.5ML; UG/.5ML; UG/.5ML; UG/.5ML
0.5 SUSPENSION INTRAMUSCULAR ONCE
Refills: 0 | Status: DISCONTINUED | OUTPATIENT
Start: 2023-11-03 | End: 2023-11-15

## 2023-11-03 RX ORDER — OLANZAPINE 15 MG/1
5 TABLET, FILM COATED ORAL AT BEDTIME
Refills: 0 | Status: DISCONTINUED | OUTPATIENT
Start: 2023-11-03 | End: 2023-11-06

## 2023-11-03 RX ORDER — HALOPERIDOL DECANOATE 100 MG/ML
5 INJECTION INTRAMUSCULAR ONCE
Refills: 0 | Status: DISCONTINUED | OUTPATIENT
Start: 2023-11-03 | End: 2023-11-15

## 2023-11-03 RX ORDER — DIPHENHYDRAMINE HCL 50 MG
50 CAPSULE ORAL ONCE
Refills: 0 | Status: DISCONTINUED | OUTPATIENT
Start: 2023-11-03 | End: 2023-11-15

## 2023-11-03 RX ORDER — DIPHENHYDRAMINE HCL 50 MG
50 CAPSULE ORAL EVERY 6 HOURS
Refills: 0 | Status: DISCONTINUED | OUTPATIENT
Start: 2023-11-03 | End: 2023-11-15

## 2023-11-03 RX ORDER — HALOPERIDOL DECANOATE 100 MG/ML
5 INJECTION INTRAMUSCULAR EVERY 6 HOURS
Refills: 0 | Status: DISCONTINUED | OUTPATIENT
Start: 2023-11-03 | End: 2023-11-15

## 2023-11-03 NOTE — ED BEHAVIORAL HEALTH ASSESSMENT NOTE - VIOLENCE RISK FACTORS:
Lack of insight into violence risk/need for treatment Lack of insight into violence risk/need for treatment/Other

## 2023-11-03 NOTE — ED BEHAVIORAL HEALTH PROGRESS NOTE - CASE SUMMARY/FORMULATION (CLEARLY DOCUMENT RATIONALE FOR DISPOSITION CHANGE)
11/3/23: Upon reassessment patient is calm, not forthcoming with questions, denies SHIIP, denies even having a sister. Reports he has not taken medications in 2 months because he does not need them, then reports he has never taken medications in his life. He is unable to state why he is in the hospital, remaining guarded. As per collateral patient has been psychiatrically decompensating for the past few months and has been psychotic and homicidal. These symptoms represent a change from baseline from which the patient cannot be reasonably expected to improve with current level of care. The patient presents with risk requiring inpatient psychiatric hospitalization for safety and stabilization.     Collateral: Jose Reyes [phone]:  #[id number]. Reports for months patient has not been sleeping, wanders around outside at nighttime, has odd behaviors such as repeatedly opening and closing doors and talking to himself. Reports he has not gone back to work for 3 months stating he is "on vacation." He has not been taking medications (Olanzapine) patient throws out medications because he believes he does not need them. He reports himself and family members are afraid of him. Last night patient told him "I'm going to send her back to [facility] in a coffin, I am going to kill her." Referring to his sister, whom also lives in the home. He has also been posting homicidal threats on facebook to kill other peoples sisters if they are not with them. Reports he has not been physically aggressive but his verbal threats and odd behaviors have been escalating and they are concerned for their safety.

## 2023-11-03 NOTE — BH SAFETY PLAN - WARNING SIGN 6
Trigger: Non adherent with medication and aftercare.

## 2023-11-03 NOTE — H&P ADULT - NSHPPHYSICALEXAM_GEN_ALL_CORE
ICU Vital Signs Last 24 Hrs  T(C): 36.8 (03 Nov 2023 15:26), Max: 37.1 (03 Nov 2023 10:37)  T(F): 98.2 (03 Nov 2023 15:26), Max: 98.7 (03 Nov 2023 10:37)  HR: 65 (03 Nov 2023 10:37) (65 - 80)  BP: 106/51 (03 Nov 2023 10:37) (103/74 - 136/70)    RR: 16 (03 Nov 2023 10:37) (16 - 18)  SpO2: 100% (03 Nov 2023 10:37) (100% - 100%)    O2 Parameters below as of 03 Nov 2023 10:37  Patient On (Oxygen Delivery Method): room air

## 2023-11-03 NOTE — ED BEHAVIORAL HEALTH ASSESSMENT NOTE - DETAILS
pt poorly related, not interacting, mostly nonverbal pt poorly related, not interacting voicemail left for uncle spoke to ED team

## 2023-11-03 NOTE — ED BEHAVIORAL HEALTH PROGRESS NOTE - COLLATERAL INFORMATION (NAME, PHONE, RELATIONSHIP):
Jose Reyes [phone]:  #[id number]. Reports for months patient has not been sleeping, wanders around outside at nighttime, has odd behaviors such as repeatedly opening and closing doors and talking to himself. Reports he has not gone back to work for 3 months stating he is "on vacation." He has not been taking medications (Olanzapine) patient throws out medications because he believes he does not need them. He reports himself and family members are afraid of him. Last night patient told him "I'm going to send her back to [facility] in a coffin, I am going to kill her." Referring to his sister, whom also lives in the home. He has also been posting homicidal threats on facebook to kill other peoples sisters if they are not with them. Reports he has not been physically aggressive but his verbal threats and odd behaviors have been escalating and they are concerned for their safety.

## 2023-11-03 NOTE — ED BEHAVIORAL HEALTH ASSESSMENT NOTE - DESCRIPTION
mostly immobile in stretcher, eyes closed & resting, minimally reactive to surroundings as per chart review; migrated from [facility] ~7years ago. Lives with uncle, aunt and cousins in private house. None known

## 2023-11-03 NOTE — ED BEHAVIORAL HEALTH PROGRESS NOTE - SUMMARY
This is a 23 yo male, Scottish-speaking, domiciled w/ family, with history of schizophrenia vs affective psychosis, multiple prior psych admissions, most recent in Aug 2023, no suicide attempts or violence hx, BIBPD for psychosis and HI towards sister.     Patient appears internally preoccupied, somnolent, with odd affect and irregular eye movents on exam. He is minimally cooperative with evaluation but mostly denies any acute suicidality, homicidality, violent thoughts, hallucinations, and past threats to his sister. Information beyond this is limited as no collateral information available and patient is a poor historian. His behavior may be a result of underlying psychotic illness, although collateral info is necessary to determine safety concerns and patient's baseline and need for further emergent care. Hold for reassessment pending collateral.

## 2023-11-03 NOTE — ED BEHAVIORAL HEALTH ASSESSMENT NOTE - SUMMARY
This is a 21 yo male, Trinidadian-speaking, domiciled w/ family, with history of schizophrenia vs affective psychosis, multiple prior psych admissions (last known at [facility] 3/1-3/31/22), no suicide attempts or violence hx, BIB EMS a/b family due to poor sleep, PO intake, and self-care in the past week in the context of medication non-adherence. This is a 23 yo male, Papua [place]inean-speaking, domiciled w/ family, with history of schizophrenia vs affective psychosis, multiple prior psych admissions, most recent in Aug 2023, no suicide attempts or violence hx, BIBPD for psychosis and HI towards sister.     Patient appears internally preoccupied, somnulent, with odd affect and irregular eyemovents on exam. He is minimally cooperative with evaluation but mostly denies any acute suicidality, homicidality, violent thoughts, hallucinations, and past threats to his sister. Information beyond this is limited as no collateral information available and patient is a poor historian. His behavior may be a result of underlying psychotic illness, although collateral info is necessary to determine safety concerns and patient's baseline and need for further emergent care. Hold for reassessment pending collateral.

## 2023-11-03 NOTE — ED BEHAVIORAL HEALTH ASSESSMENT NOTE - RISK ASSESSMENT
risk factors: past hospitalizations, poor compliance w/ treatment, poor insight, actively psychotic risk factors: past hospitalizations, poor compliance w/ treatment, poor insight  unable to assess full risk

## 2023-11-03 NOTE — ED BEHAVIORAL HEALTH ASSESSMENT NOTE - NSBHMSERELATED_PSY_A_CORE
Airway patent, TM normal bilaterally, normal appearing mouth, nose, throat, neck supple with full range of motion, no cervical adenopathy. Poor

## 2023-11-03 NOTE — H&P ADULT - HISTORY OF PRESENT ILLNESS
Pt is a pleasant 23 y/o male w/PMHx of Affective Psychosis and Schizophrenia who was escorted to [place] ED by [facility] for making homicidal statements at home , threatening to kill his sister.   Police reported pt has not been taking his psychiatric medications.  In the ED, patient denies any homicidal or suicidal ideation.   Pt denies missing his medications or making a threat towards his sister.   He denies chest pain/SOB,  no abd pain,  no fever/chills,   no edema,  no HA, no URI or urinary complaints.  Pt reports he sometimes has muscular pains in his legs.

## 2023-11-03 NOTE — ED BEHAVIORAL HEALTH PROGRESS NOTE - PSYCHIATRIC ISSUES AND PLAN (INCLUDE STANDING AND PRN MEDICATION)
Olanzapine Zydis 5 mg QHS, Ativan 2 mg/ Haldol 5 mg/ Benadryl 50 mg PO Q6H PRN, Ativan 2 mg/ Haldol 5 mg/ Benadryl 50 mg IM PRN once available for activation.

## 2023-11-03 NOTE — ED BEHAVIORAL HEALTH ASSESSMENT NOTE - NSBHROSSYSTEMS_PSY_ALL_CORE
Subjective:      Kim Latif is a 35 y.o. female who complains of vaginal bleeding in pregnancy. Patient [place] of state for vaginal bleeding. Found to have SAb, was HD STABLE. CRL measuring 8 weeks. No medical or surgical intervention done. Pt continues to bleed and have abdominal cramping. Bleeding described as passing clots but now has slowed down. OB History      Para Term  AB Living    1 0 0 0 0 0    SAB TAB Ectopic Molar Multiple Live Births    0 0 0  0         Patient Active Problem List   Diagnosis Code    Dyslipidemia E78.5    Diabetes mellitus, stable ([address] E11.9    Vitamin D deficiency E55.9    Fatty liver K76.0    Morbid obesity with BMI of 40.0-44.9, adult ([facility]) E66.01, Z68.41    Intramural leiomyoma of uterus D25.1     Current Outpatient Prescriptions   Medication Sig Dispense Refill    glucose blood VI test strips (ASCENSIA AUTODISC VI, ONE TOUCH ULTRA TEST VI) strip Check fasting sugars twice a day before breakfast and dinner. One touch meter 100 Strip 11    Blood-Glucose Meter monitoring kit Check fasting sugars twice a day before breakfast and dinner. One touch meter 1 Kit 0    Lancets misc Check fasting sugars twice a day before breakfast and dinner. One touch meter 100 Each 11    multivitamin (ONE A DAY) tablet Take  by mouth.  Take one po occasionally       No Known Allergies  Past Medical History:   Diagnosis Date    Diabetes mellitus, stable ([address] 2016    Dyslipidemia 2016    Fatty liver 2016    Vitamin D deficiency 2016     Past Surgical History:   Procedure Laterality Date    HX WISDOM TEETH EXTRACTION  at age 15 or 13yo    all 3     Family History   Problem Relation Age of Onset    Diabetes Father     Heart Disease Maternal Aunt      MI in her 52's    Heart Disease Mother     Elevated Lipids Mother     Thyroid Disease Mother      Social History   Substance Use Topics    Smoking status: Never Smoker    Smokeless tobacco: Never Used      Comment: Pt counseled to continue to not smoke.  Alcohol use 0.0 oz/week     0 Standard drinks or equivalent per week      Comment: occasionally        Review of Systems:   General ROS: negative for fever, chills, malaise  Respiratory ROS: no cough, shortness of breath, or wheezing  Cardiovascular ROS: no chest pain or dyspnea on exertion  Gastrointestinal ROS: no abdominal pain, change in bowel habits, or black or bloody stools, nausea, vomiting  Genito-Urinary ROS: no dysuria, trouble voiding, incontinence or hematuria. No unusual discharge, vaginal dryness       Objective:     Visit Vitals    /78    Pulse 84    Ht 5' 6\" (1.676 m)    Wt 250 lb (113.4 kg)    LMP 2017    BMI 40.35 kg/m2      Physical Exam:   General appearance - alert, well appearing, and in no distress, oriented to person, place, and time  Abdomen - soft, nontender, nondistended, no masses or organomegaly  Pelvic - No inguinal lymphadenopathy, normal urethral meatus and no bladder tenderness. VULVA: normal appearing vulva with no masses, tenderness or lesions,   VAGINA: normal appearing vagina with normal color and discharge, no lesions,   PELVIC FLOOR EXAM: no cystocele, rectocele or prolapse noted  CERVIX: normal appearing cervix without discharge or lesions, CLOSED, no active bleeding noted; minimal blood noted in vaginal vault. Extremities - No edema. Skin - normal coloration and turgor, no rashes, no suspicious skin lesions noted    TVUS performed:  No intrauterine gestational sac noted; moderate amount of blood noted in endometrial canal.      Assessment/Plan:       ICD-10-CM ICD-9-CM    1. SAB (spontaneous ) O03.9 634.90 CBC WITH AUTOMATED DIFF      TYPE & SCREEN      TOTAL HCG, QT.     -therapeutic options reviewed:  Expectant management vs cytotec vs D&C. Associated R/B/A of each options discussed. Patient wants EM at this time.   Wants to see HCG quant results and further decide  -Grief counseling offered, declined. Has adequate family support. Asked patient to call with persistent depressed     lab results and schedule of future lab studies reviewed with patient     Greater than 50% of the this 25 min visit was spent in counseling and/or coordination of care. Discussed with patient that our office will call for abnormal lab results. Normal results can be reviewed through [facility]. If patient has not received a phone call from our office or there are no results found on [place] [facility], the patient has been instructed to call our office to follow up on results. I have verbalized the plan of care with patient. The patient was given a full opportunity to ask questions, indicated that her questions had been answered and expressed understanding. Unable to assess

## 2023-11-03 NOTE — ED BEHAVIORAL HEALTH NOTE - BEHAVIORAL HEALTH NOTE
==================  PRE-HOSPITAL COURSE  ===================  SOURCE:  ROBYN Chao and secondhand ED documentation  DETAILS: BIB EMS/PD for agitation  =========  ED COURSE  =========  SOURCE:  ROBYN Chao and secondhand ED documentation.  ARRIVAL:  Per chart and RN, patient arrived via EMS/PD. Per RN, patient was calm upon arrival, and cooperative with triage process.  BELONGINGS:  Per RN, patient arrived with belongings. All belongings were provided to hospital security, and patient currently in a gown with a 1:1 staff member.  BEHAVIOR: RN described patient to be calm and cooperative, presenting with linear thought process, AAOx3, presenting with normal mood and congruent affect, remains in behavioral and impulse control, is not violent/aggressive. RN stated that the patient is not endorsing SI/HI/A/VH. RN stated that there are no visible marks, bruises, or lacerations on the body. RN stated that the patient appears to be well-groomed, maintains good hygiene.  TREATMENT:  Per chart and RN, patient did not receive PRN medications.   VISITORS:  Per RN, no visitors at bedside.         COVID Exposure Screen- collateral (i.e. third-party, chart review, belongings, etc; include EMS and ED staff)   ---------------------------------------------------  1. Has the patient had a COVID-19 test in the last 90 days? Unknown.   2. Has the patient tested positive for COVID-19 antibodies? Unknown.   3. Has the patient received 2 doses of the COVID-19 vaccine? Unknown  4. In the past 10 days, has the patient been around anyone with a positive COVID-19 test?* Unknown.   5. Has the patient been out of [place] within the past 10 days? Unknown

## 2023-11-03 NOTE — ED BEHAVIORAL HEALTH ASSESSMENT NOTE - OTHER PAST PSYCHIATRIC HISTORY (INCLUDE DETAILS REGARDING ONSET, COURSE OF ILLNESS, INPATIENT/OUTPATIENT TREATMENT)
Discharged from [facility] 3/2022 on olanzapine 20mg  First hospitalization in  7/21-8/11/21, readmitted 8/21-9/9/21 First hospitalization in  7/21-8/11/21, readmitted 8/21-9/9/21, most recent in august 2023; unclear compliance or outpt since

## 2023-11-03 NOTE — ED BEHAVIORAL HEALTH ASSESSMENT NOTE - HPI (INCLUDE ILLNESS QUALITY, SEVERITY, DURATION, TIMING, CONTEXT, MODIFYING FACTORS, ASSOCIATED SIGNS AND SYMPTOMS)
This is a 23 yo male, Vietnamese-speaking, domiciled w/ family, with history of schizophrenia vs affective psychosis, multiple prior psych admissions, most recent in Aug 2023, no suicide attempts or violence hx, BIBPD for     Attempted interview w/ . Patient is odd, somnolent, has irregular eye movements and This is a 21 yo male, Tanzanian-speaking, domiciled w/ family, with history of schizophrenia vs affective psychosis, multiple prior psych admissions, most recent in Aug 2023, no suicide attempts or violence hx, BIBPD for psychosis and HI towards sister.     Attempted interview w/ . Patient is somnolent, with odd affect and irregular eye movements, laughing inappropriately and superficially cooperative. He says he doesn't know why he is in ER or precipitating events. He is internally preoccupied and doesn't answer most questions. When asked about threats to his sister, he denies this and denies having a sister. Pt denies SI, prior SAs, HI, AVH, paranoia. Eventually patient refuses to answer further questions and lays back down.     No collateral available; voicemail left for uncle.

## 2023-11-03 NOTE — BH PATIENT PROFILE - NSVRISKSCORE_PSY_ALL_CORE
[facility] ENCOUNTER  HEMATOLOGY/ONCOLOGY FOLLOW UP NOTE      SOURCE OF INFORMATION:  Patient, [facility] electronic medical record and discussion with Dr. Jane and Dr. Ruth.     CHIEF COMPLAINT:  Follow-up (Ongoing care of lung cancer)    HISTORY OF PRESENT ILLNESS:  Judson Ryan is a 75 year old male seen today for management of lung cancer.      ONCOLOGIC HISTORY:  1. 6/9/17:  The patient initially presented to his primary physician, Dr. Myers with a one-month history of moderate to severe nonproductive cough.  Chest x-ray showed focal infiltrate in the right lower lobe. Patient was put on the azithromycin and Ventolin and was seen again in approximately 2 weeks on 6/20/17. Unfortunately the patient’s symptoms did not really improve repeat chest x-ray continued to show an interstitial pattern. The patient was referred to pulmonary medicine and also a CT of the chest was obtained.   2. 6/22/17:  CT of the chest showed a left lower lung lesion measuring 4.8 x 4.4 x 3.9 cm. Lytic lesions were noted in the T4 and T6 vertebral body. In addition there were minimally enlarged lymph nodes in the mediastinum bilateral hilum and left axillary region. Patient was seen by Dr. Jane and PET/CT was ordered and obtained on 7/5/17. This showed intense FDG uptake in the primary tumor in the left lower lung (maximum SUV-10.9). FDG avid lymph nodes in the left axillary subpectoral and mediastinal regions were also positive ranging from a maximum SUV of 6.5-9.7). Left adrenal FDG avid node (max SUV 5.7 (1.3 cm was also noted. Lytic lesions in the spine at T4, T6 as well as a right sacral alar with maximum SUV in the 4-5 range was noted as well.   3. IR guided biopsy was performed on 7/8/17 with a needle biopsy of the left axillary node positive for adenocarcinoma consistent with lung origin.  4. 7/21/17: Carboplatin, Alimta, Zometa and Keytruda initiated.  5. 10/20/17: Restaging scans with concern for  leptomeningeal disease. CSF sampling negative for malignancy. Restaging scans with continued response in the chest but with progressive bone lesions.     After medication dose adjustment, he has significantly improved pain in his thoracic spine and takes approximately 2 oxycodone every 4 hours and is using long acting morphine Q8 hours. He has some constipation and is using 3 senna BID with miralax BID.  Last BM 3 days ago.   He also notes improvement in his cough and breathing but feels fatigued and has lost his appetite.  He feels weaker after his most recent chemotherapy, which is a change from prior.     He has no other significant side effects related to chemotherapy.     The patient denies any hemoptysis, changes in his bladder or bowel habits, neurologic symptoms, headaches.    There are no fevers or infectious symptoms.       MEDICATIONS AND ALLERGIES:    Current Outpatient Prescriptions   Medication Sig Dispense Refill   • morphine SR (MS CONTIN) 15 MG 12 hr tablet Take 1 tablet by mouth every 8 hours. Take one every 8 hrs 60 tablet 0   • oxyCODONE, IMM REL, (ROXICODONE) 5 MG immediate release tablet Take 1 tablet by mouth every 4 hours as needed for Pain. 60 tablet 0   • LORazepam (ATIVAN) 0.5 MG tablet Take 1 to 2 tabs 1 hour prior to procedure 5 tablet 0   • DISPENSE CPAP auto-titrate 5-20 cm H2O. CPAP download in one month. Diagnosis: Obstructive sleep apnea G47.33 1 each 0   • simvastatin (ZOCOR) 20 MG tablet Take 1 tablet by mouth nightly. 90 tablet 1   • metformin (GLUCOPHAGE) 1000 MG tablet Take 1 tablet by mouth 2 times daily (with meals). 180 tablet 1   • pioglitazone (ACTOS) 45 MG tablet Take 1 tablet by mouth daily. 90 tablet 1   • Oxygen 20-80 % Kit Inhale 2 L/min into the lungs.     • Hydrocod Polst-Chlorphen Polst (TUSSIONEX PENNKINETIC ER) 10-8 MG/5ML Suspension Extended Release Take 5 mLs by mouth 2 times daily. Shake well 8 oz 0   • Magnesium 500 MG Cap Take 1 capsule by mouth daily. 30  capsule 2   • polyethylene glycol-electrolytes (NULYTELY WITH FLAVOR PACKS) 420 g solution TAKE AS DIRECTED FOR PROCEDURE - PATIENT HAS RECEIVED SEPARATE INSTRUCTIONS 4000 mL 0   • prochlorperazine (COMPAZINE) 10 MG tablet Take 1 tablet by mouth every 6 hours as needed for Nausea or Vomiting. 30 tablet 5   • dexamethasone (DECADRON) 4 MG tablet Take 1 tablet (4 mg) by mouth twice a day for 3 days, starting 1 day before chemotherapy. 12 tablet 11   • folic acid (FOLATE) 1 MG tablet Take 1 tablet by mouth daily. Start 7 days prior to first dose of PEMEtrexed. Continue until 21 days after last dose of PEMEtrexed. 30 tablet 11   • Ascorbic Acid (VITAMIN C) 500 MG tablet Take 500 mg by mouth daily.     • GLUCOSAMINE-CHONDROITIN PO      • albuterol-ipratropium 2.5 mg/0.5 mg (DUONEB) 0.5-2.5 (3) MG/3ML nebulizer solution Take 3 mLs by nebulization every 6 hours as needed for Wheezing. 180 mL 12   • ONETOUCH DELICA LANCETS 33G Misc Test once daily.  E11.9 OneTouch Verio 100 each 3   • ONETOUCH VERIO test strip 1 each daily. Test blood sugar one times daily as directed. Diagnosis: E11.9. Meter: One Touch Verio Flex 50 strip 6   • brimonidine (ALPHAGAN) 0.2 % ophthalmic solution Place 1 drop into both eyes 2 times daily. 3 Bottle 3   • dorzolamide-timolol (COSOPT) 22.3-6.8 MG/ML ophthalmic solution Place 1 drop into both eyes 2 times daily. 3 Bottle 3   • latanoprost (XALATAN) 0.005 % ophthalmic solution Place 1 drop into both eyes nightly. 7.5 mL 3   • alfuzosin (UROXATRAL) 10 MG 24 hr tablet Take 1 tablet by mouth daily. 90 tablet 2   • Aspirin 81 MG OR TABS 1 TABLET DAILY       No current facility-administered medications for this visit.      ALLERGIES:  No Known Allergies    HISTORIES:  Past Medical History:   Diagnosis Date   • Arthritis    • Cataract, senile     early, OU   • Dry eye syndrome    • GLAUCOMA    • h/o MDF dystrophy    • Kaw (hard of hearing)    • HYPERTENSION    • Mixed hyperlipidemia    • Obesity    •  SLEEP APNEA     2006 - moderate to severe sleep apnea; uses CPAP at 7 cm H20   • TYPE 2 DIABETES     DX: ~2000    --on metformin.  denies diabetic complications.    Past Surgical History:   Procedure Laterality Date   • ARTHROSCOPY KNEE MEDIAL OR LAT  7/12/2010    right medial meniscus tear   • COLONOSCOPY DIAGNOSTIC  12/03/2002    normal per patient   • COLONOSCOPY W BIOPSY  8/7/2012    Dr. Tay, Hemorrhoids/Polyps (small tubular adenoma), F/U 5  years   • INSERT TUNEL CVCATH W/PORT Left 07/19/2017    Left chest mediport insertion - Dr. Dalton Daley   • LASER SURGERY OF EYE  12/05/2008   • PELVIS/HIP JOINT SURGERY UNLISTED  01/01/1954    Left hip surgery - ORIF after non-traumatic fracture   • PELVIS/HIP JOINT SURGERY UNLISTED  01/01/1958    Left hip surgery - removal of hardware   • SLEEP STUDY ATTENDED  09/13/2006    moderate to severe sleep apnea   • ULTRASOUND AAA SCREENING  6/7/2010    Normal     Family History   Problem Relation Age of Onset   • Ophthalmology Mother      Glaucoma   • Hypertension Mother    • Cancer Mother    • Diabetes Mother    • Arthritis Father      shoulder   • Ophthalmology Father      Cataracts and Glaucoma   • Diabetes Father    • Diabetes Sister    • Diabetes Sister    Mother with liver cancer    Social History   Substance Use Topics   • Smoking status: Former Smoker     Packs/day: 2.00     Years: 22.00     Types: Cigarettes     Quit date: 1/1/1978   • Smokeless tobacco: Never Used   • Alcohol use No   Lives in [place].  Retired.  Previously in sales (auto parts and tools/equipment).  1 son.    REVIEW OF SYSTEMS:    All other systems are reviewed and are negative except as documented in the history of present illness.    PHYSICAL EXAM:  Vital Signs:   Visit Vitals  /67 Comment: 139*65   Pulse 103   Temp 97.5 °F (36.4 °C) (Oral)   Wt 93.4 kg   SpO2 96% Comment: on oxygen   BMI 30.19 kg/m²   ECOG Performance Status:  2  General: The patient is alert, well-developed,  well-nourished, no distress.  Skin: Warm, normal color, normal texture, normal turgor and without rash.  Head: Normocephalic, atraumatic.  Neck: Supple with no significant adenopathy.  Eyes: Normal conjunctivae and sclerae. Pupils equal, round.  Cardiovascular: Regular rate and rhythm, no murmurs, gallops or rubs.  Respiratory: Normal respiratory effort. Clear to auscultation bilaterally.  Abdomen: Abdomen is soft and non-tender with active bowel sounds. There is no detected organomegaly, masses, or ascites.  Extremities: No clubbing, no cyanosis, no edema and normal muscle tone and development bilaterally.  Lymph: No cervical, supraclavicular, axillary lymphadenopathy.  Neurologic: Motor strength normal, coordination normal, no tremor noted.  Psychiatric: Cooperative. Appropriate mood and affect. Normal judgment.    LABORATORY DATA:    Recent Labs  Lab 10/20/17  0759   WBC 7.9   RBC 3.01*   HGB 8.4*   HCT 27.4*   MCV 91.0   *   ANEUT 6.7   ALYMS 0.5*   FELICITY 0.6   AEOS 0.0*   ABASO 0.0       Recent Labs  Lab 10/20/17  0759   GLUCOSE 417*   SODIUM 133*   POTASSIUM 3.9   CHLORIDE 96*   CO2 27   BUN 13   CREATININE 0.49*   CALCIUM 8.5   MG 1.5*   TOTPROTEIN 6.9   ALBUMIN 1.9*   AST 14   ALKPT 129*   GPT 11       Recent Labs  Lab 10/20/17  0759   ANIONGAP 14   GLOB 5.0*   BILIRUBIN 0.3       IMAGING STUDIES:  Imaging studies viewed. The pertinent positives are as noted above.    ASSESSMENT:  1. Remote history of tobacco use.  2. Adenocarcinoma of lung, stage 4:  He had initial symptomatic improvement on carboplatin, Alimta and Keytruda but his last cycle was associated with significant malaise and fatigue and restaging scans show CNS metastases and possible leptomeningeal disease. CSF sampling demonstrated no malignant cells. Although this does not completely exclude the possibility of leptomeningeal spread, we will monitor for now and continue with treatment. We previously discussed instituting hospice if  leptomeningeal disease was identified.    He has no neurologic symptoms at the present time and his case was discussed with Dr. Ruth.  Whole brain radiation will be pursued next week and we also discussed potential palliative radiation to sites of bone pain..   I will see him back in 2 weeks with the plan to proceed with Keytruda and Zometa.  Once radiation is completed, second line chemotherapy with Taxotere would be a consideration.  3. Cough:  Improved significantly.  4. Malignant bone pain: Consider radiation as above. Continue current narcotic dosing.  5. Narcotic induced constipation: We discussed management and I gave a prescription for lactulose. He'll use this as needed and also increase his MiraLAX use.      The patient, wife indicated understanding of the diagnosis and agreed with the plan of care.      A copy of this note was sent to the referring provider.     4

## 2023-11-03 NOTE — BH PATIENT PROFILE - FALL HARM RISK - UNIVERSAL INTERVENTIONS
Bed in lowest position, wheels locked, appropriate side rails in place/Call bell, personal items and telephone in reach/Instruct patient to call for assistance before getting out of bed or chair/Non-slip footwear when patient is out of bed/[place] to call system/Physically safe environment - no spills, clutter or unnecessary equipment/Purposeful Proactive Rounding/Room/bathroom lighting operational, light cord in reach

## 2023-11-03 NOTE — ED ADULT NURSE REASSESSMENT NOTE - NS ED NURSE REASSESS COMMENT FT1
received pt in bed. pt a/ox4. denies pain/discomfort at present. 1:1 remains at bedside. safety maintained

## 2023-11-03 NOTE — H&P ADULT - TIME BILLING
I spent a total of 55 minutes on the date of this encounter coordinating the patient's care. This includes reviewing documentation pertinent to this admission, results and imaging in addition to completing a history and physical examination on the patient. Further tests, medications, and procedures have been ordered as indicated. Laboratory results and the plan of care were communicated to the patient and their family member. Supporting documentation was completed and added to the patient's chart.

## 2023-11-03 NOTE — ED BEHAVIORAL HEALTH PROGRESS NOTE - RISK ASSESSMENT
risk factors: past hospitalizations, poor compliance w/ treatment, poor insight  unable to assess full risk

## 2023-11-03 NOTE — ED BEHAVIORAL HEALTH PROGRESS NOTE - NSBHATTESTAPPBILLTIME_PSY_A_CORE
I attest my time as JAKOB is greater than 50% of the total combined time spent on qualifying patient care activities. I have reviewed and verified the documentation.

## 2023-11-04 LAB
A1C WITH ESTIMATED AVERAGE GLUCOSE RESULT: 5 % — SIGNIFICANT CHANGE UP (ref 4–5.6)
A1C WITH ESTIMATED AVERAGE GLUCOSE RESULT: 5 % — SIGNIFICANT CHANGE UP (ref 4–5.6)
CHOLEST SERPL-MCNC: 152 MG/DL — SIGNIFICANT CHANGE UP
CHOLEST SERPL-MCNC: 152 MG/DL — SIGNIFICANT CHANGE UP
ESTIMATED AVERAGE GLUCOSE: 97 MG/DL — SIGNIFICANT CHANGE UP (ref 68–114)
ESTIMATED AVERAGE GLUCOSE: 97 MG/DL — SIGNIFICANT CHANGE UP (ref 68–114)
HDLC SERPL-MCNC: 38 MG/DL — LOW
HDLC SERPL-MCNC: 38 MG/DL — LOW
LIPID PNL WITH DIRECT LDL SERPL: 94 MG/DL — SIGNIFICANT CHANGE UP
LIPID PNL WITH DIRECT LDL SERPL: 94 MG/DL — SIGNIFICANT CHANGE UP
NON HDL CHOLESTEROL: 114 MG/DL — SIGNIFICANT CHANGE UP
NON HDL CHOLESTEROL: 114 MG/DL — SIGNIFICANT CHANGE UP
TRIGL SERPL-MCNC: 106 MG/DL — SIGNIFICANT CHANGE UP
TRIGL SERPL-MCNC: 106 MG/DL — SIGNIFICANT CHANGE UP
TSH SERPL-MCNC: 1.2 UU/ML — SIGNIFICANT CHANGE UP (ref 0.34–4.82)
TSH SERPL-MCNC: 1.2 UU/ML — SIGNIFICANT CHANGE UP (ref 0.34–4.82)

## 2023-11-04 PROCEDURE — 99223 1ST HOSP IP/OBS HIGH 75: CPT

## 2023-11-04 RX ORDER — NICOTINE POLACRILEX 2 MG
1 GUM BUCCAL DAILY
Refills: 0 | Status: DISCONTINUED | OUTPATIENT
Start: 2023-11-04 | End: 2023-11-15

## 2023-11-04 RX ADMIN — Medication 50 MILLIGRAM(S): at 20:29

## 2023-11-04 RX ADMIN — OLANZAPINE 5 MILLIGRAM(S): 15 TABLET, FILM COATED ORAL at 20:30

## 2023-11-04 NOTE — BH INPATIENT PSYCHIATRY ASSESSMENT NOTE - NSRISKVIOL_PSY_A_CORE
She might have a bladder infection, we might need a UA or the daughter to bring her in UC earlier?   [place]

## 2023-11-04 NOTE — BH INPATIENT PSYCHIATRY ASSESSMENT NOTE - DESCRIPTION
As per chart review; migrated from [facility] ~7years ago. Lives with uncle, aunt and cousins in private house.

## 2023-11-04 NOTE — BH INPATIENT PSYCHIATRY ASSESSMENT NOTE - HPI (INCLUDE ILLNESS QUALITY, SEVERITY, DURATION, TIMING, CONTEXT, MODIFYING FACTORS, ASSOCIATED SIGNS AND SYMPTOMS)
This is a 23 yo male, Greek-speaking, domiciled w/ family, with history of schizophrenia vs affective psychosis, multiple prior psych admissions, most recent in Aug 2023, no suicide attempts or violence hx, BIBPD for psychosis and HI towards sister.     Attempted interview w/ . Patient is somnolent, with odd affect and irregular eye movements, laughing inappropriately and superficially cooperative. He says he doesn't know why he is in ER or precipitating events. He is internally preoccupied and doesn't answer most questions. When asked about threats to his sister, he denies this and denies having a sister. Pt denies SI, prior SAs, HI, AVH, paranoia. Eventually patient refuses to answer further questions and lays back down.     No collateral available; voicemail left for uncle.

## 2023-11-04 NOTE — BH INPATIENT PSYCHIATRY ASSESSMENT NOTE - NSBHASSESSSUMMFT_PSY_ALL_CORE
This is a 21 yo male, Stateless-speaking, domiciled w/ family, with history of schizophrenia vs affective psychosis, multiple prior psych admissions, most recent in Aug 2023, no suicide attempts or violence hx, BIBPD for psychosis and HI towards sister.     Attempted interview w/ . Patient is somnolent, with odd affect and irregular eye movements, laughing inappropriately and superficially cooperative. He says he doesn't know why he is in ER or precipitating events. He is internally preoccupied and doesn't answer most questions. When asked about threats to his sister, he denies this and denies having a sister. Pt denies SI, prior SAs, HI, AVH, paranoia. Eventually patient refuses to answer further questions and lays back down.     No collateral available; voicemail left for uncle.    Plan: Admit Involuntarily-9.39          PRN PO/IM Haldol/Benadryl/Ativan            Zyprexa 5 mg HS          Collateral Info          Discharge planning Ongoing

## 2023-11-04 NOTE — BH INPATIENT PSYCHIATRY ASSESSMENT NOTE - CURRENT MEDICATION
MEDICATIONS  (STANDING):  influenza   Vaccine 0.5 milliLiter(s) IntraMuscular once  OLANZapine Disintegrating Tablet 5 milliGRAM(s) Oral at bedtime    MEDICATIONS  (PRN):  diphenhydrAMINE 50 milliGRAM(s) Oral every 6 hours PRN Extrapyramidal prophylaxis  diphenhydrAMINE Injectable 50 milliGRAM(s) IntraMuscular once PRN Extrapyramidal prophylaxis  haloperidol     Tablet 5 milliGRAM(s) Oral every 6 hours PRN moderate psychotic agitation  haloperidol    Injectable 5 milliGRAM(s) IntraMuscular once PRN severe psychotic agitation  LORazepam     Tablet 2 milliGRAM(s) Oral every 6 hours PRN moderate psychotic anxiety  LORazepam   Injectable 2 milliGRAM(s) IntraMuscular once PRN severe psychotic anxiety  nicotine -   7 mG/24Hr(s) Patch 1 Patch Transdermal daily PRN if pt craves cigarettes and psychiatry aggrees

## 2023-11-04 NOTE — BH INPATIENT PSYCHIATRY ASSESSMENT NOTE - NSBHMETABOLIC_PSY_ALL_CORE_FT
BMI: BMI (kg/m2): 29.9 (11-02-23 @ 20:58)  HbA1c: A1C with Estimated Average Glucose Result: 5.2 % (07-30-23 @ 07:49)    Glucose:   BP: 106/51 (11-03-23 @ 10:37) (103/74 - 136/70)  Lipid Panel: Date/Time: 07-30-23 @ 07:49  Cholesterol, Serum: 142  Direct LDL: --  HDL Cholesterol, Serum: 38  Total Cholesterol/HDL Ration Measurement: --  Triglycerides, Serum: 95

## 2023-11-04 NOTE — BH INPATIENT PSYCHIATRY ASSESSMENT NOTE - NSBHCHARTREVIEWVS_PSY_A_CORE FT
Vital Signs Last 24 Hrs  T(C): 36.3 (11-04-23 @ 07:35), Max: 36.8 (11-03-23 @ 15:26)  T(F): 97.3 (11-04-23 @ 07:35), Max: 98.2 (11-03-23 @ 15:26)  HR: --  BP: --  BP(mean): --  RR: 17 (11-04-23 @ 07:35) (17 - 17)  SpO2: 99% (11-04-23 @ 07:35) (99% - 99%)    Orthostatic VS  11-04-23 @ 07:35  Lying BP: --/-- HR: --  Sitting BP: 117/51 HR: 64  Standing BP: 115/68 HR: 72  Site: upper right arm  Mode: electronic  Orthostatic VS  11-03-23 @ 15:26  Lying BP: --/-- HR: --  Sitting BP: 131/83 HR: 60  Standing BP: 132/61 HR: 69  Site: --  Mode: --

## 2023-11-04 NOTE — BH INPATIENT PSYCHIATRY ASSESSMENT NOTE - OTHER PAST PSYCHIATRIC HISTORY (INCLUDE DETAILS REGARDING ONSET, COURSE OF ILLNESS, INPATIENT/OUTPATIENT TREATMENT)
First hospitalization in  7/21-8/11/21, readmitted 8/21-9/9/21, most recent in august 2023; unclear compliance or out-pt since

## 2023-11-05 PROCEDURE — 99232 SBSQ HOSP IP/OBS MODERATE 35: CPT

## 2023-11-05 RX ADMIN — Medication 2 MILLIGRAM(S): at 12:58

## 2023-11-05 RX ADMIN — HALOPERIDOL DECANOATE 5 MILLIGRAM(S): 100 INJECTION INTRAMUSCULAR at 12:58

## 2023-11-05 RX ADMIN — OLANZAPINE 5 MILLIGRAM(S): 15 TABLET, FILM COATED ORAL at 21:17

## 2023-11-05 RX ADMIN — Medication 50 MILLIGRAM(S): at 12:58

## 2023-11-05 NOTE — BH INPATIENT PSYCHIATRY PROGRESS NOTE - NSBHFUPINTERVALHXFT_PSY_A_CORE
Patient is in his bed, lacks spontaneity, does not make eye contact but answers question.  He denies problems.  He says he is doing okay he slept and he eats well.  He denies being depressed.  He denies having thoughts of harming self and denies hearing voices. Reportedly patient does not have any suicidal homicidal behavior on the unit.

## 2023-11-05 NOTE — BH INPATIENT PSYCHIATRY PROGRESS NOTE - NSBHMETABOLIC_PSY_ALL_CORE_FT
BMI: BMI (kg/m2): 29.9 (11-02-23 @ 20:58)  HbA1c: A1C with Estimated Average Glucose Result: 5.0 % (11-04-23 @ 07:27)    Glucose:   BP: 106/51 (11-03-23 @ 10:37) (103/74 - 136/70)  Lipid Panel: Date/Time: 11-04-23 @ 07:27  Cholesterol, Serum: 152  Direct LDL: --  HDL Cholesterol, Serum: 38  Total Cholesterol/HDL Ration Measurement: --  Triglycerides, Serum: 106

## 2023-11-06 PROCEDURE — 99232 SBSQ HOSP IP/OBS MODERATE 35: CPT

## 2023-11-06 RX ORDER — OLANZAPINE 15 MG/1
10 TABLET, FILM COATED ORAL AT BEDTIME
Refills: 0 | Status: DISCONTINUED | OUTPATIENT
Start: 2023-11-06 | End: 2023-11-08

## 2023-11-06 RX ADMIN — OLANZAPINE 10 MILLIGRAM(S): 15 TABLET, FILM COATED ORAL at 20:49

## 2023-11-06 RX ADMIN — HALOPERIDOL DECANOATE 5 MILLIGRAM(S): 100 INJECTION INTRAMUSCULAR at 09:18

## 2023-11-06 RX ADMIN — Medication 50 MILLIGRAM(S): at 09:18

## 2023-11-06 RX ADMIN — Medication 2 MILLIGRAM(S): at 09:18

## 2023-11-06 NOTE — BH INPATIENT PSYCHIATRY PROGRESS NOTE - CURRENT MEDICATION
MEDICATIONS  (STANDING):  influenza   Vaccine 0.5 milliLiter(s) IntraMuscular once  OLANZapine Disintegrating Tablet 10 milliGRAM(s) Oral at bedtime    MEDICATIONS  (PRN):  diphenhydrAMINE 50 milliGRAM(s) Oral every 6 hours PRN Extrapyramidal prophylaxis  diphenhydrAMINE Injectable 50 milliGRAM(s) IntraMuscular once PRN Extrapyramidal prophylaxis  haloperidol     Tablet 5 milliGRAM(s) Oral every 6 hours PRN moderate psychotic agitation  haloperidol    Injectable 5 milliGRAM(s) IntraMuscular once PRN severe psychotic agitation  LORazepam     Tablet 2 milliGRAM(s) Oral every 6 hours PRN moderate psychotic anxiety  LORazepam   Injectable 2 milliGRAM(s) IntraMuscular once PRN severe psychotic anxiety  nicotine -   7 mG/24Hr(s) Patch 1 Patch Transdermal daily PRN if pt craves cigarettes and psychiatry aggrees

## 2023-11-06 NOTE — BH INPATIENT PSYCHIATRY PROGRESS NOTE - NSBHFUPINTERVALHXFT_PSY_A_CORE
11/06/2023: Patient was seen today AM, chart reviewed and discussed in team. He is visible, has continued disorganization, smiles inappropriately and on meds mostly Zyprexa 5 mg HS, and at this time to increase Zyprexa to 10 mg HS for a safe, rapid stability. Normal vegetative symptoms at this time.    Patient is in his bed, lacks spontaneity, does not make eye contact but answers question.  He denies problems.  He says he is doing okay he slept and he eats well.  He denies being depressed.  He denies having thoughts of harming self and denies hearing voices. Reportedly patient does not have any suicidal homicidal behavior on the unit.

## 2023-11-06 NOTE — BH INPATIENT PSYCHIATRY PROGRESS NOTE - NSBHMETABOLIC_PSY_ALL_CORE_FT
BMI: BMI (kg/m2): 29.9 (11-02-23 @ 20:58)  HbA1c: A1C with Estimated Average Glucose Result: 5.0 % (11-04-23 @ 07:27)  TSH-1.20    Glucose:   BP: --  Lipid Panel: Date/Time: 11-04-23 @ 07:27  Cholesterol, Serum: 152  Direct LDL: 94  HDL Cholesterol, Serum: 38  Triglycerides, Serum: 106

## 2023-11-07 PROCEDURE — 99232 SBSQ HOSP IP/OBS MODERATE 35: CPT

## 2023-11-07 RX ADMIN — HALOPERIDOL DECANOATE 5 MILLIGRAM(S): 100 INJECTION INTRAMUSCULAR at 10:26

## 2023-11-07 RX ADMIN — OLANZAPINE 10 MILLIGRAM(S): 15 TABLET, FILM COATED ORAL at 20:29

## 2023-11-07 RX ADMIN — Medication 2 MILLIGRAM(S): at 10:26

## 2023-11-07 RX ADMIN — Medication 50 MILLIGRAM(S): at 10:26

## 2023-11-07 NOTE — BH INPATIENT PSYCHIATRY PROGRESS NOTE - NSBHFUPINTERVALHXFT_PSY_A_CORE
11/07/2023: Patient was seen today AM, chart reviewed and discussed in team. He was upset and refused to take Zyprexa today HS, will try tomorrow in a better way so he to start taking the meds as ordered for stability/safety. Zyprexa was increased to 10 mg HS.    11/06/2023: Patient was seen today AM, chart reviewed and discussed in team. He is visible, has continued disorganization, smiles inappropriately and on meds mostly Zyprexa 5 mg HS, and at this time to increase Zyprexa to 10 mg HS for a safe, rapid stability. Normal vegetative symptoms at this time.    Patient is in his bed, lacks spontaneity, does not make eye contact but answers question.  He denies problems.  He says he is doing okay he slept and he eats well.  He denies being depressed.  He denies having thoughts of harming self and denies hearing voices. Reportedly patient does not have any suicidal homicidal behavior on the unit.

## 2023-11-07 NOTE — BH INPATIENT PSYCHIATRY PROGRESS NOTE - NSBHMETABOLIC_PSY_ALL_CORE_FT
BMI: BMI (kg/m2): 29.9 (11-02-23 @ 20:58)  HbA1c: A1C with Estimated Average Glucose Result: 5.0 % (11-04-23 @ 07:27)    Glucose:   BP: --  Lipid Panel: Date/Time: 11-04-23 @ 07:27  Cholesterol, Serum: 152  Direct LDL: --  HDL Cholesterol, Serum: 38  Total Cholesterol/HDL Ration Measurement: --  Triglycerides, Serum: 106

## 2023-11-07 NOTE — BH SOCIAL WORK INITIAL PSYCHOSOCIAL EVALUATION - NSPROGENSOURCEINFO_PSY_ALL_CORE
Other(s) Per collateral obtained in ED, "Jose Reyes [phone]:  #[id number]. Reports for months patient has not been sleeping, wanders around outside at nighttime, has odd behaviors such as repeatedly opening and closing doors and talking to himself. Reports he has not gone back to work for 3 months stating he is "on vacation." He has not been taking medications (Olanzapine) patient throws out medications because he believes he does not need them. He reports himself and family members are afraid of him. Last night patient told him "I'm going to send her back to [facility] in a coffin, I am going to kill her." Referring to his sister, whom also lives in the home. He has also been posting homicidal threats on facebook to kill other peoples sisters if they are not with them. Reports he has not been physically aggressive but his verbal threats and odd behaviors have been escalating and they are concerned for their safety"./Other(s)

## 2023-11-07 NOTE — BH SOCIAL WORK INITIAL PSYCHOSOCIAL EVALUATION - NSPROGENSOURCEINFOFT_PSY_ALL_CORE
EMR Due to pt's acute symptoms, information obtained from chart. Per RN notes, pt withdrawn to his room and internally preoccupied.

## 2023-11-07 NOTE — BH SOCIAL WORK INITIAL PSYCHOSOCIAL EVALUATION - NSBHPROFILEREVIEW_PSY_ALL_CORE
Yes Per ED psych eval, "This is a 23 yo male, Russian-speaking, domiciled w/ family, with history of schizophrenia vs affective psychosis, multiple prior psych admissions, most recent in Aug 2023, no suicide attempts or violence hx, BIBPD for psychosis and HI towards sister.     Attempted interview w/ . Patient is somnolent, with odd affect and irregular eye movements, laughing inappropriately and superficially cooperative. He says he doesn't know why he is in ER or precipitating events. He is internally preoccupied and doesn't answer most questions. When asked about threats to his sister, he denies this and denies having a sister. Pt denies SI, prior SAs, HI, AVH, paranoia. Eventually patient refuses to answer further questions and lays back down"./Yes

## 2023-11-08 PROCEDURE — 99232 SBSQ HOSP IP/OBS MODERATE 35: CPT

## 2023-11-08 RX ORDER — BENZTROPINE MESYLATE 1 MG
1 TABLET ORAL
Refills: 0 | Status: DISCONTINUED | OUTPATIENT
Start: 2023-11-08 | End: 2023-11-15

## 2023-11-08 RX ORDER — OLANZAPINE 15 MG/1
15 TABLET, FILM COATED ORAL AT BEDTIME
Refills: 0 | Status: DISCONTINUED | OUTPATIENT
Start: 2023-11-08 | End: 2023-11-10

## 2023-11-08 RX ADMIN — Medication 2 MILLIGRAM(S): at 08:58

## 2023-11-08 RX ADMIN — Medication 2 MILLIGRAM(S): at 16:42

## 2023-11-08 RX ADMIN — Medication 50 MILLIGRAM(S): at 08:58

## 2023-11-08 RX ADMIN — HALOPERIDOL DECANOATE 5 MILLIGRAM(S): 100 INJECTION INTRAMUSCULAR at 08:58

## 2023-11-08 RX ADMIN — Medication 50 MILLIGRAM(S): at 16:41

## 2023-11-08 RX ADMIN — Medication 1 MILLIGRAM(S): at 20:40

## 2023-11-08 RX ADMIN — HALOPERIDOL DECANOATE 5 MILLIGRAM(S): 100 INJECTION INTRAMUSCULAR at 16:41

## 2023-11-08 RX ADMIN — OLANZAPINE 15 MILLIGRAM(S): 15 TABLET, FILM COATED ORAL at 20:40

## 2023-11-08 NOTE — BH INPATIENT PSYCHIATRY PROGRESS NOTE - CURRENT MEDICATION
MEDICATIONS  (STANDING):  benztropine 1 milliGRAM(s) Oral two times a day  influenza   Vaccine 0.5 milliLiter(s) IntraMuscular once  OLANZapine Disintegrating Tablet 10 milliGRAM(s) Oral at bedtime    MEDICATIONS  (PRN):  diphenhydrAMINE 50 milliGRAM(s) Oral every 6 hours PRN Extrapyramidal prophylaxis  diphenhydrAMINE Injectable 50 milliGRAM(s) IntraMuscular once PRN Extrapyramidal prophylaxis  haloperidol     Tablet 5 milliGRAM(s) Oral every 6 hours PRN moderate psychotic agitation  haloperidol    Injectable 5 milliGRAM(s) IntraMuscular once PRN severe psychotic agitation  LORazepam     Tablet 2 milliGRAM(s) Oral every 6 hours PRN moderate psychotic anxiety  LORazepam   Injectable 2 milliGRAM(s) IntraMuscular once PRN severe psychotic anxiety  nicotine -   7 mG/24Hr(s) Patch 1 Patch Transdermal daily PRN if pt craves cigarettes and psychiatry aggrees   MEDICATIONS  (STANDING):  benztropine 1 milliGRAM(s) Oral two times a day  influenza   Vaccine 0.5 milliLiter(s) IntraMuscular once  OLANZapine Disintegrating Tablet 15 milliGRAM(s) Oral at bedtime    MEDICATIONS  (PRN):  diphenhydrAMINE 50 milliGRAM(s) Oral every 6 hours PRN Extrapyramidal prophylaxis  diphenhydrAMINE Injectable 50 milliGRAM(s) IntraMuscular once PRN Extrapyramidal prophylaxis  haloperidol     Tablet 5 milliGRAM(s) Oral every 6 hours PRN moderate psychotic agitation  haloperidol    Injectable 5 milliGRAM(s) IntraMuscular once PRN severe psychotic agitation  LORazepam     Tablet 2 milliGRAM(s) Oral every 6 hours PRN moderate psychotic anxiety  LORazepam   Injectable 2 milliGRAM(s) IntraMuscular once PRN severe psychotic anxiety  nicotine -   7 mG/24Hr(s) Patch 1 Patch Transdermal daily PRN if pt craves cigarettes and psychiatry aggrees

## 2023-11-08 NOTE — BH INPATIENT PSYCHIATRY PROGRESS NOTE - NSBHFUPINTERVALHXFT_PSY_A_CORE
11/08/2023: Patient was seen today AM, chart reviewed and discussed in team. He has been taking oral PRN every AM and also took Zyprexa 10 mg HS. He also pre-occupied with discharge and visible in unit. To continue with meds as ordered for stability/safety.    11/07/2023: Patient was seen today AM, chart reviewed and discussed in team. He was upset and refused to take Zyprexa today HS, will try tomorrow in a better way so he to start taking the meds as ordered for stability/safety. Zyprexa was increased to 10 mg HS.    11/06/2023: Patient was seen today AM, chart reviewed and discussed in team. He is visible, has continued disorganization, smiles inappropriately and on meds mostly Zyprexa 5 mg HS, and at this time to increase Zyprexa to 10 mg HS for a safe, rapid stability. Normal vegetative symptoms at this time.    Patient is in his bed, lacks spontaneity, does not make eye contact but answers question.  He denies problems.  He says he is doing okay he slept and he eats well.  He denies being depressed.  He denies having thoughts of harming self and denies hearing voices. Reportedly patient does not have any suicidal homicidal behavior on the unit. 11/08/2023: Patient was seen today AM, chart reviewed and discussed in team. He has been taking oral PRN every AM and also took Zyprexa 10 mg HS. He also pre-occupied with discharge and visible in unit. To continue with meds as ordered for stability/safety. Patient was seen again with RN-P, he is pre-occupied with discharge, mood irritable, limited conversation and was punching the walls in AM, received oral PRN and continues to speak, with no eye contact. He refuses EPPS, prefers to take pills only. Zyprexa further increased to 15 mg HS for mood control with Psychosis.     11/07/2023: Patient was seen today AM, chart reviewed and discussed in team. He was upset and refused to take Zyprexa today HS, will try tomorrow in a better way so he to start taking the meds as ordered for stability/safety. Zyprexa was increased to 10 mg HS.    11/06/2023: Patient was seen today AM, chart reviewed and discussed in team. He is visible, has continued disorganization, smiles inappropriately and on meds mostly Zyprexa 5 mg HS, and at this time to increase Zyprexa to 10 mg HS for a safe, rapid stability. Normal vegetative symptoms at this time.    Patient is in his bed, lacks spontaneity, does not make eye contact but answers question.  He denies problems.  He says he is doing okay he slept and he eats well.  He denies being depressed.  He denies having thoughts of harming self and denies hearing voices. Reportedly patient does not have any suicidal homicidal behavior on the unit.

## 2023-11-08 NOTE — BH INPATIENT PSYCHIATRY PROGRESS NOTE - NSBHMETABOLIC_PSY_ALL_CORE_FT
BMI: BMI (kg/m2): 29.9 (11-02-23 @ 20:58)  HbA1c: A1C with Estimated Average Glucose Result: 5.0 % (11-04-23 @ 07:27)  TSH-1.20    Glucose:   BP: --  Lipid Panel: Date/Time: 11-04-23 @ 07:27  Cholesterol, Serum: 152  Direct LDL: 94  HDL Cholesterol, Serum: 38  Triglycerides, Serum: 106   BMI: BMI (kg/m2): 29.9 (11-02-23 @ 20:58)  HbA1c: A1C with Estimated Average Glucose Result: 5.0 % (11-04-23 @ 07:27)    Glucose:   BP: --  Lipid Panel: Date/Time: 11-04-23 @ 07:27  Cholesterol, Serum: 152  Direct LDL: --  HDL Cholesterol, Serum: 38  Total Cholesterol/HDL Ration Measurement: --  Triglycerides, Serum: 106

## 2023-11-09 PROCEDURE — 99232 SBSQ HOSP IP/OBS MODERATE 35: CPT

## 2023-11-09 RX ADMIN — Medication 50 MILLIGRAM(S): at 10:34

## 2023-11-09 RX ADMIN — HALOPERIDOL DECANOATE 5 MILLIGRAM(S): 100 INJECTION INTRAMUSCULAR at 10:34

## 2023-11-09 RX ADMIN — Medication 1 MILLIGRAM(S): at 20:10

## 2023-11-09 RX ADMIN — Medication 1 MILLIGRAM(S): at 09:42

## 2023-11-09 RX ADMIN — Medication 2 MILLIGRAM(S): at 10:34

## 2023-11-09 RX ADMIN — OLANZAPINE 15 MILLIGRAM(S): 15 TABLET, FILM COATED ORAL at 20:10

## 2023-11-09 NOTE — BH INPATIENT PSYCHIATRY PROGRESS NOTE - NSBHFUPINTERVALHXFT_PSY_A_CORE
11/09/2023: Patient was seen today AM, chart reviewed and discussed in team. He has been taking PRN meds daily mostly in AM, took the nightime Zyprexa 15 mg HS, and endorses that he feels people are looking at him, feels upset and gets annoyed as he has no body to talk with in [place]. He feels lonely, annoyed and prefers to stay in bed most of the time. Zyprexa to be increased to 20 mg HS starting tomorrow. He was further advised to stay away from trouble and to look for RN if anything needed for anxiety reduction.    11/08/2023: Patient was seen today AM, chart reviewed and discussed in team. He has been taking oral PRN every AM and also took Zyprexa 10 mg HS. He also pre-occupied with discharge and visible in unit. To continue with meds as ordered for stability/safety. Patient was seen again with RN-P, he is pre-occupied with discharge, mood irritable, limited conversation and was punching the walls in AM, received oral PRN and continues to speak, with no eye contact. He refuses EPPS, prefers to take pills only. Zyprexa further increased to 15 mg HS for mood control with Psychosis.     11/07/2023: Patient was seen today AM, chart reviewed and discussed in team. He was upset and refused to take Zyprexa today HS, will try tomorrow in a better way so he to start taking the meds as ordered for stability/safety. Zyprexa was increased to 10 mg HS.    11/06/2023: Patient was seen today AM, chart reviewed and discussed in team. He is visible, has continued disorganization, smiles inappropriately and on meds mostly Zyprexa 5 mg HS, and at this time to increase Zyprexa to 10 mg HS for a safe, rapid stability. Normal vegetative symptoms at this time.    Patient is in his bed, lacks spontaneity, does not make eye contact but answers question.  He denies problems.  He says he is doing okay he slept and he eats well.  He denies being depressed.  He denies having thoughts of harming self and denies hearing voices. Reportedly patient does not have any suicidal homicidal behavior on the unit.

## 2023-11-09 NOTE — BH INPATIENT PSYCHIATRY PROGRESS NOTE - CURRENT MEDICATION
MEDICATIONS  (STANDING):  benztropine 1 milliGRAM(s) Oral two times a day  influenza   Vaccine 0.5 milliLiter(s) IntraMuscular once  OLANZapine Disintegrating Tablet 15 milliGRAM(s) Oral at bedtime    MEDICATIONS  (PRN):  diphenhydrAMINE 50 milliGRAM(s) Oral every 6 hours PRN Extrapyramidal prophylaxis  diphenhydrAMINE Injectable 50 milliGRAM(s) IntraMuscular once PRN Extrapyramidal prophylaxis  haloperidol     Tablet 5 milliGRAM(s) Oral every 6 hours PRN moderate psychotic agitation  haloperidol    Injectable 5 milliGRAM(s) IntraMuscular once PRN severe psychotic agitation  LORazepam     Tablet 2 milliGRAM(s) Oral every 6 hours PRN moderate psychotic anxiety  LORazepam   Injectable 2 milliGRAM(s) IntraMuscular once PRN severe psychotic anxiety  nicotine -   7 mG/24Hr(s) Patch 1 Patch Transdermal daily PRN if pt craves cigarettes and psychiatry aggrees

## 2023-11-10 PROCEDURE — 99232 SBSQ HOSP IP/OBS MODERATE 35: CPT

## 2023-11-10 RX ORDER — OLANZAPINE 15 MG/1
20 TABLET, FILM COATED ORAL AT BEDTIME
Refills: 0 | Status: DISCONTINUED | OUTPATIENT
Start: 2023-11-10 | End: 2023-11-15

## 2023-11-10 RX ADMIN — OLANZAPINE 20 MILLIGRAM(S): 15 TABLET, FILM COATED ORAL at 20:36

## 2023-11-10 RX ADMIN — Medication 1 MILLIGRAM(S): at 20:37

## 2023-11-10 RX ADMIN — Medication 1 MILLIGRAM(S): at 10:18

## 2023-11-10 NOTE — BH INPATIENT PSYCHIATRY PROGRESS NOTE - CURRENT MEDICATION
MEDICATIONS  (STANDING):  benztropine 1 milliGRAM(s) Oral two times a day  influenza   Vaccine 0.5 milliLiter(s) IntraMuscular once  OLANZapine Disintegrating Tablet 20 milliGRAM(s) Oral at bedtime    MEDICATIONS  (PRN):  diphenhydrAMINE 50 milliGRAM(s) Oral every 6 hours PRN Extrapyramidal prophylaxis  diphenhydrAMINE Injectable 50 milliGRAM(s) IntraMuscular once PRN Extrapyramidal prophylaxis  haloperidol     Tablet 5 milliGRAM(s) Oral every 6 hours PRN moderate psychotic agitation  haloperidol    Injectable 5 milliGRAM(s) IntraMuscular once PRN severe psychotic agitation  LORazepam     Tablet 2 milliGRAM(s) Oral every 6 hours PRN moderate psychotic anxiety  LORazepam   Injectable 2 milliGRAM(s) IntraMuscular once PRN severe psychotic anxiety  nicotine -   7 mG/24Hr(s) Patch 1 Patch Transdermal daily PRN if pt craves cigarettes and psychiatry aggrees

## 2023-11-10 NOTE — BH INPATIENT PSYCHIATRY PROGRESS NOTE - NSBHFUPINTERVALHXFT_PSY_A_CORE
11/10/2023: Patient was seen today AM, chart reviewed and discussed in team. Patient is compliant, and has been progressing well with no issues, has fair to good sleep/appetite. Zyprexa further increased to 20 mg HS for better overall effect. Discharge planning next week.     11/09/2023: Patient was seen today AM, chart reviewed and discussed in team. He has been taking PRN meds daily mostly in AM, took the nightime Zyprexa 15 mg HS, and endorses that he feels people are looking at him, feels upset and gets annoyed as he has no body to talk with in [place]. He feels lonely, annoyed and prefers to stay in bed most of the time. Zyprexa to be increased to 20 mg HS starting tomorrow. He was further advised to stay away from trouble and to look for RN if anything needed for anxiety reduction.    11/08/2023: Patient was seen today AM, chart reviewed and discussed in team. He has been taking oral PRN every AM and also took Zyprexa 10 mg HS. He also pre-occupied with discharge and visible in unit. To continue with meds as ordered for stability/safety. Patient was seen again with RN-P, he is pre-occupied with discharge, mood irritable, limited conversation and was punching the walls in AM, received oral PRN and continues to speak, with no eye contact. He refuses EPPS, prefers to take pills only. Zyprexa further increased to 15 mg HS for mood control with Psychosis.     11/07/2023: Patient was seen today AM, chart reviewed and discussed in team. He was upset and refused to take Zyprexa today HS, will try tomorrow in a better way so he to start taking the meds as ordered for stability/safety. Zyprexa was increased to 10 mg HS.    11/06/2023: Patient was seen today AM, chart reviewed and discussed in team. He is visible, has continued disorganization, smiles inappropriately and on meds mostly Zyprexa 5 mg HS, and at this time to increase Zyprexa to 10 mg HS for a safe, rapid stability. Normal vegetative symptoms at this time.    Patient is in his bed, lacks spontaneity, does not make eye contact but answers question.  He denies problems.  He says he is doing okay he slept and he eats well.  He denies being depressed.  He denies having thoughts of harming self and denies hearing voices. Reportedly patient does not have any suicidal homicidal behavior on the unit.

## 2023-11-11 RX ADMIN — Medication 1 MILLIGRAM(S): at 10:19

## 2023-11-11 RX ADMIN — OLANZAPINE 20 MILLIGRAM(S): 15 TABLET, FILM COATED ORAL at 21:06

## 2023-11-11 RX ADMIN — Medication 1 MILLIGRAM(S): at 21:05

## 2023-11-12 RX ADMIN — Medication 1 MILLIGRAM(S): at 20:43

## 2023-11-12 RX ADMIN — OLANZAPINE 20 MILLIGRAM(S): 15 TABLET, FILM COATED ORAL at 20:42

## 2023-11-12 RX ADMIN — Medication 1 MILLIGRAM(S): at 09:25

## 2023-11-13 PROCEDURE — 99232 SBSQ HOSP IP/OBS MODERATE 35: CPT

## 2023-11-13 RX ADMIN — OLANZAPINE 20 MILLIGRAM(S): 15 TABLET, FILM COATED ORAL at 20:34

## 2023-11-13 RX ADMIN — Medication 1 MILLIGRAM(S): at 09:37

## 2023-11-13 RX ADMIN — Medication 50 MILLIGRAM(S): at 20:34

## 2023-11-13 RX ADMIN — Medication 1 MILLIGRAM(S): at 20:34

## 2023-11-13 NOTE — BH INPATIENT PSYCHIATRY PROGRESS NOTE - CURRENT MEDICATION
MEDICATIONS  (STANDING):  benztropine 1 milliGRAM(s) Oral two times a day  influenza   Vaccine 0.5 milliLiter(s) IntraMuscular once  OLANZapine Disintegrating Tablet 20 milliGRAM(s) Oral at bedtime    MEDICATIONS  (PRN):  diphenhydrAMINE 50 milliGRAM(s) Oral every 6 hours PRN Extrapyramidal prophylaxis  diphenhydrAMINE Injectable 50 milliGRAM(s) IntraMuscular once PRN Extrapyramidal prophylaxis  haloperidol     Tablet 5 milliGRAM(s) Oral every 6 hours PRN moderate psychotic agitation  haloperidol    Injectable 5 milliGRAM(s) IntraMuscular once PRN severe psychotic agitation  nicotine -   7 mG/24Hr(s) Patch 1 Patch Transdermal daily PRN if pt craves cigarettes and psychiatry aggrees

## 2023-11-13 NOTE — BH INPATIENT PSYCHIATRY PROGRESS NOTE - NSBHFUPINTERVALHXFT_PSY_A_CORE
11/13/2023: Patient was seen today AM, chart reviewed and discussed in team. He is meds compliant and has been doing very well on current Zyprexa 20 mg HS only, no aggression or agitation reported. To continue the same, he denied A/V/H or feeling depressed at this time. Discharge planning on Wednesday.    11/10/2023: Patient was seen today AM, chart reviewed and discussed in team. Patient is compliant, and has been progressing well with no issues, has fair to good sleep/appetite. Zyprexa further increased to 20 mg HS for better overall effect. Discharge planning next week.     11/09/2023: Patient was seen today AM, chart reviewed and discussed in team. He has been taking PRN meds daily mostly in AM, took the nightime Zyprexa 15 mg HS, and endorses that he feels people are looking at him, feels upset and gets annoyed as he has no body to talk with in [place]. He feels lonely, annoyed and prefers to stay in bed most of the time. Zyprexa to be increased to 20 mg HS starting tomorrow. He was further advised to stay away from trouble and to look for RN if anything needed for anxiety reduction.    11/08/2023: Patient was seen today AM, chart reviewed and discussed in team. He has been taking oral PRN every AM and also took Zyprexa 10 mg HS. He also pre-occupied with discharge and visible in unit. To continue with meds as ordered for stability/safety. Patient was seen again with RN-P, he is pre-occupied with discharge, mood irritable, limited conversation and was punching the walls in AM, received oral PRN and continues to speak, with no eye contact. He refuses EPPS, prefers to take pills only. Zyprexa further increased to 15 mg HS for mood control with Psychosis.     11/07/2023: Patient was seen today AM, chart reviewed and discussed in team. He was upset and refused to take Zyprexa today HS, will try tomorrow in a better way so he to start taking the meds as ordered for stability/safety. Zyprexa was increased to 10 mg HS.    11/06/2023: Patient was seen today AM, chart reviewed and discussed in team. He is visible, has continued disorganization, smiles inappropriately and on meds mostly Zyprexa 5 mg HS, and at this time to increase Zyprexa to 10 mg HS for a safe, rapid stability. Normal vegetative symptoms at this time.    Patient is in his bed, lacks spontaneity, does not make eye contact but answers question.  He denies problems.  He says he is doing okay he slept and he eats well.  He denies being depressed.  He denies having thoughts of harming self and denies hearing voices. Reportedly patient does not have any suicidal homicidal behavior on the unit.

## 2023-11-14 PROCEDURE — 99232 SBSQ HOSP IP/OBS MODERATE 35: CPT

## 2023-11-14 RX ADMIN — OLANZAPINE 20 MILLIGRAM(S): 15 TABLET, FILM COATED ORAL at 21:06

## 2023-11-14 RX ADMIN — Medication 1 MILLIGRAM(S): at 21:06

## 2023-11-14 RX ADMIN — Medication 1 MILLIGRAM(S): at 11:00

## 2023-11-14 NOTE — BH INPATIENT PSYCHIATRY PROGRESS NOTE - NSBHPSYCHOLCOGABN_PSY_A_CORE
disoriented to situation

## 2023-11-14 NOTE — BH TREATMENT PLAN - NSTXDCOPNOINTERSW_PSY_ALL_CORE
Psych MD informed SW that pt will be discharged on Wednesday 11/15, SW inquired if ACT referral should be sent. Psych MD does not feel ACT is needed at this time. GEREMIAS met with pt in [facility] (ID# [id number]). GEREMIAS assessed for risk, pt denies SI/HI, AVH and paranoia sx and is agreeable to return home. GEREMIAS asked if pt was referred to [facility] due to not having insurance, pt confirmed and stated that he knows has a SS # and health insurance. GEREMIAS discussed the option of referral to [facility] in [place] since it is closer, pt agreeable. GEREMIAS informed pt that dc summary will be sent to [facility] so they can close case, pt agreeable. GEREMIAS spoke with pt’s uncle, Jose Reyes (ID# [id number]), and informed him of DC. Uncle does not object to DC at this time and will  pt at time of DC. SW informed of change of clinics for aftercare, uncle expressed understanding. GEREMIAS sent referral to [facility], awaiting callback.

## 2023-11-14 NOTE — BH DISCHARGE NOTE NURSING/SOCIAL WORK/PSYCH REHAB - NSDCADDINFO1FT_PSY_ALL_CORE
You have an in person appt with therapist Jessica for 11/17 @ 2pm. Please call the number above if you need to reschedule. Pt denied any substance use needs at this time. If any should arise, please discuss in treatment at [facility].  You have an in person appt with therapist Jessica for 11/17 @ 2pm. Please call the number above if you need to reschedule. Pt denied any substance use needs at this time. If any should arise, please discuss in treatment at [facility].   Please contact Dr. Hunter for medication or treatment questions, lab results or any other concerns at [phone]. Handoff provided to your outpatient provider, KAITLIN.   Please return to the ED if symptoms worsen or return.

## 2023-11-14 NOTE — BH DISCHARGE NOTE NURSING/SOCIAL WORK/PSYCH REHAB - PATIENT PORTAL LINK FT
You can access the FollowMyHealth Patient Portal offered by [facility] by registering at the following website: [email or web address]. By joining [facility]’s FollowMyHealth portal, you will also be able to view your health information using other applications (apps) compatible with our system.

## 2023-11-14 NOTE — BH DISCHARGE NOTE NURSING/SOCIAL WORK/PSYCH REHAB - NSDCOTHERTYPOFSERV_GEN_ALL_CORE
A [facility]POA application has been submitted for you to be enrolled in a ACT team.  will contact you with determination from SPOA after application is reviewed. Please continue your treatment with [facility].

## 2023-11-14 NOTE — BH DISCHARGE NOTE NURSING/SOCIAL WORK/PSYCH REHAB - NSDCNEXTLEVEL_PSY_ALL_CORE
Yes Patient alert, oriented x3, pleasant and cooperative.  Pt denies S/H IIP currently.  Nurse and  reviewed discharge plan with patient who agrees and accepts plan.  Pt provided with a copy of discharge paperwork.  Pt appropriately dressed for discharge and is transported home by family , friend or taxi to ensure safe arrival home./Yes

## 2023-11-14 NOTE — BH DISCHARGE NOTE NURSING/SOCIAL WORK/PSYCH REHAB - NSCDUDCCRISIS_PSY_A_CORE
[facility] Well  1 [phone]) [facility]-WELL ([phone])  Text "WELL" to 30941  Website: www.[employer]/.[facility] 1 (283) 621-HOPE (4710) Website: www.safehorizon.org/.  [facility] - [facility] – Crisis Care for Children, Adults and Families  [address] [place] [address]  Mobile Crisis Hotline – (514) 437-6923/.National Suicide Prevention Lifeline 8 (906) 194-0397/.  [facility]  1 (888) LIFENET (257-8254)/.  [facility]  (294) 873-1343/.  [facility] Behavioral Health Helpline / [facility] Mobile Crisis  (722) 216-YZSU (4347)/.  [facility] Response Crisis Hotline  (439) 305-3511  24 hour telephone crisis intervention and suicide prevention hotline concerned with all mental health issues/.  [facility]  [address] [place] [address] [phone]   Hours:  Monday through Friday from 9 AM to 3 PM/Other.../988 Suicide and Crisis Lifeline

## 2023-11-14 NOTE — BH TREATMENT PLAN - NSTXPLANTHERAPYSESSIONSFT_PSY_ALL_CORE
11-07-23  Type of therapy: N/A  Type of session: N/A  Level of patient participation: Resistance to participation  Duration of participation: 0  Therapy conducted by: Psych rehab  Therapy Summary: Patient declined to attend group programming although encouraged.    11-09-23  Type of therapy: N/A  Type of session: N/A  Level of patient participation: Resistance to participation  Duration of participation: 0  Therapy conducted by: Psych rehab  Therapy Summary: Patient declined to attend group programming today although encouraged.    11-12-23  Type of therapy: N/A  Type of session: N/A  Level of patient participation: Resistance to participation  Duration of participation: 0  Therapy conducted by: Psych rehab  Therapy Summary: Patient declined to attend group programming although encouraged.    11-13-23  Type of therapy: N/A  Type of session: N/A  Level of patient participation: Resistance to participation  Duration of participation: 0  Therapy conducted by: Psych rehab  Therapy Summary: Patient declined to attend group programming although encouraged today.

## 2023-11-14 NOTE — BH DISCHARGE NOTE NURSING/SOCIAL WORK/PSYCH REHAB - NSBHCRISISRESOURCESOTHER_PSY_ALL_CORE_FT
Please contact Dr. Hunter for medication or treatment questions, lab results or any other concerns at [phone]. Handoff provided to your outpatient provider, [facility].  If needed, please contact [facility], [facility], 24/7 days a week and speak with Sheila Vazquez RN Nurse manager or any [facility] staff member @ [phone].  Please return to the ED if symptoms worsen or return.

## 2023-11-14 NOTE — BH TREATMENT PLAN - NSTXCOPEINTERPR_PSY_ALL_CORE
Encourage patient to actively participate in 1-2 psych rehab groups daily to improve coping skills.
Patient has been isolating in room and not participating in psych rehab group programming despite support and encouragement from staff. Treatment goal to continue for 7 days.

## 2023-11-14 NOTE — BH DISCHARGE NOTE NURSING/SOCIAL WORK/PSYCH REHAB - NSDCPLANREVIEWED_PSY_ALL_CORE
The discharge note was reviewed with the patient and the patient has agreed to receive a copy of the SW/Nurse/MD discharge note in the patient portal. The discharge note was reviewed with the patient and the patient has agreed to receive a copy of the SW/Nurse/MD discharge note in the patient portal./Yes

## 2023-11-14 NOTE — BH INPATIENT PSYCHIATRY PROGRESS NOTE - NSBHFUPINTERVALHXFT_PSY_A_CORE
11/14/2023: Patient was seen today AM, chart reviewed and discussed in team. He is meds compliant and overall stable on Zyprexa trial. No aggression/agitation noted and no PRN neded. To continue the same, no A/V/H or Paranoid Ideation noted. Discharge planning on Wednesday.    11/13/2023: Patient was seen today AM, chart reviewed and discussed in team. He is meds compliant and has been doing very well on current Zyprexa 20 mg HS only, no aggression or agitation reported. To continue the same, he denied A/V/H or feeling depressed at this time. Discharge planning on Wednesday.    11/10/2023: Patient was seen today AM, chart reviewed and discussed in team. Patient is compliant, and has been progressing well with no issues, has fair to good sleep/appetite. Zyprexa further increased to 20 mg HS for better overall effect. Discharge planning next week.     11/09/2023: Patient was seen today AM, chart reviewed and discussed in team. He has been taking PRN meds daily mostly in AM, took the nightime Zyprexa 15 mg HS, and endorses that he feels people are looking at him, feels upset and gets annoyed as he has no body to talk with in [place]ese. He feels lonely, annoyed and prefers to stay in bed most of the time. Zyprexa to be increased to 20 mg HS starting tomorrow. He was further advised to stay away from trouble and to look for RN if anything needed for anxiety reduction.    11/08/2023: Patient was seen today AM, chart reviewed and discussed in team. He has been taking oral PRN every AM and also took Zyprexa 10 mg HS. He also pre-occupied with discharge and visible in unit. To continue with meds as ordered for stability/safety. Patient was seen again with RN-P, he is pre-occupied with discharge, mood irritable, limited conversation and was punching the walls in AM, received oral PRN and continues to speak, with no eye contact. He refuses EPPS, prefers to take pills only. Zyprexa further increased to 15 mg HS for mood control with Psychosis.     11/07/2023: Patient was seen today AM, chart reviewed and discussed in team. He was upset and refused to take Zyprexa today HS, will try tomorrow in a better way so he to start taking the meds as ordered for stability/safety. Zyprexa was increased to 10 mg HS.    11/06/2023: Patient was seen today AM, chart reviewed and discussed in team. He is visible, has continued disorganization, smiles inappropriately and on meds mostly Zyprexa 5 mg HS, and at this time to increase Zyprexa to 10 mg HS for a safe, rapid stability. Normal vegetative symptoms at this time.    Patient is in his bed, lacks spontaneity, does not make eye contact but answers question.  He denies problems.  He says he is doing okay he slept and he eats well.  He denies being depressed.  He denies having thoughts of harming self and denies hearing voices. Reportedly patient does not have any suicidal homicidal behavior on the unit.

## 2023-11-14 NOTE — BH TREATMENT PLAN - NSTXDISORGINTERMD_PSY_ALL_CORE
Schizophrenia--On Zyprexa 5 mg HS trial, later Zyprexa increased to 20 mg HS
Schizophrenia--On Zyprexa 5 mg HS trial, later Zyprexa increased to 10 mg HS

## 2023-11-15 VITALS — TEMPERATURE: 97 F | OXYGEN SATURATION: 100 % | RESPIRATION RATE: 17 BRPM

## 2023-11-15 PROCEDURE — 99239 HOSP IP/OBS DSCHRG MGMT >30: CPT

## 2023-11-15 RX ORDER — OLANZAPINE 15 MG/1
1 TABLET, FILM COATED ORAL
Qty: 30 | Refills: 0
Start: 2023-11-15 | End: 2023-12-14

## 2023-11-15 RX ORDER — BENZTROPINE MESYLATE 1 MG
1 TABLET ORAL
Qty: 60 | Refills: 0
Start: 2023-11-15 | End: 2023-12-14

## 2023-11-15 RX ADMIN — Medication 1 MILLIGRAM(S): at 09:09

## 2023-11-15 NOTE — BH INPATIENT PSYCHIATRY PROGRESS NOTE - NSTXCOPEINTERMD_PSY_ALL_CORE
Schizophrenia--On Zyprexa 5 mg HS trial, later Zyprexa increased to 20 mg HS
Schizophrenia--On Zyprexa 5 mg HS trial, later Zyprexa increased to 15 mg HS
Schizophrenia--On Zyprexa 5 mg HS trial, later Zyprexa increased to 15 mg HS
Schizophrenia--On Zyprexa 5 mg HS trial, later Zyprexa increased to 10 mg HS
Schizophrenia--On Zyprexa 5 mg HS trial, later Zyprexa increased to 10 mg HS
Schizophrenia--On Zyprexa 5 mg HS trial, later Zyprexa increased to 15 mg HS
Schizophrenia--On Zyprexa 5 mg HS trial, later Zyprexa increased to 20 mg HS

## 2023-11-15 NOTE — BH INPATIENT PSYCHIATRY PROGRESS NOTE - NSBHMSEAFFCONG_PSY_A_CORE
Unable to assess
Unable to assess
Congruent
Unable to assess

## 2023-11-15 NOTE — BH INPATIENT PSYCHIATRY DISCHARGE NOTE - NSBHFUPINTERVALHXFT_PSY_A_CORE
11/15/2023: Patient was seen today AM, chart reviewed and discussed in team. He  is meds complaint and has been doing well as previously noted, no meds induced s/e noted, he denies A/V/H or Paranoid delusions, he has fair to good sleep/appetite, prefers to stay by himself as he has limited to no peers who speaks [place]. To be discharged and to f/u as per [facility] referral. Not S/H at the time of discharge and there were no prior SI/SA.

## 2023-11-15 NOTE — BH INPATIENT PSYCHIATRY DISCHARGE NOTE - DESCRIPTION
As per chart review; migrated from [facility] ~7years ago. Lives with uncle, aunt and cousins in private house.

## 2023-11-15 NOTE — BH INPATIENT PSYCHIATRY PROGRESS NOTE - NSBHMSEJUDGE_PSY_A_CORE
[FreeTextEntry1] : 65 year old female with hx  hyperlipidemia who came for cardiac evaluation with complain that she had left sided chest pain  2 to 3 weeks , initially it was squeezing sensation  severe got better after few minutes , not associated any other symptoms ,  then  she has having different chest discomfort like sharp to  squeezing sensation , non radiating  , increase with stretching  in certain position , not related exertion. \par \par Patient lost her   few months ago , since then she is under severe mental stress \par \par Patient says her acid reflux is different from what she is having ,  occasionally she does have skipped heart beat which is there for many  years  happens very rarely \par \par Patient blood pressure is elevated today , patient says her blood pressure  usually normal . patient is very anxious 
Poor

## 2023-11-15 NOTE — BH INPATIENT PSYCHIATRY DISCHARGE NOTE - NSDCCPCAREPLAN_GEN_ALL_CORE_FT
PRINCIPAL DISCHARGE DIAGNOSIS  Diagnosis: Schizophrenia  Assessment and Plan of Treatment: Zyprexa 20 mg HS; Cogentin 1 mg BID

## 2023-11-15 NOTE — BH INPATIENT PSYCHIATRY PROGRESS NOTE - NSBHCHARTREVIEWVS_PSY_A_CORE FT
Vital Signs Last 24 Hrs  T(C): 36.8 (11-05-23 @ 07:27), Max: 36.8 (11-05-23 @ 07:27)  T(F): 98.3 (11-05-23 @ 07:27), Max: 98.3 (11-05-23 @ 07:27)  HR: --  BP: --  BP(mean): --  RR: 17 (11-05-23 @ 07:27) (17 - 17)  SpO2: 99% (11-05-23 @ 07:27) (99% - 99%)    Orthostatic VS  11-05-23 @ 07:27  Lying BP: --/-- HR: --  Sitting BP: 104/66 HR: 72  Standing BP: 100/61 HR: 78  Site: upper right arm  Mode: electronic  Orthostatic VS  11-04-23 @ 07:35  Lying BP: --/-- HR: --  Sitting BP: 117/51 HR: 64  Standing BP: 115/68 HR: 72  Site: upper right arm  Mode: electronic  Orthostatic VS  11-03-23 @ 15:26  Lying BP: --/-- HR: --  Sitting BP: 131/83 HR: 60  Standing BP: 132/61 HR: 69  Site: --  Mode: --  
Vital Signs Last 24 Hrs  T(C): 36.1 (11-13-23 @ 07:15), Max: 36.1 (11-13-23 @ 07:15)  T(F): 97 (11-13-23 @ 07:15), Max: 97 (11-13-23 @ 07:15)  HR: --  BP: --  BP(mean): --  RR: 18 (11-13-23 @ 07:15) (18 - 18)  SpO2: 100% (11-13-23 @ 07:15) (100% - 100%)    Orthostatic VS  11-13-23 @ 07:15  Lying BP: --/-- HR: --  Sitting BP: 119/79 HR: 60  Standing BP: 131/57 HR: 89  Site: upper right arm  Mode: electronic  Orthostatic VS  11-12-23 @ 08:11  Lying BP: --/-- HR: --  Sitting BP: 126/77 HR: 84  Standing BP: 139/74 HR: 104  Site: upper right arm  Mode: electronic  
Vital Signs Last 24 Hrs  T(C): 36.3 (11-10-23 @ 07:48), Max: 36.3 (11-10-23 @ 07:48)  T(F): 97.4 (11-10-23 @ 07:48), Max: 97.4 (11-10-23 @ 07:48)  HR: --  BP: --  BP(mean): --  RR: 16 (11-10-23 @ 07:48) (16 - 16)  SpO2: 99% (11-10-23 @ 07:48) (99% - 99%)    Orthostatic VS  11-10-23 @ 07:48  Lying BP: --/-- HR: --  Sitting BP: 112/72 HR: 98  Standing BP: 116/62 HR: 100  Site: upper right arm  Mode: electronic  Orthostatic VS  11-09-23 @ 07:11  Lying BP: --/-- HR: --  Sitting BP: 124/62 HR: 85  Standing BP: 109/53 HR: 108  Site: upper right arm  Mode: electronic  
Vital Signs Last 24 Hrs  T(C): 36.4 (11-06-23 @ 07:52), Max: 36.4 (11-06-23 @ 07:52)  T(F): 97.6 (11-06-23 @ 07:52), Max: 97.6 (11-06-23 @ 07:52)  HR: --  BP: --  BP(mean): --  RR: 18 (11-06-23 @ 07:52) (18 - 18)  SpO2: 99% (11-06-23 @ 07:52) (99% - 99%)    Orthostatic VS  11-06-23 @ 07:52  Lying BP: --/-- HR: --  Sitting BP: 110/57 HR: 72  Standing BP: 120/74 HR: 100  Site: upper right arm  Mode: electronic  Orthostatic VS  11-05-23 @ 07:27  Lying BP: --/-- HR: --  Sitting BP: 104/66 HR: 72  Standing BP: 100/61 HR: 78  Site: upper right arm  Mode: electronic  
Vital Signs Last 24 Hrs  T(C): 36.3 (11-08-23 @ 07:18), Max: 36.3 (11-08-23 @ 07:18)  T(F): 97.4 (11-08-23 @ 07:18), Max: 97.4 (11-08-23 @ 07:18)  HR: --  BP: --  BP(mean): --  RR: 18 (11-08-23 @ 07:18) (18 - 18)  SpO2: 100% (11-08-23 @ 07:18) (100% - 100%)    Orthostatic VS  11-08-23 @ 07:18  Lying BP: --/-- HR: --  Sitting BP: 118/58 HR: 60  Standing BP: 120/73 HR: 78  Site: upper right arm  Mode: electronic  Orthostatic VS  11-07-23 @ 07:18  Lying BP: --/-- HR: --  Sitting BP: 113/93 HR: 63  Standing BP: 110/87 HR: 72  Site: upper right arm  Mode: electronic  
Vital Signs Last 24 Hrs  T(C): 36.1 (11-14-23 @ 06:59), Max: 36.1 (11-14-23 @ 06:59)  T(F): 97 (11-14-23 @ 06:59), Max: 97 (11-14-23 @ 06:59)  HR: --  BP: --  BP(mean): --  RR: 16 (11-14-23 @ 06:59) (16 - 16)  SpO2: 100% (11-14-23 @ 06:59) (100% - 100%)    Orthostatic VS  11-14-23 @ 06:59  Lying BP: --/-- HR: --  Sitting BP: 121/64 HR: 78  Standing BP: 132/99 HR: 81  Site: upper right arm  Mode: electronic  Orthostatic VS  11-13-23 @ 07:15  Lying BP: --/-- HR: --  Sitting BP: 119/79 HR: 60  Standing BP: 131/57 HR: 89  Site: upper right arm  Mode: electronic  
Vital Signs Last 24 Hrs  T(C): 36.3 (11-09-23 @ 07:11), Max: 36.3 (11-09-23 @ 07:11)  T(F): 97.3 (11-09-23 @ 07:11), Max: 97.3 (11-09-23 @ 07:11)  HR: --  BP: --  BP(mean): --  RR: 16 (11-09-23 @ 07:11) (16 - 16)  SpO2: 98% (11-09-23 @ 07:11) (98% - 98%)    Orthostatic VS  11-09-23 @ 07:11  Lying BP: --/-- HR: --  Sitting BP: 124/62 HR: 85  Standing BP: 109/53 HR: 108  Site: upper right arm  Mode: electronic  Orthostatic VS  11-08-23 @ 07:18  Lying BP: --/-- HR: --  Sitting BP: 118/58 HR: 60  Standing BP: 120/73 HR: 78  Site: upper right arm  Mode: electronic  
Vital Signs Last 24 Hrs  T(C): 36.3 (11-07-23 @ 07:18), Max: 36.3 (11-07-23 @ 07:18)  T(F): 97.3 (11-07-23 @ 07:18), Max: 97.3 (11-07-23 @ 07:18)  HR: --  BP: --  BP(mean): --  RR: 18 (11-07-23 @ 07:18) (18 - 18)  SpO2: 96% (11-07-23 @ 07:18) (96% - 96%)    Orthostatic VS  11-07-23 @ 07:18  Lying BP: --/-- HR: --  Sitting BP: 113/93 HR: 63  Standing BP: 110/87 HR: 72  Site: upper right arm  Mode: electronic  Orthostatic VS  11-06-23 @ 07:52  Lying BP: --/-- HR: --  Sitting BP: 110/57 HR: 72  Standing BP: 120/74 HR: 100  Site: upper right arm  Mode: electronic  
Vital Signs Last 24 Hrs  T(C): 36.3 (11-15-23 @ 07:28), Max: 36.3 (11-15-23 @ 07:28)  T(F): 97.4 (11-15-23 @ 07:28), Max: 97.4 (11-15-23 @ 07:28)  HR: --  BP: --  BP(mean): --  RR: 17 (11-15-23 @ 07:28) (17 - 17)  SpO2: 100% (11-15-23 @ 07:28) (100% - 100%)    Orthostatic VS  11-15-23 @ 07:28  Lying BP: --/-- HR: --  Sitting BP: 108/55 HR: 70  Standing BP: 121/59 HR: 95  Site: upper right arm  Mode: electronic  Orthostatic VS  11-14-23 @ 06:59  Lying BP: --/-- HR: --  Sitting BP: 121/64 HR: 78  Standing BP: 132/99 HR: 81  Site: upper right arm  Mode: electronic

## 2023-11-15 NOTE — BH INPATIENT PSYCHIATRY PROGRESS NOTE - NSBHCHARTREVIEWLAB_PSY_A_CORE FT
Lipid Profile (11.04.23 @ 07:27)    Cholesterol: 152: Interpretive Comment:  Acceptable: <200 mg/dL (for adults) ; <170 mg/dL (for children)  Borderline: 200-239 mg/dL (for adults) ; 170-199 mg/dL (for children)  High: >=240 mg/dL (for adults) ; >=200 mg/dL (for children) mg/dL    Triglycerides, Serum: 106: Interpretive Comment:  Triglyceride concentration can be influenced when measured in the  non-fasting state.  Acceptable: <150 mg/dL (for adults) ; < 90 mg/dL (for children)  Borderline: 150-199 mg/dL (for adults) ;  mg/dL (for children)  High:>=200 mg/dL (for adults) ; >=130 mg/dL (for children) mg/dL    HDL Cholesterol: 38: Interpretive Comment:  HDL cholesterol less than 40 mg/dL is a risk factor for cardiovascular  disease mg/dL    Non HDL Cholesterol: 114: Interpretive Comment:  Non-HDL cholesterol is a secondary target of therapy in persons with high  serum triglycerides (> 199 mg/dL). The goal for non-HDL cholesterol in  persons with high triglycerides is 30 mg/dL higher than their LDL  cholesterol goal.  Acceptable: < 130 mg/dL (for adults) ; <120 mg/dL (for children)  Borderline: 130-159 mg/dL (for adults) ; 120-144 mg/dL (for children)  High: >=160 mg/dL (for adults) ; >=145 mg/dL (for children)  The suggested cutoff points are based on recommendations from the  American College of Cardiology/American Heart Association (ACC/AHA)  guidelines on the management of blood cholesterol {Circulation.  2019;139:o2214-v0617}, the National Cholesterol Education Program {SHON.  2001;285(19):6925-2159}, and the expert panel on integrated guidelines  for cardiovascular health and risk reduction in children and adolescents  {Pediatrics. 2011;128 Suppl 5(Suppl 5):I157-V068}. mg/dL    LDL Cholesterol Calculated: 94: Interpretive Comment:  The calculation for LDL cholesterol is based on the Jacinto/NIH equation  (SHON Cardiol. 2020;5(5):540–548. doi:10.1001/[email or web address]). The  acceptable LDL cholesterol concentration may vary based on cardiovascular  disease risk and treatment goals.  Acceptable: <130 mg/dL (for adults) ; <110 mg/dL (for children)  Borderline: 130-159 mg/dL (for adults) ; 110-129 mg/dL (for children)  High: >=160 mg/dL (for adults) ; >=130 mg/dL (for children) mg/dL  
Lipid Profile (11.04.23 @ 07:27)    Cholesterol: 152: Interpretive Comment:  Acceptable: <200 mg/dL (for adults) ; <170 mg/dL (for children)  Borderline: 200-239 mg/dL (for adults) ; 170-199 mg/dL (for children)  High: >=240 mg/dL (for adults) ; >=200 mg/dL (for children) mg/dL    Triglycerides, Serum: 106: Interpretive Comment:  Triglyceride concentration can be influenced when measured in the  non-fasting state.  Acceptable: <150 mg/dL (for adults) ; < 90 mg/dL (for children)  Borderline: 150-199 mg/dL (for adults) ;  mg/dL (for children)  High:>=200 mg/dL (for adults) ; >=130 mg/dL (for children) mg/dL    HDL Cholesterol: 38: Interpretive Comment:  HDL cholesterol less than 40 mg/dL is a risk factor for cardiovascular  disease mg/dL    Non HDL Cholesterol: 114: Interpretive Comment:  Non-HDL cholesterol is a secondary target of therapy in persons with high  serum triglycerides (> 199 mg/dL). The goal for non-HDL cholesterol in  persons with high triglycerides is 30 mg/dL higher than their LDL  cholesterol goal.  Acceptable: < 130 mg/dL (for adults) ; <120 mg/dL (for children)  Borderline: 130-159 mg/dL (for adults) ; 120-144 mg/dL (for children)  High: >=160 mg/dL (for adults) ; >=145 mg/dL (for children)  The suggested cutoff points are based on recommendations from the  American College of Cardiology/American Heart Association (ACC/AHA)  guidelines on the management of blood cholesterol {Circulation.  2019;139:e3438-g8374}, the National Cholesterol Education Program {SHON.  2001;285(19):4761-4196}, and the expert panel on integrated guidelines  for cardiovascular health and risk reduction in children and adolescents  {Pediatrics. 2011;128 Suppl 5(Suppl 5):B563-N584}. mg/dL    LDL Cholesterol Calculated: 94: Interpretive Comment:  The calculation for LDL cholesterol is based on the Jacinto/NIH equation  (SHON Cardiol. 2020;5(5):540–548. doi:10.1001/[email or web address]). The  acceptable LDL cholesterol concentration may vary based on cardiovascular  disease risk and treatment goals.  Acceptable: <130 mg/dL (for adults) ; <110 mg/dL (for children)  Borderline: 130-159 mg/dL (for adults) ; 110-129 mg/dL (for children)  High: >=160 mg/dL (for adults) ; >=130 mg/dL (for children) mg/dL  
Lipid Profile (11.04.23 @ 07:27)    Cholesterol: 152: Interpretive Comment:  Acceptable: <200 mg/dL (for adults) ; <170 mg/dL (for children)  Borderline: 200-239 mg/dL (for adults) ; 170-199 mg/dL (for children)  High: >=240 mg/dL (for adults) ; >=200 mg/dL (for children) mg/dL    Triglycerides, Serum: 106: Interpretive Comment:  Triglyceride concentration can be influenced when measured in the  non-fasting state.  Acceptable: <150 mg/dL (for adults) ; < 90 mg/dL (for children)  Borderline: 150-199 mg/dL (for adults) ;  mg/dL (for children)  High:>=200 mg/dL (for adults) ; >=130 mg/dL (for children) mg/dL    HDL Cholesterol: 38: Interpretive Comment:  HDL cholesterol less than 40 mg/dL is a risk factor for cardiovascular  disease mg/dL    Non HDL Cholesterol: 114: Interpretive Comment:  Non-HDL cholesterol is a secondary target of therapy in persons with high  serum triglycerides (> 199 mg/dL). The goal for non-HDL cholesterol in  persons with high triglycerides is 30 mg/dL higher than their LDL  cholesterol goal.  Acceptable: < 130 mg/dL (for adults) ; <120 mg/dL (for children)  Borderline: 130-159 mg/dL (for adults) ; 120-144 mg/dL (for children)  High: >=160 mg/dL (for adults) ; >=145 mg/dL (for children)  The suggested cutoff points are based on recommendations from the  American College of Cardiology/American Heart Association (ACC/AHA)  guidelines on the management of blood cholesterol {Circulation.  2019;139:m6050-t9882}, the National Cholesterol Education Program {SHON.  2001;285(19):5258-5680}, and the expert panel on integrated guidelines  for cardiovascular health and risk reduction in children and adolescents  {Pediatrics. 2011;128 Suppl 5(Suppl 5):K553-T417}. mg/dL    LDL Cholesterol Calculated: 94: Interpretive Comment:  The calculation for LDL cholesterol is based on the Jacinto/NIH equation  (SHON Cardiol. 2020;5(5):540–548. doi:10.1001/[email or web address]). The  acceptable LDL cholesterol concentration may vary based on cardiovascular  disease risk and treatment goals.  Acceptable: <130 mg/dL (for adults) ; <110 mg/dL (for children)  Borderline: 130-159 mg/dL (for adults) ; 110-129 mg/dL (for children)  High: >=160 mg/dL (for adults) ; >=130 mg/dL (for children) mg/dL  
Lipid Profile (11.04.23 @ 07:27)    Cholesterol: 152: Interpretive Comment:  Acceptable: <200 mg/dL (for adults) ; <170 mg/dL (for children)  Borderline: 200-239 mg/dL (for adults) ; 170-199 mg/dL (for children)  High: >=240 mg/dL (for adults) ; >=200 mg/dL (for children) mg/dL    Triglycerides, Serum: 106: Interpretive Comment:  Triglyceride concentration can be influenced when measured in the  non-fasting state.  Acceptable: <150 mg/dL (for adults) ; < 90 mg/dL (for children)  Borderline: 150-199 mg/dL (for adults) ;  mg/dL (for children)  High:>=200 mg/dL (for adults) ; >=130 mg/dL (for children) mg/dL    HDL Cholesterol: 38: Interpretive Comment:  HDL cholesterol less than 40 mg/dL is a risk factor for cardiovascular  disease mg/dL    Non HDL Cholesterol: 114: Interpretive Comment:  Non-HDL cholesterol is a secondary target of therapy in persons with high  serum triglycerides (> 199 mg/dL). The goal for non-HDL cholesterol in  persons with high triglycerides is 30 mg/dL higher than their LDL  cholesterol goal.  Acceptable: < 130 mg/dL (for adults) ; <120 mg/dL (for children)  Borderline: 130-159 mg/dL (for adults) ; 120-144 mg/dL (for children)  High: >=160 mg/dL (for adults) ; >=145 mg/dL (for children)  The suggested cutoff points are based on recommendations from the  American College of Cardiology/American Heart Association (ACC/AHA)  guidelines on the management of blood cholesterol {Circulation.  2019;139:u9857-c2927}, the National Cholesterol Education Program {SHON.  2001;285(19):1917-4656}, and the expert panel on integrated guidelines  for cardiovascular health and risk reduction in children and adolescents  {Pediatrics. 2011;128 Suppl 5(Suppl 5):Z090-F288}. mg/dL    LDL Cholesterol Calculated: 94: Interpretive Comment:  The calculation for LDL cholesterol is based on the Jacinto/NIH equation  (SHON Cardiol. 2020;5(5):540–548. doi:10.1001/[email or web address]). The  acceptable LDL cholesterol concentration may vary based on cardiovascular  disease risk and treatment goals.  Acceptable: <130 mg/dL (for adults) ; <110 mg/dL (for children)  Borderline: 130-159 mg/dL (for adults) ; 110-129 mg/dL (for children)  High: >=160 mg/dL (for adults) ; >=130 mg/dL (for children) mg/dL  
Lipid Profile (11.04.23 @ 07:27)    Cholesterol: 152: Interpretive Comment:  Acceptable: <200 mg/dL (for adults) ; <170 mg/dL (for children)  Borderline: 200-239 mg/dL (for adults) ; 170-199 mg/dL (for children)  High: >=240 mg/dL (for adults) ; >=200 mg/dL (for children) mg/dL    Triglycerides, Serum: 106: Interpretive Comment:  Triglyceride concentration can be influenced when measured in the  non-fasting state.  Acceptable: <150 mg/dL (for adults) ; < 90 mg/dL (for children)  Borderline: 150-199 mg/dL (for adults) ;  mg/dL (for children)  High:>=200 mg/dL (for adults) ; >=130 mg/dL (for children) mg/dL    HDL Cholesterol: 38: Interpretive Comment:  HDL cholesterol less than 40 mg/dL is a risk factor for cardiovascular  disease mg/dL    Non HDL Cholesterol: 114: Interpretive Comment:  Non-HDL cholesterol is a secondary target of therapy in persons with high  serum triglycerides (> 199 mg/dL). The goal for non-HDL cholesterol in  persons with high triglycerides is 30 mg/dL higher than their LDL  cholesterol goal.  Acceptable: < 130 mg/dL (for adults) ; <120 mg/dL (for children)  Borderline: 130-159 mg/dL (for adults) ; 120-144 mg/dL (for children)  High: >=160 mg/dL (for adults) ; >=145 mg/dL (for children)  The suggested cutoff points are based on recommendations from the  American College of Cardiology/American Heart Association (ACC/AHA)  guidelines on the management of blood cholesterol {Circulation.  2019;139:p7552-a1643}, the National Cholesterol Education Program {SHON.  2001;285(19):7384-4302}, and the expert panel on integrated guidelines  for cardiovascular health and risk reduction in children and adolescents  {Pediatrics. 2011;128 Suppl 5(Suppl 5):C756-I550}. mg/dL    LDL Cholesterol Calculated: 94: Interpretive Comment:  The calculation for LDL cholesterol is based on the Jacinto/NIH equation  (SHON Cardiol. 2020;5(5):540–548. doi:10.1001/[email or web address]). The  acceptable LDL cholesterol concentration may vary based on cardiovascular  disease risk and treatment goals.  Acceptable: <130 mg/dL (for adults) ; <110 mg/dL (for children)  Borderline: 130-159 mg/dL (for adults) ; 110-129 mg/dL (for children)  High: >=160 mg/dL (for adults) ; >=130 mg/dL (for children) mg/dL  
Lipid Profile (11.04.23 @ 07:27)    Cholesterol: 152: Interpretive Comment:  Acceptable: <200 mg/dL (for adults) ; <170 mg/dL (for children)  Borderline: 200-239 mg/dL (for adults) ; 170-199 mg/dL (for children)  High: >=240 mg/dL (for adults) ; >=200 mg/dL (for children) mg/dL    Triglycerides, Serum: 106: Interpretive Comment:  Triglyceride concentration can be influenced when measured in the  non-fasting state.  Acceptable: <150 mg/dL (for adults) ; < 90 mg/dL (for children)  Borderline: 150-199 mg/dL (for adults) ;  mg/dL (for children)  High:>=200 mg/dL (for adults) ; >=130 mg/dL (for children) mg/dL    HDL Cholesterol: 38: Interpretive Comment:  HDL cholesterol less than 40 mg/dL is a risk factor for cardiovascular  disease mg/dL    Non HDL Cholesterol: 114: Interpretive Comment:  Non-HDL cholesterol is a secondary target of therapy in persons with high  serum triglycerides (> 199 mg/dL). The goal for non-HDL cholesterol in  persons with high triglycerides is 30 mg/dL higher than their LDL  cholesterol goal.  Acceptable: < 130 mg/dL (for adults) ; <120 mg/dL (for children)  Borderline: 130-159 mg/dL (for adults) ; 120-144 mg/dL (for children)  High: >=160 mg/dL (for adults) ; >=145 mg/dL (for children)  The suggested cutoff points are based on recommendations from the  American College of Cardiology/American Heart Association (ACC/AHA)  guidelines on the management of blood cholesterol {Circulation.  2019;139:f5315-r8810}, the National Cholesterol Education Program {SHON.  2001;285(19):0712-7714}, and the expert panel on integrated guidelines  for cardiovascular health and risk reduction in children and adolescents  {Pediatrics. 2011;128 Suppl 5(Suppl 5):Q785-H607}. mg/dL    LDL Cholesterol Calculated: 94: Interpretive Comment:  The calculation for LDL cholesterol is based on the Jacinto/NIH equation  (SHON Cardiol. 2020;5(5):540–548. doi:10.1001/[email or web address]). The  acceptable LDL cholesterol concentration may vary based on cardiovascular  disease risk and treatment goals.  Acceptable: <130 mg/dL (for adults) ; <110 mg/dL (for children)  Borderline: 130-159 mg/dL (for adults) ; 110-129 mg/dL (for children)  High: >=160 mg/dL (for adults) ; >=130 mg/dL (for children) mg/dL  
Lipid Profile (11.04.23 @ 07:27)    Cholesterol: 152: Interpretive Comment:  Acceptable: <200 mg/dL (for adults) ; <170 mg/dL (for children)  Borderline: 200-239 mg/dL (for adults) ; 170-199 mg/dL (for children)  High: >=240 mg/dL (for adults) ; >=200 mg/dL (for children) mg/dL    Triglycerides, Serum: 106: Interpretive Comment:  Triglyceride concentration can be influenced when measured in the  non-fasting state.  Acceptable: <150 mg/dL (for adults) ; < 90 mg/dL (for children)  Borderline: 150-199 mg/dL (for adults) ;  mg/dL (for children)  High:>=200 mg/dL (for adults) ; >=130 mg/dL (for children) mg/dL    HDL Cholesterol: 38: Interpretive Comment:  HDL cholesterol less than 40 mg/dL is a risk factor for cardiovascular  disease mg/dL    Non HDL Cholesterol: 114: Interpretive Comment:  Non-HDL cholesterol is a secondary target of therapy in persons with high  serum triglycerides (> 199 mg/dL). The goal for non-HDL cholesterol in  persons with high triglycerides is 30 mg/dL higher than their LDL  cholesterol goal.  Acceptable: < 130 mg/dL (for adults) ; <120 mg/dL (for children)  Borderline: 130-159 mg/dL (for adults) ; 120-144 mg/dL (for children)  High: >=160 mg/dL (for adults) ; >=145 mg/dL (for children)  The suggested cutoff points are based on recommendations from the  American College of Cardiology/American Heart Association (ACC/AHA)  guidelines on the management of blood cholesterol {Circulation.  2019;139:l2863-a4892}, the National Cholesterol Education Program {SHON.  2001;285(19):5268-0689}, and the expert panel on integrated guidelines  for cardiovascular health and risk reduction in children and adolescents  {Pediatrics. 2011;128 Suppl 5(Suppl 5):X738-R593}. mg/dL    LDL Cholesterol Calculated: 94: Interpretive Comment:  The calculation for LDL cholesterol is based on the Jacinto/NIH equation  (SHON Cardiol. 2020;5(5):540–548. doi:10.1001/[email or web address]). The  acceptable LDL cholesterol concentration may vary based on cardiovascular  disease risk and treatment goals.  Acceptable: <130 mg/dL (for adults) ; <110 mg/dL (for children)  Borderline: 130-159 mg/dL (for adults) ; 110-129 mg/dL (for children)  High: >=160 mg/dL (for adults) ; >=130 mg/dL (for children) mg/dL  
Lipid Profile (11.04.23 @ 07:27)    Cholesterol: 152: Interpretive Comment:  Acceptable: <200 mg/dL (for adults) ; <170 mg/dL (for children)  Borderline: 200-239 mg/dL (for adults) ; 170-199 mg/dL (for children)  High: >=240 mg/dL (for adults) ; >=200 mg/dL (for children) mg/dL    Triglycerides, Serum: 106: Interpretive Comment:  Triglyceride concentration can be influenced when measured in the  non-fasting state.  Acceptable: <150 mg/dL (for adults) ; < 90 mg/dL (for children)  Borderline: 150-199 mg/dL (for adults) ;  mg/dL (for children)  High:>=200 mg/dL (for adults) ; >=130 mg/dL (for children) mg/dL    HDL Cholesterol: 38: Interpretive Comment:  HDL cholesterol less than 40 mg/dL is a risk factor for cardiovascular  disease mg/dL    Non HDL Cholesterol: 114: Interpretive Comment:  Non-HDL cholesterol is a secondary target of therapy in persons with high  serum triglycerides (> 199 mg/dL). The goal for non-HDL cholesterol in  persons with high triglycerides is 30 mg/dL higher than their LDL  cholesterol goal.  Acceptable: < 130 mg/dL (for adults) ; <120 mg/dL (for children)  Borderline: 130-159 mg/dL (for adults) ; 120-144 mg/dL (for children)  High: >=160 mg/dL (for adults) ; >=145 mg/dL (for children)  The suggested cutoff points are based on recommendations from the  American College of Cardiology/American Heart Association (ACC/AHA)  guidelines on the management of blood cholesterol {Circulation.  2019;139:y9950-g0781}, the National Cholesterol Education Program {SHON.  2001;285(19):6729-8524}, and the expert panel on integrated guidelines  for cardiovascular health and risk reduction in children and adolescents  {Pediatrics. 2011;128 Suppl 5(Suppl 5):R468-J547}. mg/dL    LDL Cholesterol Calculated: 94: Interpretive Comment:  The calculation for LDL cholesterol is based on the Jacinto/NIH equation  (SHON Cardiol. 2020;5(5):540–548. doi:10.1001/[email or web address]). The  acceptable LDL cholesterol concentration may vary based on cardiovascular  disease risk and treatment goals.  Acceptable: <130 mg/dL (for adults) ; <110 mg/dL (for children)  Borderline: 130-159 mg/dL (for adults) ; 110-129 mg/dL (for children)  High: >=160 mg/dL (for adults) ; >=130 mg/dL (for children) mg/dL  
Lipid Profile (11.04.23 @ 07:27)    Cholesterol: 152: Interpretive Comment:  Acceptable: <200 mg/dL (for adults) ; <170 mg/dL (for children)  Borderline: 200-239 mg/dL (for adults) ; 170-199 mg/dL (for children)  High: >=240 mg/dL (for adults) ; >=200 mg/dL (for children) mg/dL    Triglycerides, Serum: 106: Interpretive Comment:  Triglyceride concentration can be influenced when measured in the  non-fasting state.  Acceptable: <150 mg/dL (for adults) ; < 90 mg/dL (for children)  Borderline: 150-199 mg/dL (for adults) ;  mg/dL (for children)  High:>=200 mg/dL (for adults) ; >=130 mg/dL (for children) mg/dL    HDL Cholesterol: 38: Interpretive Comment:  HDL cholesterol less than 40 mg/dL is a risk factor for cardiovascular  disease mg/dL    Non HDL Cholesterol: 114: Interpretive Comment:  Non-HDL cholesterol is a secondary target of therapy in persons with high  serum triglycerides (> 199 mg/dL). The goal for non-HDL cholesterol in  persons with high triglycerides is 30 mg/dL higher than their LDL  cholesterol goal.  Acceptable: < 130 mg/dL (for adults) ; <120 mg/dL (for children)  Borderline: 130-159 mg/dL (for adults) ; 120-144 mg/dL (for children)  High: >=160 mg/dL (for adults) ; >=145 mg/dL (for children)  The suggested cutoff points are based on recommendations from the  American College of Cardiology/American Heart Association (ACC/AHA)  guidelines on the management of blood cholesterol {Circulation.  2019;139:l4579-u0089}, the National Cholesterol Education Program {SHON.  2001;285(19):0118-4449}, and the expert panel on integrated guidelines  for cardiovascular health and risk reduction in children and adolescents  {Pediatrics. 2011;128 Suppl 5(Suppl 5):D425-B958}. mg/dL    LDL Cholesterol Calculated: 94: Interpretive Comment:  The calculation for LDL cholesterol is based on the Jacinto/NIH equation  (SHON Cardiol. 2020;5(5):540–548. doi:10.1001/[email or web address]). The  acceptable LDL cholesterol concentration may vary based on cardiovascular  disease risk and treatment goals.  Acceptable: <130 mg/dL (for adults) ; <110 mg/dL (for children)  Borderline: 130-159 mg/dL (for adults) ; 110-129 mg/dL (for children)  High: >=160 mg/dL (for adults) ; >=130 mg/dL (for children) mg/dL

## 2023-11-15 NOTE — BH INPATIENT PSYCHIATRY DISCHARGE NOTE - NSDCMRMEDTOKEN_GEN_ALL_CORE_FT
benztropine 1 mg oral tablet: 1 tab(s) orally 2 times a day  OLANZapine 20 mg oral tablet: 1 tab(s) orally once a day (at bedtime)

## 2023-11-15 NOTE — BH INPATIENT PSYCHIATRY DISCHARGE NOTE - HOSPITAL COURSE
Patient was admitted involuntarily from ED at . He needs extra time for stability as he is slow responder to meds. He was provided the same meds as given previously here at -5-N. Zyprexa 5 mg HS later Zyprexa was further increased to 10 and eventually to Zyprexa 20 mg HS. Titration was relatively faster this time as he was fixated to leave as he feels that the staff/peers does not speak [place] so he had a difficult time adjusting with limited communication. He was never suicidal, initially was agitated or upset but overall was safe as he preferred to stay pout pf trouble. He has good sleep/appetite, staff feels that he is doing very well at this time. He fused EPPS because he is not interested. He was never suicidal, U-Tox was negative and there are no meds induced s/e till now. He denied being depressed and to continue meds as given. To f/u as per Sw referral.    He has no medical issues at this time.

## 2023-11-15 NOTE — BH INPATIENT PSYCHIATRY DISCHARGE NOTE - NSBHDCRISKMITIGATEFT_PSY_ALL_CORE
To f/u with [facility] or as per [facility] referral and to communicate with them any difficulties. or may come to ED un crisis for assistance.

## 2023-11-15 NOTE — BH INPATIENT PSYCHIATRY PROGRESS NOTE - NSBHMSEMOOD_PSY_A_CORE
Anxious/Unable to assess
Normal
Anxious/Unable to assess

## 2023-11-15 NOTE — BH INPATIENT PSYCHIATRY PROGRESS NOTE - NSBHMSEGAIT_PSY_A_CORE
Normal gait / station
Unable to assess

## 2023-11-15 NOTE — BH INPATIENT PSYCHIATRY PROGRESS NOTE - NSBHADMITMEDEDUDETAILS_A_CORE FT
On Zyprexa trial after discussing of meds s/e and risks.

## 2023-11-15 NOTE — BH INPATIENT PSYCHIATRY PROGRESS NOTE - NSTXCOPEDATEEST_PSY_ALL_CORE
03-Nov-2023
10-Nov-2023
10-Nov-2023
03-Nov-2023
13-Nov-2023
10-Nov-2023
03-Nov-2023
13-Nov-2023
13-Nov-2023

## 2023-11-15 NOTE — BH INPATIENT PSYCHIATRY PROGRESS NOTE - NSBHMSEINTELL_PSY_A_CORE
Unable to assess
Average

## 2023-11-15 NOTE — BH INPATIENT PSYCHIATRY PROGRESS NOTE - NSICDXBHPRIMARYDX_PSY_ALL_CORE
Schizophrenia   F20.9  

## 2023-11-15 NOTE — BH INPATIENT PSYCHIATRY PROGRESS NOTE - NSTXDCOPNOINTERMD_PSY_ALL_CORE
Schizophrenia--On Zyprexa 5 mg HS trial, later Zyprexa increased to 20 mg HS
Schizophrenia--On Zyprexa 5 mg HS trial, later Zyprexa increased to 15 mg HS
Schizophrenia--On Zyprexa 5 mg HS trial, later Zyprexa increased to 15 mg HS
Schizophrenia--On Zyprexa 5 mg HS trial, later Zyprexa increased to 20 mg HS
Schizophrenia--On Zyprexa 5 mg HS trial, later Zyprexa increased to 15 mg HS

## 2023-11-15 NOTE — BH INPATIENT PSYCHIATRY PROGRESS NOTE - NSBHASSESSSUMMFT_PSY_ALL_CORE
This is a 23 yo male, Papua [place] Guinean-speaking, domiciled w/ family, with history of schizophrenia vs affective psychosis, multiple prior psych admissions, most recent in Aug 2023, no suicide attempts or violence hx, BIBPD for psychosis and HI towards sister.   Pt denies SI, prior SAs, HI, AVH, paranoia.   Patient was admitted for psychosis and homicidal thoughts.  On inpatient unit he is cooperative and he denies any disturbing thoughts.  He is acute risk remains moderate as he is encapsulating, does not answer the question and remains psychotic.  His factors are mitigated by giving him antipsychotic.  Plan continue medication :    OLANZapine Disintegrating Tablet 5 milliGRAM(s) Oral at bedtime      
This is a 21 yo male, Maltese-speaking, domiciled w/ family, with history of schizophrenia vs affective psychosis, multiple prior psych admissions, most recent in Aug 2023, no suicide attempts or violence hx, BIBPD for psychosis and HI towards sister.   Pt denies SI, prior SAs, HI, AVH, paranoia.   Patient was admitted for psychosis and homicidal thoughts.  On inpatient unit he is cooperative and he denies any disturbing thoughts.  He is acute risk remains moderate as he is encapsulating, does not answer the question and remains psychotic.  His factors are mitigated by giving him antipsychotic.  Plan continue medication :    OLANZapine Disintegrating Tablet 5 milliGRAM(s) Oral at bedtime      
This is a 23 yo male, South Sudanese-speaking, domiciled w/ family, with history of schizophrenia vs affective psychosis, multiple prior psych admissions, most recent in Aug 2023, no suicide attempts or violence hx, BIBPD for psychosis and HI towards sister.   Pt denies SI, prior SAs, HI, AVH, paranoia.   Patient was admitted for psychosis and homicidal thoughts.  On inpatient unit he is cooperative and he denies any disturbing thoughts.  He is acute risk remains moderate as he is encapsulating, does not answer the question and remains psychotic.  His factors are mitigated by giving him antipsychotic.  Plan continue medication :    OLANZapine Disintegrating Tablet 5 milliGRAM(s) Oral at bedtime      
This is a 21 yo male, Marshallese-speaking, domiciled w/ family, with history of schizophrenia vs affective psychosis, multiple prior psych admissions, most recent in Aug 2023, no suicide attempts or violence hx, BIBPD for psychosis and HI towards sister.   Pt denies SI, prior SAs, HI, AVH, paranoia.   Patient was admitted for psychosis and homicidal thoughts.  On inpatient unit he is cooperative and he denies any disturbing thoughts.  He is acute risk remains moderate as he is encapsulating, does not answer the question and remains psychotic.  His factors are mitigated by giving him antipsychotic.  Plan continue medication :    OLANZapine Disintegrating Tablet 5 milliGRAM(s) Oral at bedtime      
This is a 23 yo male, Anguillan-speaking, domiciled w/ family, with history of schizophrenia vs affective psychosis, multiple prior psych admissions, most recent in Aug 2023, no suicide attempts or violence hx, BIBPD for psychosis and HI towards sister.   Pt denies SI, prior SAs, HI, AVH, paranoia.   Patient was admitted for psychosis and homicidal thoughts.  On inpatient unit he is cooperative and he denies any disturbing thoughts.  He is acute risk remains moderate as he is encapsulating, does not answer the question and remains psychotic.  His factors are mitigated by giving him antipsychotic.  Plan continue medication :    OLANZapine Disintegrating Tablet 5 milliGRAM(s) Oral at bedtime      
This is a 21 yo male, Bahamian-speaking, domiciled w/ family, with history of schizophrenia vs affective psychosis, multiple prior psych admissions, most recent in Aug 2023, no suicide attempts or violence hx, BIBPD for psychosis and HI towards sister.   Pt denies SI, prior SAs, HI, AVH, paranoia.   Patient was admitted for psychosis and homicidal thoughts.  On inpatient unit he is cooperative and he denies any disturbing thoughts.  He is acute risk remains moderate as he is encapsulating, does not answer the question and remains psychotic.  His factors are mitigated by giving him antipsychotic.  Plan continue medication :    OLANZapine Disintegrating Tablet 5 milliGRAM(s) Oral at bedtime      
This is a 23 yo male, Gabonese-speaking, domiciled w/ family, with history of schizophrenia vs affective psychosis, multiple prior psych admissions, most recent in Aug 2023, no suicide attempts or violence hx, BIBPD for psychosis and HI towards sister.   Pt denies SI, prior SAs, HI, AVH, paranoia.   Patient was admitted for psychosis and homicidal thoughts.  On inpatient unit he is cooperative and he denies any disturbing thoughts.  He is acute risk remains moderate as he is encapsulating, does not answer the question and remains psychotic.  His factors are mitigated by giving him antipsychotic.  Plan continue medication :    OLANZapine Disintegrating Tablet 5 milliGRAM(s) Oral at bedtime      
This is a 23 yo male, Lebanese-speaking, domiciled w/ family, with history of schizophrenia vs affective psychosis, multiple prior psych admissions, most recent in Aug 2023, no suicide attempts or violence hx, BIBPD for psychosis and HI towards sister.   Pt denies SI, prior SAs, HI, AVH, paranoia.   Patient was admitted for psychosis and homicidal thoughts.  On inpatient unit he is cooperative and he denies any disturbing thoughts.  He is acute risk remains moderate as he is encapsulating, does not answer the question and remains psychotic.  His factors are mitigated by giving him antipsychotic.  Plan continue medication :    OLANZapine Disintegrating Tablet 5 milliGRAM(s) Oral at bedtime      
This is a 23 yo male, Ukrainian-speaking, domiciled w/ family, with history of schizophrenia vs affective psychosis, multiple prior psych admissions, most recent in Aug 2023, no suicide attempts or violence hx, BIBPD for psychosis and HI towards sister.   Pt denies SI, prior SAs, HI, AVH, paranoia.   Patient was admitted for psychosis and homicidal thoughts.  On inpatient unit he is cooperative and he denies any disturbing thoughts.  He is acute risk remains moderate as he is encapsulating, does not answer the question and remains psychotic.  His factors are mitigated by giving him antipsychotic.  Plan continue medication :    OLANZapine Disintegrating Tablet 5 milliGRAM(s) Oral at bedtime

## 2023-11-15 NOTE — BH INPATIENT PSYCHIATRY PROGRESS NOTE - NSTXDISORGINTERMD_PSY_ALL_CORE
Schizophrenia--On Zyprexa 5 mg HS trial, later Zyprexa increased to 15 mg HS
Schizophrenia--On Zyprexa 5 mg HS trial, later Zyprexa increased to 20 mg HS
Schizophrenia--On Zyprexa 5 mg HS trial, later Zyprexa increased to 20 mg HS
Schizophrenia--On Zyprexa 5 mg HS trial, later Zyprexa increased to 15 mg HS
Schizophrenia--On Zyprexa 5 mg HS trial, later Zyprexa increased to 10 mg HS
Schizophrenia--On Zyprexa 5 mg HS trial, later Zyprexa increased to 15 mg HS
Schizophrenia--On Zyprexa 5 mg HS trial, later Zyprexa increased to 10 mg HS

## 2023-11-15 NOTE — BH INPATIENT PSYCHIATRY PROGRESS NOTE - NSBHADMITMEDEDUDETAILS_PSY_A_CORE FT
Patient was advised to take meds, discussed Zyprexa s/e, risks, and benefits

## 2023-11-15 NOTE — BH INPATIENT PSYCHIATRY PROGRESS NOTE - NSBHMSELANG_PSY_A_CORE
No abnormalities noted/Unable to assess

## 2023-11-15 NOTE — BH INPATIENT PSYCHIATRY DISCHARGE NOTE - NSDCPROCEDURESFT_PSY_ALL_CORE
CBC-WNL  CMP-WNL  TSH-1.20  HBA1-C-5.0    EKG-QTC-411 msec    Covid-19-Negative    Lipid Profile  LDL-94  HDL-38  TRG-106

## 2023-11-15 NOTE — BH INPATIENT PSYCHIATRY PROGRESS NOTE - PRN MEDS
MEDICATIONS  (PRN):  diphenhydrAMINE 50 milliGRAM(s) Oral every 6 hours PRN Extrapyramidal prophylaxis  diphenhydrAMINE Injectable 50 milliGRAM(s) IntraMuscular once PRN Extrapyramidal prophylaxis  haloperidol     Tablet 5 milliGRAM(s) Oral every 6 hours PRN moderate psychotic agitation  haloperidol    Injectable 5 milliGRAM(s) IntraMuscular once PRN severe psychotic agitation  LORazepam     Tablet 2 milliGRAM(s) Oral every 6 hours PRN moderate psychotic anxiety  LORazepam   Injectable 2 milliGRAM(s) IntraMuscular once PRN severe psychotic anxiety  nicotine -   7 mG/24Hr(s) Patch 1 Patch Transdermal daily PRN if pt craves cigarettes and psychiatry aggrees  
MEDICATIONS  (PRN):  diphenhydrAMINE 50 milliGRAM(s) Oral every 6 hours PRN Extrapyramidal prophylaxis  diphenhydrAMINE Injectable 50 milliGRAM(s) IntraMuscular once PRN Extrapyramidal prophylaxis  haloperidol     Tablet 5 milliGRAM(s) Oral every 6 hours PRN moderate psychotic agitation  haloperidol    Injectable 5 milliGRAM(s) IntraMuscular once PRN severe psychotic agitation  nicotine -   7 mG/24Hr(s) Patch 1 Patch Transdermal daily PRN if pt craves cigarettes and psychiatry aggrees  

## 2023-11-15 NOTE — BH INPATIENT PSYCHIATRY PROGRESS NOTE - NSBHMSETHTCONTENT_PSY_A_CORE
Unremarkable
Delusions/Preoccupations

## 2023-11-15 NOTE — BH INPATIENT PSYCHIATRY DISCHARGE NOTE - NSBHASSESSSUMMFT_PSY_ALL_CORE
This is a 21 yo male, Nigerian-speaking, domiciled w/ family, with history of schizophrenia vs affective psychosis, multiple prior psych admissions, most recent in Aug 2023, no suicide attempts or violence hx, BIBPD for psychosis and HI towards sister.   Pt denies SI, prior SAs, HI, AVH, paranoia.   Patient was admitted for psychosis and homicidal thoughts.  On inpatient unit he is cooperative and he denies any disturbing thoughts.  He is acute risk remains moderate as he is encapsulating, does not answer the question and remains psychotic.  His factors are mitigated by giving him antipsychotic.  Plan continue medication :    OLANZapine Disintegrating Tablet 5 milliGRAM(s) Oral at bedtime

## 2023-11-15 NOTE — BH INPATIENT PSYCHIATRY PROGRESS NOTE - NSBHMSEPERCEPT_PSY_A_CORE
Auditory hallucinations
No abnormalities
Auditory hallucinations

## 2023-11-15 NOTE — BH INPATIENT PSYCHIATRY PROGRESS NOTE - NSBHMSETHTPROC_PSY_A_CORE
Unable to assess
Linear/Unable to assess
Unable to assess

## 2023-11-15 NOTE — BH INPATIENT PSYCHIATRY PROGRESS NOTE - NSBHATTESTBILLING_PSY_A_CORE
81921-Vmctqipozy OBS or IP - moderate complexity OR 35-49 mins
22773-Zlxqnacaeq OBS or IP - moderate complexity OR 35-49 mins
19402-Lweynbhofw OBS or IP - moderate complexity OR 35-49 mins
10319-Alfvrswipq OBS or IP - moderate complexity OR 35-49 mins
58471-Mdwfjqnikn OBS or IP - moderate complexity OR 35-49 mins
92683-Fascdchvvq OBS or IP - moderate complexity OR 35-49 mins
78958-Ptwpsodwbu OBS or IP - moderate complexity OR 35-49 mins
61000-Elqysoyhdi OBS or IP - moderate complexity OR 35-49 mins
77837-Zjynhrvslo OBS or IP - moderate complexity OR 35-49 mins

## 2023-11-15 NOTE — BH INPATIENT PSYCHIATRY PROGRESS NOTE - NSTXCOPEDATETRGT_PSY_ALL_CORE
17-Nov-2023
20-Nov-2023
17-Nov-2023
20-Nov-2023
20-Nov-2023

## 2023-11-15 NOTE — BH INPATIENT PSYCHIATRY DISCHARGE NOTE - HPI (INCLUDE ILLNESS QUALITY, SEVERITY, DURATION, TIMING, CONTEXT, MODIFYING FACTORS, ASSOCIATED SIGNS AND SYMPTOMS)
This is a 23 yo male, South African-speaking, domiciled w/ family, with history of schizophrenia vs affective psychosis, multiple prior psych admissions, most recent in Aug 2023, no suicide attempts or violence hx, BIBPD for psychosis and HI towards sister.     Attempted interview w/ . Patient is somnolent, with odd affect and irregular eye movements, laughing inappropriately and superficially cooperative. He says he doesn't know why he is in ER or precipitating events. He is internally preoccupied and doesn't answer most questions. When asked about threats to his sister, he denies this and denies having a sister. Pt denies SI, prior SAs, HI, AVH, paranoia. Eventually patient refuses to answer further questions and lays back down.     No collateral available; voicemail left for uncle.

## 2023-11-15 NOTE — BH INPATIENT PSYCHIATRY PROGRESS NOTE - NSBHMSEAFFQUAL_PSY_A_CORE
Euthymic/Unable to assess
Anxious/Unable to assess

## 2023-11-15 NOTE — BH INPATIENT PSYCHIATRY PROGRESS NOTE - NSBHCHARTREVIEWINVESTIGATE_PSY_A_CORE FT
Ventricular Rate 76 BPM    Atrial Rate 76 BPM    P-R Interval 144 ms    QRS Duration 88 ms    Q-T Interval 366 ms    QTC Calculation(Bazett) 411 ms    P Axis 67 degrees    R Axis 46 degrees    T Axis 15 degrees    Diagnosis Line Normal sinus rhythm  Nonspecific T wave abnormality  Abnormal ECG  When compared with ECG of 27-JUL-2023 21:48,  Nonspecific T wave abnormality, worse in Inferior leads  Nonspecific T wave abnormality now evident in Lateral leads  Confirmed by Jerman Dunne MD ([id number]) on 11/3/2023 3:37:42 PM  
  Ventricular Rate 76 BPM    Atrial Rate 76 BPM    P-R Interval 144 ms    QRS Duration 88 ms    Q-T Interval 366 ms    QTC Calculation(Bazett) 411 ms    P Axis 67 degrees    R Axis 46 degrees    T Axis 15 degrees    Diagnosis Line Normal sinus rhythm  Nonspecific T wave abnormality  Abnormal ECG  When compared with ECG of 27-JUL-2023 21:48,  Nonspecific T wave abnormality, worse in Inferior leads  Nonspecific T wave abnormality now evident in Lateral leads  Confirmed by Jerman Dunne MD ([id number]) on 11/3/2023 3:37:42 PM  
  Ventricular Rate 76 BPM    Atrial Rate 76 BPM    P-R Interval 144 ms    QRS Duration 88 ms    Q-T Interval 366 ms    QTC Calculation(Bazett) 411 ms    P Axis 67 degrees    R Axis 46 degrees    T Axis 15 degrees    Diagnosis Line Normal sinus rhythm  Nonspecific T wave abnormality  Abnormal ECG  When compared with ECG of 27-JUL-2023 21:48,  Nonspecific T wave abnormality, worse in Inferior leads  Nonspecific T wave abnormality now evident in Lateral leads  Confirmed by Jerman Dunne MD [id number]) on 11/3/2023 3:37:42 PM  
  Ventricular Rate 76 BPM    Atrial Rate 76 BPM    P-R Interval 144 ms    QRS Duration 88 ms    Q-T Interval 366 ms    QTC Calculation(Bazett) 411 ms    P Axis 67 degrees    R Axis 46 degrees    T Axis 15 degrees    Diagnosis Line Normal sinus rhythm  Nonspecific T wave abnormality  Abnormal ECG  When compared with ECG of 27-JUL-2023 21:48,  Nonspecific T wave abnormality, worse in Inferior leads  Nonspecific T wave abnormality now evident in Lateral leads  Confirmed by Jerman Dunne MD [id number]) on 11/3/2023 3:37:42 PM  
  Ventricular Rate 76 BPM    Atrial Rate 76 BPM    P-R Interval 144 ms    QRS Duration 88 ms    Q-T Interval 366 ms    QTC Calculation(Bazett) 411 ms    P Axis 67 degrees    R Axis 46 degrees    T Axis 15 degrees    Diagnosis Line Normal sinus rhythm  Nonspecific T wave abnormality  Abnormal ECG  When compared with ECG of 27-JUL-2023 21:48,  Nonspecific T wave abnormality, worse in Inferior leads  Nonspecific T wave abnormality now evident in Lateral leads  Confirmed by Jerman Dunne MD ([id number]) on 11/3/2023 3:37:42 PM  
  Ventricular Rate 76 BPM    Atrial Rate 76 BPM    P-R Interval 144 ms    QRS Duration 88 ms    Q-T Interval 366 ms    QTC Calculation(Bazett) 411 ms    P Axis 67 degrees    R Axis 46 degrees    T Axis 15 degrees    Diagnosis Line Normal sinus rhythm  Nonspecific T wave abnormality  Abnormal ECG  When compared with ECG of 27-JUL-2023 21:48,  Nonspecific T wave abnormality, worse in Inferior leads  Nonspecific T wave abnormality now evident in Lateral leads  Confirmed by Jerman Dunne MD ([id number]) on 11/3/2023 3:37:42 PM  
  Ventricular Rate 76 BPM    Atrial Rate 76 BPM    P-R Interval 144 ms    QRS Duration 88 ms    Q-T Interval 366 ms    QTC Calculation(Bazett) 411 ms    P Axis 67 degrees    R Axis 46 degrees    T Axis 15 degrees    Diagnosis Line Normal sinus rhythm  Nonspecific T wave abnormality  Abnormal ECG  When compared with ECG of 27-JUL-2023 21:48,  Nonspecific T wave abnormality, worse in Inferior leads  Nonspecific T wave abnormality now evident in Lateral leads  Confirmed by Jerman uDnne MD ([id number]) on 11/3/2023 3:37:42 PM  
  Ventricular Rate 76 BPM    Atrial Rate 76 BPM    P-R Interval 144 ms    QRS Duration 88 ms    Q-T Interval 366 ms    QTC Calculation(Bazett) 411 ms    P Axis 67 degrees    R Axis 46 degrees    T Axis 15 degrees    Diagnosis Line Normal sinus rhythm  Nonspecific T wave abnormality  Abnormal ECG  When compared with ECG of 27-JUL-2023 21:48,  Nonspecific T wave abnormality, worse in Inferior leads  Nonspecific T wave abnormality now evident in Lateral leads  Confirmed by Jerman Dunne MD ([id number]) on 11/3/2023 3:37:42 PM  
  Ventricular Rate 76 BPM    Atrial Rate 76 BPM    P-R Interval 144 ms    QRS Duration 88 ms    Q-T Interval 366 ms    QTC Calculation(Bazett) 411 ms    P Axis 67 degrees    R Axis 46 degrees    T Axis 15 degrees    Diagnosis Line Normal sinus rhythm  Nonspecific T wave abnormality  Abnormal ECG  When compared with ECG of 27-JUL-2023 21:48,  Nonspecific T wave abnormality, worse in Inferior leads  Nonspecific T wave abnormality now evident in Lateral leads  Confirmed by Jerman Dunne MD ([id number]) on 11/3/2023 3:37:42 PM

## 2023-11-15 NOTE — BH INPATIENT PSYCHIATRY PROGRESS NOTE - NSDCCRITERIA_PSY_ALL_CORE
When no longer depressed and  Psychotic

## 2023-11-15 NOTE — BH INPATIENT PSYCHIATRY PROGRESS NOTE - NSBHFUPINTERVALHXFT_PSY_A_CORE
11/15/2023: Patient was seen today AM, chart reviewed and discussed in team. He  is meds complaint and has been doing well as previously noted, no meds induced s/e noted, he denies A/V/H or Paranoid delusions, he has fair to good sleep/appetite, prefers to stay by himself as he has limited to no peers who speaks Chinese. To be discharged and to f/u as per Sw referral. Not S/H at the time of discharge and there were no prior SI/SA.

## 2023-11-18 DIAGNOSIS — Z71.6 TOBACCO ABUSE COUNSELING: ICD-10-CM

## 2023-11-18 DIAGNOSIS — Z91.148 PATIENT'S OTHER NONCOMPLIANCE WITH MEDICATION REGIMEN FOR OTHER REASON: ICD-10-CM

## 2023-11-18 DIAGNOSIS — F17.210 NICOTINE DEPENDENCE, CIGARETTES, UNCOMPLICATED: ICD-10-CM

## 2023-11-18 DIAGNOSIS — R45.850 HOMICIDAL IDEATIONS: ICD-10-CM

## 2023-11-18 DIAGNOSIS — F20.9 SCHIZOPHRENIA, UNSPECIFIED: ICD-10-CM

## 2023-11-21 NOTE — CHART NOTE - NSCHARTNOTEFT_GEN_A_CORE
GEREMIAS r'cd call from [facility] in [place], pt did not link to appt on 11/17. GEREMIAS r'cd email from [facility], pt accepted to [facility] @ [phone]. GEREMIAS spoke with Katie with ACT @ [phone], plan to contact pt today to schedule intake for 11/27. GEREMIAS contacted pt (ID#[id number]), phone not in service. GEREMIAS spoke with pt's uncle to inform, consent obtained during admission, and informed him that pt missed intake and that [facility] is looking to schedule intake for next week. Uncle reports that pt "continues the same routine" and that he "only needs to take one pill a month" but denies behavorial concerns. Uncle reports that pt has not paid his phone bill and only [employer] is working. GEREMIAS spoke with pt who was home with uncle, pt reports doing well and taking his medication. GEREMIAS informed pt of acceptance to [facility] team and inquired if team could contact uncles phone, pt agreeable for ACT team to contact uncles phone to schedule intake. GEREMIAS reviewed with pt the importance of taking his medication daily, pt expressed understanding. GEREMIAS spoke with Katie again and informed her of conversation with pt and provided uncles phone #. GEREMIAS informed psych MD of neema, GEREMIAS to f/u with [facility] team to see if intake was completed next week.

## 2023-11-28 ENCOUNTER — INPATIENT (INPATIENT)
Facility: HOSPITAL | Age: 23
LOS: 13 days | Discharge: HOME | DRG: 750 | End: 2023-12-12
Attending: PSYCHIATRY & NEUROLOGY | Admitting: PSYCHIATRY & NEUROLOGY
Payer: COMMERCIAL

## 2023-11-28 VITALS
OXYGEN SATURATION: 100 % | HEIGHT: 65 IN | SYSTOLIC BLOOD PRESSURE: 99 MMHG | DIASTOLIC BLOOD PRESSURE: 46 MMHG | TEMPERATURE: 98 F | RESPIRATION RATE: 18 BRPM | HEART RATE: 68 BPM

## 2023-11-28 DIAGNOSIS — F29 UNSPECIFIED PSYCHOSIS NOT DUE TO A SUBSTANCE OR KNOWN PHYSIOLOGICAL CONDITION: ICD-10-CM

## 2023-11-28 LAB
ALBUMIN SERPL ELPH-MCNC: 4.1 G/DL — SIGNIFICANT CHANGE UP (ref 3.3–5)
ALBUMIN SERPL ELPH-MCNC: 4.1 G/DL — SIGNIFICANT CHANGE UP (ref 3.3–5)
ALP SERPL-CCNC: 53 U/L — SIGNIFICANT CHANGE UP (ref 40–120)
ALP SERPL-CCNC: 53 U/L — SIGNIFICANT CHANGE UP (ref 40–120)
ALT FLD-CCNC: 26 U/L — SIGNIFICANT CHANGE UP (ref 12–78)
ALT FLD-CCNC: 26 U/L — SIGNIFICANT CHANGE UP (ref 12–78)
AMPHET UR-MCNC: NEGATIVE — SIGNIFICANT CHANGE UP
AMPHET UR-MCNC: NEGATIVE — SIGNIFICANT CHANGE UP
ANION GAP SERPL CALC-SCNC: 4 MMOL/L — LOW (ref 5–17)
ANION GAP SERPL CALC-SCNC: 4 MMOL/L — LOW (ref 5–17)
APAP SERPL-MCNC: < 2 UG/ML (ref 10–30)
APAP SERPL-MCNC: < 2 UG/ML (ref 10–30)
APPEARANCE UR: CLEAR — SIGNIFICANT CHANGE UP
APPEARANCE UR: CLEAR — SIGNIFICANT CHANGE UP
AST SERPL-CCNC: 18 U/L — SIGNIFICANT CHANGE UP (ref 15–37)
AST SERPL-CCNC: 18 U/L — SIGNIFICANT CHANGE UP (ref 15–37)
BARBITURATES UR SCN-MCNC: NEGATIVE — SIGNIFICANT CHANGE UP
BARBITURATES UR SCN-MCNC: NEGATIVE — SIGNIFICANT CHANGE UP
BASOPHILS # BLD AUTO: 0.02 K/UL — SIGNIFICANT CHANGE UP (ref 0–0.2)
BASOPHILS # BLD AUTO: 0.02 K/UL — SIGNIFICANT CHANGE UP (ref 0–0.2)
BASOPHILS NFR BLD AUTO: 0.5 % — SIGNIFICANT CHANGE UP (ref 0–2)
BASOPHILS NFR BLD AUTO: 0.5 % — SIGNIFICANT CHANGE UP (ref 0–2)
BENZODIAZ UR-MCNC: NEGATIVE — SIGNIFICANT CHANGE UP
BENZODIAZ UR-MCNC: NEGATIVE — SIGNIFICANT CHANGE UP
BILIRUB SERPL-MCNC: 0.6 MG/DL — SIGNIFICANT CHANGE UP (ref 0.2–1.2)
BILIRUB SERPL-MCNC: 0.6 MG/DL — SIGNIFICANT CHANGE UP (ref 0.2–1.2)
BILIRUB UR-MCNC: NEGATIVE — SIGNIFICANT CHANGE UP
BILIRUB UR-MCNC: NEGATIVE — SIGNIFICANT CHANGE UP
BUN SERPL-MCNC: 9 MG/DL — SIGNIFICANT CHANGE UP (ref 7–23)
BUN SERPL-MCNC: 9 MG/DL — SIGNIFICANT CHANGE UP (ref 7–23)
CALCIUM SERPL-MCNC: 9 MG/DL — SIGNIFICANT CHANGE UP (ref 8.5–10.1)
CALCIUM SERPL-MCNC: 9 MG/DL — SIGNIFICANT CHANGE UP (ref 8.5–10.1)
CHLORIDE SERPL-SCNC: 106 MMOL/L — SIGNIFICANT CHANGE UP (ref 96–108)
CHLORIDE SERPL-SCNC: 106 MMOL/L — SIGNIFICANT CHANGE UP (ref 96–108)
CO2 SERPL-SCNC: 30 MMOL/L — SIGNIFICANT CHANGE UP (ref 22–31)
CO2 SERPL-SCNC: 30 MMOL/L — SIGNIFICANT CHANGE UP (ref 22–31)
COCAINE METAB.OTHER UR-MCNC: NEGATIVE — SIGNIFICANT CHANGE UP
COCAINE METAB.OTHER UR-MCNC: NEGATIVE — SIGNIFICANT CHANGE UP
COLOR SPEC: YELLOW — SIGNIFICANT CHANGE UP
COLOR SPEC: YELLOW — SIGNIFICANT CHANGE UP
CREAT SERPL-MCNC: 1.12 MG/DL — SIGNIFICANT CHANGE UP (ref 0.5–1.3)
CREAT SERPL-MCNC: 1.12 MG/DL — SIGNIFICANT CHANGE UP (ref 0.5–1.3)
DIFF PNL FLD: NEGATIVE — SIGNIFICANT CHANGE UP
DIFF PNL FLD: NEGATIVE — SIGNIFICANT CHANGE UP
EGFR: 95 ML/MIN/1.73M2 — SIGNIFICANT CHANGE UP
EGFR: 95 ML/MIN/1.73M2 — SIGNIFICANT CHANGE UP
EOSINOPHIL # BLD AUTO: 0.04 K/UL — SIGNIFICANT CHANGE UP (ref 0–0.5)
EOSINOPHIL # BLD AUTO: 0.04 K/UL — SIGNIFICANT CHANGE UP (ref 0–0.5)
EOSINOPHIL NFR BLD AUTO: 0.9 % — SIGNIFICANT CHANGE UP (ref 0–6)
EOSINOPHIL NFR BLD AUTO: 0.9 % — SIGNIFICANT CHANGE UP (ref 0–6)
ETHANOL SERPL-MCNC: <10 MG/DL — SIGNIFICANT CHANGE UP (ref 0–10)
ETHANOL SERPL-MCNC: <10 MG/DL — SIGNIFICANT CHANGE UP (ref 0–10)
GLUCOSE SERPL-MCNC: 82 MG/DL — SIGNIFICANT CHANGE UP (ref 70–99)
GLUCOSE SERPL-MCNC: 82 MG/DL — SIGNIFICANT CHANGE UP (ref 70–99)
GLUCOSE UR QL: NEGATIVE MG/DL — SIGNIFICANT CHANGE UP
GLUCOSE UR QL: NEGATIVE MG/DL — SIGNIFICANT CHANGE UP
HCT VFR BLD CALC: 45.7 % — SIGNIFICANT CHANGE UP (ref 39–50)
HCT VFR BLD CALC: 45.7 % — SIGNIFICANT CHANGE UP (ref 39–50)
HGB BLD-MCNC: 15.9 G/DL — SIGNIFICANT CHANGE UP (ref 13–17)
HGB BLD-MCNC: 15.9 G/DL — SIGNIFICANT CHANGE UP (ref 13–17)
IMM GRANULOCYTES NFR BLD AUTO: 0.5 % — SIGNIFICANT CHANGE UP (ref 0–0.9)
IMM GRANULOCYTES NFR BLD AUTO: 0.5 % — SIGNIFICANT CHANGE UP (ref 0–0.9)
KETONES UR-MCNC: NEGATIVE MG/DL — SIGNIFICANT CHANGE UP
KETONES UR-MCNC: NEGATIVE MG/DL — SIGNIFICANT CHANGE UP
LEUKOCYTE ESTERASE UR-ACNC: NEGATIVE — SIGNIFICANT CHANGE UP
LEUKOCYTE ESTERASE UR-ACNC: NEGATIVE — SIGNIFICANT CHANGE UP
LYMPHOCYTES # BLD AUTO: 1.44 K/UL — SIGNIFICANT CHANGE UP (ref 1–3.3)
LYMPHOCYTES # BLD AUTO: 1.44 K/UL — SIGNIFICANT CHANGE UP (ref 1–3.3)
LYMPHOCYTES # BLD AUTO: 33 % — SIGNIFICANT CHANGE UP (ref 13–44)
LYMPHOCYTES # BLD AUTO: 33 % — SIGNIFICANT CHANGE UP (ref 13–44)
MCHC RBC-ENTMCNC: 30.1 PG — SIGNIFICANT CHANGE UP (ref 27–34)
MCHC RBC-ENTMCNC: 30.1 PG — SIGNIFICANT CHANGE UP (ref 27–34)
MCHC RBC-ENTMCNC: 34.8 GM/DL — SIGNIFICANT CHANGE UP (ref 32–36)
MCHC RBC-ENTMCNC: 34.8 GM/DL — SIGNIFICANT CHANGE UP (ref 32–36)
MCV RBC AUTO: 86.4 FL — SIGNIFICANT CHANGE UP (ref 80–100)
MCV RBC AUTO: 86.4 FL — SIGNIFICANT CHANGE UP (ref 80–100)
METHADONE UR-MCNC: NEGATIVE — SIGNIFICANT CHANGE UP
METHADONE UR-MCNC: NEGATIVE — SIGNIFICANT CHANGE UP
MONOCYTES # BLD AUTO: 0.33 K/UL — SIGNIFICANT CHANGE UP (ref 0–0.9)
MONOCYTES # BLD AUTO: 0.33 K/UL — SIGNIFICANT CHANGE UP (ref 0–0.9)
MONOCYTES NFR BLD AUTO: 7.6 % — SIGNIFICANT CHANGE UP (ref 2–14)
MONOCYTES NFR BLD AUTO: 7.6 % — SIGNIFICANT CHANGE UP (ref 2–14)
NEUTROPHILS # BLD AUTO: 2.51 K/UL — SIGNIFICANT CHANGE UP (ref 1.8–7.4)
NEUTROPHILS # BLD AUTO: 2.51 K/UL — SIGNIFICANT CHANGE UP (ref 1.8–7.4)
NEUTROPHILS NFR BLD AUTO: 57.5 % — SIGNIFICANT CHANGE UP (ref 43–77)
NEUTROPHILS NFR BLD AUTO: 57.5 % — SIGNIFICANT CHANGE UP (ref 43–77)
NITRITE UR-MCNC: NEGATIVE — SIGNIFICANT CHANGE UP
NITRITE UR-MCNC: NEGATIVE — SIGNIFICANT CHANGE UP
OPIATES UR-MCNC: NEGATIVE — SIGNIFICANT CHANGE UP
OPIATES UR-MCNC: NEGATIVE — SIGNIFICANT CHANGE UP
PCP SPEC-MCNC: SIGNIFICANT CHANGE UP
PCP SPEC-MCNC: SIGNIFICANT CHANGE UP
PCP UR-MCNC: NEGATIVE — SIGNIFICANT CHANGE UP
PCP UR-MCNC: NEGATIVE — SIGNIFICANT CHANGE UP
PH UR: 6.5 — SIGNIFICANT CHANGE UP (ref 5–8)
PH UR: 6.5 — SIGNIFICANT CHANGE UP (ref 5–8)
PLATELET # BLD AUTO: 256 K/UL — SIGNIFICANT CHANGE UP (ref 150–400)
PLATELET # BLD AUTO: 256 K/UL — SIGNIFICANT CHANGE UP (ref 150–400)
POTASSIUM SERPL-MCNC: 3.9 MMOL/L — SIGNIFICANT CHANGE UP (ref 3.5–5.3)
POTASSIUM SERPL-MCNC: 3.9 MMOL/L — SIGNIFICANT CHANGE UP (ref 3.5–5.3)
POTASSIUM SERPL-SCNC: 3.9 MMOL/L — SIGNIFICANT CHANGE UP (ref 3.5–5.3)
POTASSIUM SERPL-SCNC: 3.9 MMOL/L — SIGNIFICANT CHANGE UP (ref 3.5–5.3)
PROT SERPL-MCNC: 7.9 GM/DL — SIGNIFICANT CHANGE UP (ref 6–8.3)
PROT SERPL-MCNC: 7.9 GM/DL — SIGNIFICANT CHANGE UP (ref 6–8.3)
PROT UR-MCNC: NEGATIVE MG/DL — SIGNIFICANT CHANGE UP
PROT UR-MCNC: NEGATIVE MG/DL — SIGNIFICANT CHANGE UP
RBC # BLD: 5.29 M/UL — SIGNIFICANT CHANGE UP (ref 4.2–5.8)
RBC # BLD: 5.29 M/UL — SIGNIFICANT CHANGE UP (ref 4.2–5.8)
RBC # FLD: 12 % — SIGNIFICANT CHANGE UP (ref 10.3–14.5)
RBC # FLD: 12 % — SIGNIFICANT CHANGE UP (ref 10.3–14.5)
SALICYLATES SERPL-MCNC: <1.7 MG/DL — LOW (ref 2.8–20)
SALICYLATES SERPL-MCNC: <1.7 MG/DL — LOW (ref 2.8–20)
SARS-COV-2 RNA SPEC QL NAA+PROBE: SIGNIFICANT CHANGE UP
SARS-COV-2 RNA SPEC QL NAA+PROBE: SIGNIFICANT CHANGE UP
SODIUM SERPL-SCNC: 140 MMOL/L — SIGNIFICANT CHANGE UP (ref 135–145)
SODIUM SERPL-SCNC: 140 MMOL/L — SIGNIFICANT CHANGE UP (ref 135–145)
SP GR SPEC: 1.01 — SIGNIFICANT CHANGE UP (ref 1–1.03)
SP GR SPEC: 1.01 — SIGNIFICANT CHANGE UP (ref 1–1.03)
THC UR QL: NEGATIVE — SIGNIFICANT CHANGE UP
THC UR QL: NEGATIVE — SIGNIFICANT CHANGE UP
TSH SERPL-MCNC: 0.96 UU/ML — SIGNIFICANT CHANGE UP (ref 0.34–4.82)
TSH SERPL-MCNC: 0.96 UU/ML — SIGNIFICANT CHANGE UP (ref 0.34–4.82)
UROBILINOGEN FLD QL: 1 MG/DL — SIGNIFICANT CHANGE UP (ref 0.2–1)
UROBILINOGEN FLD QL: 1 MG/DL — SIGNIFICANT CHANGE UP (ref 0.2–1)
WBC # BLD: 4.36 K/UL — SIGNIFICANT CHANGE UP (ref 3.8–10.5)
WBC # BLD: 4.36 K/UL — SIGNIFICANT CHANGE UP (ref 3.8–10.5)
WBC # FLD AUTO: 4.36 K/UL — SIGNIFICANT CHANGE UP (ref 3.8–10.5)
WBC # FLD AUTO: 4.36 K/UL — SIGNIFICANT CHANGE UP (ref 3.8–10.5)

## 2023-11-28 PROCEDURE — 84443 ASSAY THYROID STIM HORMONE: CPT

## 2023-11-28 PROCEDURE — 36415 COLL VENOUS BLD VENIPUNCTURE: CPT

## 2023-11-28 PROCEDURE — 80061 LIPID PANEL: CPT

## 2023-11-28 PROCEDURE — 83036 HEMOGLOBIN GLYCOSYLATED A1C: CPT

## 2023-11-28 PROCEDURE — 99285 EMERGENCY DEPT VISIT HI MDM: CPT

## 2023-11-28 PROCEDURE — 93010 ELECTROCARDIOGRAM REPORT: CPT

## 2023-11-28 RX ORDER — HALOPERIDOL DECANOATE 100 MG/ML
5 INJECTION INTRAMUSCULAR EVERY 6 HOURS
Refills: 0 | Status: DISCONTINUED | OUTPATIENT
Start: 2023-11-28 | End: 2023-12-12

## 2023-11-28 RX ORDER — DIPHENHYDRAMINE HCL 50 MG
50 CAPSULE ORAL ONCE
Refills: 0 | Status: DISCONTINUED | OUTPATIENT
Start: 2023-11-28 | End: 2023-12-11

## 2023-11-28 RX ORDER — BENZTROPINE MESYLATE 1 MG
1 TABLET ORAL
Refills: 0 | Status: DISCONTINUED | OUTPATIENT
Start: 2023-11-28 | End: 2023-12-12

## 2023-11-28 RX ORDER — BENZTROPINE MESYLATE 1 MG
1 TABLET ORAL
Refills: 0 | Status: DISCONTINUED | OUTPATIENT
Start: 2023-11-28 | End: 2023-11-29

## 2023-11-28 RX ORDER — INFLUENZA VIRUS VACCINE 15; 15; 15; 15 UG/.5ML; UG/.5ML; UG/.5ML; UG/.5ML
0.5 SUSPENSION INTRAMUSCULAR ONCE
Refills: 0 | Status: DISCONTINUED | OUTPATIENT
Start: 2023-11-28 | End: 2023-12-12

## 2023-11-28 RX ORDER — DIPHENHYDRAMINE HCL 50 MG
50 CAPSULE ORAL EVERY 6 HOURS
Refills: 0 | Status: DISCONTINUED | OUTPATIENT
Start: 2023-11-28 | End: 2023-12-11

## 2023-11-28 RX ORDER — HALOPERIDOL DECANOATE 100 MG/ML
5 INJECTION INTRAMUSCULAR
Refills: 0 | Status: DISCONTINUED | OUTPATIENT
Start: 2023-11-28 | End: 2023-12-02

## 2023-11-28 RX ORDER — OLANZAPINE 15 MG/1
10 TABLET, FILM COATED ORAL AT BEDTIME
Refills: 0 | Status: DISCONTINUED | OUTPATIENT
Start: 2023-11-28 | End: 2023-11-28

## 2023-11-28 RX ORDER — HALOPERIDOL DECANOATE 100 MG/ML
5 INJECTION INTRAMUSCULAR ONCE
Refills: 0 | Status: DISCONTINUED | OUTPATIENT
Start: 2023-11-28 | End: 2023-12-11

## 2023-11-28 RX ADMIN — HALOPERIDOL DECANOATE 5 MILLIGRAM(S): 100 INJECTION INTRAMUSCULAR at 20:23

## 2023-11-28 RX ADMIN — Medication 2 MILLIGRAM(S): at 20:23

## 2023-11-28 RX ADMIN — Medication 50 MILLIGRAM(S): at 20:23

## 2023-11-28 RX ADMIN — Medication 1 MILLIGRAM(S): at 20:23

## 2023-11-28 NOTE — ED PROVIDER NOTE - PROGRESS NOTE DETAILS
spoke to psych NP SAUD Verdin DO Saul OTTO: I did not participate in patient's care- patient had been admitted to [facility] but order not placed- order placed as instructed by nursing staff.

## 2023-11-28 NOTE — ED BEHAVIORAL HEALTH ASSESSMENT NOTE - DESCRIPTION
Vital Signs Last 24 Hrs  T(C): 36.6 (28 Nov 2023 10:39), Max: 36.6 (28 Nov 2023 10:39)  T(F): 97.8 (28 Nov 2023 10:39), Max: 97.8 (28 Nov 2023 10:39)  HR: 68 (28 Nov 2023 10:39) (68 - 68)  BP: 99/46 (28 Nov 2023 10:39) (99/46 - 99/46)  BP(mean): --  RR: 18 (28 Nov 2023 10:39) (18 - 18)  SpO2: 100% (28 Nov 2023 10:39) (100% - 100%)    Parameters below as of 28 Nov 2023 10:39  Patient On (Oxygen Delivery Method): room air None known as per chart review; migrated from [facility] ~7years ago. Lives with uncle, aunt and cousins in private house.

## 2023-11-28 NOTE — ED ADULT NURSE NOTE - NS ED NURSE PATIENT LEFT UNIT TIME
Chief Complaint   Patient presents with   • Follow-up     Hematuria       HISTORY OF PRESENT ILLNESS:  The patient is a very pleasant 66-year-old gentleman who has had a history of BPH with nocturia. He has also had a history of gross hematuria in the past. He did undergo a hematuria workup a urine apical that was negative.    He passed a kidney stone apparently in December. Since then he has been experiencing gross hematuria 1-2 times per week. Typically it is initial gross hematuria, painless. He is currently on Proscar.    ASSESSMENT AND PLAN:  We had an extended discussion regarding consideration for further workup. We discussed proceeding with a repeat hematuria workup now 1-1/2 years after previous negative workup because of recurrent episodes of gross hematuria, initial. I also suggested that if he could come in with an active episode of bleeding than cystoscopy at that time might be most revealing.    Since he is not able to predict when that would occur and it occurs very sporadically and may not really be possible to work it out in this way. Therefore I suggested an intermediate consideration of setting him up for CT urogram followed by cystoscopy in about 3 months. Between now and then if he can come in during an episode of active bleeding then we would plan for a cystoscopic examination at that time which would be likely the most revealing and predictive. We will plan to see him back in 3 months or sooner.     16:29
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
## 2023-11-28 NOTE — PHARMACOTHERAPY INTERVENTION NOTE - COMMENTS
Medication reconciliation completed.  Pt recently dc'd from  on 11/15, current medication list taken from Pt Discharge Paperwork; confirmed with Dr. [facility].  Family states pt is non-compliant with meds.

## 2023-11-28 NOTE — BH PATIENT PROFILE - FALL HARM RISK - UNIVERSAL INTERVENTIONS
Bed in lowest position, wheels locked, appropriate side rails in place/Call bell, personal items and telephone in reach/Instruct patient to call for assistance before getting out of bed or chair/Non-slip footwear when patient is out of bed/[place] to call system/Physically safe environment - no spills, clutter or unnecessary equipment/Purposeful Proactive Rounding/Room/bathroom lighting operational, light cord in reach

## 2023-11-28 NOTE — ED BEHAVIORAL HEALTH ASSESSMENT NOTE - HPI (INCLUDE ILLNESS QUALITY, SEVERITY, DURATION, TIMING, CONTEXT, MODIFYING FACTORS, ASSOCIATED SIGNS AND SYMPTOMS)
This is a 21 yo male, Monegasque-speaking, domiciled w/ family, with history of schizophrenia vs affective psychosis, multiple prior psych admissions, most recent in November 2023 at , no suicide attempts or violence hx, however has hx of verbal threats and property damage, BIBPD for psychosis, HI, and medication non-compliance. Psychiatry consulted for evaluation.     Attempted interview w/ . Patient is somnolent, with odd affect and irregular eye movements, laughing inappropriately and superficially cooperative. He says he doesn't know why he is in ER or precipitating events. He is internally preoccupied and doesn't answer most questions. When asked about threats to his sister, he laughs. Pt denies SI, prior SAs, HI, AVH, paranoia. Eventually patient refuses to answer further questions and lays back down.     Collateral from Uncle: Patient has not been taking medications, has caused extensive property damage to the home inside and out. Making homicidal threats towards uncle. Patient is also talking and laughing to self and acting odd, last night snorted the ink out of pen. He and family are advocating for inpatient admission.

## 2023-11-28 NOTE — ED BEHAVIORAL HEALTH ASSESSMENT NOTE - OTHER PAST PSYCHIATRIC HISTORY (INCLUDE DETAILS REGARDING ONSET, COURSE OF ILLNESS, INPATIENT/OUTPATIENT TREATMENT)
First hospitalization in  7/21-8/11/21, readmitted 8/21-9/9/21, most recent in august 2023; last at  11/2023

## 2023-11-28 NOTE — ED ADULT TRIAGE NOTE - CHIEF COMPLAINT QUOTE
Patient presents to the ER with complaints of erratic behavior at home and non compliance with medications. Patient brought in by [id number]. Patient denies SI/HI at this time. Patient calm and cooperative in triage. Patient with hx: schizophrenia

## 2023-11-28 NOTE — BH PATIENT PROFILE - HOME MEDICATIONS
(3) slightly limited OLANZapine 20 mg oral tablet , 1 tab(s) orally once a day (at bedtime)  benztropine 1 mg oral tablet , 1 tab(s) orally 2 times a day

## 2023-11-28 NOTE — ED PROVIDER NOTE - OBJECTIVE STATEMENT
23 y/o male with a PMHx of affective psychosis presents to the ED for erratic behavior. Pt states "you know why I am here." Per family, pt has been noncompliant with meds.

## 2023-11-28 NOTE — ED ADULT TRIAGE NOTE - STATUS:
Pt had cervical spine C3 - C5 fusion on Friday by Dr. Soto. Pt woke today with difficulty swallowing. Difficulty has increased throughout the day. Noted swelling at surgical site   Applied

## 2023-11-28 NOTE — ED PROVIDER NOTE - NSICDXFAMILYHX_GEN_ALL_CORE_FT
"[facility] STATUS NOTE    DATA:  Time:  06:10 AM  Pressures WNL:  Yes  Filter Status: WDL  Problems Reported/Alarms Noted:  None reported    Supplies Present:  Yes    ASSESSMENT:  Patient Net Fluid Balance:  Net - 1.4 L yesterday. Net -664 ml since midnight.   Vital Signs:  /66   Pulse 100   Temp 97.9  F (36.6  C) (Oral)   Resp (!) 34   Ht 1.651 m (5' 5\")   Wt 53.6 kg (118 lb 2.7 oz)   LMP 12/26/2020 (Exact Date)   SpO2 100%   BMI 19.66 kg/m    Labs:    Last Comprehensive Metabolic Panel:  Sodium   Date Value Ref Range Status   02/04/2021 137 133 - 144 mmol/L Final     Potassium   Date Value Ref Range Status   02/04/2021 4.4 3.4 - 5.3 mmol/L Final     Chloride   Date Value Ref Range Status   02/04/2021 106 94 - 109 mmol/L Final     Carbon Dioxide   Date Value Ref Range Status   02/04/2021 23 20 - 32 mmol/L Final     Anion Gap   Date Value Ref Range Status   02/04/2021 8 3 - 14 mmol/L Final     Glucose   Date Value Ref Range Status   02/04/2021 146 (H) 70 - 99 mg/dL Final     Urea Nitrogen   Date Value Ref Range Status   02/04/2021 51 (H) 7 - 30 mg/dL Final     Creatinine   Date Value Ref Range Status   02/04/2021 2.09 (H) 0.52 - 1.04 mg/dL Final     GFR Estimate   Date Value Ref Range Status   02/04/2021 29 (L) >60 mL/min/[1.73_m2] Final     Comment:     Non  GFR Calc  Starting 12/18/2018, serum creatinine based estimated GFR (eGFR) will be   calculated using the Chronic Kidney Disease Epidemiology Collaboration   (CKD-EPI) equation.       Calcium   Date Value Ref Range Status   02/04/2021 8.7 8.5 - 10.1 mg/dL Final     Bilirubin Total   Date Value Ref Range Status   02/04/2021 0.5 0.2 - 1.3 mg/dL Final     Alkaline Phosphatase   Date Value Ref Range Status   02/04/2021 172 (H) 40 - 150 U/L Final     ALT   Date Value Ref Range Status   02/04/2021 14 0 - 50 U/L Final     AST   Date Value Ref Range Status   02/04/2021 <3 0 - 45 U/L Final     CBC RESULTS:   Recent Labs   Lab Test 02/04/21  0337 "   WBC 31.6*   RBC 2.66*   HGB 8.2*   HCT 25.6*   MCV 96   MCH 30.8   MCHC 32.0   RDW 16.4*                Goals of Therapy: Net -100/hour can start/increase pressor to achieve    INTERVENTIONS:   None required    PLAN:  Continue with current plan of care. Change circuit Q 72 hours and prn. Contact CRRT Resource RN at [phone] with questions or concerns     FAMILY HISTORY:  No known problems

## 2023-11-28 NOTE — ED BEHAVIORAL HEALTH ASSESSMENT NOTE - VIOLENCE RISK FACTORS:
Impulsivity/Lack of insight into violence risk/need for treatment/Noncompliance with treatment/Irritability/Other

## 2023-11-28 NOTE — BH PATIENT PROFILE - NSVRISKLVLACTUAL_PSY_ALL_CORE
Dahlia Veloz,  I have seen your patient and please see my notes for my findings and recommendations. She is over 1 year post-op from retrocalcaneal/Ever's excision with repair of Achilles tendon, and having some difficulties with scar tissue. I discussed various treatment options for the scar tissue, and finally she may need surgical revision. She will f/u with you in the next month to discuss this. Please feel free to contact me with any questions regarding the diagnosis or treatment plans.       Andres Garibay  Podiatry
Mild-Moderate (Score 4-6)

## 2023-11-29 LAB
A1C WITH ESTIMATED AVERAGE GLUCOSE RESULT: 5 % — SIGNIFICANT CHANGE UP (ref 4–5.6)
A1C WITH ESTIMATED AVERAGE GLUCOSE RESULT: 5 % — SIGNIFICANT CHANGE UP (ref 4–5.6)
CHOLEST SERPL-MCNC: 168 MG/DL — SIGNIFICANT CHANGE UP
CHOLEST SERPL-MCNC: 168 MG/DL — SIGNIFICANT CHANGE UP
ESTIMATED AVERAGE GLUCOSE: 97 MG/DL — SIGNIFICANT CHANGE UP (ref 68–114)
ESTIMATED AVERAGE GLUCOSE: 97 MG/DL — SIGNIFICANT CHANGE UP (ref 68–114)
HDLC SERPL-MCNC: 40 MG/DL — LOW
HDLC SERPL-MCNC: 40 MG/DL — LOW
LIPID PNL WITH DIRECT LDL SERPL: 115 MG/DL — HIGH
LIPID PNL WITH DIRECT LDL SERPL: 115 MG/DL — HIGH
NON HDL CHOLESTEROL: 128 MG/DL — SIGNIFICANT CHANGE UP
NON HDL CHOLESTEROL: 128 MG/DL — SIGNIFICANT CHANGE UP
TRIGL SERPL-MCNC: 71 MG/DL — SIGNIFICANT CHANGE UP
TRIGL SERPL-MCNC: 71 MG/DL — SIGNIFICANT CHANGE UP
TSH SERPL-MCNC: 1.6 UU/ML — SIGNIFICANT CHANGE UP (ref 0.34–4.82)
TSH SERPL-MCNC: 1.6 UU/ML — SIGNIFICANT CHANGE UP (ref 0.34–4.82)

## 2023-11-29 PROCEDURE — 99222 1ST HOSP IP/OBS MODERATE 55: CPT

## 2023-11-29 PROCEDURE — 99223 1ST HOSP IP/OBS HIGH 75: CPT

## 2023-11-29 RX ADMIN — HALOPERIDOL DECANOATE 5 MILLIGRAM(S): 100 INJECTION INTRAMUSCULAR at 11:03

## 2023-11-29 RX ADMIN — HALOPERIDOL DECANOATE 5 MILLIGRAM(S): 100 INJECTION INTRAMUSCULAR at 20:29

## 2023-11-29 RX ADMIN — Medication 1 MILLIGRAM(S): at 20:29

## 2023-11-29 RX ADMIN — Medication 1 MILLIGRAM(S): at 11:03

## 2023-11-29 RX ADMIN — Medication 50 MILLIGRAM(S): at 20:29

## 2023-11-29 NOTE — BH TREATMENT PLAN - NSTXCOPEINTERPR_PSY_ALL_CORE
Continue to encourage patient to actively participate in 1-2 psych rehab groups daily to improve coping skills.

## 2023-11-29 NOTE — BH INPATIENT PSYCHIATRY ASSESSMENT NOTE - NSBHMETABOLIC_PSY_ALL_CORE_FT
BMI: BMI (kg/m2): 28.9 (11-28-23 @ 17:21)  HbA1c: A1C with Estimated Average Glucose Result: 5.0 % (11-04-23 @ 07:27)    Glucose:   BP: 110/57 (11-28-23 @ 16:23) (99/46 - 110/57)Vital Signs Last 24 Hrs  T(C): 36.6 (11-29-23 @ 07:15), Max: 36.8 (11-28-23 @ 17:21)  T(F): 97.8 (11-29-23 @ 07:15), Max: 98.2 (11-28-23 @ 17:21)  HR: --  BP: --  BP(mean): --  RR: 17 (11-29-23 @ 07:15) (17 - 18)  SpO2: 99% (11-29-23 @ 07:15) (99% - 100%)    Orthostatic VS  11-29-23 @ 07:15  Lying BP: --/-- HR: --  Sitting BP: 122/63 HR: 84  Standing BP: 100/64 HR: 88  Site: upper right arm  Mode: electronic  Orthostatic VS  11-28-23 @ 17:21  Lying BP: --/-- HR: --  Sitting BP: 123/64 HR: 79  Standing BP: 105/91 HR: 77  Site: --  Mode: --    Lipid Panel: Date/Time: 11-29-23 @ 09:11  Cholesterol, Serum: 168  LDL Cholesterol Calculated: 115  HDL Cholesterol, Serum: 40  Total Cholesterol/HDL Ration Measurement: --  Triglycerides, Serum: 71

## 2023-11-29 NOTE — H&P ADULT - HISTORY OF PRESENT ILLNESS
23 yo male, Mongolian-speaking, domiciled w/ family, with history of schizophrenia vs affective psychosis, multiple prior psych admissions, most recent in November 2023 at , no suicide attempts or violence hx, however has hx of verbal threats and property damage, BIBPD for psychosis, aggressive behaviour breaking things and threatening family ,  and medication non-compliance. Psychiatry consulted for evaluation.     Collateral from Uncle: Patient has not been taking medications, has caused extensive property damage to the home inside and out. Making homicidal threats towards uncle. Patient is also talking and laughing to self and acting odd, last night snorted the ink out of pen. He and family are advocating for inpatient admission.    PMHx: as above, schizophrenia   PSHx: none  SocHx: +smokes THC occasionally   Social Hx: patient denies any h/o Htn, DM, CAD in the family             REVIEW OF SYSTEMS:    CONSTITUTIONAL: No weakness, No fevers or chills  ENT: No ear ache, No sorethroat  NECK: No pain, No stiffness  RESPIRATORY: No cough, No wheezing, No hemoptysis; No dyspnea  CARDIOVASCULAR: No chest pain, No palpitations  GASTROINTESTINAL: No abd pain, No nausea, No vomiting, No hematemesis, No diarrhea or constipation. No melena, No hematochezia.  GENITOURINARY: No dysuria, No  hematuria  NEUROLOGICAL: No diplopia, No paresthesia, No motor dysfunction  MUSCULOSKELETAL: No arthralgia, No myalgia  SKIN: No rashes, or lesions   PSYCH: + anxiety, no suicidal ideation    All other review of systems is negative unless indicated above    Vital Signs Last 24 Hrs  T(C): 36.6 (29 Nov 2023 07:15), Max: 36.8 (28 Nov 2023 17:21)  T(F): 97.8 (29 Nov 2023 07:15), Max: 98.2 (28 Nov 2023 17:21)  HR: 69 (28 Nov 2023 16:23) (69 - 69)  BP: 110/57 (28 Nov 2023 16:23) (110/57 - 110/57)  BP(mean): 71 (28 Nov 2023 16:23) (71 - 71)  RR: 17 (29 Nov 2023 07:15) (16 - 18)  SpO2: 99% (29 Nov 2023 07:15) (99% - 100%)    Parameters below as of 29 Nov 2023 07:15  Patient On (Oxygen Delivery Method): room air        PHYSICAL EXAM:    GENERAL: NAD  HEENT:  NC/AT, EOMI, PERRLA, No scleral icterus, Moist mucous membranes  NECK: Supple, No JVD  CNS:  Alert & Oriented X3, Motor Strength 5/5 B/L upper and lower extremities; DTRs 2+ intact   LUNG: Normal Breath sounds, Clear to auscultation bilaterally, No rales, No rhonchi, No wheezing  HEART: RRR; No murmurs, No rubs  ABDOMEN: +BS, ST/ND/NT  GENITOURINARY: Voiding, Bladder not distended  EXTREMITIES:  2+ Peripheral Pulses, No clubbing, No cyanosis, No tibial edema  MUSCULOSKELTAL: Joints normal ROM, No TTP, No effusion  SKIN: no rashes  RECTAL: deferred, not indicated  BREAST: deferred                          15.9   4.36  )-----------( 256      ( 28 Nov 2023 11:22 )             45.7     11-28    140  |  106  |  9   ----------------------------<  82  3.9   |  30  |  1.12    Ca    9.0      28 Nov 2023 11:22    TPro  7.9  /  Alb  4.1  /  TBili  0.6  /  DBili  x   /  AST  18  /  ALT  26  /  AlkPhos  53  11-28    Vancomycin levels:   Cultures:     MEDICATIONS  (STANDING):  benztropine 1 milliGRAM(s) Oral two times a day  benztropine 1 milliGRAM(s) Oral two times a day  diphenhydrAMINE Injectable 50 milliGRAM(s) IntraMuscular once  haloperidol     Tablet 5 milliGRAM(s) Oral two times a day  haloperidol    Injectable 5 milliGRAM(s) IntraMuscular once  influenza   Vaccine 0.5 milliLiter(s) IntraMuscular once    MEDICATIONS  (PRN):  diphenhydrAMINE 50 milliGRAM(s) Oral every 6 hours PRN Extrapyramidal prophylaxis  diphenhydrAMINE Injectable 50 milliGRAM(s) IntraMuscular once PRN Extrapyramidal prophylaxis  haloperidol     Tablet 5 milliGRAM(s) Oral every 6 hours PRN Moderate psychotic agitation  haloperidol    Injectable 5 milliGRAM(s) IntraMuscular once PRN severe psychotic agitation  LORazepam     Tablet 2 milliGRAM(s) Oral every 6 hours PRN moderate psychotic anxiety  LORazepam   Injectable 2 milliGRAM(s) IntraMuscular once PRN severe psychotic anxiety      all labs reviewed  all imaging reviewed    a/p:    1. Decompensated Schizophrenia :  There are no medical CI to treatment  May c/w psychotropic meds: Benztropine, Haldol, Benadryl

## 2023-11-29 NOTE — BH INPATIENT PSYCHIATRY ASSESSMENT NOTE - DESCRIPTION
as per chart review; migrated from [facility] ~7years ago. Lives with uncle, aunt and cousins in private house.

## 2023-11-29 NOTE — BH SAFETY PLAN - SUICIDE PREVENTION LIFELINE PHONES
Suicide Prevention Lifeline Phone: [phone] Suicide Prevention Lifeline Phone: [phone] ([phone]) Suicide Prevention Lifeline Phone: [phone] [phone] ([phone])

## 2023-11-29 NOTE — BH INPATIENT PSYCHIATRY ASSESSMENT NOTE - NSBHASSESSSUMMFT_PSY_ALL_CORE
"Why you bother me for , just want to sleep, rest"    Pt with fair eye contact and the pt with some irritable mood but is able to be  redirected . Pt with

## 2023-11-29 NOTE — BH SAFETY PLAN - LOCAL URGENT CARE ADDRESS
Go to the closest emergency room, urgent care facility or call [phone] for immediate assistance.  Go to the closest emergency room, urgent care facility or call [phone] for immediate assistance.  Go to the closest emergency room, urgent care facility or call [phone] for immediate assistance.

## 2023-11-29 NOTE — BH INPATIENT PSYCHIATRY ASSESSMENT NOTE - HPI (INCLUDE ILLNESS QUALITY, SEVERITY, DURATION, TIMING, CONTEXT, MODIFYING FACTORS, ASSOCIATED SIGNS AND SYMPTOMS)
This is a 21 yo male, Eritrean-speaking, domiciled w/ family, with history of schizophrenia vs affective psychosis, multiple prior psych admissions, most recent in November 2023 at , no suicide attempts or violence hx, however has hx of verbal threats and property damage, BIBPD for psychosis, HI, and medication non-compliance. Psychiatry consulted for evaluation.     Attempted interview w/ . Patient is somnolent, with odd affect and irregular eye movements, laughing inappropriately and superficially cooperative. He says he doesn't know why he is in ER or precipitating events. He is internally preoccupied and doesn't answer most questions. When asked about threats to his sister, he laughs. Pt denies SI, prior SAs, HI, AVH, paranoia. Eventually patient refuses to answer further questions and lays back down.     Collateral from Uncle: Patient has not been taking medications, has caused extensive property damage to the home inside and out. Making homicidal threats towards uncle. Patient is also talking and laughing to self and acting odd, last night snorted the ink out of pen. He and family are advocating for inpatient admission.

## 2023-11-29 NOTE — BH TREATMENT PLAN - NSTXPSYCHOINTERRN_PSY_ALL_CORE
Encourage participation in medication education group  Provide positive feedback  Mouth checks and/or monitor after medication administration  Encourage participation in unit activities with emphasis on coping/stress management.  Explore healthy coping skills with patient and provide positive feed back.  Reality orient as needed.  Provide consistent direction.
Encourage participation in medication education group  Provide positive feedback  Mouth checks and/or monitor after medication administration  Encourage participation in unit activities with emphasis on coping/stress management.  Explore healthy coping skills with patient and provide positive feed back.  Reality orient as needed.  Provide consistent direction.

## 2023-11-29 NOTE — BH INPATIENT PSYCHIATRY ASSESSMENT NOTE - NSBHCRANIAL_PSY_ALL_CORE
Recognizes 2 fingers or can read (II)/Extraocular Eye Movement Intact  (III, IV, VI)/Shoulder shrug (XI)/Hearing intact (VIII)

## 2023-11-29 NOTE — BH SOCIAL WORK INITIAL PSYCHOSOCIAL EVALUATION - NSBHTREATHX_PSY_ALL_CORE
At previous admission, pt was referred to [facility] in [place] as well as the ACT team for when an available spot was opened. Pt did not link to his intake with [facility] but was approved for the [facility] team. ACT team went to see pt on 11/27 but pt refused services.

## 2023-11-29 NOTE — BH SAFETY PLAN - ASK FOR HELP NAME 1
[facility] in [place] [facility] in [place] [facility] in [place] [facility] - BRADY CUMMINS

## 2023-11-29 NOTE — BH TREATMENT PLAN - NSTXPLANTHERAPYSESSIONSFT_PSY_ALL_CORE
11-29-23  Type of therapy: N/A  Type of session: N/A  Level of patient participation: Resistance to participation  Duration of participation: 0  Therapy conducted by: Psych rehab  Therapy Summary: Patient declined to attend group programming although encouraged.

## 2023-11-29 NOTE — BH SOCIAL WORK INITIAL PSYCHOSOCIAL EVALUATION - NSHIGHRISKBEHFT_PSY_ALL_CORE
- non-compliance with tx  - multiple hospitalizations due to non-compliance and psychotic sx  - recent HI towards others   - recent property destruction

## 2023-11-29 NOTE — BH TREATMENT PLAN - NSTXMEDICINTERRN_PSY_ALL_CORE
Left message for pt to remind him of labs that need to be drawn prior to his appt with Dr Fowler on Oct 6th.  
Encourage participation in medication education group  Provide positive feedback  Mouth checks and/or monitor after medication administration  Encourage participation in unit activities with emphasis on coping/stress management.  Explore healthy coping skills with patient and provide positive feed back.
Encourage participation in medication education group  Provide positive feedback  Mouth checks and/or monitor after medication administration  Encourage participation in unit activities with emphasis on coping/stress management.  Explore healthy coping skills with patient and provide positive feed back.

## 2023-11-29 NOTE — BH SAFETY PLAN - SUICIDE AND CRISIS LIFELINE, CALL [phone]
Suicide and Crisis Lifeline, call [phone] Suicide and Crisis Lifeline, call [phone] Suicide and Crisis Lifeline, call 552

## 2023-11-29 NOTE — BH TREATMENT PLAN - NSTXDCOPNOINTERSW_PSY_ALL_CORE
SW to explore pt's discharge aftercare options with pt when stable for DC. Pt to return home with the uncle. No subtance abuse issues. Health insurance is active.

## 2023-11-29 NOTE — BH INPATIENT PSYCHIATRY ASSESSMENT NOTE - CURRENT MEDICATION
MEDICATIONS  (STANDING):  benztropine 1 milliGRAM(s) Oral two times a day  diphenhydrAMINE Injectable 50 milliGRAM(s) IntraMuscular once  haloperidol     Tablet 5 milliGRAM(s) Oral two times a day  haloperidol    Injectable 5 milliGRAM(s) IntraMuscular once  influenza   Vaccine 0.5 milliLiter(s) IntraMuscular once    MEDICATIONS  (PRN):  diphenhydrAMINE 50 milliGRAM(s) Oral every 6 hours PRN Extrapyramidal prophylaxis  diphenhydrAMINE Injectable 50 milliGRAM(s) IntraMuscular once PRN Extrapyramidal prophylaxis  haloperidol     Tablet 5 milliGRAM(s) Oral every 6 hours PRN Moderate psychotic agitation  haloperidol    Injectable 5 milliGRAM(s) IntraMuscular once PRN severe psychotic agitation  LORazepam     Tablet 2 milliGRAM(s) Oral every 6 hours PRN moderate psychotic anxiety  LORazepam   Injectable 2 milliGRAM(s) IntraMuscular once PRN severe psychotic anxiety

## 2023-11-29 NOTE — BH SAFETY PLAN - LOCAL URGENT CARE NAME
[facility]

## 2023-11-29 NOTE — BH SOCIAL WORK INITIAL PSYCHOSOCIAL EVALUATION - NSPROGENSOURCEINFO_PSY_ALL_CORE
Per ED eval, "This is a 23 yo male, Puerto Rican-speaking, domiciled w/ family, with history of schizophrenia vs affective psychosis, multiple prior psych admissions, most recent in November 2023 at , no suicide attempts or violence hx, however has hx of verbal threats and property damage, BIBPD for psychosis, HI, and medication non-compliance. Psychiatry consulted for evaluation.     Attempted interview w/ . Patient is somnolent, with odd affect and irregular eye movements, laughing inappropriately and superficially cooperative. He says he doesn't know why he is in ER or precipitating events. He is internally preoccupied and doesn't answer most questions. When asked about threats to his sister, he laughs. Pt denies SI, prior SAs, HI, AVH, paranoia. Eventually patient refuses to answer further questions and lays back down.     Collateral from Uncle: Patient has not been taking medications, has caused extensive property damage to the home inside and out. Making homicidal threats towards uncle. Patient is also talking and laughing to self and acting odd, last night snorted the ink out of pen. He and family are advocating for inpatient admission"./Other(s)

## 2023-11-29 NOTE — BH INPATIENT PSYCHIATRY ASSESSMENT NOTE - NSBHCHARTREVIEWVS_PSY_A_CORE FT
Vital Signs Last 24 Hrs  T(C): 36.6 (11-29-23 @ 07:15), Max: 36.8 (11-28-23 @ 17:21)  T(F): 97.8 (11-29-23 @ 07:15), Max: 98.2 (11-28-23 @ 17:21)  HR: --  BP: --  BP(mean): --  RR: 17 (11-29-23 @ 07:15) (17 - 18)  SpO2: 99% (11-29-23 @ 07:15) (99% - 100%)    Orthostatic VS  11-29-23 @ 07:15  Lying BP: --/-- HR: --  Sitting BP: 122/63 HR: 84  Standing BP: 100/64 HR: 88  Site: upper right arm  Mode: electronic  Orthostatic VS  11-28-23 @ 17:21  Lying BP: --/-- HR: --  Sitting BP: 123/64 HR: 79  Standing BP: 105/91 HR: 77  Site: --  Mode: --

## 2023-11-29 NOTE — BH SAFETY PLAN - PHONE
[phone]  [phone]

## 2023-11-30 PROCEDURE — 99231 SBSQ HOSP IP/OBS SF/LOW 25: CPT

## 2023-11-30 RX ADMIN — HALOPERIDOL DECANOATE 5 MILLIGRAM(S): 100 INJECTION INTRAMUSCULAR at 21:10

## 2023-11-30 RX ADMIN — Medication 1 MILLIGRAM(S): at 09:17

## 2023-11-30 RX ADMIN — HALOPERIDOL DECANOATE 5 MILLIGRAM(S): 100 INJECTION INTRAMUSCULAR at 09:17

## 2023-11-30 RX ADMIN — Medication 1 MILLIGRAM(S): at 21:10

## 2023-12-01 PROCEDURE — 99231 SBSQ HOSP IP/OBS SF/LOW 25: CPT

## 2023-12-01 RX ADMIN — Medication 1 MILLIGRAM(S): at 08:29

## 2023-12-01 RX ADMIN — HALOPERIDOL DECANOATE 5 MILLIGRAM(S): 100 INJECTION INTRAMUSCULAR at 08:29

## 2023-12-01 RX ADMIN — Medication 1 MILLIGRAM(S): at 20:16

## 2023-12-01 RX ADMIN — HALOPERIDOL DECANOATE 5 MILLIGRAM(S): 100 INJECTION INTRAMUSCULAR at 20:10

## 2023-12-02 PROCEDURE — 99233 SBSQ HOSP IP/OBS HIGH 50: CPT

## 2023-12-02 RX ORDER — HALOPERIDOL DECANOATE 100 MG/ML
7.5 INJECTION INTRAMUSCULAR
Refills: 0 | Status: DISCONTINUED | OUTPATIENT
Start: 2023-12-02 | End: 2023-12-12

## 2023-12-02 RX ADMIN — Medication 1 MILLIGRAM(S): at 20:45

## 2023-12-02 RX ADMIN — Medication 1 MILLIGRAM(S): at 09:18

## 2023-12-02 RX ADMIN — HALOPERIDOL DECANOATE 5 MILLIGRAM(S): 100 INJECTION INTRAMUSCULAR at 09:18

## 2023-12-02 RX ADMIN — HALOPERIDOL DECANOATE 7.5 MILLIGRAM(S): 100 INJECTION INTRAMUSCULAR at 21:36

## 2023-12-02 NOTE — BH INPATIENT PSYCHIATRY PROGRESS NOTE - NSBHTIMEACTIVITIESPERFORMED_PSY_A_CORE
1. interviewed the pt to assess current mental status  2. reviewed medications    3. reviewed lab results   4. conferred with nursing concerning behavior on the unit

## 2023-12-03 PROCEDURE — 99232 SBSQ HOSP IP/OBS MODERATE 35: CPT

## 2023-12-03 RX ADMIN — Medication 1 MILLIGRAM(S): at 20:03

## 2023-12-03 RX ADMIN — HALOPERIDOL DECANOATE 7.5 MILLIGRAM(S): 100 INJECTION INTRAMUSCULAR at 09:42

## 2023-12-03 RX ADMIN — Medication 1 MILLIGRAM(S): at 09:42

## 2023-12-03 RX ADMIN — HALOPERIDOL DECANOATE 7.5 MILLIGRAM(S): 100 INJECTION INTRAMUSCULAR at 20:03

## 2023-12-04 PROCEDURE — 99232 SBSQ HOSP IP/OBS MODERATE 35: CPT

## 2023-12-04 RX ADMIN — HALOPERIDOL DECANOATE 7.5 MILLIGRAM(S): 100 INJECTION INTRAMUSCULAR at 20:35

## 2023-12-04 RX ADMIN — Medication 1 MILLIGRAM(S): at 21:00

## 2023-12-04 RX ADMIN — HALOPERIDOL DECANOATE 7.5 MILLIGRAM(S): 100 INJECTION INTRAMUSCULAR at 09:21

## 2023-12-04 RX ADMIN — Medication 1 MILLIGRAM(S): at 09:20

## 2023-12-05 PROCEDURE — 99231 SBSQ HOSP IP/OBS SF/LOW 25: CPT

## 2023-12-05 RX ORDER — HALOPERIDOL DECANOATE 100 MG/ML
50 INJECTION INTRAMUSCULAR
Refills: 0 | Status: COMPLETED | OUTPATIENT
Start: 2023-12-11 | End: 2023-12-11

## 2023-12-05 RX ORDER — HALOPERIDOL DECANOATE 100 MG/ML
100 INJECTION INTRAMUSCULAR
Refills: 0 | Status: COMPLETED | OUTPATIENT
Start: 2023-12-05 | End: 2023-12-05

## 2023-12-05 RX ADMIN — HALOPERIDOL DECANOATE 7.5 MILLIGRAM(S): 100 INJECTION INTRAMUSCULAR at 09:24

## 2023-12-05 RX ADMIN — Medication 1 MILLIGRAM(S): at 21:14

## 2023-12-05 RX ADMIN — HALOPERIDOL DECANOATE 7.5 MILLIGRAM(S): 100 INJECTION INTRAMUSCULAR at 21:14

## 2023-12-05 RX ADMIN — HALOPERIDOL DECANOATE 100 MILLIGRAM(S): 100 INJECTION INTRAMUSCULAR at 13:14

## 2023-12-05 RX ADMIN — Medication 1 MILLIGRAM(S): at 09:24

## 2023-12-05 NOTE — BH INPATIENT PSYCHIATRY PROGRESS NOTE - NSBHATTESTBILLING_PSY_A_CORE
[facility] ([facility]) ED Follow up Call    Reason for ED visit:  Dyspnea           Hi Marilin Saunders , this is Bk Tapia  from Dr. [facility] office, just calling to see how you are doing after your recent ED visit. Did you receive discharge instructions? Yes  Do you understand the discharge instructions? Yes  Did the ED give you any new prescriptions? Yes prednisone   Were you able to fill your prescriptions? Yes      Do you have one of our red, yellow and green  Zone sheets that help you to determine when you should go to the ED? Not Applicable      Do you need or want to make a follow up appt with your PCP? Yes    Do you have any further needs in the home i.e. Equipment?   Not Applicable        FU appts/Provider:    Future Appointments   Date Time Provider Andrés Bernstein   4/16/2019  9:00 AM Chayo Lucio MD [address] [place] [address]   4/25/2019  9:30 AM Mattie GIBSON AM [address] [facility] - [place] GIBSON AM [address]   7/10/2019 10:15 AM Dwayne Miranda MD [facility] - [place] GIBSON AM [address]   12/12/2019 10:00 AM STR CT IMAGING RM1  [facility] OUT EXP STR Radiolog   12/19/2019  9:15 AM Won Ojeda [facility] - [place] GIBSON AM [address] Billing in another system

## 2023-12-06 PROCEDURE — 99231 SBSQ HOSP IP/OBS SF/LOW 25: CPT

## 2023-12-06 RX ADMIN — HALOPERIDOL DECANOATE 7.5 MILLIGRAM(S): 100 INJECTION INTRAMUSCULAR at 09:08

## 2023-12-06 RX ADMIN — Medication 1 MILLIGRAM(S): at 22:29

## 2023-12-06 RX ADMIN — HALOPERIDOL DECANOATE 7.5 MILLIGRAM(S): 100 INJECTION INTRAMUSCULAR at 20:19

## 2023-12-06 RX ADMIN — Medication 1 MILLIGRAM(S): at 09:09

## 2023-12-07 PROCEDURE — 99231 SBSQ HOSP IP/OBS SF/LOW 25: CPT

## 2023-12-07 RX ADMIN — HALOPERIDOL DECANOATE 7.5 MILLIGRAM(S): 100 INJECTION INTRAMUSCULAR at 09:36

## 2023-12-07 RX ADMIN — Medication 1 MILLIGRAM(S): at 09:36

## 2023-12-07 RX ADMIN — HALOPERIDOL DECANOATE 7.5 MILLIGRAM(S): 100 INJECTION INTRAMUSCULAR at 21:35

## 2023-12-07 RX ADMIN — Medication 1 MILLIGRAM(S): at 21:35

## 2023-12-08 PROCEDURE — 99232 SBSQ HOSP IP/OBS MODERATE 35: CPT

## 2023-12-08 RX ADMIN — HALOPERIDOL DECANOATE 7.5 MILLIGRAM(S): 100 INJECTION INTRAMUSCULAR at 09:24

## 2023-12-08 RX ADMIN — Medication 1 MILLIGRAM(S): at 20:48

## 2023-12-08 RX ADMIN — HALOPERIDOL DECANOATE 7.5 MILLIGRAM(S): 100 INJECTION INTRAMUSCULAR at 20:48

## 2023-12-08 RX ADMIN — Medication 1 MILLIGRAM(S): at 09:24

## 2023-12-08 NOTE — BH INPATIENT PSYCHIATRY PROGRESS NOTE - NSTXMEDICINTERMD_PSY_ALL_CORE
continue to encourage pt to remain compliance with medication 

## 2023-12-08 NOTE — BH INPATIENT PSYCHIATRY PROGRESS NOTE - NSBHMSEBEHAV_PSY_A_CORE
Cooperative/Uncooperative
Cooperative/Uncooperative
Uncooperative
Uncooperative
Cooperative/Uncooperative
Cooperative/Uncooperative
Uncooperative
Cooperative/Uncooperative
Cooperative/Uncooperative

## 2023-12-08 NOTE — BH INPATIENT PSYCHIATRY PROGRESS NOTE - NSTXCOPEDATEEST_PSY_ALL_CORE
14-Dec-2023
29-Nov-2023
29-Nov-2023
05-Dec-2023
29-Nov-2023
14-Dec-2023
29-Nov-2023
14-Dec-2023
29-Nov-2023

## 2023-12-08 NOTE — BH INPATIENT PSYCHIATRY PROGRESS NOTE - NSTXPSYCHOGOAL_PSY_ALL_CORE
Will engage in a 15 minute conversation with no irrational content
Will engage in a 15 minute conversation with no irrational content
Will report hearing a voice only momentarily a few times a day instead of all day
Will engage in a 15 minute conversation with no irrational content
Will report hearing a voice only momentarily a few times a day instead of all day

## 2023-12-08 NOTE — BH INPATIENT PSYCHIATRY PROGRESS NOTE - NSBHASSESSSUMMFT_PSY_ALL_CORE
moderate  as the pt still with hallucination and is responding to internal stimuli.   mitigating pt taking medication   protective pt with place to live   chronic pt with prior psych hospitalization 
low risk as the pt denies any ideation intent pt is selectively interactive   mitigating pt remaining on medication while in the hospital   protective pt with family and place to live   chronic pt with  prior psych hosp 
pt denies any suicidal ideation intent or plan  pt willing to continue with medication   protective pt with family   chronic pt with prior psych hosp 
low risk for suicide Pt denies any intent , He denies any continued auditory hallucinations.  mitigating  pt is on medication   protective pt with place to live , pt claiming willing to get haldol decanoate   chronic pt with prior psych hosp  
pt denies any suicidal ideation intent or plan Pt denies any hallucination and is with increased goal directed speech.   mitigating pt in the hospital and is taking medication   protective pt with place to live and has family   chronic pt with prior psych hospitalization 
Pt with denial of ny hallucination denies any suicidal ideation or plan   mitigating pt with compliance with medication while in the hospital   protective pt with place to live   chronic pt with prior psuch hx, etoh 
pt denies any suicidal plan, ideation or intent  pt with denial of any
pt with low moderate risk as the pt still with response to internal stimuli,  pt minimizing symptoms , still isolative , pacing , little interaction with peers  mitigating pt in the hospital   protective pt verbalized willing to consider haldol decanoate when less symptomatic    chronic pt with prior psych hosp, alcohol use
pt denies any suicidal plan, ideation or intent  pt with denial of any

## 2023-12-08 NOTE — BH INPATIENT PSYCHIATRY PROGRESS NOTE - NSTXMEDICDATETRGT_PSY_ALL_CORE
05-Dec-2023

## 2023-12-08 NOTE — BH INPATIENT PSYCHIATRY PROGRESS NOTE - CURRENT MEDICATION
MEDICATIONS  (STANDING):  benztropine 1 milliGRAM(s) Oral two times a day  diphenhydrAMINE Injectable 50 milliGRAM(s) IntraMuscular once  haloperidol     Tablet 5 milliGRAM(s) Oral two times a day  haloperidol    Injectable 5 milliGRAM(s) IntraMuscular once  influenza   Vaccine 0.5 milliLiter(s) IntraMuscular once    MEDICATIONS  (PRN):  diphenhydrAMINE 50 milliGRAM(s) Oral every 6 hours PRN Extrapyramidal prophylaxis  diphenhydrAMINE Injectable 50 milliGRAM(s) IntraMuscular once PRN Extrapyramidal prophylaxis  haloperidol     Tablet 5 milliGRAM(s) Oral every 6 hours PRN Moderate psychotic agitation  haloperidol    Injectable 5 milliGRAM(s) IntraMuscular once PRN severe psychotic agitation  LORazepam     Tablet 2 milliGRAM(s) Oral every 6 hours PRN moderate psychotic anxiety  LORazepam   Injectable 2 milliGRAM(s) IntraMuscular once PRN severe psychotic anxiety  
MEDICATIONS  (STANDING):  benztropine 1 milliGRAM(s) Oral two times a day  diphenhydrAMINE Injectable 50 milliGRAM(s) IntraMuscular once  haloperidol    Concentrate 7.5 milliGRAM(s) Oral two times a day  haloperidol    Injectable 5 milliGRAM(s) IntraMuscular once  influenza   Vaccine 0.5 milliLiter(s) IntraMuscular once    MEDICATIONS  (PRN):  diphenhydrAMINE 50 milliGRAM(s) Oral every 6 hours PRN Extrapyramidal prophylaxis  diphenhydrAMINE Injectable 50 milliGRAM(s) IntraMuscular once PRN Extrapyramidal prophylaxis  haloperidol     Tablet 5 milliGRAM(s) Oral every 6 hours PRN Moderate psychotic agitation  haloperidol    Injectable 5 milliGRAM(s) IntraMuscular once PRN severe psychotic agitation  LORazepam     Tablet 1 milliGRAM(s) Oral every 6 hours PRN anxiety  LORazepam   Injectable 2 milliGRAM(s) IntraMuscular once PRN severe psychotic anxiety  
MEDICATIONS  (STANDING):  benztropine 1 milliGRAM(s) Oral two times a day  diphenhydrAMINE Injectable 50 milliGRAM(s) IntraMuscular once  haloperidol    Injectable 5 milliGRAM(s) IntraMuscular once  influenza   Vaccine 0.5 milliLiter(s) IntraMuscular once    MEDICATIONS  (PRN):  diphenhydrAMINE 50 milliGRAM(s) Oral every 6 hours PRN Extrapyramidal prophylaxis  diphenhydrAMINE Injectable 50 milliGRAM(s) IntraMuscular once PRN Extrapyramidal prophylaxis  haloperidol     Tablet 5 milliGRAM(s) Oral every 6 hours PRN Moderate psychotic agitation  haloperidol    Injectable 5 milliGRAM(s) IntraMuscular once PRN severe psychotic agitation  LORazepam     Tablet 1 milliGRAM(s) Oral every 6 hours PRN anxiety  LORazepam   Injectable 2 milliGRAM(s) IntraMuscular once PRN severe psychotic anxiety  
MEDICATIONS  (STANDING):  benztropine 1 milliGRAM(s) Oral two times a day  diphenhydrAMINE Injectable 50 milliGRAM(s) IntraMuscular once  haloperidol     Tablet 5 milliGRAM(s) Oral two times a day  haloperidol    Injectable 5 milliGRAM(s) IntraMuscular once  influenza   Vaccine 0.5 milliLiter(s) IntraMuscular once    MEDICATIONS  (PRN):  diphenhydrAMINE 50 milliGRAM(s) Oral every 6 hours PRN Extrapyramidal prophylaxis  diphenhydrAMINE Injectable 50 milliGRAM(s) IntraMuscular once PRN Extrapyramidal prophylaxis  haloperidol     Tablet 5 milliGRAM(s) Oral every 6 hours PRN Moderate psychotic agitation  haloperidol    Injectable 5 milliGRAM(s) IntraMuscular once PRN severe psychotic agitation  LORazepam     Tablet 2 milliGRAM(s) Oral every 6 hours PRN moderate psychotic anxiety  LORazepam   Injectable 2 milliGRAM(s) IntraMuscular once PRN severe psychotic anxiety  
MEDICATIONS  (STANDING):  benztropine 1 milliGRAM(s) Oral two times a day  diphenhydrAMINE Injectable 50 milliGRAM(s) IntraMuscular once  haloperidol    Concentrate 7.5 milliGRAM(s) Oral two times a day  haloperidol    Injectable 5 milliGRAM(s) IntraMuscular once  influenza   Vaccine 0.5 milliLiter(s) IntraMuscular once    MEDICATIONS  (PRN):  diphenhydrAMINE 50 milliGRAM(s) Oral every 6 hours PRN Extrapyramidal prophylaxis  diphenhydrAMINE Injectable 50 milliGRAM(s) IntraMuscular once PRN Extrapyramidal prophylaxis  haloperidol     Tablet 5 milliGRAM(s) Oral every 6 hours PRN Moderate psychotic agitation  haloperidol    Injectable 5 milliGRAM(s) IntraMuscular once PRN severe psychotic agitation  LORazepam     Tablet 1 milliGRAM(s) Oral every 6 hours PRN anxiety  LORazepam   Injectable 2 milliGRAM(s) IntraMuscular once PRN severe psychotic anxiety  
MEDICATIONS  (STANDING):  benztropine 1 milliGRAM(s) Oral two times a day  diphenhydrAMINE Injectable 50 milliGRAM(s) IntraMuscular once  haloperidol    Concentrate 7.5 milliGRAM(s) Oral two times a day  haloperidol    Injectable 5 milliGRAM(s) IntraMuscular once  influenza   Vaccine 0.5 milliLiter(s) IntraMuscular once    MEDICATIONS  (PRN):  diphenhydrAMINE 50 milliGRAM(s) Oral every 6 hours PRN Extrapyramidal prophylaxis  diphenhydrAMINE Injectable 50 milliGRAM(s) IntraMuscular once PRN Extrapyramidal prophylaxis  haloperidol     Tablet 5 milliGRAM(s) Oral every 6 hours PRN Moderate psychotic agitation  haloperidol    Injectable 5 milliGRAM(s) IntraMuscular once PRN severe psychotic agitation  LORazepam   Injectable 2 milliGRAM(s) IntraMuscular once PRN severe psychotic anxiety  
MEDICATIONS  (STANDING):  benztropine 1 milliGRAM(s) Oral two times a day  diphenhydrAMINE Injectable 50 milliGRAM(s) IntraMuscular once  haloperidol    Concentrate 7.5 milliGRAM(s) Oral two times a day  haloperidol    Injectable 5 milliGRAM(s) IntraMuscular once  influenza   Vaccine 0.5 milliLiter(s) IntraMuscular once    MEDICATIONS  (PRN):  diphenhydrAMINE 50 milliGRAM(s) Oral every 6 hours PRN Extrapyramidal prophylaxis  diphenhydrAMINE Injectable 50 milliGRAM(s) IntraMuscular once PRN Extrapyramidal prophylaxis  haloperidol     Tablet 5 milliGRAM(s) Oral every 6 hours PRN Moderate psychotic agitation  haloperidol    Injectable 5 milliGRAM(s) IntraMuscular once PRN severe psychotic agitation  

## 2023-12-08 NOTE — BH INPATIENT PSYCHIATRY PROGRESS NOTE - NSBHMSEAFFRANGE_PSY_A_CORE
Full/Constricted
Full/Constricted
Constricted
Constricted
Full/Constricted
Full/Constricted
Constricted
Full/Constricted
Full/Constricted

## 2023-12-08 NOTE — BH INPATIENT PSYCHIATRY PROGRESS NOTE - NSBHMSEAFFCONG_PSY_A_CORE
Congruent
Non-congruent
Congruent

## 2023-12-08 NOTE — BH INPATIENT PSYCHIATRY PROGRESS NOTE - NSTXCOPEDATETRGT_PSY_ALL_CORE
06-Dec-2023
14-Dec-2023
06-Dec-2023
14-Dec-2023
12-Dec-2023
06-Dec-2023
14-Dec-2023

## 2023-12-08 NOTE — BH INPATIENT PSYCHIATRY PROGRESS NOTE - NSBHFUPINTERVALCCFT_PSY_A_CORE
"I'm fine" 
"Yes Im feeling better just want more sleep "
"I don't like to be in groups " 
"I'm doing better" 
"I'm feeling alright"
"I'm feeling better"
"I feel better "
"I'm feeling alright"
I feel ready to go home

## 2023-12-08 NOTE — BH INPATIENT PSYCHIATRY PROGRESS NOTE - NSBHMSETHTPROC_PSY_A_CORE
Disorganized/Tangential/Illogical/Impaired reasoning/Unable to assess
Disorganized/Tangential/Illogical/Impaired reasoning/Unable to assess
Linear/Unable to assess
Disorganized/Tangential/Illogical/Impaired reasoning/Unable to assess
Linear/Unable to assess

## 2023-12-08 NOTE — BH INPATIENT PSYCHIATRY PROGRESS NOTE - NSBHMSETHTCONTENT_PSY_A_CORE
Delusions/Preoccupations/Hopelessness
Delusions/Preoccupations/Hopelessness
Unremarkable
Delusions/Preoccupations/Hopelessness
Unremarkable

## 2023-12-08 NOTE — BH INPATIENT PSYCHIATRY PROGRESS NOTE - NSBHATTESTBILLING_PSY_A_CORE
27617-Hnkospzrbu OBS or IP - moderate complexity OR 35-49 mins 82813-Yjrdcmseid OBS or IP - moderate complexity OR 35-49 mins

## 2023-12-08 NOTE — BH INPATIENT PSYCHIATRY PROGRESS NOTE - NSTXPSYCHODATETRGT_PSY_ALL_CORE
05-Dec-2023
05-Dec-2023
10-Dec-2023
10-Dec-2023
05-Dec-2023
10-Dec-2023

## 2023-12-08 NOTE — BH INPATIENT PSYCHIATRY PROGRESS NOTE - NSBHMETABOLIC_PSY_ALL_CORE_FT
BMI: BMI (kg/m2): 28.9 (11-28-23 @ 17:21)  HbA1c: A1C with Estimated Average Glucose Result: 5.0 % (11-29-23 @ 09:11)    Glucose:   BP: 110/57 (11-28-23 @ 16:23) (110/57 - 110/57)Vital Signs Last 24 Hrs  T(C): 36.2 (12-01-23 @ 07:10), Max: 36.2 (12-01-23 @ 07:10)  T(F): 97.2 (12-01-23 @ 07:10), Max: 97.2 (12-01-23 @ 07:10)  HR: --  BP: --  BP(mean): --  RR: 18 (12-01-23 @ 07:10) (18 - 18)  SpO2: 100% (12-01-23 @ 07:10) (100% - 100%)    Orthostatic VS  12-01-23 @ 07:10  Lying BP: --/-- HR: --  Sitting BP: 113/55 HR: 66  Standing BP: 114/59 HR: 87  Site: upper right arm  Mode: electronic  Orthostatic VS  11-30-23 @ 07:07  Lying BP: --/-- HR: --  Sitting BP: 107/53 HR: 105  Standing BP: 113/57 HR: 86  Site: upper right arm  Mode: electronic    Lipid Panel: Date/Time: 11-29-23 @ 09:11  Cholesterol, Serum: 168  LDL Cholesterol Calculated: 115  HDL Cholesterol, Serum: 40  Total Cholesterol/HDL Ration Measurement: --  Triglycerides, Serum: 71  
BMI: BMI (kg/m2): 28.9 (11-28-23 @ 17:21)  HbA1c: A1C with Estimated Average Glucose Result: 5.0 % (11-29-23 @ 09:11)    Glucose:   BP: --Vital Signs Last 24 Hrs  T(C): 36.3 (12-05-23 @ 07:05), Max: 36.3 (12-05-23 @ 07:05)  T(F): 97.4 (12-05-23 @ 07:05), Max: 97.4 (12-05-23 @ 07:05)  HR: --  BP: --  BP(mean): --  RR: 18 (12-05-23 @ 07:05) (18 - 18)  SpO2: 99% (12-05-23 @ 07:05) (99% - 99%)    Orthostatic VS  12-05-23 @ 07:05  Lying BP: --/-- HR: --  Sitting BP: 107/62 HR: 62  Standing BP: 105/62 HR: 76  Site: upper right arm  Mode: electronic  Orthostatic VS  12-04-23 @ 07:49  Lying BP: --/-- HR: --  Sitting BP: 122/60 HR: 69  Standing BP: 118/50 HR: 75  Site: upper right arm  Mode: electronic    Lipid Panel: Date/Time: 11-29-23 @ 09:11  Cholesterol, Serum: 168  LDL Cholesterol Calculated: 115  HDL Cholesterol, Serum: 40  Total Cholesterol/HDL Ration Measurement: --  Triglycerides, Serum: 71  
BMI: BMI (kg/m2): 28.9 (11-28-23 @ 17:21)  HbA1c: A1C with Estimated Average Glucose Result: 5.0 % (11-29-23 @ 09:11)    Glucose:   BP: --Vital Signs Last 24 Hrs  T(C): 36.3 (12-03-23 @ 07:07), Max: 36.3 (12-03-23 @ 07:07)  T(F): 97.3 (12-03-23 @ 07:07), Max: 97.3 (12-03-23 @ 07:07)  HR: --  BP: --  BP(mean): --  RR: 18 (12-03-23 @ 07:07) (18 - 18)  SpO2: 98% (12-03-23 @ 07:07) (98% - 98%)    Orthostatic VS  12-03-23 @ 07:07  Lying BP: --/-- HR: --  Sitting BP: 120/55 HR: 75  Standing BP: 110/57 HR: 88  Site: upper right arm  Mode: electronic  Orthostatic VS  12-02-23 @ 07:19  Lying BP: --/-- HR: --  Sitting BP: 130/71 HR: 81  Standing BP: 119/51 HR: 107  Site: upper right arm  Mode: electronic    Lipid Panel: Date/Time: 11-29-23 @ 09:11  Cholesterol, Serum: 168  LDL Cholesterol Calculated: 115  HDL Cholesterol, Serum: 40  Total Cholesterol/HDL Ration Measurement: --  Triglycerides, Serum: 71  
BMI: BMI (kg/m2): 28.9 (11-28-23 @ 17:21)  HbA1c: A1C with Estimated Average Glucose Result: 5.0 % (11-29-23 @ 09:11)    Glucose:   BP: --Vital Signs Last 24 Hrs  T(C): 36.5 (12-02-23 @ 07:19), Max: 36.5 (12-02-23 @ 07:19)  T(F): 97.7 (12-02-23 @ 07:19), Max: 97.7 (12-02-23 @ 07:19)  HR: --  BP: --  BP(mean): --  RR: 18 (12-02-23 @ 07:19) (18 - 18)  SpO2: 99% (12-02-23 @ 07:19) (99% - 99%)    Orthostatic VS  12-02-23 @ 07:19  Lying BP: --/-- HR: --  Sitting BP: 130/71 HR: 81  Standing BP: 119/51 HR: 107  Site: upper right arm  Mode: electronic  Orthostatic VS  12-01-23 @ 07:10  Lying BP: --/-- HR: --  Sitting BP: 113/55 HR: 66  Standing BP: 114/59 HR: 87  Site: upper right arm  Mode: electronic    Lipid Panel: Date/Time: 11-29-23 @ 09:11  Cholesterol, Serum: 168  LDL Cholesterol Calculated: 115  HDL Cholesterol, Serum: 40  Total Cholesterol/HDL Ration Measurement: --  Triglycerides, Serum: 71  
BMI: BMI (kg/m2): 28.9 (11-28-23 @ 17:21)  HbA1c: A1C with Estimated Average Glucose Result: 5.0 % (11-29-23 @ 09:11)  TSH-1.60    Glucose:   BP: --Vital Signs Last 24 Hrs  T(C): 36.3 (12-08-23 @ 07:59), Max: 36.3 (12-08-23 @ 07:59)  T(F): 97.3 (12-08-23 @ 07:59), Max: 97.3 (12-08-23 @ 07:59)  HR: --  BP: --  BP(mean): --  RR: 16 (12-08-23 @ 07:59) (16 - 16)  SpO2: 100% (12-08-23 @ 07:59) (100% - 100%)    Orthostatic VS  12-08-23 @ 07:59  Lying BP: --/-- HR: --  Sitting BP: 105/50 HR: 72  Standing BP: 105/63 HR: 84  Site: upper right arm  Mode: electronic  Orthostatic VS  12-07-23 @ 07:45  Lying BP: --/-- HR: --  Sitting BP: 117/61 HR: 62  Standing BP: 108/64 HR: 95  Site: upper right arm  Mode: electronic    Lipid Panel: Date/Time: 11-29-23 @ 09:11  Cholesterol, Serum: 168  LDL Cholesterol Calculated: 115  HDL Cholesterol, Serum: 40  Triglycerides, Serum: 71  
BMI: BMI (kg/m2): 28.9 (11-28-23 @ 17:21)  HbA1c: A1C with Estimated Average Glucose Result: 5.0 % (11-29-23 @ 09:11)    Glucose:   BP: --Vital Signs Last 24 Hrs  T(C): 36.9 (12-06-23 @ 07:27), Max: 36.9 (12-06-23 @ 07:27)  T(F): 98.4 (12-06-23 @ 07:27), Max: 98.4 (12-06-23 @ 07:27)  HR: --  BP: --  BP(mean): --  RR: 16 (12-06-23 @ 07:27) (16 - 16)  SpO2: 100% (12-06-23 @ 07:27) (100% - 100%)    Orthostatic VS  12-06-23 @ 07:27  Lying BP: --/-- HR: --  Sitting BP: 98/50 HR: 100  Standing BP: 106/50 HR: 85  Site: upper right arm  Mode: electronic  Orthostatic VS  12-05-23 @ 07:05  Lying BP: --/-- HR: --  Sitting BP: 107/62 HR: 62  Standing BP: 105/62 HR: 76  Site: upper right arm  Mode: electronic    Lipid Panel: Date/Time: 11-29-23 @ 09:11  Cholesterol, Serum: 168  LDL Cholesterol Calculated: 115  HDL Cholesterol, Serum: 40  Total Cholesterol/HDL Ration Measurement: --  Triglycerides, Serum: 71  
BMI: BMI (kg/m2): 28.9 (11-28-23 @ 17:21)  HbA1c: A1C with Estimated Average Glucose Result: 5.0 % (11-29-23 @ 09:11)    Glucose:   BP: 110/57 (11-28-23 @ 16:23) (99/46 - 110/57)Vital Signs Last 24 Hrs  T(C): 36.2 (11-30-23 @ 07:07), Max: 36.2 (11-30-23 @ 07:07)  T(F): 97.2 (11-30-23 @ 07:07), Max: 97.2 (11-30-23 @ 07:07)  HR: --  BP: --  BP(mean): --  RR: 18 (11-30-23 @ 07:07) (18 - 18)  SpO2: 100% (11-30-23 @ 07:07) (100% - 100%)    Orthostatic VS  11-30-23 @ 07:07  Lying BP: --/-- HR: --  Sitting BP: 107/53 HR: 105  Standing BP: 113/57 HR: 86  Site: upper right arm  Mode: electronic  Orthostatic VS  11-29-23 @ 07:15  Lying BP: --/-- HR: --  Sitting BP: 122/63 HR: 84  Standing BP: 100/64 HR: 88  Site: upper right arm  Mode: electronic  Orthostatic VS  11-28-23 @ 17:21  Lying BP: --/-- HR: --  Sitting BP: 123/64 HR: 79  Standing BP: 105/91 HR: 77  Site: --  Mode: --    Lipid Panel: Date/Time: 11-29-23 @ 09:11  Cholesterol, Serum: 168  LDL Cholesterol Calculated: 115  HDL Cholesterol, Serum: 40  Total Cholesterol/HDL Ration Measurement: --  Triglycerides, Serum: 71  
BMI: BMI (kg/m2): 28.9 (11-28-23 @ 17:21)  HbA1c: A1C with Estimated Average Glucose Result: 5.0 % (11-29-23 @ 09:11)    Glucose:   BP: --Vital Signs Last 24 Hrs  T(C): 36.3 (12-07-23 @ 07:45), Max: 36.3 (12-07-23 @ 07:45)  T(F): 97.3 (12-07-23 @ 07:45), Max: 97.3 (12-07-23 @ 07:45)  HR: --  BP: --  BP(mean): --  RR: 18 (12-07-23 @ 07:45) (18 - 18)  SpO2: 99% (12-07-23 @ 07:45) (99% - 99%)    Orthostatic VS  12-07-23 @ 07:45  Lying BP: --/-- HR: --  Sitting BP: 117/61 HR: 62  Standing BP: 108/64 HR: 95  Site: upper right arm  Mode: electronic  Orthostatic VS  12-06-23 @ 07:27  Lying BP: --/-- HR: --  Sitting BP: 98/50 HR: 100  Standing BP: 106/50 HR: 85  Site: upper right arm  Mode: electronic    Lipid Panel: Date/Time: 11-29-23 @ 09:11  Cholesterol, Serum: 168  LDL Cholesterol Calculated: 115  HDL Cholesterol, Serum: 40  Total Cholesterol/HDL Ration Measurement: --  Triglycerides, Serum: 71  
BMI: BMI (kg/m2): 28.9 (11-28-23 @ 17:21)  HbA1c: A1C with Estimated Average Glucose Result: 5.0 % (11-29-23 @ 09:11)    Glucose:   BP: --Vital Signs Last 24 Hrs  T(C): 36.3 (12-04-23 @ 07:49), Max: 36.3 (12-04-23 @ 07:49)  T(F): 97.4 (12-04-23 @ 07:49), Max: 97.4 (12-04-23 @ 07:49)  HR: --  BP: --  BP(mean): --  RR: 18 (12-04-23 @ 07:49) (18 - 18)  SpO2: 99% (12-04-23 @ 07:49) (99% - 99%)    Orthostatic VS  12-04-23 @ 07:49  Lying BP: --/-- HR: --  Sitting BP: 122/60 HR: 69  Standing BP: 118/50 HR: 75  Site: upper right arm  Mode: electronic  Orthostatic VS  12-03-23 @ 07:07  Lying BP: --/-- HR: --  Sitting BP: 120/55 HR: 75  Standing BP: 110/57 HR: 88  Site: upper right arm  Mode: electronic    Lipid Panel: Date/Time: 11-29-23 @ 09:11  Cholesterol, Serum: 168  LDL Cholesterol Calculated: 115  HDL Cholesterol, Serum: 40  Total Cholesterol/HDL Ration Measurement: --  Triglycerides, Serum: 71

## 2023-12-08 NOTE — BH INPATIENT PSYCHIATRY PROGRESS NOTE - NSBHMSEAFFQUAL_PSY_A_CORE
1/31/2018         RE: Roc Parkinson  [address]   [place] [id number]        Dear Colleague,    Thank you for referring your patient, Roc Parkinson, to the [facility]. Please see a copy of my visit note below.    Medical Assistant Note:  Roc Parkinson presents today for yes  Patient seen by provider today: Yes: Dr Quinn.   present during visit today: Not Applicable.    Concerns: No Concerns.    Procedure:  Lab draw site: LAC, Needle type: BF, Gauge: 21. Guaze and coban applied     Post Assessment:  Labs drawn without difficulty: Yes.    Discharge Plan:  Departure Mode: Ambulatory.    Face to Face Time: 5.    Winsome Diaz MA              Again, thank you for allowing me to participate in the care of your patient.        Sincerely,        Oncology Nurse    
Euthymic/Irritable
Irritable
Irritable
Euthymic/Irritable
Irritable
Euthymic/Irritable

## 2023-12-08 NOTE — BH INPATIENT PSYCHIATRY PROGRESS NOTE - NSBHMSESPEECH_PSY_A_CORE
Normal volume, rate, productivity, spontaneity and articulation
Normal volume, rate, productivity, spontaneity and articulation
Abnormal as indicated, otherwise normal...
Normal volume, rate, productivity, spontaneity and articulation

## 2023-12-08 NOTE — BH INPATIENT PSYCHIATRY PROGRESS NOTE - NSBHMSEPERCEPT_PSY_A_CORE
No abnormalities/Unable to assess

## 2023-12-08 NOTE — BH INPATIENT PSYCHIATRY PROGRESS NOTE - NSBHFUPINTERVALHXFT_PSY_A_CORE
Pt appears less preoccupied , is with improved goal directed speech . Pt is less preoccupied and irritable .  Spoke with the pt about medication and he claims to be wiling to go on haldol decanoate if possible. 
Pt is up out of bed and in the hallway  Pt declined the need for attending groups   Pt denies any hallucination Pt with improved eye contact.  Pt denies any suicidal ideation or intent  
Pt with seen with Japanese speaking team member and the pt was seen resting comfortably in his bed.  Pt with increased goal directed speech and is denying any hallucination . Pt denies any suicidal ideation intent or plan . Pt not attend all groups 
Pt with improved eye contact Alert Pt with denial of any side effects from medication   Pt spoken with the option for injectabledecanoate  Pt agreeing to be on the injectable. 
Pt with no eye contact avoidant and is isolative . Pt declining the  phone "no ". He denies any hallucination but  nursing indicated that pt was seen talking to self. Will be increasing the  haldol as the pt still with psychosis althou
Pt currently on liquid form of haldol  in attempt to decrease the possibility of noncompliance . Pt with continued response to internal stimuli.  Pt claiming that he is not having any hallucination but still appears preoccupied and still spending most of the day pacing in the [place]. 
pt with denial of any hallucination   Pt with denial of any side effect from medication
12/08/2023: Patient was seen today AM, chart reviewed and discussed in team. Mood in control, and prefers to stay in room most of the time due to language barrier, has limited English capability, and received Haldol Decanoate 100 mg IM on 12/05, waiting for another dosage of Haldol Decanoate 50 mg IM on 12/11/2023. No meds induced s/e noted, pre-occupied with discharge.    Pt with good eye contact Alert Pt declined need for rehab treatment pt on injectable
Pt with good eye contact Alert Pt declined need for rehab treatment pt on injectable

## 2023-12-08 NOTE — BH INPATIENT PSYCHIATRY PROGRESS NOTE - NSTXDCOPNODATETRGT_PSY_ALL_CORE
12-Dec-2023
12-Dec-2023
06-Dec-2023
12-Dec-2023
06-Dec-2023
12-Dec-2023
06-Dec-2023

## 2023-12-08 NOTE — BH INPATIENT PSYCHIATRY PROGRESS NOTE - NSTXPSYCHOINTERMD_PSY_ALL_CORE
Serena May is a 35 y.o. female , id x 2(name and ). Reviewed questionnaires, and  medications. Chief Complaint   Patient presents with    Headache    Nasal Congestion    Ear Pain    Sore Throat     * PT states that she had a rapid COVID test at [facility] today that was neg. * States that she has been taking Sudafed for her sx's  * states that sx's started yesterday.      3 most recent PHQ Screens 10/5/2020   PHQ Not Done Active Diagnosis of Depression or Bipolar Disorder   Little interest or pleasure in doing things -   Feeling down, depressed, irritable, or hopeless -   Total Score PHQ 2 -   Trouble falling or staying asleep, or sleeping too much -   Feeling tired or having little energy -   Poor appetite, weight loss, or overeating -   Feeling bad about yourself - or that you are a failure or have let yourself or your family down -   Trouble concentrating on things such as school, work, reading, or watching TV -   Moving or speaking so slowly that other people could have noticed; or the opposite being so fidgety that others notice -   Thoughts of being better off dead, or hurting yourself in some way -   PHQ 9 Score -
started on haldol

## 2023-12-08 NOTE — BH INPATIENT PSYCHIATRY PROGRESS NOTE - PRN MEDS
MEDICATIONS  (PRN):  diphenhydrAMINE 50 milliGRAM(s) Oral every 6 hours PRN Extrapyramidal prophylaxis  diphenhydrAMINE Injectable 50 milliGRAM(s) IntraMuscular once PRN Extrapyramidal prophylaxis  haloperidol     Tablet 5 milliGRAM(s) Oral every 6 hours PRN Moderate psychotic agitation  haloperidol    Injectable 5 milliGRAM(s) IntraMuscular once PRN severe psychotic agitation  LORazepam     Tablet 1 milliGRAM(s) Oral every 6 hours PRN anxiety  LORazepam   Injectable 2 milliGRAM(s) IntraMuscular once PRN severe psychotic anxiety  
MEDICATIONS  (PRN):  diphenhydrAMINE 50 milliGRAM(s) Oral every 6 hours PRN Extrapyramidal prophylaxis  diphenhydrAMINE Injectable 50 milliGRAM(s) IntraMuscular once PRN Extrapyramidal prophylaxis  haloperidol     Tablet 5 milliGRAM(s) Oral every 6 hours PRN Moderate psychotic agitation  haloperidol    Injectable 5 milliGRAM(s) IntraMuscular once PRN severe psychotic agitation  
MEDICATIONS  (PRN):  diphenhydrAMINE 50 milliGRAM(s) Oral every 6 hours PRN Extrapyramidal prophylaxis  diphenhydrAMINE Injectable 50 milliGRAM(s) IntraMuscular once PRN Extrapyramidal prophylaxis  haloperidol     Tablet 5 milliGRAM(s) Oral every 6 hours PRN Moderate psychotic agitation  haloperidol    Injectable 5 milliGRAM(s) IntraMuscular once PRN severe psychotic agitation  LORazepam     Tablet 2 milliGRAM(s) Oral every 6 hours PRN moderate psychotic anxiety  LORazepam   Injectable 2 milliGRAM(s) IntraMuscular once PRN severe psychotic anxiety  
MEDICATIONS  (PRN):  diphenhydrAMINE 50 milliGRAM(s) Oral every 6 hours PRN Extrapyramidal prophylaxis  diphenhydrAMINE Injectable 50 milliGRAM(s) IntraMuscular once PRN Extrapyramidal prophylaxis  haloperidol     Tablet 5 milliGRAM(s) Oral every 6 hours PRN Moderate psychotic agitation  haloperidol    Injectable 5 milliGRAM(s) IntraMuscular once PRN severe psychotic agitation  LORazepam     Tablet 1 milliGRAM(s) Oral every 6 hours PRN anxiety  LORazepam   Injectable 2 milliGRAM(s) IntraMuscular once PRN severe psychotic anxiety  
MEDICATIONS  (PRN):  diphenhydrAMINE 50 milliGRAM(s) Oral every 6 hours PRN Extrapyramidal prophylaxis  diphenhydrAMINE Injectable 50 milliGRAM(s) IntraMuscular once PRN Extrapyramidal prophylaxis  haloperidol     Tablet 5 milliGRAM(s) Oral every 6 hours PRN Moderate psychotic agitation  haloperidol    Injectable 5 milliGRAM(s) IntraMuscular once PRN severe psychotic agitation  
MEDICATIONS  (PRN):  diphenhydrAMINE 50 milliGRAM(s) Oral every 6 hours PRN Extrapyramidal prophylaxis  diphenhydrAMINE Injectable 50 milliGRAM(s) IntraMuscular once PRN Extrapyramidal prophylaxis  haloperidol     Tablet 5 milliGRAM(s) Oral every 6 hours PRN Moderate psychotic agitation  haloperidol    Injectable 5 milliGRAM(s) IntraMuscular once PRN severe psychotic agitation  LORazepam     Tablet 2 milliGRAM(s) Oral every 6 hours PRN moderate psychotic anxiety  LORazepam   Injectable 2 milliGRAM(s) IntraMuscular once PRN severe psychotic anxiety  
MEDICATIONS  (PRN):  diphenhydrAMINE 50 milliGRAM(s) Oral every 6 hours PRN Extrapyramidal prophylaxis  diphenhydrAMINE Injectable 50 milliGRAM(s) IntraMuscular once PRN Extrapyramidal prophylaxis  haloperidol     Tablet 5 milliGRAM(s) Oral every 6 hours PRN Moderate psychotic agitation  haloperidol    Injectable 5 milliGRAM(s) IntraMuscular once PRN severe psychotic agitation  LORazepam   Injectable 2 milliGRAM(s) IntraMuscular once PRN severe psychotic anxiety  
MEDICATIONS  (PRN):  diphenhydrAMINE 50 milliGRAM(s) Oral every 6 hours PRN Extrapyramidal prophylaxis  diphenhydrAMINE Injectable 50 milliGRAM(s) IntraMuscular once PRN Extrapyramidal prophylaxis  haloperidol     Tablet 5 milliGRAM(s) Oral every 6 hours PRN Moderate psychotic agitation  haloperidol    Injectable 5 milliGRAM(s) IntraMuscular once PRN severe psychotic agitation  
MEDICATIONS  (PRN):  diphenhydrAMINE 50 milliGRAM(s) Oral every 6 hours PRN Extrapyramidal prophylaxis  diphenhydrAMINE Injectable 50 milliGRAM(s) IntraMuscular once PRN Extrapyramidal prophylaxis  haloperidol     Tablet 5 milliGRAM(s) Oral every 6 hours PRN Moderate psychotic agitation  haloperidol    Injectable 5 milliGRAM(s) IntraMuscular once PRN severe psychotic agitation  LORazepam     Tablet 1 milliGRAM(s) Oral every 6 hours PRN anxiety  LORazepam   Injectable 2 milliGRAM(s) IntraMuscular once PRN severe psychotic anxiety

## 2023-12-08 NOTE — BH INPATIENT PSYCHIATRY PROGRESS NOTE - NSTXDCOPNODATEEST_PSY_ALL_CORE
05-Dec-2023
29-Nov-2023
29-Nov-2023
05-Dec-2023
05-Dec-2023
29-Nov-2023
29-Nov-2023
05-Dec-2023
29-Nov-2023

## 2023-12-08 NOTE — BH INPATIENT PSYCHIATRY PROGRESS NOTE - NSBHCHARTREVIEWVS_PSY_A_CORE FT
Vital Signs Last 24 Hrs  T(C): 36.3 (12-04-23 @ 07:49), Max: 36.3 (12-04-23 @ 07:49)  T(F): 97.4 (12-04-23 @ 07:49), Max: 97.4 (12-04-23 @ 07:49)  HR: --  BP: --  BP(mean): --  RR: 18 (12-04-23 @ 07:49) (18 - 18)  SpO2: 99% (12-04-23 @ 07:49) (99% - 99%)    Orthostatic VS  12-04-23 @ 07:49  Lying BP: --/-- HR: --  Sitting BP: 122/60 HR: 69  Standing BP: 118/50 HR: 75  Site: upper right arm  Mode: electronic  Orthostatic VS  12-03-23 @ 07:07  Lying BP: --/-- HR: --  Sitting BP: 120/55 HR: 75  Standing BP: 110/57 HR: 88  Site: upper right arm  Mode: electronic  
Vital Signs Last 24 Hrs  T(C): 36.3 (12-07-23 @ 07:45), Max: 36.3 (12-07-23 @ 07:45)  T(F): 97.3 (12-07-23 @ 07:45), Max: 97.3 (12-07-23 @ 07:45)  HR: --  BP: --  BP(mean): --  RR: 18 (12-07-23 @ 07:45) (18 - 18)  SpO2: 99% (12-07-23 @ 07:45) (99% - 99%)    Orthostatic VS  12-07-23 @ 07:45  Lying BP: --/-- HR: --  Sitting BP: 117/61 HR: 62  Standing BP: 108/64 HR: 95  Site: upper right arm  Mode: electronic  Orthostatic VS  12-06-23 @ 07:27  Lying BP: --/-- HR: --  Sitting BP: 98/50 HR: 100  Standing BP: 106/50 HR: 85  Site: upper right arm  Mode: electronic  
Vital Signs Last 24 Hrs  T(C): 36.2 (12-01-23 @ 07:10), Max: 36.2 (12-01-23 @ 07:10)  T(F): 97.2 (12-01-23 @ 07:10), Max: 97.2 (12-01-23 @ 07:10)  HR: --  BP: --  BP(mean): --  RR: 18 (12-01-23 @ 07:10) (18 - 18)  SpO2: 100% (12-01-23 @ 07:10) (100% - 100%)    Orthostatic VS  12-01-23 @ 07:10  Lying BP: --/-- HR: --  Sitting BP: 113/55 HR: 66  Standing BP: 114/59 HR: 87  Site: upper right arm  Mode: electronic  Orthostatic VS  11-30-23 @ 07:07  Lying BP: --/-- HR: --  Sitting BP: 107/53 HR: 105  Standing BP: 113/57 HR: 86  Site: upper right arm  Mode: electronic  
Vital Signs Last 24 Hrs  T(C): 36.5 (12-02-23 @ 07:19), Max: 36.5 (12-02-23 @ 07:19)  T(F): 97.7 (12-02-23 @ 07:19), Max: 97.7 (12-02-23 @ 07:19)  HR: --  BP: --  BP(mean): --  RR: 18 (12-02-23 @ 07:19) (18 - 18)  SpO2: 99% (12-02-23 @ 07:19) (99% - 99%)    Orthostatic VS  12-02-23 @ 07:19  Lying BP: --/-- HR: --  Sitting BP: 130/71 HR: 81  Standing BP: 119/51 HR: 107  Site: upper right arm  Mode: electronic  Orthostatic VS  12-01-23 @ 07:10  Lying BP: --/-- HR: --  Sitting BP: 113/55 HR: 66  Standing BP: 114/59 HR: 87  Site: upper right arm  Mode: electronic  
Vital Signs Last 24 Hrs  T(C): 36.2 (11-30-23 @ 07:07), Max: 36.2 (11-30-23 @ 07:07)  T(F): 97.2 (11-30-23 @ 07:07), Max: 97.2 (11-30-23 @ 07:07)  HR: --  BP: --  BP(mean): --  RR: 18 (11-30-23 @ 07:07) (18 - 18)  SpO2: 100% (11-30-23 @ 07:07) (100% - 100%)    Orthostatic VS  11-30-23 @ 07:07  Lying BP: --/-- HR: --  Sitting BP: 107/53 HR: 105  Standing BP: 113/57 HR: 86  Site: upper right arm  Mode: electronic  Orthostatic VS  11-29-23 @ 07:15  Lying BP: --/-- HR: --  Sitting BP: 122/63 HR: 84  Standing BP: 100/64 HR: 88  Site: upper right arm  Mode: electronic  Orthostatic VS  11-28-23 @ 17:21  Lying BP: --/-- HR: --  Sitting BP: 123/64 HR: 79  Standing BP: 105/91 HR: 77  Site: --  Mode: --  
Vital Signs Last 24 Hrs  T(C): 36.3 (12-03-23 @ 07:07), Max: 36.3 (12-03-23 @ 07:07)  T(F): 97.3 (12-03-23 @ 07:07), Max: 97.3 (12-03-23 @ 07:07)  HR: --  BP: --  BP(mean): --  RR: 18 (12-03-23 @ 07:07) (18 - 18)  SpO2: 98% (12-03-23 @ 07:07) (98% - 98%)    Orthostatic VS  12-03-23 @ 07:07  Lying BP: --/-- HR: --  Sitting BP: 120/55 HR: 75  Standing BP: 110/57 HR: 88  Site: upper right arm  Mode: electronic  Orthostatic VS  12-02-23 @ 07:19  Lying BP: --/-- HR: --  Sitting BP: 130/71 HR: 81  Standing BP: 119/51 HR: 107  Site: upper right arm  Mode: electronic  
Vital Signs Last 24 Hrs  T(C): 36.9 (12-06-23 @ 07:27), Max: 36.9 (12-06-23 @ 07:27)  T(F): 98.4 (12-06-23 @ 07:27), Max: 98.4 (12-06-23 @ 07:27)  HR: --  BP: --  BP(mean): --  RR: 16 (12-06-23 @ 07:27) (16 - 16)  SpO2: 100% (12-06-23 @ 07:27) (100% - 100%)    Orthostatic VS  12-06-23 @ 07:27  Lying BP: --/-- HR: --  Sitting BP: 98/50 HR: 100  Standing BP: 106/50 HR: 85  Site: upper right arm  Mode: electronic  Orthostatic VS  12-05-23 @ 07:05  Lying BP: --/-- HR: --  Sitting BP: 107/62 HR: 62  Standing BP: 105/62 HR: 76  Site: upper right arm  Mode: electronic  
Vital Signs Last 24 Hrs  T(C): 36.3 (12-05-23 @ 07:05), Max: 36.3 (12-05-23 @ 07:05)  T(F): 97.4 (12-05-23 @ 07:05), Max: 97.4 (12-05-23 @ 07:05)  HR: --  BP: --  BP(mean): --  RR: 18 (12-05-23 @ 07:05) (18 - 18)  SpO2: 99% (12-05-23 @ 07:05) (99% - 99%)    Orthostatic VS  12-05-23 @ 07:05  Lying BP: --/-- HR: --  Sitting BP: 107/62 HR: 62  Standing BP: 105/62 HR: 76  Site: upper right arm  Mode: electronic  Orthostatic VS  12-04-23 @ 07:49  Lying BP: --/-- HR: --  Sitting BP: 122/60 HR: 69  Standing BP: 118/50 HR: 75  Site: upper right arm  Mode: electronic  
Vital Signs Last 24 Hrs  T(C): 36.3 (12-08-23 @ 07:59), Max: 36.3 (12-08-23 @ 07:59)  T(F): 97.3 (12-08-23 @ 07:59), Max: 97.3 (12-08-23 @ 07:59)  HR: --  BP: --  BP(mean): --  RR: 16 (12-08-23 @ 07:59) (16 - 16)  SpO2: 100% (12-08-23 @ 07:59) (100% - 100%)    Orthostatic VS  12-08-23 @ 07:59  Lying BP: --/-- HR: --  Sitting BP: 105/50 HR: 72  Standing BP: 105/63 HR: 84  Site: upper right arm  Mode: electronic  Orthostatic VS  12-07-23 @ 07:45  Lying BP: --/-- HR: --  Sitting BP: 117/61 HR: 62  Standing BP: 108/64 HR: 95  Site: upper right arm  Mode: electronic

## 2023-12-08 NOTE — BH INPATIENT PSYCHIATRY PROGRESS NOTE - NSTXCOPEGOAL_PSY_ALL_CORE
PRE-OP DIAGNOSIS:  Chest Pain  Abnormal CCTA    POST-OP DIAGNOSIS:  CAD    PROCEDURE:  Coronary Angiogram    PHYSICIAN: Norman Stanton MD  ASSISTANT: Fellow    ANESTHESIA TYPE:  [ X] Sedation  [ X] Local/Regional  [   ]General Anesthesia    ESTIMATED BLOOD LOSS: < 10 mL    IV CONTRAST: 80 mL    COMPLICATIONS: None    POST-OP CONDITION:  [X ] Good  [   ] Fair  [   ] Serious  [   ] Critical    ACCESS:  Right Radial --> D-Stat    FINDINGS:  Dominance: Co-Dominant    Mild diffuse LAD and CX disease.  Moderate to severe Prox to Mid RCA disease.  80% Distal RCA stenosis.    (see report for details)    INTERVENTION: None    IMPLANTS (if applicable): None    SPECIMENS REMOVED (if applicable): None    IMPRESSION:  1.  1v CAD as above.    PLAN:  1. Medical therapy.  2. PCI if persistent angina.  3. Outpatient follow-up 2-weeks.    D/W referring cardiologist.
PREOPERATIVE DAY OF PROCEDURE EVALUATION:  I have personally seen and examined the patient.  I agree with the history and physical which I have reviewed and noted any changes below.  Clinically stable.  Prior CP with CAD on CCTA.  Proceed with cardiac catheterization.  (Signed electronically by Norman Stanton 07-24-18 @ 08:27
Identify and utilize 2 coping skills that meet their needs

## 2023-12-08 NOTE — BH INPATIENT PSYCHIATRY PROGRESS NOTE - NSICDXBHPRIMARYDX_PSY_ALL_CORE
Schizoaffective psychosis   F25.9  

## 2023-12-08 NOTE — BH INPATIENT PSYCHIATRY PROGRESS NOTE - NSDCCRITERIA_PSY_ALL_CORE
pt with stable mood and affect , denies any suicidal ideation intent or plan 

## 2023-12-08 NOTE — BH INPATIENT PSYCHIATRY PROGRESS NOTE - NSBHMSEMOOD_PSY_A_CORE
Irritable
Normal/Irritable
Irritable
Normal/Irritable
Irritable
Normal/Irritable
Normal/Irritable

## 2023-12-08 NOTE — BH INPATIENT PSYCHIATRY PROGRESS NOTE - NSTXPSYCHODATEEST_PSY_ALL_CORE
28-Nov-2023

## 2023-12-09 RX ADMIN — Medication 1 MILLIGRAM(S): at 09:34

## 2023-12-09 RX ADMIN — Medication 1 MILLIGRAM(S): at 20:18

## 2023-12-09 RX ADMIN — HALOPERIDOL DECANOATE 7.5 MILLIGRAM(S): 100 INJECTION INTRAMUSCULAR at 09:34

## 2023-12-09 RX ADMIN — HALOPERIDOL DECANOATE 7.5 MILLIGRAM(S): 100 INJECTION INTRAMUSCULAR at 20:20

## 2023-12-10 RX ADMIN — Medication 1 MILLIGRAM(S): at 20:21

## 2023-12-10 RX ADMIN — HALOPERIDOL DECANOATE 7.5 MILLIGRAM(S): 100 INJECTION INTRAMUSCULAR at 20:23

## 2023-12-10 RX ADMIN — HALOPERIDOL DECANOATE 7.5 MILLIGRAM(S): 100 INJECTION INTRAMUSCULAR at 09:23

## 2023-12-10 RX ADMIN — Medication 1 MILLIGRAM(S): at 09:24

## 2023-12-11 RX ORDER — HALOPERIDOL DECANOATE 100 MG/ML
1 INJECTION INTRAMUSCULAR
Qty: 45 | Refills: 1
Start: 2023-12-11 | End: 2024-01-09

## 2023-12-11 RX ORDER — BENZTROPINE MESYLATE 1 MG
1 TABLET ORAL
Qty: 30 | Refills: 0
Start: 2023-12-11 | End: 2023-12-25

## 2023-12-11 RX ADMIN — Medication 1 MILLIGRAM(S): at 20:40

## 2023-12-11 RX ADMIN — HALOPERIDOL DECANOATE 50 MILLIGRAM(S): 100 INJECTION INTRAMUSCULAR at 09:32

## 2023-12-11 RX ADMIN — HALOPERIDOL DECANOATE 7.5 MILLIGRAM(S): 100 INJECTION INTRAMUSCULAR at 20:41

## 2023-12-11 RX ADMIN — Medication 1 MILLIGRAM(S): at 09:31

## 2023-12-11 RX ADMIN — HALOPERIDOL DECANOATE 7.5 MILLIGRAM(S): 100 INJECTION INTRAMUSCULAR at 09:32

## 2023-12-11 NOTE — BH INPATIENT PSYCHIATRY DISCHARGE NOTE - NSBHMETABOLIC_PSY_ALL_CORE_FT
BMI: BMI (kg/m2): 28.9 (11-28-23 @ 17:21)  HbA1c: A1C with Estimated Average Glucose Result: 5.0 % (11-29-23 @ 09:11)    Glucose:   BP: --Vital Signs Last 24 Hrs  T(C): 36.3 (12-11-23 @ 07:59), Max: 36.3 (12-11-23 @ 07:59)  T(F): 97.4 (12-11-23 @ 07:59), Max: 97.4 (12-11-23 @ 07:59)  HR: --  BP: --  BP(mean): --  RR: 16 (12-11-23 @ 07:59) (16 - 16)  SpO2: 100% (12-11-23 @ 07:59) (100% - 100%)    Orthostatic VS  12-11-23 @ 07:59  Lying BP: --/-- HR: --  Sitting BP: 101/56 HR: 64  Standing BP: 100/83 HR: 87  Site: upper right arm  Mode: electronic  Orthostatic VS  12-10-23 @ 07:39  Lying BP: --/-- HR: --  Sitting BP: 126/54 HR: 66  Standing BP: 122/58 HR: 95  Site: upper right arm  Mode: electronic    Lipid Panel: Date/Time: 11-29-23 @ 09:11  Cholesterol, Serum: 168  LDL Cholesterol Calculated: 115  HDL Cholesterol, Serum: 40  Total Cholesterol/HDL Ration Measurement: --  Triglycerides, Serum: 71

## 2023-12-11 NOTE — BH INPATIENT PSYCHIATRY DISCHARGE NOTE - NSBHASSESSSUMMFT_PSY_ALL_CORE
pt denies any suicidal ideation intent or plan   pt with continuation with medication   protective pt with family and place to live   chronic prior psych hx

## 2023-12-11 NOTE — BH INPATIENT PSYCHIATRY DISCHARGE NOTE - DESCRIPTION
as per chart review; migrated from [facility] ~7years ago. Lives with uncle, aunt and cousins in private house. as per chart review; migrated from [facility] ~7years ago. Lives with uncle, aunt and cousins in private house.

## 2023-12-11 NOTE — BH INPATIENT PSYCHIATRY DISCHARGE NOTE - REASON FOR ADMISSION
21 y/o male with a PMHx of affective psychosis pt has been noncompliant with medshistory of schizophrenia vs affective psychosis, multiple prior psych admissions, most recent in November 2023 at [facility] for psychosilaughing inappropriately and superficially cooperative 21 y/o male with a PMHx of affective psychosis pt has been noncompliant with medshistory of schizophrenia vs affective psychosis, multiple prior psych admissions, most recent in November 2023 at [facility] for psychosilaughing inappropriately and superficially cooperative

## 2023-12-11 NOTE — BH INPATIENT PSYCHIATRY DISCHARGE NOTE - HOSPITAL COURSE
Pt with gradual improvement with MEDICATIONS  (STANDING):  benztropine 1 milliGRAM(s) Oral two times a day  diphenhydrAMINE Injectable 50 milliGRAM(s) IntraMuscular once  haloperidol    Concentrate 7.5 milliGRAM(s) Oral two times a day  haloperidol    Injectable 5 milliGRAM(s) IntraMuscular once  influenza   Vaccine 0.5 milliLiter(s) IntraMuscular once  haldol decanoate 100mg IM 12/5 and 12/11    haldol decanoate 50mg IM        Pt with decreased irritable   mood and decreased restless, and now denies any auditory hallucination of voices, now with increased sleep and appetite.  Pt verbalized willing to continue with medication and therapy

## 2023-12-11 NOTE — BH INPATIENT PSYCHIATRY DISCHARGE NOTE - EXTENDED VTE YES NO FOR MLM ENOXAPARIN
Additional Information: (Optional): The wound was cleaned, and a pressure dressing was applied.  The patient received detailed post-op instructions. ,

## 2023-12-11 NOTE — BH INPATIENT PSYCHIATRY DISCHARGE NOTE - NSDCMRMEDTOKEN_GEN_ALL_CORE_FT
benztropine 1 mg oral tablet: 1 tab(s) orally 2 times a day  haloperidol 5 mg oral tablet: 1 tab(s) orally 3 times a day MDD: 15

## 2023-12-11 NOTE — BH INPATIENT PSYCHIATRY DISCHARGE NOTE - HPI (INCLUDE ILLNESS QUALITY, SEVERITY, DURATION, TIMING, CONTEXT, MODIFYING FACTORS, ASSOCIATED SIGNS AND SYMPTOMS)
This is a 21 yo male, Bermudian-speaking, domiciled w/ family, with history of schizophrenia vs affective psychosis, multiple prior psych admissions, most recent in November 2023 at , no suicide attempts or violence hx, however has hx of verbal threats and property damage, BIBPD for psychosis, HI, and medication non-compliance. Psychiatry consulted for evaluation.     Attempted interview w/ . Patient is somnolent, with odd affect and irregular eye movements, laughing inappropriately and superficially cooperative. He says he doesn't know why he is in ER or precipitating events. He is internally preoccupied and doesn't answer most questions. When asked about threats to his sister, he laughs. Pt denies SI, prior SAs, HI, AVH, paranoia. Eventually patient refuses to answer further questions and lays back down.     Collateral from Uncle: Patient has not been taking medications, has caused extensive property damage to the home inside and out. Making homicidal threats towards uncle. Patient is also talking and laughing to self and acting odd, last night snorted the ink out of pen. He and family are advocating for inpatient admission. This is a 21 yo male, Lithuanian-speaking, domiciled w/ family, with history of schizophrenia vs affective psychosis, multiple prior psych admissions, most recent in November 2023 at , no suicide attempts or violence hx, however has hx of verbal threats and property damage, BIBPD for psychosis, HI, and medication non-compliance. Psychiatry consulted for evaluation.     Attempted interview w/ . Patient is somnolent, with odd affect and irregular eye movements, laughing inappropriately and superficially cooperative. He says he doesn't know why he is in ER or precipitating events. He is internally preoccupied and doesn't answer most questions. When asked about threats to his sister, he laughs. Pt denies SI, prior SAs, HI, AVH, paranoia. Eventually patient refuses to answer further questions and lays back down.     Collateral from Uncle: Patient has not been taking medications, has caused extensive property damage to the home inside and out. Making homicidal threats towards uncle. Patient is also talking and laughing to self and acting odd, last night snorted the ink out of pen. He and family are advocating for inpatient admission.

## 2023-12-11 NOTE — BH DISCHARGE NOTE NURSING/SOCIAL WORK/PSYCH REHAB - [facility]
Unit Name: [address] [place] [address] Unit Phone Number: [phone] Unit Name: [address] [place] [address] Unit Phone Number: [phone]

## 2023-12-11 NOTE — BH DISCHARGE NOTE NURSING/SOCIAL WORK/PSYCH REHAB - NSCDUDCCRISIS_PSY_A_CORE
.[facility] 1 [phone]) 279-YIGF (0787) Website: www.[facility].org/.  [facility] – Crisis Care for Children, Adults and Families  [address] [place] [address]  Mobile Crisis Hotline – (995) 153-8961/.National Suicide Prevention Lifeline 6 (271) 794-2248/.  [facility]  (946) 685-9333/.  [facility] Response Crisis Hotline  (564) 327-4109  24 hour telephone crisis intervention and suicide prevention hotline concerned with all mental health issues/Other.../988 Suicide and Crisis Lifeline .[facility] 1 (511) 748-MVVU (5945) Website: www.[facility].org/.  [facility] – Crisis Care for Children, Adults and Families  [address] [place] [address]  Mobile Crisis Hotline – (443) 377-3199/.National Suicide Prevention Lifeline 6 (271) 033-3938/.  [facility]  (367) 327-9075/.  [facility] Response Crisis Hotline  (527) 862-5406  24 hour telephone crisis intervention and suicide prevention hotline concerned with all mental health issues/Other.../988 Suicide and Crisis Lifeline

## 2023-12-11 NOTE — BH INPATIENT PSYCHIATRY DISCHARGE NOTE - NSBHFUPINTERVALHXFT_PSY_A_CORE
Pt with euthymic mood and denies any suicidal ideation intent or plan Pt with denies any side effect from medication. Pt pn the haldol decanoate and will be due for 150mg IM on the

## 2023-12-11 NOTE — BH DISCHARGE NOTE NURSING/SOCIAL WORK/PSYCH REHAB - PATIENT PORTAL LINK FT
You can access the FollowMyHealth Patient Portal offered by [facility] by registering at the following website: [email or web address]. By joining [employer]’s FollowMyHealth portal, you will also be able to view your health information using other applications (apps) compatible with our system. You can access the FollowMyHealth Patient Portal offered by [facility] by registering at the following website: [email or web address]. By joining [facility]’s FollowMyHealth portal, you will also be able to view your health information using other applications (apps) compatible with our system.

## 2023-12-11 NOTE — BH DISCHARGE NOTE NURSING/SOCIAL WORK/PSYCH REHAB - NSDCADDINFO1FT_PSY_ALL_CORE
Aftercare appt with [facility] 12/12/23 at 1:00 pm   In Person with Syriac speaking therapist Agnieszka Turk-LCSW-R   located at [address] [facility]  ([place])  Pt agreeable to referral.   Aftercare appt with [facility] 12/12/23 at 1:00 pm   In Person with Irish speaking therapist Agnieszka Turk-LCSW-R   located at [address] [facility]  ([place])  Pt agreeable to referral.

## 2023-12-11 NOTE — BH DISCHARGE NOTE NURSING/SOCIAL WORK/PSYCH REHAB - NSBHCRISISRESOURCESOTHER_PSY_ALL_CORE_FT
[address] [phone] [address] [phone] [facility] 5N 24/7 ([phone])  Sheila Vazquez, Nurse Manager  Dr. Smith, Psychiatrist [facility] 5N 24/7 ([phone])  Sheila Vazquez, Nurse Manager  Dr. Smith, Psychiatrist

## 2023-12-11 NOTE — BH DISCHARGE NOTE NURSING/SOCIAL WORK/PSYCH REHAB - NSBHDCAGENCY1FT_PSY_A_CORE
Agnieszka Turk-LCSW-R, [facility] Agnieszka Turk-LCSW-R, [facility]

## 2023-12-11 NOTE — BH DISCHARGE NOTE NURSING/SOCIAL WORK/PSYCH REHAB - NSDCPLANREVIEWED_PSY_ALL_CORE
The discharge note was reviewed with the patient and the patient agrees to receive a copy of the SW/Nursing/MD discharge note through the patient portal./Yes

## 2023-12-11 NOTE — BH INPATIENT PSYCHIATRY DISCHARGE NOTE - NSDCCPCAREPLAN_GEN_ALL_CORE_FT
PRINCIPAL DISCHARGE DIAGNOSIS  Diagnosis: Schizoaffective psychosis  Assessment and Plan of Treatment:       SECONDARY DISCHARGE DIAGNOSES  Diagnosis: ETOH abuse  Assessment and Plan of Treatment:

## 2023-12-12 VITALS — OXYGEN SATURATION: 100 % | TEMPERATURE: 97 F | RESPIRATION RATE: 16 BRPM

## 2023-12-12 PROCEDURE — 99239 HOSP IP/OBS DSCHRG MGMT >30: CPT

## 2023-12-12 RX ADMIN — HALOPERIDOL DECANOATE 7.5 MILLIGRAM(S): 100 INJECTION INTRAMUSCULAR at 09:29

## 2023-12-12 RX ADMIN — Medication 1 MILLIGRAM(S): at 09:28

## 2023-12-16 DIAGNOSIS — F10.10 ALCOHOL ABUSE, UNCOMPLICATED: ICD-10-CM

## 2023-12-16 DIAGNOSIS — Z91.148 PATIENT'S OTHER NONCOMPLIANCE WITH MEDICATION REGIMEN FOR OTHER REASON: ICD-10-CM

## 2023-12-16 DIAGNOSIS — F25.9 SCHIZOAFFECTIVE DISORDER, UNSPECIFIED: ICD-10-CM

## 2023-12-18 NOTE — CHART NOTE - NSCHARTNOTEFT_GEN_A_CORE
GEREMIAS spoke with [facility], pt did not attend his intake appt and they did not reschedule appt. SW spoke with pt (ID#[id number]), pt denies SI/HI and wants to reschedule intake. Pt gives consent for SW to provide [facility] therapist with uncle's phone number to contact him to reschedule appt @ [phone]. VM left for therapist Agnieszka providing phone # and informed he that pt is interested in rescheduling appt. GEREMIAS spoke with [facility], pt did not attend his intake appt and they did not reschedule appt. SW spoke with pt (ID#[id number]), pt denies SI/HI and wants to reschedule intake. Pt gives consent for SW to provide [facility] therapist with uncle's phone number to contact him to reschedule appt @ [phone]. VM left for therapist Agnieszka providing phone # and informed he that pt is interested in rescheduling appt. GEREMIAS spoke with [facility], pt did not attend his intake appt and they did not reschedule appt. SW spoke with pt (ID#[id number]), pt denies SI/HI and wants to reschedule intake. Pt gives consent for SW to provide [facility] therapist with uncle's phone number to contact him to reschedule appt @ [phone]. VM left for therapist Agnieszka providing phone # and informed he that pt is interested in rescheduling appt. GEREMIAS awaiting callback with updated intake appt. GEREMIAS spoke with [facility], pt did not attend his intake appt and they did not reschedule appt. SW spoke with pt (ID#[id number]), pt denies SI/HI and wants to reschedule intake. Pt gives consent for SW to provide [facility] therapist with uncle's phone number to contact him to reschedule appt @ [phone]. VM left for therapist Agnieszka providing phone # and informed he that pt is interested in rescheduling appt. GEREMIAS awaiting callback with updated intake appt.

## 2024-02-09 NOTE — PROGRESS NOTE BEHAVIORAL HEALTH - ORIENTED TO SITUATION
No
Yes
PAST MEDICAL HISTORY:  No pertinent past medical history     
No
Yes
Yes
No
No
Yes
Yes
No

## 2024-03-27 ENCOUNTER — EMERGENCY (EMERGENCY)
Facility: HOSPITAL | Age: 24
LOS: 0 days | Discharge: HOME | End: 2024-03-27
Attending: STUDENT IN AN ORGANIZED HEALTH CARE EDUCATION/TRAINING PROGRAM
Payer: COMMERCIAL

## 2024-03-27 VITALS — WEIGHT: 160.94 LBS | HEIGHT: 63 IN

## 2024-03-27 VITALS
OXYGEN SATURATION: 98 % | RESPIRATION RATE: 18 BRPM | TEMPERATURE: 98 F | SYSTOLIC BLOOD PRESSURE: 120 MMHG | DIASTOLIC BLOOD PRESSURE: 64 MMHG | HEART RATE: 70 BPM

## 2024-03-27 DIAGNOSIS — M25.562 PAIN IN LEFT KNEE: ICD-10-CM

## 2024-03-27 DIAGNOSIS — M25.561 PAIN IN RIGHT KNEE: ICD-10-CM

## 2024-03-27 DIAGNOSIS — M79.669 PAIN IN UNSPECIFIED LOWER LEG: ICD-10-CM
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
## 2024-03-27 PROCEDURE — 99283 EMERGENCY DEPT VISIT LOW MDM: CPT | Mod: 25

## 2024-03-27 PROCEDURE — 99284 EMERGENCY DEPT VISIT MOD MDM: CPT

## 2024-03-27 PROCEDURE — 73562 X-RAY EXAM OF KNEE 3: CPT | Mod: 26,LT,RT

## 2024-03-27 PROCEDURE — 73562 X-RAY EXAM OF KNEE 3: CPT | Mod: 50

## 2024-03-27 RX ORDER — IBUPROFEN 200 MG
1 TABLET ORAL
Qty: 12 | Refills: 0
Start: 2024-03-27 | End: 2024-03-29

## 2024-03-27 RX ORDER — IBUPROFEN 200 MG
600 TABLET ORAL ONCE
Refills: 0 | Status: COMPLETED | OUTPATIENT
Start: 2024-03-27 | End: 2024-03-27

## 2024-03-27 RX ADMIN — Medication 600 MILLIGRAM(S): at 19:43

## 2024-03-27 NOTE — ED STATDOCS - PHYSICAL EXAMINATION
GENERAL: A&Ox4, non-toxic appearing, no acute distress  HEENT: NCAT, EOMI, oral mucosa moist, normal conjunctiva  RESP: no respiratory distress, speaking in full sentences  CV: RRR  MSK: no visible deformities, no bony ttp, FROM bilateral knees, no calf ttp, no popliteal mass or pulsatile sensation, negative anterior and posterior draw, no pain with valgus or varus strain.  NEURO: no focal sensory or motor deficits, CN 2-12 grossly intact  SKIN: warm, normal color, well perfused, no rash  PSYCH: normal affect

## 2024-03-27 NOTE — ED STATDOCS - OBJECTIVE STATEMENT
24 y/o male presents to the ED c/o bilateral knee and calf pain while he was at work today, pt works as  mowing lawns. Pt denies injury or trauma. Pt denies frequent kneeling or squatting.     : language line: [id number]

## 2024-03-27 NOTE — ED ADULT TRIAGE NOTE - CHIEF COMPLAINT QUOTE
PT presents to er with complaints of bilateral knee pain, onset this afternoon while at home, denies injury.

## 2024-03-27 NOTE — ED STATDOCS - PATIENT PORTAL LINK FT
You can access the FollowMyHealth Patient Portal offered by [facility] by registering at the following website: [email or web address]. By joining [facility]’s FollowMyHealth portal, you will also be able to view your health information using other applications (apps) compatible with our system.

## 2024-03-27 NOTE — ED STATDOCS - NSFOLLOWUPCLINICS_GEN_ALL_ED_FT
[facility]  Family Medicine  [address] [place] [address]  Phone: [phone]  Fax:

## 2024-03-27 NOTE — ED STATDOCS - NSFOLLOWUPINSTRUCTIONS_ED_ALL_ED_FT
Dolor muscular en los adultos  Muscle Pain, Adult  El dolor muscular, también llamado mialgia, es lulu afección que causa dolor en meagan o más músculos del cuerpo. El dolor puede ser leve, moderado o intenso. Puede ser esdras, lulu molestia continua o lulu sensación de ardor. La mayoría de las veces, el dolor dura solo un corto período y desaparece por sí solo.    Es normal sentir algo de dolor muscular después de comenzar un nuevo programa de ejercicios. Los músculos que no se garzon usado mucho dolerán al principio.    ¿Cuáles son las causas?  Puede tener dolor muscular cuando usa los músculos de lulu manera nueva o diferente después de no haberlos usado bro algún tiempo. El dolor muscular también puede deberse al uso excesivo o al estiramiento de un músculo más allá de bernard longitud normal (distensión muscular). Es más probable que tenga dolor muscular si no está en forma.    Algunas otras causas son las siguientes:  Lesiones o moretones.  Enfermedades infecciosas. Estas incluyen enfermedades causadas por virus, sharif la gripe (influenza).  Fibromialgia. Es lulu afección a yousif plazo (crónica) que causa sensibilidad muscular, cansancio (fatiga) y dolor de leon.  Enfermedades autoinmunes o reumatológicas. Son afecciones, sharif el lupus, que hacen que el sistema de defensa del cuerpo (sistema inmunitario) ataque zonas del cuerpo.  Ciertos medicamentos. Estos incluyen los inhibidores de la enzima convertidora de angiotensina (IECA) y estatinas.  ¿Cuáles son los signos o síntomas?  El síntoma principal son los músculos inflamados o doloridos. Los músculos pueden estar doloridos cuando usted realiza actividades y cuando se estira. También puede elisa lulu leve hinchazón.    ¿Cómo se diagnostica?  El dolor muscular se diagnostica mediante un examen físico. El médico le hará preguntas acerca del dolor y cuándo comenzó a sentirlo.  Si no ha tenido dolor muscular bro mucho tiempo, el médico puede esperar antes de hacer diversas pruebas.  Si el dolor muscular ha durado mucho tiempo, se pueden realizar pruebas de inmediato.  En algunos casos, es posible que necesite pruebas para descartar otras afecciones y enfermedades.  ¿Cómo se trata?  El tratamiento del dolor muscular depende de la causa. El cuidado en casa a menudo es suficiente para aliviar el dolor. El médico también puede recetarle antiinflamatorios no esteroideos (JCARLOS), sharif ibuprofeno.    Siga estas instrucciones en bernard casa:  Medicamentos    Use los medicamentos de venta williams y los recetados solamente sharif se lo haya indicado el médico.  Pregúntele al médico si el medicamento recetado le impide conducir o usar maquinaria.  Manejo del dolor, la hinchazón y las molestias    Bag of ice on a towel on the skin.  A heating pad for use on the affected area.  Si se lo indican, aplique hielo en la yuliana dolorida bro los primeros 2 días de dolor.  Ponga el hielo en lulu bolsa plástica.  Coloque lulu toalla entre la piel y la bolsa.  Aplique el hielo bro 20 minutos, 2 a 3 veces por día.  Si la piel se le pone de color joseph brillante, retire el hielo de inmediato para evitar daños en la piel. El riesgo de daño es mayor si no puede sentir dolor, calor o frío.  Bro los primeros 2 días de dolor muscular, o si hay hinchazón:  No se dé noel calientes.  No use el jacuzzi, baño malia, sauna, almohadillas térmicas ni ninguna otra soraida de calor.  Después de 2 o 3 días, puede cambiar entre aplicar hielo y calor en la yuliana. Si se lo indican, aplique calor en la yuliana afectada con la frecuencia que le haya dicho el médico. Use la soraida de calor que el médico le recomiende, sharif lulu compresa de calor húmedo o lulu almohadilla térmica.  Coloque lulu toalla entre la piel y la soraida de calor.  Aplique calor bro 20 a 30 minutos.  Si la piel se le pone de color joseph brillante, retire el hielo o el calor de inmediato para evitar daños en la piel. El riesgo de daño es mayor si no puede sentir dolor, calor o frío.  Si tiene lulu lesión, levante (eleve) la yuliana lesionada por encima del nivel del corazón mientras esté sentado o acostado.  Actividad    A person standing with arms stretched straight out in front and then squatting while keeping the knees over the toes.  Si el dolor muscular se debe al uso excesivo, calin lo siguiente:  Disminuya brandee actividades hasta que el dolor desaparezca.  Realice ejercicios suaves y regulares si normalmente no hace actividad física.  Entre en calor antes de hacer ejercicio. Elongue antes y después de hacer el ejercicio. [place] puede ayudar a disminuir el riesgo de que sufra dolor muscular.  No continúe haciendo actividad física si el dolor es intenso. El dolor intenso podría indicar que se ha lesionado un músculo.  Es posible que deba evitar levantar objetos. Pregúntele al médico cuánto peso puede levantar sin correr riesgos.  Retome brandee actividades normales sharif se lo haya indicado el médico. Pregúntele al médico qué actividades son seguras para usted.  Instrucciones generales    No consuma ningún producto que contenga nicotina o tabaco. Estos productos incluyen cigarrillos, tabaco para mascar y aparatos de vapeo, sharif los cigarrillos electrónicos. Si necesita ayuda para dejar de fumar, consulte al médico.  Comuníquese con un médico si:  El dolor muscular empeora y los medicamentos no surten efecto.  El dolor muscular dura más de 3 días.  Tiene lulu erupción cutánea o fiebre.  Tiene dolor muscular después de lulu picadura de garrapata.  Tiene dolor muscular al hacer ejercicio, aunque esté en buena forma.  Tiene enrojecimiento dolor o hinchazón.  Tiene dolor muscular después de comenzar un medicamento nuevo o de cambiar la dosis de un medicamento.  Solicite ayuda de inmediato si:  Tiene dificultad para respirar.  Tiene dificultad para tragar.  Tiene dolor muscular junto con rigidez en el shadi, fiebre y vómitos.  Tiene debilidad muscular intensa o no puede  lulu parte del cuerpo.  Orina menos o tiene orina oscura, con jorge alberto o de color diferente.  Tiene enrojecimiento o hinchazón en el lugar del dolor muscular.  Estos síntomas pueden indicar lulu emergencia. Solicite ayuda de inmediato. Llame al 911.  No espere a sandee si los síntomas desaparecen.  No conduzca por brandee propios medios hasta el hospital.  Esta información no tiene sharif fin reemplazar el consejo del médico. Asegúrese de hacerle al médico cualquier pregunta que tenga.

## 2024-03-27 NOTE — ED STATDOCS - PROGRESS NOTE DETAILS
xrays unremarkable.  Will DC with Motrin, ortho follow up.  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 Pt denies injury or trauma. Pt denies frequent kneeling or squatting.     : language line: [id number]

## 2024-03-27 NOTE — ED ADULT NURSE NOTE - NSFALLUNIVINTERV_ED_ALL_ED
Bed/Stretcher in lowest position, wheels locked, appropriate side rails in place/Call bell, personal items and telephone in reach/Instruct patient to call for assistance before getting out of bed/chair/stretcher/Non-slip footwear applied when patient is off stretcher/[place] to call system/Physically safe environment - no spills, clutter or unnecessary equipment/Purposeful proactive rounding/Room/bathroom lighting operational, light cord in reach

## 2024-03-27 NOTE — ED STATDOCS - CARE PROVIDER_API CALL
Theodore Tineo  Orthopaedic Surgery  [address], [address]  [place] [address]  Phone: [phone]  Fax: [phone]  Follow Up Time:

## 2024-03-27 NOTE — ED STATDOCS - CLINICAL SUMMARY MEDICAL DECISION MAKING FREE TEXT BOX
23-year-old male presenting with bilateral knee and calf pain after working, works as a .  No focal findings on exam, likely muscular strain.  Will obtain x-ray to rule out any bony pathology, give pain control, anticipate discharge. 23-year-old male presenting with bilateral knee and calf pain after working, works as a .  No focal findings on exam, likely muscular strain.  Will obtain x-ray to rule out any bony pathology, give pain control, anticipate discharge.        xrays unremarkable.  Will DC with Motrin, ortho follow up.  Love Coello PA-C

## 2024-04-19 NOTE — BH PATIENT PROFILE - NSDYSPHAGSECTONE_PSY_ALL_CORE
[de-identified] : Constitutional: Healthy, and well nourished in no acute distress.  Psych: Calm, cooperative, grossly normal  Eyes: Normal, sclera non-icteric  Ears, Nose, Mouth, Throat: External inspection of nose and ears does not reveal any scars or masses  Head: Normocephalic  Neck: Neck appears supple without sign of limited or painful ROM  Respiratory: Normal effort, no respiratory distress, no cyanosis  Cardiovascular: Visualized extremities without edema or varicosities, warm, brisk cap refill  Abdominal/GI: Not examined  Skin: No rashes on the extremity examined.  Neurological: Patient is awake and alert    right ankle Mild soft tissue swelling lateral ankle No santiago joint effusion appreciated. has FROM but mild discomfort with terminal plantarflexion and inversion Moderate tendernes to ATFL, anterolateral talar dome and mild at base of the 5th metatarsal. mild guarding and +1 lax with anterior drawer negative squeeze test mild reproduction of discomfort with talar tilt. sensation intact. 
N/A

## 2024-05-23 NOTE — ED BEHAVIORAL HEALTH ASSESSMENT NOTE - NSBHPSYCHOLCOGORIENT_PSY_A_CORE
Patient was offered choice of vendor and chose [facility].  Patient Jairo CASTANEDA Andi was set up at [place] on May 23, 2024. Patient received a [employer]med Airsense 11 Pressures were set at  7-14 cm H2O.   Patient s ramp is 5 cm H2O for Auto and FLEX/EPR is EPR, 2.  Patient received a H-art (WPP) & Elepago Mask name: PASQUALE  Full Face mask size Medium, heated tubing and heated humidifier.  Patient has the following compliance requirements: using and visit requirements  Patient has a follow up on 12/10/2024 with Bennett Goltz, PA-C. Tracy L Jackson    Unable to assess
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
## 2024-05-28 ENCOUNTER — INPATIENT (INPATIENT)
Facility: HOSPITAL | Age: 24
LOS: 7 days | Discharge: HOME | DRG: 750 | End: 2024-06-05
Attending: PSYCHIATRY & NEUROLOGY | Admitting: PSYCHIATRY & NEUROLOGY
Payer: COMMERCIAL

## 2024-05-28 VITALS
DIASTOLIC BLOOD PRESSURE: 59 MMHG | TEMPERATURE: 98 F | HEIGHT: 67 IN | WEIGHT: 169.98 LBS | OXYGEN SATURATION: 99 % | HEART RATE: 61 BPM | RESPIRATION RATE: 16 BRPM | SYSTOLIC BLOOD PRESSURE: 124 MMHG

## 2024-05-28 DIAGNOSIS — R45.851 SUICIDAL IDEATIONS: ICD-10-CM

## 2024-05-28 LAB
AMPHET UR-MCNC: NEGATIVE — SIGNIFICANT CHANGE UP
ANION GAP SERPL CALC-SCNC: 2 MMOL/L — LOW (ref 5–17)
APAP SERPL-MCNC: <2 UG/ML — LOW (ref 10–30)
APPEARANCE UR: CLEAR — SIGNIFICANT CHANGE UP
BARBITURATES UR SCN-MCNC: NEGATIVE — SIGNIFICANT CHANGE UP
BASOPHILS # BLD AUTO: 0.02 K/UL — SIGNIFICANT CHANGE UP (ref 0–0.2)
BASOPHILS NFR BLD AUTO: 0.5 % — SIGNIFICANT CHANGE UP (ref 0–2)
BENZODIAZ UR-MCNC: NEGATIVE — SIGNIFICANT CHANGE UP
BILIRUB UR-MCNC: NEGATIVE — SIGNIFICANT CHANGE UP
BUN SERPL-MCNC: 5 MG/DL — LOW (ref 7–23)
CALCIUM SERPL-MCNC: 9.4 MG/DL — SIGNIFICANT CHANGE UP (ref 8.5–10.1)
CHLORIDE SERPL-SCNC: 109 MMOL/L — HIGH (ref 96–108)
CO2 SERPL-SCNC: 29 MMOL/L — SIGNIFICANT CHANGE UP (ref 22–31)
COCAINE METAB.OTHER UR-MCNC: NEGATIVE — SIGNIFICANT CHANGE UP
COLOR SPEC: YELLOW — SIGNIFICANT CHANGE UP
CREAT SERPL-MCNC: 0.96 MG/DL — SIGNIFICANT CHANGE UP (ref 0.5–1.3)
DIFF PNL FLD: NEGATIVE — SIGNIFICANT CHANGE UP
EGFR: 114 ML/MIN/1.73M2 — SIGNIFICANT CHANGE UP
EOSINOPHIL # BLD AUTO: 0.04 K/UL — SIGNIFICANT CHANGE UP (ref 0–0.5)
EOSINOPHIL NFR BLD AUTO: 0.9 % — SIGNIFICANT CHANGE UP (ref 0–6)
ETHANOL SERPL-MCNC: <10 MG/DL — SIGNIFICANT CHANGE UP (ref 0–10)
FENTANYL UR QL SCN: NEGATIVE — SIGNIFICANT CHANGE UP
FLUAV AG NPH QL: SIGNIFICANT CHANGE UP
FLUBV AG NPH QL: SIGNIFICANT CHANGE UP
GLUCOSE SERPL-MCNC: 110 MG/DL — HIGH (ref 70–99)
GLUCOSE UR QL: NEGATIVE MG/DL — SIGNIFICANT CHANGE UP
HCT VFR BLD CALC: 42.8 % — SIGNIFICANT CHANGE UP (ref 39–50)
HGB BLD-MCNC: 14.8 G/DL — SIGNIFICANT CHANGE UP (ref 13–17)
IMM GRANULOCYTES NFR BLD AUTO: 0.2 % — SIGNIFICANT CHANGE UP (ref 0–0.9)
KETONES UR-MCNC: NEGATIVE MG/DL — SIGNIFICANT CHANGE UP
LEUKOCYTE ESTERASE UR-ACNC: NEGATIVE — SIGNIFICANT CHANGE UP
LYMPHOCYTES # BLD AUTO: 1.65 K/UL — SIGNIFICANT CHANGE UP (ref 1–3.3)
LYMPHOCYTES # BLD AUTO: 39 % — SIGNIFICANT CHANGE UP (ref 13–44)
MCHC RBC-ENTMCNC: 30 PG — SIGNIFICANT CHANGE UP (ref 27–34)
MCHC RBC-ENTMCNC: 34.6 GM/DL — SIGNIFICANT CHANGE UP (ref 32–36)
MCV RBC AUTO: 86.6 FL — SIGNIFICANT CHANGE UP (ref 80–100)
METHADONE UR-MCNC: NEGATIVE — SIGNIFICANT CHANGE UP
MONOCYTES # BLD AUTO: 0.25 K/UL — SIGNIFICANT CHANGE UP (ref 0–0.9)
MONOCYTES NFR BLD AUTO: 5.9 % — SIGNIFICANT CHANGE UP (ref 2–14)
NEUTROPHILS # BLD AUTO: 2.26 K/UL — SIGNIFICANT CHANGE UP (ref 1.8–7.4)
NEUTROPHILS NFR BLD AUTO: 53.5 % — SIGNIFICANT CHANGE UP (ref 43–77)
NITRITE UR-MCNC: NEGATIVE — SIGNIFICANT CHANGE UP
OPIATES UR-MCNC: NEGATIVE — SIGNIFICANT CHANGE UP
PCP SPEC-MCNC: SIGNIFICANT CHANGE UP
PCP UR-MCNC: NEGATIVE — SIGNIFICANT CHANGE UP
PH UR: 6.5 — SIGNIFICANT CHANGE UP (ref 5–8)
PLATELET # BLD AUTO: 220 K/UL — SIGNIFICANT CHANGE UP (ref 150–400)
POTASSIUM SERPL-MCNC: 4.7 MMOL/L — SIGNIFICANT CHANGE UP (ref 3.5–5.3)
POTASSIUM SERPL-SCNC: 4.7 MMOL/L — SIGNIFICANT CHANGE UP (ref 3.5–5.3)
PROT UR-MCNC: NEGATIVE MG/DL — SIGNIFICANT CHANGE UP
RBC # BLD: 4.94 M/UL — SIGNIFICANT CHANGE UP (ref 4.2–5.8)
RBC # FLD: 11.9 % — SIGNIFICANT CHANGE UP (ref 10.3–14.5)
RSV RNA NPH QL NAA+NON-PROBE: SIGNIFICANT CHANGE UP
SALICYLATES SERPL-MCNC: 1.7 MG/DL — LOW (ref 2.8–20)
SARS-COV-2 RNA SPEC QL NAA+PROBE: SIGNIFICANT CHANGE UP
SODIUM SERPL-SCNC: 140 MMOL/L — SIGNIFICANT CHANGE UP (ref 135–145)
SP GR SPEC: 1.01 — SIGNIFICANT CHANGE UP (ref 1–1.03)
THC UR QL: POSITIVE — SIGNIFICANT CHANGE UP
UROBILINOGEN FLD QL: 0.2 MG/DL — SIGNIFICANT CHANGE UP (ref 0.2–1)
WBC # BLD: 4.23 K/UL — SIGNIFICANT CHANGE UP (ref 3.8–10.5)
WBC # FLD AUTO: 4.23 K/UL — SIGNIFICANT CHANGE UP (ref 3.8–10.5)

## 2024-05-28 PROCEDURE — 93010 ELECTROCARDIOGRAM REPORT: CPT

## 2024-05-28 PROCEDURE — 99285 EMERGENCY DEPT VISIT HI MDM: CPT

## 2024-05-28 PROCEDURE — 99053 MED SERV 10PM-8AM 24 HR FAC: CPT

## 2024-05-28 PROCEDURE — 86769 SARS-COV-2 COVID-19 ANTIBODY: CPT

## 2024-05-28 PROCEDURE — 90792 PSYCH DIAG EVAL W/MED SRVCS: CPT

## 2024-05-28 PROCEDURE — 99252 IP/OBS CONSLTJ NEW/EST SF 35: CPT

## 2024-05-28 PROCEDURE — 83036 HEMOGLOBIN GLYCOSYLATED A1C: CPT

## 2024-05-28 PROCEDURE — 93005 ELECTROCARDIOGRAM TRACING: CPT

## 2024-05-28 PROCEDURE — 99231 SBSQ HOSP IP/OBS SF/LOW 25: CPT

## 2024-05-28 PROCEDURE — 80061 LIPID PANEL: CPT

## 2024-05-28 PROCEDURE — 36415 COLL VENOUS BLD VENIPUNCTURE: CPT

## 2024-05-28 PROCEDURE — 84443 ASSAY THYROID STIM HORMONE: CPT

## 2024-05-28 PROCEDURE — 70450 CT HEAD/BRAIN W/O DYE: CPT | Mod: 26,MC

## 2024-05-28 RX ORDER — DIPHENHYDRAMINE HCL 50 MG
50 CAPSULE ORAL EVERY 6 HOURS
Refills: 0 | Status: DISCONTINUED | OUTPATIENT
Start: 2024-05-28 | End: 2024-06-04

## 2024-05-28 RX ORDER — HALOPERIDOL DECANOATE 100 MG/ML
5 INJECTION INTRAMUSCULAR EVERY 6 HOURS
Refills: 0 | Status: DISCONTINUED | OUTPATIENT
Start: 2024-05-28 | End: 2024-06-04

## 2024-05-28 RX ORDER — ALPRAZOLAM 0.25 MG
0.5 TABLET ORAL ONCE
Refills: 0 | Status: DISCONTINUED | OUTPATIENT
Start: 2024-05-28 | End: 2024-05-28

## 2024-05-28 RX ADMIN — Medication 0.5 MILLIGRAM(S): at 08:12

## 2024-05-28 NOTE — ED PROVIDER NOTE - OBJECTIVE STATEMENT
[id number]  23-year-old male no past psych hospitalizations, presents with hallucinations and SI.  Patient was brought in by PD who claims patient states he thinks he has a gun in his hand and is pointed at his head.  Patient somewhat disorganized, intermittently provides tangential response to questions.  Patient aware of person place and year, endorsed SI and HI, but states "I do not know" when asked "how" he plans to cause harm.

## 2024-05-28 NOTE — ED PROVIDER NOTE - CLINICAL SUMMARY MEDICAL DECISION MAKING FREE TEXT BOX
Kerri Alfaro MD, Attending  ddx includes, but is not limited to the following: psychosis vs mood disorder, lower suspicion for organic disease such as CNS mass, electrolyte abnormalities, infection  plan: ED psych on consult orderset, dispo pending psych recommendations.   update: see progress notes.   ---- Kerri Alfaro MD, Attending  ddx includes, but is not limited to the following: psychosis vs mood disorder, lower suspicion for organic disease such as CNS mass, electrolyte abnormalities, infection.   plan: ED psych on consult orderset, Unclear of previous psych history, no CT head on record, will obtain CT head to r.o mass. dispo pending psych recommendations.   update: see progress notes.   ----

## 2024-05-28 NOTE — H&P ADULT - NSHPPHYSICALEXAM_GEN_ALL_CORE
Vital Signs Last 24 Hrs  T(C): 36.7 (05-28-24 @ 12:20), Max: 36.7 (05-28-24 @ 10:52)  T(F): 98.1 (05-28-24 @ 12:20), Max: 98.1 (05-28-24 @ 10:52)  HR: 61 (05-28-24 @ 10:52) (60 - 61)  BP: 119/62 (05-28-24 @ 10:52) (111/64 - 124/59)  BP(mean): 75 (05-28-24 @ 05:26) (75 - 75)  RR: 20 (05-28-24 @ 12:20) (16 - 20)  SpO2: 98% (05-28-24 @ 12:20) (98% - 100%)    Orthostatic VS  05-28-24 @ 12:20  Lying BP: --/-- HR: --  Sitting BP: 92/66 HR: 86  Standing BP: 113/58 HR: 71  Site: --  Mode: electronic

## 2024-05-28 NOTE — ED PROVIDER NOTE - PROGRESS NOTE DETAILS
Kerri Alfaro MD, Attending  telepsych consulted, likely will be seen by Am psych given existing on consults. Pt signed out to Am Attending pending psych consult. Pt aware and agreeable to plan. Patient was signed out to me pending psych evaluation.  Patient seen by psych they state they will admit patient.  Patient is medically cleared for psychiatric admission. Alberto Uriarte M.D., Attending Physician Kerri Alfaro MD, Attending  telepsych consulted, likely will be seen by Am psych given existing on consults. Pt signed out to Am Attending pending psych evaluation. Pt aware and agreeable to plan.

## 2024-05-28 NOTE — BH SAFETY PLAN - WARNING SIGN 1
Triggers: Not following up with treatment. Smoking weed.   I have been having problems getting along with my uncle.
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
## 2024-05-28 NOTE — ED ADULT NURSE NOTE - OBJECTIVE STATEMENT
pt stated that "he was hallucinating that he had a gun in his hand and there was a voice telling him to kill other people and himself." pt endorses having feelings of wanting to harm himself, but denies having a plan . pt denies having weapon in the house. pt endorses that he has no support at home, endorses a mental health dx which he takes olanzapine daily for, but he is unsure of what the dx is. pt denies SI/HI at this time, denies hearing voices/seeing things at this time. pt is calm and cooperative during interview and during bloodwork.

## 2024-05-28 NOTE — ED ADULT NURSE REASSESSMENT NOTE - NS ED NURSE REASSESS COMMENT FT1
Assumed care of patient from overnight RN. As per overnight RN, pt was not yet wanded by security. Authored RN called security upon arrival to shift to wand patient. Patient wanded. 1:1 maintained.

## 2024-05-28 NOTE — ED PROVIDER NOTE - PATIENT'S PREFERRED PRONOUN
Please check out the [employer] PATIENT WEBSITE.  The site has education materials, patient stories, expert views, and a portal for you to ask questions.       [email or web address]      As always, please let me know if you have any questions.     Dr. Anh Box       Him/He

## 2024-05-28 NOTE — ED ADULT NURSE NOTE - CHIEF COMPLAINT QUOTE
Pt BIBEMS and [employer] badge #[id number] from home c/o SI and hallucinations. Per EMS, pt stated that "he was hallucinating that he had a gun in his hand to harm himself." Pt reports thoughts of harming self, denies a plan or previous attempts. Pt reports smoking marijuana today, denies drinking alcohol or other drug use. 1:1 initiated in triage, pt calm and cooperative at this time. Per [employer], pt has been brought to  for similar episodes.

## 2024-05-28 NOTE — ED ADULT TRIAGE NOTE - CHIEF COMPLAINT QUOTE
Pt BIBEMS and [employer] badge #0095 from home c/o SI and hallucinations. Per EMS, pt stated that "he was hallucinating that he had a gun in his hand to harm himself." Pt reports thoughts of harming self, denies a plan or previous attempts. Pt reports smoking marijuana today, denies drinking alcohol or other drug use. 1:1 initiated in triage, pt calm and cooperative at this time. Pt BIBEMS and [employer] badge #2125 from home c/o SI and hallucinations. Per EMS, pt stated that "he was hallucinating that he had a gun in his hand to harm himself." Pt reports thoughts of harming self, denies a plan or previous attempts. Pt reports smoking marijuana today, denies drinking alcohol or other drug use. 1:1 initiated in triage, pt calm and cooperative at this time. Per [employer], pt has been brought to  for similar episodes.

## 2024-05-28 NOTE — ED PROVIDER NOTE - PHYSICAL EXAMINATION
Kerri Alfaro MD, Attending  Gen: Well appearing in NAD   Head: NC/AT  Neck: trachea midline,   abd: non tender  Resp:  No distress, clear to auscultation b/l  Ext: no deformities  Neuro:  A&O appears non focal  Skin:  Warm and dry as visualized  Psych:  +flat affect.

## 2024-05-28 NOTE — ED BEHAVIORAL HEALTH ASSESSMENT NOTE - LEVEL OF CONSCIOUSNESS
Patient overdue for colonoscopy-he will follow-up this year. Lethargic, arousable to verbal stimulus

## 2024-05-28 NOTE — H&P ADULT - ASSESSMENT
23 year old male who presented to the hospital for psychosis.     Plan  1. Psychosis   - Management per psychiatry    2. DVT PPX  - Encourage ambulation 23 year old male who presented to the hospital for psychosis.     Plan  1. Psychosis   - Management per psychiatry    2. Abnormal EKG   - Suggest cardiology consult    3. DVT PPX  - Encourage ambulation

## 2024-05-28 NOTE — BH PATIENT PROFILE - LAST ORAL INTAKE
28-May-2024 12:26 Melolabial Interpolation Flap Text: A decision was made to reconstruct the defect utilizing an interpolation axial flap and a staged reconstruction.  A telfa template was made of the defect.  This telfa template was then used to outline the melolabial interpolation flap.  The donor area for the pedicle flap was then injected with anesthesia.  The flap was excised through the skin and subcutaneous tissue down to the layer of the underlying musculature.  The pedicle flap was carefully excised within this deep plane to maintain its blood supply.  The edges of the donor site were undermined.   The donor site was closed in a primary fashion.  The pedicle was then rotated into position and sutured.  Once the tube was sutured into place, adequate blood supply was confirmed with blanching and refill.  The pedicle was then wrapped with xeroform gauze and dressed appropriately with a telfa and gauze bandage to ensure continued blood supply and protect the attached pedicle.

## 2024-05-28 NOTE — H&P ADULT - NEGATIVE PSYCHIATRIC SYMPTOMS
no depression/no anxiety/no insomnia/no memory loss/no paranoia/no mood swings/no agitation/no auditory hallucinations/no hyperactivity

## 2024-05-28 NOTE — ED BEHAVIORAL HEALTH ASSESSMENT NOTE - DESCRIPTION
None known calm, cooperative Vietnamese speaking as per chart review; migrated from [facility] ~7years ago. Lives with uncle, aunt and cousins in private house.

## 2024-05-28 NOTE — BH PATIENT PROFILE - HOME MEDICATIONS
ibuprofen 600 mg oral tablet , 1 tab(s) orally every 6 hours  haloperidol 5 mg oral tablet , 1 tab(s) orally 3 times a day MDD: 15  benztropine 1 mg oral tablet , 1 tab(s) orally 2 times a day

## 2024-05-28 NOTE — ED BEHAVIORAL HEALTH ASSESSMENT NOTE - HPI (INCLUDE ILLNESS QUALITY, SEVERITY, DURATION, TIMING, CONTEXT, MODIFYING FACTORS, ASSOCIATED SIGNS AND SYMPTOMS)
This is a 24 yo male, Greenlandic-speaking, interview with help of the interpereter [facility] [id number] domiciled w/ family, with history of schizophrenia vs affective psychosis, multiple prior psych admissions, most recent in November 2023 at , no suicide attempts or violence hx, however has hx of verbal threats and property damage, BIBPD for psychosis, HI, and medication non-compliance. Psychiatry consulted for evaluation.   Initial answers from patient did not make sense he was talking about man, house, family but could not explain the reason why he lost down the road with full questions about his wound disclosed that he is depressed and anxious, that he has poor sleep.  He claimed that he takes his medication but he is not sure the name and mention something that sounds like olanzapine, he also says that he sees provider in the community.  Then he says that he wanted to kill himself and he still has thoughts about killing himself and that he told he has a gun in his hand and wanted to shoot himself which is the reason why family called marijuana.    Patient does not object to being admitted to inpatient psychiatry.

## 2024-05-28 NOTE — ED BEHAVIORAL HEALTH ASSESSMENT NOTE - NSBHMSEAFFQUAL_PSY_A_CORE
Received call from Boogie's wife. She states he lost his medicare. Good Rx coupons for tacrolimus 5 mg, omeprazole and mycophenolate sent to pharmacy downstairs and notified Dr. Gamez's office to follow up as unknown what meds he is currently taking. Left wife VM informing her to follow up with Dr. Gamez.  Angie also informed to reach out.   
Depressed

## 2024-05-28 NOTE — BH PATIENT PROFILE - FALL HARM RISK - UNIVERSAL INTERVENTIONS
Bed in lowest position, wheels locked, appropriate side rails in place/Call bell, personal items and telephone in reach/Instruct patient to call for assistance before getting out of bed or chair/Non-slip footwear when patient is out of bed/D Han[place] to call system/Physically safe environment - no spills, clutter or unnecessary equipment/Purposeful Proactive Rounding/Room/bathroom lighting operational, light cord in reach

## 2024-05-28 NOTE — H&P ADULT - HISTORY OF PRESENT ILLNESS
This is a 23 year old male with a past history of schizophrenia vs affective psychosis, multiple prior psych admissions, most recent in November 2023 at  who was brought into the hospital by police for psychosis, SI, and medication non-compliance. Patient reports that last night, he was having hallucinations and thought that he had a gun in his hand which he was going to use to kill himself. Patient also previously reported that he is depressed, anxious and has poor sleep. Patient was evaluated by and admitted to psychiatry. Medicine was consulted for medical evaluation.  Patient denies any current SI/HI/AH/VH. Patient denies any fever, chills, chest pain, sob, nausea, vomiting, diarrhea, headache, dizziness and all other acute complaints.

## 2024-05-28 NOTE — H&P ADULT - NS ATTEND AMEND GEN_ALL_CORE FT
Case discussed and reviewed in detail, after initial evaluation above by RAMIRO Cole. Please note my plan below     74 y/o M w/ PMH of schizophrenia, p/w psychosis. Admitted to psych service.     *Schizophrenia  -Management as per psych     *Sinus bradycardia w/ abnormal EKG  -EKG: Sinus Aj 49 bpm, incomplete RBBB   -F/u TSH   -Cardio consult    *DVT ppx  -Encourage ambulation     40 mins required for consult

## 2024-05-29 DIAGNOSIS — F10.10 ALCOHOL ABUSE, UNCOMPLICATED: ICD-10-CM

## 2024-05-29 DIAGNOSIS — F12.10 CANNABIS ABUSE, UNCOMPLICATED: ICD-10-CM

## 2024-05-29 LAB
A1C WITH ESTIMATED AVERAGE GLUCOSE RESULT: 5.1 % — SIGNIFICANT CHANGE UP (ref 4–5.6)
CHOLEST SERPL-MCNC: 167 MG/DL — SIGNIFICANT CHANGE UP
COVID-19 SPIKE DOMAIN AB INTERP: POSITIVE
COVID-19 SPIKE DOMAIN ANTIBODY RESULT: >250 U/ML — HIGH
ESTIMATED AVERAGE GLUCOSE: 100 MG/DL — SIGNIFICANT CHANGE UP (ref 68–114)
HDLC SERPL-MCNC: 43 MG/DL — SIGNIFICANT CHANGE UP
LIPID PNL WITH DIRECT LDL SERPL: 99 MG/DL — SIGNIFICANT CHANGE UP
NON HDL CHOLESTEROL: 125 MG/DL — SIGNIFICANT CHANGE UP
SARS-COV-2 IGG+IGM SERPL QL IA: >250 U/ML — HIGH
SARS-COV-2 IGG+IGM SERPL QL IA: POSITIVE
TRIGL SERPL-MCNC: 143 MG/DL — SIGNIFICANT CHANGE UP
TSH SERPL-MCNC: 1.39 UU/ML — SIGNIFICANT CHANGE UP (ref 0.34–4.82)

## 2024-05-29 PROCEDURE — 93010 ELECTROCARDIOGRAM REPORT: CPT

## 2024-05-29 PROCEDURE — 99223 1ST HOSP IP/OBS HIGH 75: CPT

## 2024-05-29 RX ORDER — OLANZAPINE 15 MG/1
15 TABLET, FILM COATED ORAL AT BEDTIME
Refills: 0 | Status: DISCONTINUED | OUTPATIENT
Start: 2024-05-29 | End: 2024-05-31

## 2024-05-29 RX ORDER — TRAZODONE HCL 50 MG
75 TABLET ORAL AT BEDTIME
Refills: 0 | Status: DISCONTINUED | OUTPATIENT
Start: 2024-05-29 | End: 2024-06-05

## 2024-05-29 RX ORDER — BENZTROPINE MESYLATE 1 MG
1 TABLET ORAL THREE TIMES A DAY
Refills: 0 | Status: DISCONTINUED | OUTPATIENT
Start: 2024-05-29 | End: 2024-05-29

## 2024-05-29 RX ORDER — HALOPERIDOL DECANOATE 100 MG/ML
5 INJECTION INTRAMUSCULAR
Refills: 0 | Status: DISCONTINUED | OUTPATIENT
Start: 2024-05-29 | End: 2024-05-29

## 2024-05-29 RX ADMIN — Medication 75 MILLIGRAM(S): at 21:12

## 2024-05-29 RX ADMIN — OLANZAPINE 15 MILLIGRAM(S): 15 TABLET, FILM COATED ORAL at 21:12

## 2024-05-29 NOTE — BH SOCIAL WORK INITIAL PSYCHOSOCIAL EVALUATION - NSBHHOUSECOMMENTFT_PSY_ALL_CORE
Pt reports that he is still residing with his uncle but they are not currently speaking. SW explained to pt that prior to his dc, tx team will need to speak to his uncle to inform of dc. Pt expressed understanding.

## 2024-05-29 NOTE — BH SOCIAL WORK INITIAL PSYCHOSOCIAL EVALUATION - NSBHPROFILEREVIEW_PSY_ALL_CORE
Yes Per ED psych eval, "This is a 24 yo male, English-speaking, interview with help of the interpereter [facility] No [id number] domiciled w/ family, with history of schizophrenia vs affective psychosis, multiple prior psych admissions, most recent in November 2023 at , no suicide attempts or violence hx, however has hx of verbal threats and property damage, BIBPD for psychosis, HI, and medication non-compliance. Psychiatry consulted for evaluation.   Initial answers from patient did not make sense he was talking about man, house, family but could not explain the reason why he lost down the road with full questions about his wound disclosed that he is depressed and anxious, that he has poor sleep.  He claimed that he takes his medication but he is not sure the name and mention something that sounds like olanzapine, he also says that he sees provider in the community.  Then he says that he wanted to kill himself and he still has thoughts about killing himself and that he told he has a gun in his hand and wanted to shoot himself which is the reason why family called marijuana"./Yes

## 2024-05-29 NOTE — BH INPATIENT PSYCHIATRY ASSESSMENT NOTE - NSBHTIMEACTIVITIESPERFORMED_PSY_A_CORE
1. interviewed the pt to assess current mental status  2. reviewed medications    3. reviewed lab results   4. conferred with  nursing concerning behavior on the unit  5. reviewed previous chart material

## 2024-05-29 NOTE — BH INPATIENT PSYCHIATRY ASSESSMENT NOTE - NSBHMETABOLIC_PSY_ALL_CORE_FT
BMI: BMI (kg/m2): 24.6 (05-28-24 @ 12:20)  HbA1c: A1C with Estimated Average Glucose Result: 5.0 % (11-29-23 @ 09:11)    Glucose:   BP: 119/62 (05-28-24 @ 10:52) (111/64 - 124/59)Vital Signs Last 24 Hrs  T(C): 36.3 (05-29-24 @ 07:39), Max: 36.7 (05-28-24 @ 12:20)  T(F): 97.3 (05-29-24 @ 07:39), Max: 98.1 (05-28-24 @ 12:20)  HR: --  BP: --  BP(mean): --  RR: 18 (05-29-24 @ 07:39) (18 - 20)  SpO2: 100% (05-29-24 @ 07:39) (98% - 100%)    Orthostatic VS  05-29-24 @ 07:39  Lying BP: --/-- HR: --  Sitting BP: 119/61 HR: 102  Standing BP: 128/68 HR: 102  Site: upper right arm  Mode: electronic  Orthostatic VS  05-28-24 @ 12:20  Lying BP: --/-- HR: --  Sitting BP: 92/66 HR: 86  Standing BP: 113/58 HR: 71  Site: --  Mode: electronic    Lipid Panel: Date/Time: 11-29-23 @ 09:11  Cholesterol, Serum: 168  LDL Cholesterol Calculated: 115  HDL Cholesterol, Serum: 40  Total Cholesterol/HDL Ration Measurement: --  Triglycerides, Serum: 71   BMI: BMI (kg/m2): 24.6 (05-28-24 @ 12:20)  HbA1c: A1C with Estimated Average Glucose Result: 5.1 % (05-29-24 @ 08:20)    Glucose:   BP: 119/62 (05-28-24 @ 10:52) (111/64 - 124/59)Vital Signs Last 24 Hrs  T(C): 36.3 (05-29-24 @ 07:39), Max: 36.3 (05-29-24 @ 07:39)  T(F): 97.3 (05-29-24 @ 07:39), Max: 97.3 (05-29-24 @ 07:39)  HR: --  BP: --  BP(mean): --  RR: 18 (05-29-24 @ 07:39) (18 - 18)  SpO2: 100% (05-29-24 @ 07:39) (100% - 100%)    Orthostatic VS  05-29-24 @ 07:39  Lying BP: --/-- HR: --  Sitting BP: 119/61 HR: 102  Standing BP: 128/68 HR: 102  Site: upper right arm  Mode: electronic  Orthostatic VS  05-28-24 @ 12:20  Lying BP: --/-- HR: --  Sitting BP: 92/66 HR: 86  Standing BP: 113/58 HR: 71  Site: --  Mode: electronic    Lipid Panel: Date/Time: 05-29-24 @ 08:20  Cholesterol, Serum: 167  LDL Cholesterol Calculated: 99  HDL Cholesterol, Serum: 43  Total Cholesterol/HDL Ration Measurement: --  Triglycerides, Serum: 143

## 2024-05-29 NOTE — BH INPATIENT PSYCHIATRY ASSESSMENT NOTE - NSTXDCOTHRGOAL_PSY_ALL_CORE
Patient will be referred to the appropriate level of outpatient treatment and have appointments within 3-5 days of discharge.  Patient will be discharged to safe housing either back home or DSS emergency housing.   will explore need for duel diagnosis tx with appointments if needed.  Benefits in place.

## 2024-05-29 NOTE — BH INPATIENT PSYCHIATRY ASSESSMENT NOTE - CURRENT MEDICATION
MEDICATIONS  (STANDING):  benztropine 1 milliGRAM(s) Oral three times a day  haloperidol     Tablet 5 milliGRAM(s) Oral two times a day    MEDICATIONS  (PRN):  diphenhydrAMINE Injectable 50 milliGRAM(s) IntraMuscular every 6 hours PRN EPS prevention  haloperidol    Injectable 5 milliGRAM(s) IntraMuscular every 6 hours PRN severe psychotic agitation  LORazepam   Injectable 2 milliGRAM(s) IntraMuscular every 4 hours PRN severe agitation   MEDICATIONS  (STANDING):  OLANZapine 15 milliGRAM(s) Oral at bedtime  traZODone 75 milliGRAM(s) Oral at bedtime    MEDICATIONS  (PRN):  diphenhydrAMINE Injectable 50 milliGRAM(s) IntraMuscular every 6 hours PRN EPS prevention  haloperidol    Injectable 5 milliGRAM(s) IntraMuscular every 6 hours PRN severe psychotic agitation  LORazepam   Injectable 2 milliGRAM(s) IntraMuscular every 4 hours PRN severe agitation

## 2024-05-29 NOTE — BH SOCIAL WORK INITIAL PSYCHOSOCIAL EVALUATION - NSHIGHRISKBEHFT_PSY_ALL_CORE
Pt reports that he has been smoking one joint a day for the last few days. Pt denies that his AH increased when he smoked.  Pt with a hx of non-compliance but has been compliant prior to this admission.

## 2024-05-29 NOTE — BH INPATIENT PSYCHIATRY ASSESSMENT NOTE - DESCRIPTION
as per chart review; migrated from [facility] ~7years ago. Lives with uncle, aunt and cousins in private house.

## 2024-05-29 NOTE — BH INPATIENT PSYCHIATRY ASSESSMENT NOTE - NSBHASSESSSUMMFT_PSY_ALL_CORE
Pt is only Italian speaking Pt giving confusing story of hearina a voice commanding him to kill himself with a gun and that he was holding a gun but that he doesnt own or have access to a gun.  Pt is only Khmer speaking Pt giving confusing story of hearing a  voice commanding him to kill himself with a gun and that he was holding a gun but that he doesnt own or have access to a gun. Pt claiming that he could feel the gun in his hands . Pt indicated that he recalled using unknown amount of  alcohol and cannabis and that he "may "have been off of his medications.  Pt currently declining other medication except to restart the use of zyprexa. Pt unaware of the dose of his medication but by record was 20mg a day.  Pt to be restarted with 15mg pf the Zyprexa .  Pt currently with more goal directed speech and with decreased irritable mood.  Pt denies any suicidal ideation intent or plan.  Pt with decreased preoccupation with own thoughts. Pt declining  the need for any medication involving injectable form of the medication .

## 2024-05-29 NOTE — BH INPATIENT PSYCHIATRY ASSESSMENT NOTE - HPI (INCLUDE ILLNESS QUALITY, SEVERITY, DURATION, TIMING, CONTEXT, MODIFYING FACTORS, ASSOCIATED SIGNS AND SYMPTOMS)
This is a 22 yo male, Eritrean-speaking, interview with help of the interpereter [facility] No [id number] domiciled w/ family, with history of schizophrenia vs affective psychosis, multiple prior psych admissions, most recent in November 2023 at , no suicide attempts or violence hx, however has hx of verbal threats and property damage, BIBPD for psychosis, HI, and medication non-compliance. Psychiatry consulted for evaluation.   Initial answers from patient did not make sense he was talking about man, house, family but could not explain the reason why he lost down the road with full questions about his wound disclosed that he is depressed and anxious, that he has poor sleep.  He claimed that he takes his medication but he is not sure the name and mention something that sounds like olanzapine, he also says that he sees provider in the community.  Then he says that he wanted to kill himself and he still has thoughts about killing himself and that he told he has a gun in his hand and wanted to shoot himself which is the reason why family called marijuana.    Patient does not object to being admitted to inpatient psychiatry.

## 2024-05-29 NOTE — BH SOCIAL WORK INITIAL PSYCHOSOCIAL EVALUATION - OTHER PAST PSYCHIATRIC HISTORY (INCLUDE DETAILS REGARDING ONSET, COURSE OF ILLNESS, INPATIENT/OUTPATIENT TREATMENT)
Pt with a hx of multiple psych admissions due to psychosis and not compliant with his medication. Pt reports that he was experiencing "disturbing thoughts" telling him to kill himself with firearms in his house. Pt confirms that there are no firearms in his home. Pt denies feeling depressed or anxious prior to admission or any triggering event. Pt reports being compliant with his medication and tx. Pt reports good sleep and appetite.

## 2024-05-29 NOTE — BH SOCIAL WORK INITIAL PSYCHOSOCIAL EVALUATION - NSPROGENSOURCEINFOFT_PSY_ALL_CORE
EMR Pt known to this writer from previous admissions.   Information obtained from EMR.  Pt not consenting for tx team to speak with his uncle at this time.  SW utilized  services (ID# [id number]) to complete assessment.

## 2024-05-29 NOTE — BH INPATIENT PSYCHIATRY ASSESSMENT NOTE - NSBHCHARTREVIEWVS_PSY_A_CORE FT
Vital Signs Last 24 Hrs  T(C): 36.3 (05-29-24 @ 07:39), Max: 36.7 (05-28-24 @ 12:20)  T(F): 97.3 (05-29-24 @ 07:39), Max: 98.1 (05-28-24 @ 12:20)  HR: --  BP: --  BP(mean): --  RR: 18 (05-29-24 @ 07:39) (18 - 20)  SpO2: 100% (05-29-24 @ 07:39) (98% - 100%)    Orthostatic VS  05-29-24 @ 07:39  Lying BP: --/-- HR: --  Sitting BP: 119/61 HR: 102  Standing BP: 128/68 HR: 102  Site: upper right arm  Mode: electronic  Orthostatic VS  05-28-24 @ 12:20  Lying BP: --/-- HR: --  Sitting BP: 92/66 HR: 86  Standing BP: 113/58 HR: 71  Site: --  Mode: electronic   Vital Signs Last 24 Hrs  T(C): 36.3 (05-29-24 @ 07:39), Max: 36.3 (05-29-24 @ 07:39)  T(F): 97.3 (05-29-24 @ 07:39), Max: 97.3 (05-29-24 @ 07:39)  HR: --  BP: --  BP(mean): --  RR: 18 (05-29-24 @ 07:39) (18 - 18)  SpO2: 100% (05-29-24 @ 07:39) (100% - 100%)    Orthostatic VS  05-29-24 @ 07:39  Lying BP: --/-- HR: --  Sitting BP: 119/61 HR: 102  Standing BP: 128/68 HR: 102  Site: upper right arm  Mode: electronic  Orthostatic VS  05-28-24 @ 12:20  Lying BP: --/-- HR: --  Sitting BP: 92/66 HR: 86  Standing BP: 113/58 HR: 71  Site: --  Mode: electronic

## 2024-05-30 PROCEDURE — 99232 SBSQ HOSP IP/OBS MODERATE 35: CPT

## 2024-05-30 RX ADMIN — OLANZAPINE 15 MILLIGRAM(S): 15 TABLET, FILM COATED ORAL at 20:30

## 2024-05-30 RX ADMIN — Medication 75 MILLIGRAM(S): at 20:30

## 2024-05-30 NOTE — BH TREATMENT PLAN - NSTXCOPEINTERPR_PSY_ALL_CORE
Will be encouraged to attend group programming daily to gain insight and work on coping skill development.

## 2024-05-30 NOTE — BH TREATMENT PLAN - NSTXPLANTHERAPYSESSIONSFT_PSY_ALL_CORE
05-28-24  Type of therapy: Safety planning  Type of session: Individual  Level of patient participation: Not engaged, Participates  Duration of participation: 30 minutes  Therapy conducted by: Psych rehab  Therapy Summary:  Carol #[id number] assisted. Patient reported that he called the police because he was having suicidal thoughts that started for 1 night. Denies SI currently. Stated he has been feeling depressed for the last 10 days. Unable to identify a trigger and does not tell writer anything that has been going on in. Stated "I haven't been getting along with my uncle," but doesn't specify why. Patient was guarded with writer. At times patient would avoid answering questions and the  informed writer his responses did not always make sense with what was asked. Encouraged patient to come to staff when in need of support. Education and support ongoing.

## 2024-05-30 NOTE — BH TREATMENT PLAN - NSTXDISORGINTERRN_PSY_ALL_CORE
Establish trust/therapeutic rapport to encourage ventilation of feelings  Provide emotional support  Assess moood Q shift, document and report changes  Encourage medication compliance, provide education, and monitor effects  Encourage participation in unit activities with emphasis on coping/stress management  Reality orient as needed  Provide consistent direction

## 2024-05-31 PROCEDURE — 99232 SBSQ HOSP IP/OBS MODERATE 35: CPT

## 2024-05-31 RX ORDER — OLANZAPINE 15 MG/1
20 TABLET, FILM COATED ORAL AT BEDTIME
Refills: 0 | Status: DISCONTINUED | OUTPATIENT
Start: 2024-05-31 | End: 2024-06-05

## 2024-05-31 RX ORDER — HYDROXYZINE HCL 10 MG
50 TABLET ORAL EVERY 6 HOURS
Refills: 0 | Status: DISCONTINUED | OUTPATIENT
Start: 2024-05-31 | End: 2024-06-04

## 2024-05-31 RX ADMIN — OLANZAPINE 20 MILLIGRAM(S): 15 TABLET, FILM COATED ORAL at 20:29

## 2024-05-31 RX ADMIN — Medication 75 MILLIGRAM(S): at 20:29

## 2024-06-01 PROCEDURE — 99232 SBSQ HOSP IP/OBS MODERATE 35: CPT

## 2024-06-01 RX ADMIN — OLANZAPINE 20 MILLIGRAM(S): 15 TABLET, FILM COATED ORAL at 20:34

## 2024-06-01 RX ADMIN — Medication 75 MILLIGRAM(S): at 20:34

## 2024-06-02 PROCEDURE — 99232 SBSQ HOSP IP/OBS MODERATE 35: CPT

## 2024-06-02 RX ADMIN — OLANZAPINE 20 MILLIGRAM(S): 15 TABLET, FILM COATED ORAL at 20:15

## 2024-06-02 RX ADMIN — Medication 75 MILLIGRAM(S): at 20:14

## 2024-06-03 PROCEDURE — 99232 SBSQ HOSP IP/OBS MODERATE 35: CPT

## 2024-06-03 RX ADMIN — Medication 75 MILLIGRAM(S): at 20:38

## 2024-06-03 RX ADMIN — OLANZAPINE 20 MILLIGRAM(S): 15 TABLET, FILM COATED ORAL at 20:37

## 2024-06-03 NOTE — BH INPATIENT PSYCHIATRY PROGRESS NOTE - NSBHCONSDANGERSELF_PSY_A_CORE
suicidal ideation with plan and means
unable to care for self

## 2024-06-04 PROCEDURE — 99232 SBSQ HOSP IP/OBS MODERATE 35: CPT

## 2024-06-04 RX ORDER — TRAZODONE HCL 50 MG
1 TABLET ORAL
Qty: 15 | Refills: 1
Start: 2024-06-04 | End: 2024-07-03

## 2024-06-04 RX ORDER — OLANZAPINE 15 MG/1
1 TABLET, FILM COATED ORAL
Qty: 15 | Refills: 1
Start: 2024-06-04 | End: 2024-07-03

## 2024-06-04 RX ADMIN — Medication 75 MILLIGRAM(S): at 20:43

## 2024-06-04 RX ADMIN — OLANZAPINE 20 MILLIGRAM(S): 15 TABLET, FILM COATED ORAL at 20:43

## 2024-06-04 NOTE — BH INPATIENT PSYCHIATRY DISCHARGE NOTE - DESCRIPTION
as per chart review; migrated from [facility] ~7years ago. Lives with uncle, aunt and cousins in private house.

## 2024-06-04 NOTE — BH DISCHARGE NOTE NURSING/SOCIAL WORK/PSYCH REHAB - NSDCOTHERNAME_GEN_ALL_CORE
National Help Line for Substance Abuse  [phone]    SMART Recovery Groups  *Can meet online   https://www.smartrecovery.org     Find a local AA (or NA) group:  [facility] 24hr hotline: (736) 181.1101  [facility] 24hr hotline: [phone]

## 2024-06-04 NOTE — BH DISCHARGE NOTE NURSING/SOCIAL WORK/PSYCH REHAB - PATIENT PORTAL LINK FT
You can access the FollowMyHealth Patient Portal offered by [facility] by registering at the following website: [email or web address]. By joining [email or web address]’s FollowMyHealth portal, you will also be able to view your health information using other applications (apps) compatible with our system.

## 2024-06-04 NOTE — BH INPATIENT PSYCHIATRY PROGRESS NOTE - NSBHMETABOLIC_PSY_ALL_CORE_FT
BMI: BMI (kg/m2): 24.6 (05-28-24 @ 12:20)  HbA1c: A1C with Estimated Average Glucose Result: 5.1 % (05-29-24 @ 08:20)    Glucose:   BP: --Vital Signs Last 24 Hrs  T(C): 36.6 (05-31-24 @ 07:25), Max: 36.6 (05-31-24 @ 07:25)  T(F): 97.8 (05-31-24 @ 07:25), Max: 97.8 (05-31-24 @ 07:25)  HR: --  BP: --  BP(mean): --  RR: 18 (05-31-24 @ 07:25) (18 - 18)  SpO2: 100% (05-31-24 @ 07:25) (100% - 100%)    Orthostatic VS  05-31-24 @ 07:25  Lying BP: --/-- HR: --  Sitting BP: 111/53 HR: 76  Standing BP: 107/67 HR: 107  Site: upper right arm  Mode: electronic  Orthostatic VS  05-30-24 @ 07:35  Lying BP: --/-- HR: --  Sitting BP: 107/58 HR: 87  Standing BP: 100/54 HR: 101  Site: upper right arm  Mode: electronic    Lipid Panel: Date/Time: 05-29-24 @ 08:20  Cholesterol, Serum: 167  LDL Cholesterol Calculated: 99  HDL Cholesterol, Serum: 43  Total Cholesterol/HDL Ration Measurement: --  Triglycerides, Serum: 143  
BMI: BMI (kg/m2): 24.6 (05-28-24 @ 12:20)  HbA1c: A1C with Estimated Average Glucose Result: 5.1 % (05-29-24 @ 08:20)    Glucose:   BP: --Vital Signs Last 24 Hrs  T(C): 36.4 (06-03-24 @ 07:39), Max: 36.4 (06-03-24 @ 07:39)  T(F): 97.5 (06-03-24 @ 07:39), Max: 97.5 (06-03-24 @ 07:39)  HR: --  BP: --  BP(mean): --  RR: 18 (06-03-24 @ 07:39) (18 - 18)  SpO2: 99% (06-03-24 @ 07:39) (99% - 99%)    Orthostatic VS  06-03-24 @ 07:39  Lying BP: --/-- HR: --  Sitting BP: 124/58 HR: 71  Standing BP: 102/76 HR: 98  Site: upper right arm  Mode: electronic  Orthostatic VS  06-02-24 @ 07:28  Lying BP: --/-- HR: --  Sitting BP: 117/66 HR: 82  Standing BP: 106/76 HR: 109  Site: upper right arm  Mode: electronic    Lipid Panel: Date/Time: 05-29-24 @ 08:20  Cholesterol, Serum: 167  LDL Cholesterol Calculated: 99  HDL Cholesterol, Serum: 43  Total Cholesterol/HDL Ration Measurement: --  Triglycerides, Serum: 143  
BMI: BMI (kg/m2): 24.6 (05-28-24 @ 12:20)  HbA1c: A1C with Estimated Average Glucose Result: 5.1 % (05-29-24 @ 08:20)    Glucose:   BP: --Vital Signs Last 24 Hrs  T(C): 36.7 (06-01-24 @ 07:35), Max: 36.7 (06-01-24 @ 07:35)  T(F): 98 (06-01-24 @ 07:35), Max: 98 (06-01-24 @ 07:35)  HR: --  BP: --  BP(mean): --  RR: 17 (06-01-24 @ 07:35) (17 - 17)  SpO2: 100% (06-01-24 @ 07:35) (100% - 100%)    Orthostatic VS  06-01-24 @ 07:35  Lying BP: --/-- HR: --  Sitting BP: 107/65 HR: 95  Standing BP: 123/60 HR: 90  Site: upper right arm  Mode: electronic  Orthostatic VS  05-31-24 @ 07:25  Lying BP: --/-- HR: --  Sitting BP: 111/53 HR: 76  Standing BP: 107/67 HR: 107  Site: upper right arm  Mode: electronic    Lipid Panel: Date/Time: 05-29-24 @ 08:20  Cholesterol, Serum: 167  LDL Cholesterol Calculated: 99  HDL Cholesterol, Serum: 43  Total Cholesterol/HDL Ration Measurement: --  Triglycerides, Serum: 143  
BMI: BMI (kg/m2): 24.6 (05-28-24 @ 12:20)  HbA1c: A1C with Estimated Average Glucose Result: 5.1 % (05-29-24 @ 08:20)    Glucose:   BP: --Vital Signs Last 24 Hrs  T(C): 36.4 (06-02-24 @ 07:28), Max: 36.4 (06-02-24 @ 07:28)  T(F): 97.5 (06-02-24 @ 07:28), Max: 97.5 (06-02-24 @ 07:28)  HR: --  BP: --  BP(mean): --  RR: 17 (06-02-24 @ 07:28) (17 - 17)  SpO2: 100% (06-02-24 @ 07:28) (100% - 100%)    Orthostatic VS  06-02-24 @ 07:28  Lying BP: --/-- HR: --  Sitting BP: 117/66 HR: 82  Standing BP: 106/76 HR: 109  Site: upper right arm  Mode: electronic  Orthostatic VS  06-01-24 @ 07:35  Lying BP: --/-- HR: --  Sitting BP: 107/65 HR: 95  Standing BP: 123/60 HR: 90  Site: upper right arm  Mode: electronic    Lipid Panel: Date/Time: 05-29-24 @ 08:20  Cholesterol, Serum: 167  LDL Cholesterol Calculated: 99  HDL Cholesterol, Serum: 43  Total Cholesterol/HDL Ration Measurement: --  Triglycerides, Serum: 143  
BMI: BMI (kg/m2): 24.6 (05-28-24 @ 12:20)  HbA1c: A1C with Estimated Average Glucose Result: 5.1 % (05-29-24 @ 08:20)    Glucose:   BP: 119/62 (05-28-24 @ 10:52) (111/64 - 124/59)Vital Signs Last 24 Hrs  T(C): 36.3 (05-30-24 @ 07:35), Max: 36.3 (05-30-24 @ 07:35)  T(F): 97.4 (05-30-24 @ 07:35), Max: 97.4 (05-30-24 @ 07:35)  HR: --  BP: --  BP(mean): --  RR: 18 (05-30-24 @ 07:35) (18 - 18)  SpO2: 100% (05-30-24 @ 07:35) (100% - 100%)    Orthostatic VS  05-30-24 @ 07:35  Lying BP: --/-- HR: --  Sitting BP: 107/58 HR: 87  Standing BP: 100/54 HR: 101  Site: upper right arm  Mode: electronic  Orthostatic VS  05-29-24 @ 07:39  Lying BP: --/-- HR: --  Sitting BP: 119/61 HR: 102  Standing BP: 128/68 HR: 102  Site: upper right arm  Mode: electronic    Lipid Panel: Date/Time: 05-29-24 @ 08:20  Cholesterol, Serum: 167  LDL Cholesterol Calculated: 99  HDL Cholesterol, Serum: 43  Total Cholesterol/HDL Ration Measurement: --  Triglycerides, Serum: 143  
BMI: BMI (kg/m2): 24.6 (05-28-24 @ 12:20)  HbA1c: A1C with Estimated Average Glucose Result: 5.1 % (05-29-24 @ 08:20)    Glucose:   BP: --Vital Signs Last 24 Hrs  T(C): 36.3 (06-04-24 @ 07:22), Max: 36.3 (06-04-24 @ 07:22)  T(F): 97.4 (06-04-24 @ 07:22), Max: 97.4 (06-04-24 @ 07:22)  HR: --  BP: --  BP(mean): --  RR: 19 (06-04-24 @ 07:22) (19 - 19)  SpO2: 98% (06-04-24 @ 07:22) (98% - 98%)    Orthostatic VS  06-04-24 @ 07:22  Lying BP: --/-- HR: --  Sitting BP: 107/51 HR: 77  Standing BP: 107/58 HR: 97  Site: upper right arm  Mode: electronic  Orthostatic VS  06-03-24 @ 07:39  Lying BP: --/-- HR: --  Sitting BP: 124/58 HR: 71  Standing BP: 102/76 HR: 98  Site: upper right arm  Mode: electronic    Lipid Panel: Date/Time: 05-29-24 @ 08:20  Cholesterol, Serum: 167  LDL Cholesterol Calculated: 99  HDL Cholesterol, Serum: 43  Total Cholesterol/HDL Ration Measurement: --  Triglycerides, Serum: 143

## 2024-06-04 NOTE — BH INPATIENT PSYCHIATRY PROGRESS NOTE - CURRENT MEDICATION
MEDICATIONS  (STANDING):  OLANZapine 20 milliGRAM(s) Oral at bedtime  traZODone 75 milliGRAM(s) Oral at bedtime    MEDICATIONS  (PRN):  diphenhydrAMINE Injectable 50 milliGRAM(s) IntraMuscular every 6 hours PRN EPS prevention  haloperidol    Injectable 5 milliGRAM(s) IntraMuscular every 6 hours PRN severe psychotic agitation  hydrOXYzine hydrochloride 50 milliGRAM(s) Oral every 6 hours PRN anxiety  LORazepam   Injectable 2 milliGRAM(s) IntraMuscular every 4 hours PRN severe agitation  
MEDICATIONS  (STANDING):  OLANZapine 20 milliGRAM(s) Oral at bedtime  traZODone 75 milliGRAM(s) Oral at bedtime    MEDICATIONS  (PRN):  
MEDICATIONS  (STANDING):  OLANZapine 15 milliGRAM(s) Oral at bedtime  traZODone 75 milliGRAM(s) Oral at bedtime    MEDICATIONS  (PRN):  diphenhydrAMINE Injectable 50 milliGRAM(s) IntraMuscular every 6 hours PRN EPS prevention  haloperidol    Injectable 5 milliGRAM(s) IntraMuscular every 6 hours PRN severe psychotic agitation  LORazepam   Injectable 2 milliGRAM(s) IntraMuscular every 4 hours PRN severe agitation  
MEDICATIONS  (STANDING):  OLANZapine 20 milliGRAM(s) Oral at bedtime  traZODone 75 milliGRAM(s) Oral at bedtime    MEDICATIONS  (PRN):  diphenhydrAMINE Injectable 50 milliGRAM(s) IntraMuscular every 6 hours PRN EPS prevention  haloperidol    Injectable 5 milliGRAM(s) IntraMuscular every 6 hours PRN severe psychotic agitation  hydrOXYzine hydrochloride 50 milliGRAM(s) Oral every 6 hours PRN anxiety  LORazepam   Injectable 2 milliGRAM(s) IntraMuscular every 4 hours PRN severe agitation

## 2024-06-04 NOTE — BH INPATIENT PSYCHIATRY PROGRESS NOTE - NSBHASSESSSUMMFT_PSY_ALL_CORE
pt with low risk for suicide Pt with increased goal directed speech , still with periods of anxiety   mitigating pt on medication   protective pt with family and a place to live   chronic pt with prior psych hx , cannabis use
pt denies any suicidal ideation intent or plan   mitigating pt with medication   protective pt with home and family   chronic pt with use of substances and hx of noncompliance 
pt denies any suicidal ideation intent or plan  mitigating pt on medication   protective pt with support of family   chronic pt with prior psych hosp and also a place to live 
pt with low risk for suicide Pt with increased goal directed speech , still with periods of anxiety   mitigating pt on medication   protective pt with family and a place to live   chronic pt with prior psych hx , cannabis use
pt denies any suicidal ideation intent or plan . Pt claiming the auditory hallucination has decreased in intensity   mitigating pt on medication   protective pt with family , place to live   chronic pt with prior psych hx 
pt with low risk for suicide Pt with increased goal directed speech , still with periods of anxiety   mitigating pt on medication   protective pt with family and a place to live   chronic pt with prior psych hx , cannabis use

## 2024-06-04 NOTE — BH INPATIENT PSYCHIATRY PROGRESS NOTE - NSDCCRITERIA_PSY_ALL_CORE
pt with stable mood and affect , denies any suicidal ideation intent or plan 

## 2024-06-04 NOTE — BH DISCHARGE NOTE NURSING/SOCIAL WORK/PSYCH REHAB - NSBHCRISISRESOURCESOTHER_PSY_ALL_CORE_FT
Please contact Dr. Smith for medication or treatment questions, lab results or any other concerns at [phone]. Handoff provided to your outpatient provider, [facility].  If needed, please contact [facility], [facility], 24/7 days a week and speak with Sheila Vazquez RN Nurse manager or any [facility]N staff member @ [phone].  Please return to the ED if symptoms worsen or return.

## 2024-06-04 NOTE — BH INPATIENT PSYCHIATRY DISCHARGE NOTE - HOSPITAL COURSE
MEDICATIONS  (STANDING):  OLANZapine 20 milliGRAM(s) Oral at bedtime  traZODone 75 milliGRAM(s) Oral at bedtime    Pt with gradual decreased psychosis , with decreased hallucination , decreased preoccupation .  Pt now denies any hallucination , no delusions elicited. . Pt denies any suicidal or homicidal ideation intent or plan .   He denies any side effect from medication

## 2024-06-04 NOTE — BH INPATIENT PSYCHIATRY PROGRESS NOTE - NSBHFUPINTERVALHXFT_PSY_A_CORE
Pt with improved eye contact. Pt claiming hew was compliant with medication at home but did not know the dose of the medication  Pt able to sleep better . Pt declining the need for rehab treatment 
Pt family was contacted and uncle is aware of the plan for pt to return home and he did not express any objection for this .  Pt is agreeing with plan for discharge . Pt with good eye contact Alert mod is euthymic .  Pt denies any suicidal ideation intent escobar plan 
06/02/2024: Patient was seen today AM, chart reviewed and discussed in team. He is meds compliant, seems still under the radar of psychosis, limited conversation, not spontaneous and jovial as seen in last admission. He denied alcohol abuse, but endorses smokes cannabis at times. he was advised to stay away from cannabis. No meds changes for now.    06/01/2024: Patient was seen today AM, chart reviewed and discussed in team. Patient well known to unit as he has been here in the past, no aggression/agitation, limited conversation till now. Initially less responsive as he usually does, later once in better shape with meds he perks up and has been taking meds, but stopped the meds for now leading to decompensation, he added that he also smokes Cannabis which may contribute to exaggerated Psychosis.    5/31/2024  Pt with denial of any hallucination . Pt with increased goal directed speech .  Pt still claiming that he is willing to continue with the Zyprexa and is not wanting it to be changed to a medication in injectable form. .  Pt with hx of noncompliance and of continued use of cannabis.   Will increase the zyprexa to 20mg hs and add atarax  prn  for anxiety. 
06/01/2024: Patient was seen today AM, chart reviewed and discussed in team. Patient well known to unit as he has been here in the past, no aggression/agitation, limited conversation till now. Initially less responsive as he usually does, later once in better shape with meds he perks up and has been taking meds, but stopped the meds for now leading to decompensation, he added that he also smokes Cannabis which may contribute to exaggerated Psychosis.    5/31/2024  Pt with denial of any hallucination . Pt with increased goal directed speech .  Pt still claiming that he is willing to continue with the Zyprexa and is not wanting it to be changed to a medication in injectable form. .  Pt with hx of noncompliance and of continued use of cannabis.   Will increase the zyprexa to 20mg hs and add atarax  prn  for anxiety. 
pt denies any suicidal ideatio intent or plan Pt working with CSW on aftercare treatment 
5/31/2024  Pt with denial of any hallucination . Pt with increased goal directed speech .  Pt still claiming that he is willing to continue with the zyprexa and is not wanting it to be changed to a medication in injectable form. .  Pt with hx of noncompliance and of continued use of cannabis.   Will increase the zyprexa to 20mg hs and add atarax  prn  for anxiety.
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
## 2024-06-04 NOTE — BH INPATIENT PSYCHIATRY DISCHARGE NOTE - NSDCMRMEDTOKEN_GEN_ALL_CORE_FT
OLANZapine 20 mg oral tablet: 1 tab(s) orally once a day (at bedtime)  traZODone 50 mg oral tablet: 1 tab(s) orally once a day (at bedtime) as needed for  insomnia

## 2024-06-04 NOTE — BH DISCHARGE NOTE NURSING/SOCIAL WORK/PSYCH REHAB - NSDCADDINFO1FT_PSY_ALL_CORE
You have an in person appt with your therapist Agnieszka Siddiqui on 6/11 @ 10:30am. Please address any substance abuse issues with your provider.

## 2024-06-04 NOTE — BH DISCHARGE NOTE NURSING/SOCIAL WORK/PSYCH REHAB - NSDCPLANREVIEWED_PSY_ALL_CORE
The discharge note was reviewed with the patient and the patient has agreed to receive a copy of the SW/Nurse/MD discharge note in the patient portal.

## 2024-06-04 NOTE — BH INPATIENT PSYCHIATRY PROGRESS NOTE - NSTXDISORGGOAL_PSY_ALL_CORE
Will verbalize 1 strategy to successfully cope with disturbed thinking

## 2024-06-04 NOTE — BH INPATIENT PSYCHIATRY PROGRESS NOTE - NSTXDISORGINTERMD_PSY_ALL_CORE
pt placed on zyprexa for stabilization of mood and affect , antipsychotic medication

## 2024-06-04 NOTE — BH INPATIENT PSYCHIATRY DISCHARGE NOTE - NSBHMETABOLIC_PSY_ALL_CORE_FT
BMI: BMI (kg/m2): 24.6 (05-28-24 @ 12:20)  HbA1c: A1C with Estimated Average Glucose Result: 5.1 % (05-29-24 @ 08:20)    Glucose:   BP: --Vital Signs Last 24 Hrs  T(C): 36.3 (06-04-24 @ 07:22), Max: 36.3 (06-04-24 @ 07:22)  T(F): 97.4 (06-04-24 @ 07:22), Max: 97.4 (06-04-24 @ 07:22)  HR: --  BP: --  BP(mean): --  RR: 19 (06-04-24 @ 07:22) (19 - 19)  SpO2: 98% (06-04-24 @ 07:22) (98% - 98%)    Orthostatic VS  06-04-24 @ 07:22  Lying BP: --/-- HR: --  Sitting BP: 107/51 HR: 77  Standing BP: 107/58 HR: 97  Site: upper right arm  Mode: electronic  Orthostatic VS  06-03-24 @ 07:39  Lying BP: --/-- HR: --  Sitting BP: 124/58 HR: 71  Standing BP: 102/76 HR: 98  Site: upper right arm  Mode: electronic    Lipid Panel: Date/Time: 05-29-24 @ 08:20  Cholesterol, Serum: 167  LDL Cholesterol Calculated: 99  HDL Cholesterol, Serum: 43  Total Cholesterol/HDL Ration Measurement: --  Triglycerides, Serum: 143

## 2024-06-04 NOTE — BH INPATIENT PSYCHIATRY PROGRESS NOTE - NSICDXBHSECONDARYDX_PSY_ALL_CORE
Alcohol abuse   F10.10  Cannabis abuse   F12.10  

## 2024-06-04 NOTE — BH INPATIENT PSYCHIATRY PROGRESS NOTE - NSBHPROGSUIC_PSY_A_CORE
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       Subjective   Overnight events noted  Sitting in chair  Little change  Getting EEG  +Ng tube  Minimal response      Intake/Output Summary (Last 24 hours) at 4/12/2024 0941  Last data filed at 4/12/2024 0859  Gross per 24 hour   Intake 1770.85 ml   Output 950 ml   Net 820.85 ml        Labs and vitals reviewed.     Plan:   Renal function improved;stable  Cont FWF via NG tube  Monitor UO/Cr   Avoid NSAIDs, IV contrast   No need for RRT   Labs later today and tomorrow.     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   Hypernatremia  CKD stage 2-3:   Seziures  LV thrombus  HTN; BP meds on hold     Objective       Last Recorded Vitals  Blood pressure 136/69, pulse 73, temperature 98.3 °F (36.8 °C), temperature source Axillary, resp. rate (!) 11, height 5' 11\" (1.803 m), weight 77.5 kg (170 lb 13.7 oz), SpO2 97%.  Body mass index is 23.83 kg/m².      NAD, +Ng tube  No JVD   S1 and S2 are audible   Soft, nt abdomen  No Edema      LABORATORY DATA:    Lab Results   Component Value Date    SODIUM 137 04/12/2024    POTASSIUM 3.9 04/12/2024    CO2 28 04/12/2024    BUN 15 04/12/2024    CREATININE 1.02 04/12/2024    GLUCOSE 126 (H) 04/12/2024      Lab Results   Component Value Date    CALCIUM 9.0 04/12/2024         Inpatient Medications:  Current Facility-Administered Medications   Medication Dose Route Frequency Provider Last Rate Last Admin    potassium & sodium phosphates (PHOS-NAK) 280-160-250 MG powder packet 1 packet  1 packet Per NG Tube Once Bobby Lisa MD        valproic acid (DEPAKENE) solution 375 mg  375 mg Per NG Tube 3 times per day Jimena Canada, 
no
DO   375 mg at 04/12/24 0548    carvedilol (COREG) tablet 12.5 mg  12.5 mg Per NG Tube 2 times per day Savanah Henderson T, DO   12.5 mg at 04/12/24 0812    amLODIPine (NORVASC) tablet 10 mg  10 mg Per NG Tube Daily SweSavanah galvez, DO   10 mg at 04/12/24 0812    levETIRAcetam ORAL (KepPRA) 100 MG/ML oral solution 750 mg  750 mg Per NG Tube 2 times per day Savanah Henderson T, DO   750 mg at 04/12/24 0812    lacosamide ORAL (VIMPAT) 10 MG/ML oral solution 150 mg  150 mg Per NG Tube 2 times per day Bobby Lisa MD   150 mg at 04/12/24 0812    OXcarbazepine (TRILEPTAL) 300 MG/5ML suspension 600 mg  600 mg Per NG Tube 2 times per day Bobby Lisa MD   600 mg at 04/12/24 0812    ipratropium-albuterol (DUONEB) 0.5-2.5 (3) MG/3ML nebulizer solution 3 mL  3 mL Nebulization Q6H Resp Kali Elder MD   3 mL at 04/12/24 0745    atorvastatin (LIPITOR) tablet 80 mg  80 mg Per NG Tube Nightly Bobby Lisa MD   80 mg at 04/11/24 2111    aspirin chewable 81 mg  81 mg Per NG Tube Daily Bobby Lisa MD   81 mg at 04/12/24 0812    Potassium Standard Replacement Protocol (Levels 3.5 and lower)   Does not apply See Admin Instructions Nasrin Rocha APNP        Potassium Replacement (Levels 3.6 - 4)   Does not apply See Admin Instructions Nasrin Rocha APNP        Magnesium Standard Replacement Protocol   Does not apply See Admin Instructions Nasrin Rocha APNP        Phosphorus Standard Replacement Protocol   Does not apply See Admin Instructions Nasrin Rocha APNP                [facility]  [phone]    4/12/2024           
no

## 2024-06-04 NOTE — BH DISCHARGE NOTE NURSING/SOCIAL WORK/PSYCH REHAB - NSDCPRGOAL_PSY_ALL_CORE
Patient worked on coping skill development and safety planning through participation in unit programming.

## 2024-06-04 NOTE — BH INPATIENT PSYCHIATRY PROGRESS NOTE - NSBHATTESTBILLING_PSY_A_CORE
Billing in another system
90649-Mynlrkbcbd OBS or IP - moderate complexity OR 35-49 mins
66918-Lxmgstbsqa OBS or IP - moderate complexity OR 35-49 mins

## 2024-06-04 NOTE — BH INPATIENT PSYCHIATRY PROGRESS NOTE - NSBHCHARTREVIEWVS_PSY_A_CORE FT
Vital Signs Last 24 Hrs  T(C): 36.3 (05-30-24 @ 07:35), Max: 36.3 (05-30-24 @ 07:35)  T(F): 97.4 (05-30-24 @ 07:35), Max: 97.4 (05-30-24 @ 07:35)  HR: --  BP: --  BP(mean): --  RR: 18 (05-30-24 @ 07:35) (18 - 18)  SpO2: 100% (05-30-24 @ 07:35) (100% - 100%)    Orthostatic VS  05-30-24 @ 07:35  Lying BP: --/-- HR: --  Sitting BP: 107/58 HR: 87  Standing BP: 100/54 HR: 101  Site: upper right arm  Mode: electronic  Orthostatic VS  05-29-24 @ 07:39  Lying BP: --/-- HR: --  Sitting BP: 119/61 HR: 102  Standing BP: 128/68 HR: 102  Site: upper right arm  Mode: electronic  
Vital Signs Last 24 Hrs  T(C): 36.4 (06-02-24 @ 07:28), Max: 36.4 (06-02-24 @ 07:28)  T(F): 97.5 (06-02-24 @ 07:28), Max: 97.5 (06-02-24 @ 07:28)  HR: --  BP: --  BP(mean): --  RR: 17 (06-02-24 @ 07:28) (17 - 17)  SpO2: 100% (06-02-24 @ 07:28) (100% - 100%)    Orthostatic VS  06-02-24 @ 07:28  Lying BP: --/-- HR: --  Sitting BP: 117/66 HR: 82  Standing BP: 106/76 HR: 109  Site: upper right arm  Mode: electronic  Orthostatic VS  06-01-24 @ 07:35  Lying BP: --/-- HR: --  Sitting BP: 107/65 HR: 95  Standing BP: 123/60 HR: 90  Site: upper right arm  Mode: electronic  
Vital Signs Last 24 Hrs  T(C): 36.7 (06-01-24 @ 07:35), Max: 36.7 (06-01-24 @ 07:35)  T(F): 98 (06-01-24 @ 07:35), Max: 98 (06-01-24 @ 07:35)  HR: --  BP: --  BP(mean): --  RR: 17 (06-01-24 @ 07:35) (17 - 17)  SpO2: 100% (06-01-24 @ 07:35) (100% - 100%)    Orthostatic VS  06-01-24 @ 07:35  Lying BP: --/-- HR: --  Sitting BP: 107/65 HR: 95  Standing BP: 123/60 HR: 90  Site: upper right arm  Mode: electronic  Orthostatic VS  05-31-24 @ 07:25  Lying BP: --/-- HR: --  Sitting BP: 111/53 HR: 76  Standing BP: 107/67 HR: 107  Site: upper right arm  Mode: electronic  
Vital Signs Last 24 Hrs  T(C): 36.6 (05-31-24 @ 07:25), Max: 36.6 (05-31-24 @ 07:25)  T(F): 97.8 (05-31-24 @ 07:25), Max: 97.8 (05-31-24 @ 07:25)  HR: --  BP: --  BP(mean): --  RR: 18 (05-31-24 @ 07:25) (18 - 18)  SpO2: 100% (05-31-24 @ 07:25) (100% - 100%)    Orthostatic VS  05-31-24 @ 07:25  Lying BP: --/-- HR: --  Sitting BP: 111/53 HR: 76  Standing BP: 107/67 HR: 107  Site: upper right arm  Mode: electronic  Orthostatic VS  05-30-24 @ 07:35  Lying BP: --/-- HR: --  Sitting BP: 107/58 HR: 87  Standing BP: 100/54 HR: 101  Site: upper right arm  Mode: electronic  
Vital Signs Last 24 Hrs  T(C): 36.4 (06-03-24 @ 07:39), Max: 36.4 (06-03-24 @ 07:39)  T(F): 97.5 (06-03-24 @ 07:39), Max: 97.5 (06-03-24 @ 07:39)  HR: --  BP: --  BP(mean): --  RR: 18 (06-03-24 @ 07:39) (18 - 18)  SpO2: 99% (06-03-24 @ 07:39) (99% - 99%)    Orthostatic VS  06-03-24 @ 07:39  Lying BP: --/-- HR: --  Sitting BP: 124/58 HR: 71  Standing BP: 102/76 HR: 98  Site: upper right arm  Mode: electronic  Orthostatic VS  06-02-24 @ 07:28  Lying BP: --/-- HR: --  Sitting BP: 117/66 HR: 82  Standing BP: 106/76 HR: 109  Site: upper right arm  Mode: electronic  
Vital Signs Last 24 Hrs  T(C): 36.3 (06-04-24 @ 07:22), Max: 36.3 (06-04-24 @ 07:22)  T(F): 97.4 (06-04-24 @ 07:22), Max: 97.4 (06-04-24 @ 07:22)  HR: --  BP: --  BP(mean): --  RR: 19 (06-04-24 @ 07:22) (19 - 19)  SpO2: 98% (06-04-24 @ 07:22) (98% - 98%)    Orthostatic VS  06-04-24 @ 07:22  Lying BP: --/-- HR: --  Sitting BP: 107/51 HR: 77  Standing BP: 107/58 HR: 97  Site: upper right arm  Mode: electronic  Orthostatic VS  06-03-24 @ 07:39  Lying BP: --/-- HR: --  Sitting BP: 124/58 HR: 71  Standing BP: 102/76 HR: 98  Site: upper right arm  Mode: electronic

## 2024-06-04 NOTE — BH INPATIENT PSYCHIATRY DISCHARGE NOTE - NSDCCPCAREPLAN_GEN_ALL_CORE_FT
PRINCIPAL DISCHARGE DIAGNOSIS  Diagnosis: Schizoaffective disorder  Assessment and Plan of Treatment:       SECONDARY DISCHARGE DIAGNOSES  Diagnosis: Cannabis abuse  Assessment and Plan of Treatment:     Diagnosis: Alcohol abuse  Assessment and Plan of Treatment:

## 2024-06-04 NOTE — BH INPATIENT PSYCHIATRY DISCHARGE NOTE - NSBHASSESSSUMMFT_PSY_ALL_CORE
pt denies any suicidal ideation intent or plan .  Pt verbalized willing to attend aftercare treatment

## 2024-06-04 NOTE — BH DISCHARGE NOTE NURSING/SOCIAL WORK/PSYCH REHAB - NSCDUDCCRISIS_PSY_A_CORE
[facility] Well  [phone] [facility]-WELL ([phone])  Text "WELL" to 35225  Website: www.[employer]/.[facility] 1 [phone]) 621-HOPE (4598) Website: www.safehorizon.org/.  [facility] Crisis Care for Children, Adults and Families  [address] [place] [address]  Mobile Crisis Hotline – (164) 541-5955/.National Suicide Prevention Lifeline 9 (199) 468-7609/.  [facility]  1 (391) LIFENET (202-1853)/.  [facility]  (643) 727-5505/.  [facility] Behavioral Health Helpline / [facility] Mobile Crisis  (326) 335-KKHI (8741)/.  [facility] Response Crisis Hotline  (208) 500-5727  24 hour telephone crisis intervention and suicide prevention hotline concerned with all mental health issues/.  [facility]  [address] [place] [phone]   Hours:  Monday through Friday from 9 AM to 3 PM/Other.../988 Suicide and Crisis Lifeline

## 2024-06-04 NOTE — BH INPATIENT PSYCHIATRY DISCHARGE NOTE - NSBHFUPINTERVALHXFT_PSY_A_CORE
Pt with good eye contact, alert Pt with euthymic mood and affect is blunted and constricted.  Pt denies any hallucinations .  No delusions elicited . l

## 2024-06-04 NOTE — BH INPATIENT PSYCHIATRY PROGRESS NOTE - NSBHFUPINTERVALCCFT_PSY_A_CORE
"I still get anxious at times " 
"I still get anxious at times " 
  " really would like to go home "
"I feel alright"
"I'm doing fine " 
"I still get anxious at times "

## 2024-06-04 NOTE — BH INPATIENT PSYCHIATRY PROGRESS NOTE - PRN MEDS
MEDICATIONS  (PRN):  diphenhydrAMINE Injectable 50 milliGRAM(s) IntraMuscular every 6 hours PRN EPS prevention  haloperidol    Injectable 5 milliGRAM(s) IntraMuscular every 6 hours PRN severe psychotic agitation  hydrOXYzine hydrochloride 50 milliGRAM(s) Oral every 6 hours PRN anxiety  LORazepam   Injectable 2 milliGRAM(s) IntraMuscular every 4 hours PRN severe agitation  
MEDICATIONS  (PRN):  
MEDICATIONS  (PRN):  diphenhydrAMINE Injectable 50 milliGRAM(s) IntraMuscular every 6 hours PRN EPS prevention  haloperidol    Injectable 5 milliGRAM(s) IntraMuscular every 6 hours PRN severe psychotic agitation  hydrOXYzine hydrochloride 50 milliGRAM(s) Oral every 6 hours PRN anxiety  LORazepam   Injectable 2 milliGRAM(s) IntraMuscular every 4 hours PRN severe agitation  
MEDICATIONS  (PRN):  diphenhydrAMINE Injectable 50 milliGRAM(s) IntraMuscular every 6 hours PRN EPS prevention  haloperidol    Injectable 5 milliGRAM(s) IntraMuscular every 6 hours PRN severe psychotic agitation  hydrOXYzine hydrochloride 50 milliGRAM(s) Oral every 6 hours PRN anxiety  LORazepam   Injectable 2 milliGRAM(s) IntraMuscular every 4 hours PRN severe agitation  
MEDICATIONS  (PRN):  diphenhydrAMINE Injectable 50 milliGRAM(s) IntraMuscular every 6 hours PRN EPS prevention  haloperidol    Injectable 5 milliGRAM(s) IntraMuscular every 6 hours PRN severe psychotic agitation  LORazepam   Injectable 2 milliGRAM(s) IntraMuscular every 4 hours PRN severe agitation  
MEDICATIONS  (PRN):  diphenhydrAMINE Injectable 50 milliGRAM(s) IntraMuscular every 6 hours PRN EPS prevention  haloperidol    Injectable 5 milliGRAM(s) IntraMuscular every 6 hours PRN severe psychotic agitation  hydrOXYzine hydrochloride 50 milliGRAM(s) Oral every 6 hours PRN anxiety  LORazepam   Injectable 2 milliGRAM(s) IntraMuscular every 4 hours PRN severe agitation

## 2024-06-05 VITALS — OXYGEN SATURATION: 99 % | RESPIRATION RATE: 18 BRPM | TEMPERATURE: 98 F

## 2024-06-05 PROCEDURE — 99239 HOSP IP/OBS DSCHRG MGMT >30: CPT

## 2024-06-07 ENCOUNTER — EMERGENCY (EMERGENCY)
Facility: HOSPITAL | Age: 24
LOS: 0 days | Discharge: ANOTHER HOSPITAL | End: 2024-06-08
Attending: EMERGENCY MEDICINE
Payer: COMMERCIAL

## 2024-06-07 VITALS
HEART RATE: 75 BPM | WEIGHT: 190.04 LBS | OXYGEN SATURATION: 98 % | DIASTOLIC BLOOD PRESSURE: 72 MMHG | SYSTOLIC BLOOD PRESSURE: 117 MMHG | TEMPERATURE: 99 F | HEIGHT: 65 IN | RESPIRATION RATE: 18 BRPM

## 2024-06-07 DIAGNOSIS — Z56.0 UNEMPLOYMENT, UNSPECIFIED: ICD-10-CM

## 2024-06-07 DIAGNOSIS — F32.A DEPRESSION, UNSPECIFIED: ICD-10-CM

## 2024-06-07 DIAGNOSIS — T43.592A POISONING BY OTHER ANTIPSYCHOTICS AND NEUROLEPTICS, INTENTIONAL SELF-HARM, INITIAL ENCOUNTER: ICD-10-CM

## 2024-06-07 DIAGNOSIS — F25.9 SCHIZOAFFECTIVE DISORDER, UNSPECIFIED: ICD-10-CM

## 2024-06-07 DIAGNOSIS — Z03.6 ENCOUNTER FOR OBSERVATION FOR SUSPECTED TOXIC EFFECT FROM INGESTED SUBSTANCE RULED OUT: ICD-10-CM

## 2024-06-07 DIAGNOSIS — Z20.822 CONTACT WITH AND (SUSPECTED) EXPOSURE TO COVID-19: ICD-10-CM

## 2024-06-07 LAB — PCP SPEC-MCNC: SIGNIFICANT CHANGE UP

## 2024-06-07 PROCEDURE — 93010 ELECTROCARDIOGRAM REPORT: CPT

## 2024-06-07 PROCEDURE — 70450 CT HEAD/BRAIN W/O DYE: CPT | Mod: MC

## 2024-06-07 PROCEDURE — 80307 DRUG TEST PRSMV CHEM ANLYZR: CPT

## 2024-06-07 PROCEDURE — 99285 EMERGENCY DEPT VISIT HI MDM: CPT | Mod: 25

## 2024-06-07 PROCEDURE — 36415 COLL VENOUS BLD VENIPUNCTURE: CPT

## 2024-06-07 PROCEDURE — 85025 COMPLETE CBC W/AUTO DIFF WBC: CPT

## 2024-06-07 PROCEDURE — 99285 EMERGENCY DEPT VISIT HI MDM: CPT

## 2024-06-07 PROCEDURE — 93005 ELECTROCARDIOGRAM TRACING: CPT

## 2024-06-07 PROCEDURE — 70450 CT HEAD/BRAIN W/O DYE: CPT | Mod: 26,MC

## 2024-06-07 PROCEDURE — 80053 COMPREHEN METABOLIC PANEL: CPT

## 2024-06-07 PROCEDURE — 0241U: CPT

## 2024-06-07 RX ORDER — ALPRAZOLAM 0.25 MG
0.25 TABLET ORAL ONCE
Refills: 0 | Status: DISCONTINUED | OUTPATIENT
Start: 2024-06-07 | End: 2024-06-07

## 2024-06-07 SDOH — ECONOMIC STABILITY - INCOME SECURITY: UNEMPLOYMENT, UNSPECIFIED: Z56.0

## 2024-06-07 NOTE — ED ADULT NURSE NOTE - OBJECTIVE STATEMENT
Pt presented to the ED via EMS with complaint of overdose. Pt was recently discharged from . Pt reports calling family today saying Pt presented to the ED via EMS with complaint of overdose. Pt was recently discharged from . Pt reports calling family today saying he "felt like he was dying". Pt took 5 5mg Haldol tablets. Pt denies auditory or visual hallucinations. Pt denies SI/HI. 1-1 sitter initiated. Pt AO x 4. In no acute distress.

## 2024-06-07 NOTE — ED ADULT NURSE NOTE - CAS EDN DISCHARGE INTERVENTIONS
02/09/24 1753   Team Members   Responding MD Arreola   STAT RN Phoenix/Alie   STAT RT Horacio   Primary RN Huyen   Rapid Response/ Neuro Alert   Alert Type Rapid Response / STAT   Significant Events Pt seized   Location / Unit at time of call 8T   Team Arrival 1753   End Time 1830   Primary Reason for Call Seizures   Call initiated by Team Member   Interventions During Call Oxygen;Consult   Labs Drawn During Call ABG;CBC;Magnesium;Potassium;Metered Blood Glucose   Outcomes Stabilized remains on current unit   Admission Source Inpatient     Upon arrival, STAT team was informed by bedside RN that the pt had a seizure. Pt's extremities were cold and modeled, pulse ox was low and o2 was applied, upon further assessment, pt was very lethargic.Because pulse ox was unreliable, STAT team applied ambu bag. Pt woke up after a few breathes from the ambubag and was agitated. MD bedside at this point and ordered Keppra and VEEG. STAT team informed bedside RN to call with any needs or concerns.    NO IV

## 2024-06-07 NOTE — ED ADULT TRIAGE NOTE - CHIEF COMPLAINT QUOTE
Pt BIBEMS co psych eval. As per EMS, pt was recently DC from [facility], but was called today by family that pt stated "I feel like I'm going to die". Pt took 5 of 5mg Haldol appearing slightly lethargic, denies pain, auditory or visual hallucinations, CP, SOB, SI/HI. Charge RN notified, 1:1 initiated, NKDA, AOx4. STAT EKG in progress.

## 2024-06-07 NOTE — ED ADULT NURSE NOTE - HPI (INCLUDE ILLNESS QUALITY, SEVERITY, DURATION, TIMING, CONTEXT, MODIFYING FACTORS, ASSOCIATED SIGNS AND SYMPTOMS)
Pt presented to the ED via EMS with complaint of overdose. Pt was recently discharged from . Pt reports calling family today saying he "felt like he was dying". Pt took 5 5mg Haldol tablets. Pt denies auditory or visual hallucinations. Pt denies SI/HI. 1-1 sitter initiated. Pt AO x 4. In no acute distress.

## 2024-06-07 NOTE — ED ADULT NURSE NOTE - CHIEF COMPLAINT QUOTE
Pt BIBEMS co psych eval. As per EMS, pt was recently DC from [facility], but was called today by family that pt stated "I feel like I'm going to die". Pt took 5 of 5mg Haldol appearing slightly lethargic, denies pain, auditory or visual hallucinations, CP, SOB, SI/HI. Charge RN notified, 1:1 initiated, NKDA, AOx4. STAT EKG in progress.

## 2024-06-08 VITALS
RESPIRATION RATE: 18 BRPM | TEMPERATURE: 98 F | DIASTOLIC BLOOD PRESSURE: 46 MMHG | SYSTOLIC BLOOD PRESSURE: 119 MMHG | OXYGEN SATURATION: 98 % | HEART RATE: 67 BPM

## 2024-06-08 DIAGNOSIS — F25.9 SCHIZOAFFECTIVE DISORDER, UNSPECIFIED: ICD-10-CM

## 2024-06-08 LAB
ALBUMIN SERPL ELPH-MCNC: 4.2 G/DL — SIGNIFICANT CHANGE UP (ref 3.3–5)
ALP SERPL-CCNC: 55 U/L — SIGNIFICANT CHANGE UP (ref 40–120)
ALT FLD-CCNC: 51 U/L — SIGNIFICANT CHANGE UP (ref 12–78)
AMPHET UR-MCNC: NEGATIVE — SIGNIFICANT CHANGE UP
ANION GAP SERPL CALC-SCNC: 3 MMOL/L — LOW (ref 5–17)
APAP SERPL-MCNC: < 2 UG/ML (ref 10–30)
AST SERPL-CCNC: 24 U/L — SIGNIFICANT CHANGE UP (ref 15–37)
BARBITURATES UR SCN-MCNC: NEGATIVE — SIGNIFICANT CHANGE UP
BASOPHILS # BLD AUTO: 0.02 K/UL — SIGNIFICANT CHANGE UP (ref 0–0.2)
BASOPHILS NFR BLD AUTO: 0.3 % — SIGNIFICANT CHANGE UP (ref 0–2)
BENZODIAZ UR-MCNC: NEGATIVE — SIGNIFICANT CHANGE UP
BILIRUB SERPL-MCNC: 0.2 MG/DL — SIGNIFICANT CHANGE UP (ref 0.2–1.2)
BUN SERPL-MCNC: 10 MG/DL — SIGNIFICANT CHANGE UP (ref 7–23)
CALCIUM SERPL-MCNC: 9 MG/DL — SIGNIFICANT CHANGE UP (ref 8.5–10.1)
CHLORIDE SERPL-SCNC: 109 MMOL/L — HIGH (ref 96–108)
CO2 SERPL-SCNC: 27 MMOL/L — SIGNIFICANT CHANGE UP (ref 22–31)
COCAINE METAB.OTHER UR-MCNC: NEGATIVE — SIGNIFICANT CHANGE UP
CREAT SERPL-MCNC: 0.91 MG/DL — SIGNIFICANT CHANGE UP (ref 0.5–1.3)
EGFR: 121 ML/MIN/1.73M2 — SIGNIFICANT CHANGE UP
EOSINOPHIL # BLD AUTO: 0.07 K/UL — SIGNIFICANT CHANGE UP (ref 0–0.5)
EOSINOPHIL NFR BLD AUTO: 1 % — SIGNIFICANT CHANGE UP (ref 0–6)
ETHANOL SERPL-MCNC: <10 MG/DL — SIGNIFICANT CHANGE UP (ref 0–10)
FENTANYL UR QL SCN: NEGATIVE — SIGNIFICANT CHANGE UP
FLUAV AG NPH QL: SIGNIFICANT CHANGE UP
FLUBV AG NPH QL: SIGNIFICANT CHANGE UP
GLUCOSE SERPL-MCNC: 115 MG/DL — HIGH (ref 70–99)
HCT VFR BLD CALC: 40.6 % — SIGNIFICANT CHANGE UP (ref 39–50)
HGB BLD-MCNC: 14.3 G/DL — SIGNIFICANT CHANGE UP (ref 13–17)
IMM GRANULOCYTES NFR BLD AUTO: 0.6 % — SIGNIFICANT CHANGE UP (ref 0–0.9)
LYMPHOCYTES # BLD AUTO: 2.25 K/UL — SIGNIFICANT CHANGE UP (ref 1–3.3)
LYMPHOCYTES # BLD AUTO: 33.3 % — SIGNIFICANT CHANGE UP (ref 13–44)
MCHC RBC-ENTMCNC: 30.2 PG — SIGNIFICANT CHANGE UP (ref 27–34)
MCHC RBC-ENTMCNC: 35.2 GM/DL — SIGNIFICANT CHANGE UP (ref 32–36)
MCV RBC AUTO: 85.7 FL — SIGNIFICANT CHANGE UP (ref 80–100)
METHADONE UR-MCNC: NEGATIVE — SIGNIFICANT CHANGE UP
MONOCYTES # BLD AUTO: 0.41 K/UL — SIGNIFICANT CHANGE UP (ref 0–0.9)
MONOCYTES NFR BLD AUTO: 6.1 % — SIGNIFICANT CHANGE UP (ref 2–14)
NEUTROPHILS # BLD AUTO: 3.96 K/UL — SIGNIFICANT CHANGE UP (ref 1.8–7.4)
NEUTROPHILS NFR BLD AUTO: 58.7 % — SIGNIFICANT CHANGE UP (ref 43–77)
OPIATES UR-MCNC: NEGATIVE — SIGNIFICANT CHANGE UP
PCP UR-MCNC: NEGATIVE — SIGNIFICANT CHANGE UP
PLATELET # BLD AUTO: 229 K/UL — SIGNIFICANT CHANGE UP (ref 150–400)
POTASSIUM SERPL-MCNC: 3.6 MMOL/L — SIGNIFICANT CHANGE UP (ref 3.5–5.3)
POTASSIUM SERPL-SCNC: 3.6 MMOL/L — SIGNIFICANT CHANGE UP (ref 3.5–5.3)
PROT SERPL-MCNC: 7.1 GM/DL — SIGNIFICANT CHANGE UP (ref 6–8.3)
RBC # BLD: 4.74 M/UL — SIGNIFICANT CHANGE UP (ref 4.2–5.8)
RBC # FLD: 11.9 % — SIGNIFICANT CHANGE UP (ref 10.3–14.5)
RSV RNA NPH QL NAA+NON-PROBE: SIGNIFICANT CHANGE UP
SALICYLATES SERPL-MCNC: <1.7 MG/DL — LOW (ref 2.8–20)
SARS-COV-2 RNA SPEC QL NAA+PROBE: SIGNIFICANT CHANGE UP
SODIUM SERPL-SCNC: 139 MMOL/L — SIGNIFICANT CHANGE UP (ref 135–145)
THC UR QL: NEGATIVE — SIGNIFICANT CHANGE UP
WBC # BLD: 6.75 K/UL — SIGNIFICANT CHANGE UP (ref 3.8–10.5)
WBC # FLD AUTO: 6.75 K/UL — SIGNIFICANT CHANGE UP (ref 3.8–10.5)

## 2024-06-08 PROCEDURE — 90792 PSYCH DIAG EVAL W/MED SRVCS: CPT | Mod: 95

## 2024-06-08 RX ADMIN — Medication 0.25 MILLIGRAM(S): at 00:06

## 2024-06-08 NOTE — ED ADULT NURSE REASSESSMENT NOTE - NS ED NURSE REASSESS COMMENT FT1
Pt is calm, cooperative. Pt has no complaints at this time. Pt does not appear in discomfort or distress at this time. Pt denies SI/HI, auditory, visual, and tactile disturbances. 1:1 in place. Safety and comfort maintained.

## 2024-06-08 NOTE — ED BEHAVIORAL HEALTH ASSESSMENT NOTE - DETAILS
BTCM spoke to uncle Email sent to Dr. Smith as above Would not impact clinical decisionmaking at this time 5/28-6/5/24 at  for SI/HI/psychosis Discussed with ED attending

## 2024-06-08 NOTE — CHART NOTE - NSCHARTNOTEFT_GEN_A_CORE
GEREMIAS contacted Admissions at [facility].  Dr. Hart is accepting pt.  Pt will be going to [place] [id number], [address].
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
## 2024-06-08 NOTE — ED BEHAVIORAL HEALTH ASSESSMENT NOTE - NSBHMSESPEECH_PSY_A_CORE
Normal volume, rate, productivity, spontaneity and articulation Airway patent, Nasal mucosa clear. Mouth with normal mucosa. Throat has no vesicles, no oropharyngeal exudates and uvula is midline. Airway patent, Nasal mucosa clear. Mouth with normal mucosa. Throat has no vesicles, no oropharyngeal exudates and uvula is midline

## 2024-06-08 NOTE — ED PROVIDER NOTE - DIFFERENTIAL DIAGNOSIS
Differential Diagnosis SI, bib family, took pills, suffers from schizoaffective d/o, labs, EKG, psych consult. Signed out the care of the patient to Dr. Flores pending eval.

## 2024-06-08 NOTE — ED BEHAVIORAL HEALTH NOTE - BEHAVIORAL HEALTH NOTE
Pt in need of 2PC transfer. [place] has available bed. GEREMIAS requested medical clearance which was completed. 2PC completed. GEREMIAS spoke with Mallory in admissions and pt accepted to [facility] by Dr. James. GEREMIAS informed pt’s uncle (ID#[id number]) of transfer. GEREMIAS informed tx team of transfer.  to set up transport. Pt in need of 2PC transfer. [facility] has available bed. GEREMIAS requested medical clearance which was completed. 2PC completed. GEREMIAS spoke with Mallory in admissions and pt accepted to [facility] by Dr. James. GEREMIAS informed pt’s uncle (ID#[id number]) of transfer. GEREMIAS informed tx team of transfer.  to set up transport. Legals in chart.

## 2024-06-08 NOTE — ED BEHAVIORAL HEALTH ASSESSMENT NOTE - SUMMARY
23M, living w family, unemployed, Montserratian speaking, no PMH, psych hx of schizophrenia vs SAD, multiple prior admissions most recently 5/28-6/5/2024 (prior to that 11/2023), no suicide attempts, hx of verbal threats, now BIB uncle after patient took 5 zyprexa 50mg tablets saying that he wanted to "go and rest for good"    Patient gives somewhat inconsistent response but indicated that this was a suicide attempt 2 days after discharge. As such he appears to be at high risk of suicide and meets criteria for involuntary admission at this time

## 2024-06-08 NOTE — ED BEHAVIORAL HEALTH NOTE - BEHAVIORAL HEALTH NOTE
Collateral Note          Collateral below has requested that the information provided remain confidential: Yes [] No [X]      Collateral below has provided information that patient is/may be unaware of: Yes [] No [X]     Patient gives permission to obtain collateral from _____:     ( ) Yes     (X)  No     Rationale for overriding objection               (  ) Lack of capacity. Details: ________               (X) Assessing risk of danger to self/others. Details: _SA_     Rationale for selecting specific collateral source               (  ) Potential to impact risk of danger to self/others and no alternative equivalent. Details:      NAME: Miguel     NUMBER: [phone]     RELATIONSHIP: Uncle     RELIABILITY: reliable     COMMENTS:       [id number]            PATIENT DEMOGRAPHICS:     ========================     HPI BASELINE FUNCTIONING: Patient is a 22 yo male, Sao Tomean-speaking, domiciled with uncle and sister, with history of schizophrenia, psychosis, multiple prior psych admissions, most recent in May 2024 at ( D/C’d 6/5), hx of SI, hx HI, no prior suicide attempts, no legal/violence hx, hx of AVH, hx of substance use: THC.      DECOMPENSATION: Collateral reports picking patient up from hospital on 6/5/24 after discharge from inpatient unit at [facility]. Collateral reports patient activated EMS after stating he did not feel well. Collateral reports patient admitted to taking 5 pills of his medication he was discharge home with. Collateral was unsure of which medication patient took. Collateral reports patient stated, "I want to go rest for good." as the reason he took the medication. Collateral reports patient has been "lounging, head down, not looking good" since returning home and that he has not kept up with hygiene. Collateral is unsure if patient has been compliant with medications at home and is unsure if patient has been eating. Collateral reports concerns of safety with patient being at home alone while collateral and patient's sister works.
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
## 2024-06-08 NOTE — ED PROVIDER NOTE - CLINICAL SUMMARY MEDICAL DECISION MAKING FREE TEXT BOX
SI, bib family, took pills, suffers from schizoaffective d/o, labs, EKG, psych consult. Signed out the care of the patient to Dr. Flores pending eval.

## 2024-06-08 NOTE — ED PROVIDER NOTE - OBJECTIVE STATEMENT
23 year old male with PMH of schizoaffective disorders presents with cc of wanting to "rest with god" implying suicidal ideation after taking 5 Zyprexa pill. No previous SI attempt. No HI. As per family there were concerned that patient would hurt self. No physical complaints in the ER, including no chest pain, palpitation, abdominal pain, fever or chills. No melena or hematochezia. No visual or focal neurological complaints. No recent trauma. No skin rash.

## 2024-06-08 NOTE — ED BEHAVIORAL HEALTH ASSESSMENT NOTE - ABORTED (SELF-INTERRUPTED) ATTEMPT:
Patient arrives with c/o syncope last night while at a concert. Patient was sitting and after standing got dizzy, sat back down, and when he got up again, he felt dizzy and fell back hitting head on the ground. Patient reports some soreness to the neck. Reports after he came back to, he felt better and went back home. 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 Patient did a 50 mile bike ride yesterday, and rides every day. Reports cardiac stents about 4 years ago. No blood thinners. Takes 81mg ASA daily.    Unable to assess

## 2024-06-08 NOTE — ED ADULT NURSE REASSESSMENT NOTE - NS ED NURSE REASSESS COMMENT FT1
Rounded on pt. 1-1 sitter in place. Pt resting comfortably. In no acute distress. No needs at this time. Safety and comfort maintained.

## 2024-06-08 NOTE — ED PROVIDER NOTE - PROGRESS NOTE DETAILS
CC:  Signout received from Dr. Freeman at shift change to f/u Psych re-eval in AM.  22 yo HM, Salvadorean-speaking, presented s/p intentional OD of Zyprexa 50 mg x 5 tabs at 5 PM yesterday.  Pt stated "I wanted to go and rest for good".  Patient aware that such an ingestion potentially endangered his health and life.  This morning patient in good comfort, awake, alert, no active complaints; physical exam unremarkable. EKG 6/7 +NSR, no acute change compared to May 29, 2024.  Labs unremarkable.  Currently 18 hours post-ingestion.  No medical contraindication to inpatient psychiatric management. CC:  Pt accepted for Psych transfer by Dr. James at [facility].

## 2024-06-08 NOTE — ED BEHAVIORAL HEALTH ASSESSMENT NOTE - DESCRIPTION
as per chart review; migrated from [facility] ~7years ago. Lives with uncle, aunt and cousins in private house. calm, cooperative German speaking None known

## 2024-06-08 NOTE — ED ADULT NURSE REASSESSMENT NOTE - NS ED NURSE REASSESS COMMENT FT1
Pt is breathing nonlabored and spontaneously. NAD noted. Cardiac monitor discontinued as per MD Freeman. 1:1 in place. safety and comfort maintained.

## 2024-06-10 DIAGNOSIS — F12.10 CANNABIS ABUSE, UNCOMPLICATED: ICD-10-CM

## 2024-06-10 DIAGNOSIS — R45.6 VIOLENT BEHAVIOR: ICD-10-CM

## 2024-06-10 DIAGNOSIS — Z63.9 PROBLEM RELATED TO PRIMARY SUPPORT GROUP, UNSPECIFIED: ICD-10-CM

## 2024-06-10 DIAGNOSIS — Z11.52 ENCOUNTER FOR SCREENING FOR COVID-19: ICD-10-CM

## 2024-06-10 DIAGNOSIS — F17.210 NICOTINE DEPENDENCE, CIGARETTES, UNCOMPLICATED: ICD-10-CM

## 2024-06-10 DIAGNOSIS — R45.851 SUICIDAL IDEATIONS: ICD-10-CM

## 2024-06-10 DIAGNOSIS — F39 UNSPECIFIED MOOD [AFFECTIVE] DISORDER: ICD-10-CM

## 2024-06-10 DIAGNOSIS — R94.31 ABNORMAL ELECTROCARDIOGRAM [ECG] [EKG]: ICD-10-CM

## 2024-06-10 DIAGNOSIS — F25.9 SCHIZOAFFECTIVE DISORDER, UNSPECIFIED: ICD-10-CM

## 2024-06-10 DIAGNOSIS — F10.90 ALCOHOL USE, UNSPECIFIED, UNCOMPLICATED: ICD-10-CM

## 2024-06-10 SDOH — SOCIAL STABILITY - SOCIAL INSECURITY: PROBLEM RELATED TO PRIMARY SUPPORT GROUP, UNSPECIFIED: Z63.9

## 2024-06-20 NOTE — BH PATIENT PROFILE - FUNCTIONAL SCREEN CURRENT LEVEL: SWALLOWING (IF SCORE 2 OR MORE FOR ANY ITEM, CONSULT REHAB SERVICES), MLM)
How Severe Is Your Skin Lesion?: mild Is This A New Presentation, Or A Follow-Up?: Skin Lesion 0 = swallows foods/liquids without difficulty

## 2024-07-08 NOTE — ED BEHAVIORAL HEALTH ASSESSMENT NOTE - NS ED BHA REVIEW OF ED CHART AVAILABLE LABS REVIEWED
Last office visit 03/26/2024. Both medications are patient reported and have not been signed by Dr. Hernández previously. Routed to provider for review.   
Prescription refilled and sent to pharmacy. Needs follow-up appt in the fall.   
Yes

## 2024-08-10 NOTE — ED ADULT TRIAGE NOTE - HEIGHT IN INCHES
At this time, child is asymptomatic.  She requires no over-the-counter or prescription medications for allergies, outside of potentially topical medications for eczema flares.  She is followed by dermatology at [facility] for this.  At this time, no changes in care plan recommended.  Monitoring.   5

## 2024-09-04 NOTE — BH PATIENT CHARACTERISTICS SURVEY - NSPCSCASHASSIST1_PSY_ALL_CORE
Pending Prescriptions:                       Disp   Refills    sertraline (ZOLOFT) 50 MG tablet          90 tab*1            Sig: Take 1 tablet (50 mg) by mouth daily.       Requested Pharmacy: [id number] - [address]     Pt's last office visit: 06/06/2024  Next scheduled office visit: 10/10/2024      Per the RN/LPN medication refill protocol, writer is unable to refill this request.        
No

## 2024-10-09 NOTE — ED BEHAVIORAL HEALTH ASSESSMENT NOTE - NSBHMSESPABN_PSY_A_CORE
FEES> MBS as patient w/ R humarl fracture w/ sling and in pain when repositioned./FEES
Soft volume/Decreased productivity

## 2024-10-21 NOTE — ED PROVIDER NOTE - RESPIRATORY, MLM
Subjective   The ABCs of the Annual Wellness Visit  Medicare Wellness Visit    From [place]. Lived in [place] for many years. , lives by herself - daughter lives with her PRN. She has 3 adult children. 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 On disability since July 2022 due to multiple injuries/ MSK issues.     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  She has chronic musculoskeletal pain related to multiple injuries and scoliosis, spinal stenosis, s/p spinal surgery (2017, fusion L2-S1), followed by pain management.    Huma Montano is a 64 y.o. patient who presents for a Medicare Wellness Visit.    The following portions of the patient's history were reviewed and   updated as appropriate: allergies, current medications, past family history, past medical history, past social history, past surgical history, and problem list.    Compared to one year ago, the patient's physical   health is better.  Compared to one year ago, the patient's mental   health is better.    Recent Hospitalizations:  She was not admitted to the hospital during the last year.     Current Medical Providers:  Patient Care Team:  Morgan Wong MD as PCP - General (Family Medicine)  Martinez Cameron MD as Referring Physician (Family Medicine)  Guillaume Morales II, MD (Pain Medicine)  Kika Nielsen MD as Consulting Physician (Rheumatology)    Outpatient Medications Prior to Visit   Medication Sig Dispense Refill    acetaminophen (TYLENOL) 500 MG tablet Take 1 tablet by mouth Every 6 (Six) Hours As Needed for Mild Pain.      atorvastatin (LIPITOR) 80 MG tablet Take 1 tablet by mouth Every Night. At bedtime 90 tablet 3    cetirizine (zyrTEC) 10 MG tablet Take 1 tablet by mouth  Daily. 90 tablet 3    cholecalciferol (VITAMIN D3) 25 MCG (1000 UT) tablet Take 1 tablet by mouth Daily. 90 tablet 3    Diclofenac Sodium (VOLTAREN) 1 % gel gel APPLY 2 TO 4 GRAMS TO THE APPROPRIATE AREA AS DIRECTED TWO TIMES A  g 1    ESTRADIOL-PROGESTERONE PO Take  by mouth Daily. Estrodiol 2 mg - progesterone 100 mg -testosterone 3 mg  daily      fluticasone (FLONASE) 50 MCG/ACT nasal spray 2 sprays into the nostril(s) as directed by provider Daily. 1-2 sprays daily 54.6 mL 6    folic acid (FOLVITE) 1 MG tablet Take 1 tablet by mouth Daily.      gabapentin (NEURONTIN) 600 MG tablet Take 1 tablet by mouth 2 (Two) Times a Day. 1 tablet 0    hydroxychloroquine (PLAQUENIL) 200 MG tablet Take 1 tablet by mouth 2 (Two) Times a Day.      levothyroxine (SYNTHROID, LEVOTHROID) 112 MCG tablet Take 1 tablet by mouth Daily. 6d/w, 1/2 tablet 1 d/w 90 tablet 1    lisinopril (PRINIVIL,ZESTRIL) 10 MG tablet Take 1 tablet by mouth Daily. 90 tablet 3    metFORMIN ER (GLUCOPHAGE-XR) 500 MG 24 hr tablet Take 1 tablet by mouth Daily With Breakfast. 90 tablet 3    methotrexate 2.5 MG tablet Take 6 tablets by mouth 1 (One) Time Per Week.      Multiple Vitamins-Minerals (MULTIVITAL PO) Take 1 tablet by mouth Daily.      Nutritional Supplements (DHEA PO) Take  by mouth. 1  100 mg capsule 3 times a week      ondansetron (ZOFRAN) 4 MG tablet Take 1 tablet by mouth Every 4 (Four) Hours As Needed for Nausea or Vomiting (CAN TAKE EVERY 4-6 HRS PRN). 30 tablet 1    traMADol (ULTRAM) 50 MG tablet Take 1 tablet by mouth Every 6 (Six) Hours As Needed for Moderate Pain . 60 tablet 2    traMADol ER (ULTRAM ER) 300 MG 24 hr tablet Take 1 tablet by mouth Daily. 60 tablet 0    Unable to find HRT compound  4 clicks daily      vitamin B-12 (CYANOCOBALAMIN) 1000 MCG tablet Take 1 tablet by mouth Daily.      Aspirin Low Dose 81 MG EC tablet       omeprazole (priLOSEC) 20 MG capsule Take 1 capsule by mouth Daily. 90 capsule 3     No  "facility-administered medications prior to visit.     Opioid medication/s are on active medication list.  and I have evaluated her active treatment plan and pain score trends (see table).  Vitals:    10/21/24 0816   PainSc:   7   PainLoc: Back     I have reviewed the chart for potential of high risk medication and harmful drug interactions in the elderly.        Aspirin is on active medication list. Aspirin use is not indicated based on review of current medical condition/s. Risk of harm outweighs potential benefits. Patient instructed to discontinue this medication.  .      Patient Active Problem List   Diagnosis    Cervical pain    Prediabetes    Postablative hypothyroidism    Attention deficit hyperactivity disorder, predominantly inattentive type    Allergic rhinitis    Idiopathic scoliosis of thoracolumbar region    Lumbar back pain with radiculopathy affecting right lower extremity    Spinal stenosis, thoracolumbar region    MELISSA on CPAP    Essential hypertension    DDD (degenerative disc disease), lumbar    Mixed hyperlipidemia    Fibromyalgia    Anxiety    Vitamin D deficiency    Graves disease    Primary osteoarthritis of right knee    Rheumatoid arthritis    Neuropathy    Stage 3a chronic kidney disease    Loss of height    Spondylosis of cervical region without myelopathy or radiculopathy     Advance Care Planning Advance Directive is not on file.  ACP discussion was held with the patient during this visit. Patient does not have an advance directive, information provided.            Objective   Vitals:    10/21/24 0816   BP: 126/70   BP Location: Left arm   Patient Position: Sitting   Cuff Size: Adult   Pulse: 60   Temp: 98.6 °F (37 °C)   TempSrc: Temporal   Weight: 54.8 kg (120 lb 12.8 oz)   Height: 163.5 cm (64.37\")   PainSc:   7   PainLoc: Back       Estimated body mass index is 20.5 kg/m² as calculated from the following:    Height as of this encounter: 163.5 cm (64.37\").    Weight as of this " encounter: 54.8 kg (120 lb 12.8 oz).    BMI is within normal parameters. No other follow-up for BMI required.      Gait and Balance Evaluation:  Normal  Does the patient have evidence of cognitive impairment? No                                                                                                Health  Risk Assessment    Smoking Status:  Social History     Tobacco Use   Smoking Status Former    Current packs/day: 0.00    Average packs/day: 0.3 packs/day for 1.2 years (0.3 ttl pk-yrs)    Types: Cigarettes    Start date: 1979    Quit date: 1980    Years since quittin.8   Smokeless Tobacco Never   Tobacco Comments    social smoker in college     Alcohol Consumption:  Social History     Substance and Sexual Activity   Alcohol Use Not Currently    Alcohol/week: 0.0 - 1.0 standard drinks of alcohol    Comment: occasional glass of wine or cocktail w/dinner 2-4 x month       Fall Risk Screen  RUDYADI Fall Risk Assessment was completed, and patient is at MODERATE risk for falls. Assessment completed on:10/21/2024    Depression Screening:      10/21/2024     8:23 AM   PHQ-2/PHQ-9 Depression Screening   Little interest or pleasure in doing things Not at all   Feeling down, depressed, or hopeless Several days   How difficult have these problems made it for you to do your work, take care of things at home, or get along with other people? Not difficult at all     Health Habits and Functional and Cognitive Screening:      10/18/2024     8:42 PM   Functional & Cognitive Status   Do you have difficulty preparing food and eating? Yes    Do you have difficulty bathing yourself, getting dressed or grooming yourself? No    Do you have difficulty using the toilet? No    Do you have difficulty moving around from place to place? Yes    Do you have trouble with steps or getting out of a bed or a chair? Yes    Current Diet Well Balanced Diet    Dental Exam Not up to date    Eye Exam Up to date    Exercise (times per  week) 6 times per week    Current Exercises Include Home Fitness Gym;Light Weights    Do you need help using the phone?  No    Are you deaf or do you have serious difficulty hearing?  No    Do you need help to go to places out of walking distance? No    Do you need help shopping? Yes    Do you need help preparing meals?  No    Do you need help with housework?  Yes    Do you need help with laundry? No    Do you need help taking your medications? No    Do you need help managing money? No    Do you ever drive or ride in a car without wearing a seat belt? No    Have you felt unusual stress, anger or loneliness in the last month? Yes    Who do you live with? Child    If you need help, do you have trouble finding someone available to you? No    Have you been bothered in the last four weeks by sexual problems? No    Do you have difficulty concentrating, remembering or making decisions? Yes        Patient-reported   Visual Acuity:  Vision Screening    Right eye Left eye Both eyes   Without correction      With correction 20/30 20/40 20/30     Age-appropriate Screening Schedule:  Refer to the list below for future screening recommendations based on patient's age, sex and/or medical conditions. Orders for these recommended tests are listed in the plan section. The patient has been provided with a written plan.    Health Maintenance List  Health Maintenance   Topic Date Due    LIPID PANEL  07/31/2024    COVID-19 Vaccine (5 - 2023-24 season) 09/01/2024    MAMMOGRAM  09/08/2025    ANNUAL WELLNESS VISIT  10/21/2025    PAP SMEAR  12/19/2025    COLORECTAL CANCER SCREENING  01/01/2028    TDAP/TD VACCINES (3 - Td or Tdap) 08/10/2028    HEPATITIS C SCREENING  Completed    INFLUENZA VACCINE  Completed    ZOSTER VACCINE  Completed    Pneumococcal Vaccine 0-64  Aged Out                                                                                                                                                CMS Preventative Services  Quick Reference  Risk Factors Identified During Encounter  Chronic Pain: Natural history and expected course discussed. Questions answered.  Follow up neuro surgery and pain management  Immunizations Discussed/Encouraged: Influenza, Prevnar 20 (Pneumococcal 20-valent conjugate), COVID19, and RSV (Respiratory Syncytial Virus)    The above risks/problems have been discussed with the patient.  Pertinent information has been shared with the patient in the After Visit Summary.  An After Visit Summary and PPPS were made available to the patient.    Follow Up:   Next Medicare Wellness visit to be scheduled in 1 year.         Additional E&M Note during same encounter follows:  Patient has additional, significant, and separately identifiable condition(s)/problem(s) that require work above and beyond the Medicare Wellness Visit     Chief Complaint  Welcome To Medicare    Subjective   HPI  Huma is also being seen today for an annual adult preventative physical exam.  and Huma is also being seen today for additional medical problem/s.        Patient has establish care with neurosurgeon and recently did MRI of her neck, thoracic spine and lumbar spine, results not on file.  She is planned for follow-up.  She also continues to follow-up with pain management and her pain is overall not well-controlled.  She did have physical therapy a while back, and is considering new round depending on what the neurosurgeon will recommend.    Her abdominal discomfort has now resolved.  She no longer takes MiraLAX, but she takes about half a tablet of Dulcolax every night, also takes fiber.  She has been able to maintain her weight and no more weight loss at this time.    She tells me her rheumatic symptoms have improved after starting methotrexate earlier this year.    She also has establish care with Dr. Ponce at [facility], she requested referral for me recently.    Objective   Vital Signs:  /70 (BP Location: Left arm, Patient  "Position: Sitting, Cuff Size: Adult)   Pulse 60   Temp 98.6 °F (37 °C) (Temporal)   Ht 163.5 cm (64.37\")   Wt 54.8 kg (120 lb 12.8 oz)   BMI 20.50 kg/m²   Physical Exam  Vitals reviewed.   Constitutional:       Appearance: Normal appearance.   HENT:      Head: Normocephalic and atraumatic.      Right Ear: Tympanic membrane, ear canal and external ear normal. There is no impacted cerumen.      Left Ear: Tympanic membrane, ear canal and external ear normal. There is no impacted cerumen.      Nose: Nose normal. No congestion.      Mouth/Throat:      Mouth: Mucous membranes are moist.      Pharynx: Oropharynx is clear.   Eyes:      Extraocular Movements: Extraocular movements intact.      Conjunctiva/sclera: Conjunctivae normal.   Cardiovascular:      Rate and Rhythm: Normal rate and regular rhythm.      Heart sounds: Normal heart sounds. No murmur heard.  Pulmonary:      Effort: Pulmonary effort is normal.      Breath sounds: Normal breath sounds.   Abdominal:      General: There is no distension.      Palpations: Abdomen is soft. There is no mass.      Tenderness: There is no abdominal tenderness.   Musculoskeletal:         General: No swelling.      Cervical back: Neck supple.   Skin:     General: Skin is warm and dry.   Neurological:      Mental Status: She is alert and oriented to person, place, and time. Mental status is at baseline.      Motor: No weakness.      Gait: Gait normal.      Comments: Gets onto and off the exam table without assistance.   Psychiatric:         Behavior: Behavior normal.         Thought Content: Thought content normal.                 Assessment and Plan       1. Welcome to Medicare preventive visit  2. Well adult exam  Counseled on recommended vaccines.  Due to her immunosuppressed status on methotrexate I recommend getting pneumococcal vaccine now.  I would consider getting it again in 5 years.  Counseled on recommendations for annual mammogram, patient would like orders for this " year.    3. Mixed hyperlipidemia  Patient is due for recheck of her lipids, she is not fasting today and will go to the lab within a few days fasting.  Continue on atorvastatin 80 mg for now.  - Lipid Panel; Future    4. Prediabetes  Hemoglobin A1C   Date Value Ref Range Status   05/24/2024 6.00 (H) 4.80 - 5.60 % Final   08/25/2023 6.00 (H) 4.80 - 5.60 % Final   07/18/2023 5.9 % Final   04/18/2023 6.4 % Final   10/05/2022 6.00 (H) 4.80 - 5.60 % Final   Has been stable.  Will recheck on blood work now.  Follow-up in 6 months  - Hemoglobin A1c; Future    5. Postablative hypothyroidism  Has been stable.  Will check thyroid now.  Recheck 6 months  - TSH; Future  - T4, Free; Future    6. Lumbar back pain with radiculopathy affecting right lower extremity  Somewhat worsening symptoms over time.  Continue follow-up with pain management and neurosurgery    7. Primary osteoarthritis of right knee  Follow-up with Dr. Ponce.  Has gotten worse over time    8. Rheumatoid arthritis involving multiple sites, unspecified whether rheumatoid factor present  improvement after starting methotrexate.  Continue follow-up with rheumatology  - folic acid (FOLVITE) 1 MG tablet; Take 1 tablet by mouth Daily.  - methotrexate 2.5 MG tablet; Take 6 tablets by mouth 1 (One) Time Per Week.    9. Weight loss  10. Epigastric abdominal pain  Weight loss no longer concern, currently stable.  Abdominal discomfort resolved.  Suspect in setting of constipation now resolved    11. Need for pneumococcal 20-valent conjugate vaccination  Administered today  - Pneumococcal Conjugate Vaccine 20-Valent All    12. Need for influenza vaccination  Administered today  - Fluzone >6mos    13. Encounter for screening mammogram for malignant neoplasm of breast  Patient is due for annual screening  - Mammo Screening Digital Tomosynthesis Bilateral With CAD; Future            Welcome to Medicare preventive visit    Well adult exam    Mixed hyperlipidemia      Prediabetes    Postablative hypothyroidism    Lumbar back pain with radiculopathy affecting right lower extremity    Primary osteoarthritis of right knee    Rheumatoid arthritis involving multiple sites, unspecified whether rheumatoid factor present    Weight loss    Epigastric abdominal pain    Need for pneumococcal 20-valent conjugate vaccination    Need for influenza vaccination    Encounter for screening mammogram for malignant neoplasm of breast      Orders Placed This Encounter   Procedures    Mammo Screening Digital Tomosynthesis Bilateral With CAD     Standing Status:   Future     Standing Expiration Date:   10/21/2025     Order Specific Question:   Reason for Exam:     Answer:   Annual screening     Order Specific Question:   Release to patient     Answer:   Routine Release [[id number]]    Fluzone >6mos    Pneumococcal Conjugate Vaccine 20-Valent All    Hemoglobin A1c     Standing Status:   Future     Standing Expiration Date:   10/21/2025     Order Specific Question:   Release to patient     Answer:   Routine Release [[id number]]    Lipid Panel     Standing Status:   Future     Standing Expiration Date:   10/21/2025     Order Specific Question:   Release to patient     Answer:   Routine Release [[id number]]    TSH     Standing Status:   Future     Standing Expiration Date:   10/21/2025     Order Specific Question:   Release to patient     Answer:   Routine Release [[id number]]    T4, Free     Standing Status:   Future     Standing Expiration Date:   10/21/2025     Order Specific Question:   Release to patient     Answer:   Routine Release [[id number]]             Follow Up   Return in about 6 months (around 4/21/2025) for Recheck.    Future Appointments         Provider Department Center    12/18/2024 3:15 PM (Arrive by 2:45 PM) Lefty Lawrence MD [facility] GASTROENTEROLOGY [place]    4/21/2025 9:00 AM Morgan Wong MD [facility] INTERNAL MEDICINE [place]               Patient was given instructions and counseling regarding her condition or for health maintenance advice. Please see specific information pulled into the AVS if appropriate.    Morgan Wong MD     Breath sounds clear and equal bilaterally.

## 2024-11-29 NOTE — ED ADULT NURSE NOTE - NS ED BHA MSE SPEECH RATE
Dr. Lira has discussed and recommended the following surgical procedure(s):       Surgery scheduling requirements include:    Procedure: Bilateral Stab Phlebectomies    Facility: [facility] [place]    Admission Type: Outpatient Surgery    Time Needed: 90 min    Anesthesia: General    Surgical Assist: yes, SA    Co-Surgeon: no    Special Equipment: None      Pre-Op H&P: Will do at facility    Pre-Op labs: None    Schedule post-op appointment: Reception to schedule after discharge      Patient Prep Instructions: No bowel prep needed    Additional Instructions:     - none      Facility Orders:  - Thigh length [facility]  
Heather Philippe     date for vein surgery [facility]?   Received: Today   Message Contents   Whitley Lira MD             Patient would like to schedule her vein surgery and she would like to schedule on either Monday October 23rd or October 30th at [facility].     Thank you!        
Scheduled procedure with Patient at [phone]   Scheduled Via: Case Creation for [facility], case # [id number]  Patient prefers na facility rather than the doctor's preferred facility  Procedure date: 10/25/17  Procedure time: 7:30am arriving at 6:30am   Rep Contacted?: n/a  [place] Program code: [phone]    phone?:Yes   email?:No  Entered into MD's [place]? n/a  Insurance confirmed as R, will be the same at time of procedure?: Yes  Insurance Accepted at Facility? Yes    The following have been confirmed:  Latex Allergy No  Diabetic No  Sleep Apnea No  Diuretic/Water pill No  Defibrillator/Pacemaker No  MRSA hx No  Blood thinners: Coumadin (Wafarin) or Plavix No      Aspirin No      Phentermine (diet pill) No  Pre-Op testing required n/a, Patient informed NA  Prep required? No; Briefly reviewed? n/a; Prep cost range discussed? n/a  If procedure is scheduled 7 days or less, patient was told to  prep letter?: n/a      
Sent out letter confirming.   
Satisfactory
Normal

## 2025-01-01 NOTE — BH PATIENT PROFILE - FUNCTIONAL SCREEN CURRENT LEVEL: COMMUNICATION, MLM
0 = understands/communicates without difficulty Dr. Haynes:  I have personally performed a face to face bedside history and physical examination of this patient. I have discussed the history, examination, review of systems, assessment and plan of management with the resident. I have reviewed the electronic medical record and amended it to reflect my history, review of systems, physical exam, assessment and plan.    55F h/o HTN, DM, presents with gradual onset headache.  Feels similar to previous headaches.  Attributes it to her elevated BP, took her meds at home this morning.  Denies fever/chills, cp, sob, n/v/d, changes in vision.    Exam:  - well appearing  - rrr  - ctab   -abd soft ntnd  - no focal neuro deficits    A/P  - HA, no red flags on H&P  - cbc, cmp, meds & reassess

## 2025-03-22 NOTE — ED ADULT NURSE NOTE - NS_NURSE_OTHER_FACILTIY_ED_ALL_ED_FT
Care Transitions Program opened due to a patient initiated call from the CT Queue Line.    Patient called queue line with questions regarding when she was given pain and nausea medication this morning. Asked writer to leave a detailed VM if she did not answer. Writer indicated on VM that she got Fentanyl around 11:29AM and Zofran around 11:44AM. She was appreciative of assistance.   [facility]

## 2025-05-27 NOTE — BH PATIENT PROFILE - FALL HARM RISK - CONCLUSION
Future Appointments   Date Time Provider Department Center   5/30/2025 10:20 AM Robert Ricks, APRN - CNP [facility] DEP        Universal Safety Interventions

## 2025-05-29 NOTE — BH INPATIENT PSYCHIATRY PROGRESS NOTE - NSBHLEGALSTATUSCHANGE_PSY_ALL_CORE
No
Detail Level: Zone
No
Detail Level: Generalized
Detail Level: Simple
No
Detail Level: Detailed
No

## 2025-06-11 NOTE — BH PATIENT PROFILE - FUNCTIONAL ASSESSMENT - DAILY ACTIVITY 4.
Vital Signs Last 24 Hrs  T(C): 36.7 (11 Jun 2025 22:38), Max: 36.9 (11 Jun 2025 17:01)  T(F): 98 (11 Jun 2025 22:38), Max: 98.4 (11 Jun 2025 17:01)  HR: 100 (11 Jun 2025 22:38) (97 - 115)  BP: 179/92 (11 Jun 2025 22:38) (169/84 - 194/95)  BP(mean): --  RR: 19 (11 Jun 2025 22:38) (17 - 19)  SpO2: 99% (11 Jun 2025 22:38) (96% - 99%)    Parameters below as of 11 Jun 2025 22:38  Patient On (Oxygen Delivery Method): room air        CONSTITUTIONAL: Well-groomed, in no apparent distress  EYES: No conjunctival or scleral injection, non-icteric  ENMT: No external nasal lesions; MMM  RESPIRATORY: Breathing comfortably; lungs CTA without wheeze/rhonchi/rales  CARDIOVASCULAR: +S1S2, RRR; BLE 2+ pitting edema   GASTROINTESTINAL: No tenderness, +BS throughout, no rebound/guarding  NEUROLOGIC: No gross focal neurological deficits, AAOX3  PSYCHIATRIC: mood and affect appropriate; appropriate insight and judgment 4 = No assist / stand by assistance